# Patient Record
Sex: FEMALE | Race: OTHER | Employment: OTHER | URBAN - METROPOLITAN AREA
[De-identification: names, ages, dates, MRNs, and addresses within clinical notes are randomized per-mention and may not be internally consistent; named-entity substitution may affect disease eponyms.]

---

## 2019-11-04 ENCOUNTER — HOSPITAL ENCOUNTER (EMERGENCY)
Age: 54
Discharge: HOME | End: 2019-11-04
Attending: EMERGENCY MEDICINE
Payer: MEDICAID

## 2019-11-04 VITALS
TEMPERATURE: 98 F | HEIGHT: 67 IN | DIASTOLIC BLOOD PRESSURE: 71 MMHG | HEART RATE: 74 BPM | BODY MASS INDEX: 37.83 KG/M2 | SYSTOLIC BLOOD PRESSURE: 138 MMHG | WEIGHT: 241 LBS | RESPIRATION RATE: 16 BRPM | OXYGEN SATURATION: 98 %

## 2019-11-04 DIAGNOSIS — C54.1 ENDOMETRIAL CANCER (HCC): ICD-10-CM

## 2019-11-04 DIAGNOSIS — N93.9 VAGINAL BLEEDING: Primary | ICD-10-CM

## 2019-11-04 LAB
ALBUMIN SERPL-MCNC: 3.4 G/DL (ref 3.5–5)
ALBUMIN/GLOB SERPL: 0.9 {RATIO} (ref 1.2–3.5)
ALP SERPL-CCNC: 168 U/L (ref 50–130)
ALT SERPL-CCNC: 23 U/L (ref 12–65)
ANION GAP SERPL CALC-SCNC: 5 MMOL/L (ref 7–16)
APTT PPP: 27.9 SEC (ref 24.7–39.8)
AST SERPL-CCNC: 24 U/L (ref 15–37)
BASOPHILS # BLD: 0 K/UL (ref 0–0.2)
BASOPHILS NFR BLD: 0 % (ref 0–2)
BILIRUB SERPL-MCNC: 0.6 MG/DL (ref 0.2–1.1)
BUN SERPL-MCNC: 15 MG/DL (ref 6–23)
CALCIUM SERPL-MCNC: 8.8 MG/DL (ref 8.3–10.4)
CHLORIDE SERPL-SCNC: 108 MMOL/L (ref 98–107)
CO2 SERPL-SCNC: 28 MMOL/L (ref 21–32)
CREAT SERPL-MCNC: 0.84 MG/DL (ref 0.6–1)
DIFFERENTIAL METHOD BLD: ABNORMAL
EOSINOPHIL # BLD: 0.5 K/UL (ref 0–0.8)
EOSINOPHIL NFR BLD: 6 % (ref 0.5–7.8)
ERYTHROCYTE [DISTWIDTH] IN BLOOD BY AUTOMATED COUNT: 15.2 % (ref 11.9–14.6)
GLOBULIN SER CALC-MCNC: 3.7 G/DL (ref 2.3–3.5)
GLUCOSE SERPL-MCNC: 101 MG/DL (ref 65–100)
HCT VFR BLD AUTO: 29.3 % (ref 35.8–46.3)
HGB BLD-MCNC: 8.6 G/DL (ref 11.7–15.4)
IMM GRANULOCYTES # BLD AUTO: 0 K/UL (ref 0–0.5)
IMM GRANULOCYTES NFR BLD AUTO: 0 % (ref 0–5)
INR PPP: 1
LYMPHOCYTES # BLD: 2.5 K/UL (ref 0.5–4.6)
LYMPHOCYTES NFR BLD: 34 % (ref 13–44)
MAGNESIUM SERPL-MCNC: 2.2 MG/DL (ref 1.8–2.4)
MCH RBC QN AUTO: 22.6 PG (ref 26.1–32.9)
MCHC RBC AUTO-ENTMCNC: 29.4 G/DL (ref 31.4–35)
MCV RBC AUTO: 76.9 FL (ref 79.6–97.8)
MONOCYTES # BLD: 0.7 K/UL (ref 0.1–1.3)
MONOCYTES NFR BLD: 10 % (ref 4–12)
NEUTS SEG # BLD: 3.7 K/UL (ref 1.7–8.2)
NEUTS SEG NFR BLD: 50 % (ref 43–78)
NRBC # BLD: 0 K/UL (ref 0–0.2)
PLATELET # BLD AUTO: 230 K/UL (ref 150–450)
PMV BLD AUTO: 11 FL (ref 9.4–12.3)
POTASSIUM SERPL-SCNC: 4 MMOL/L (ref 3.5–5.1)
PROT SERPL-MCNC: 7.1 G/DL (ref 6.3–8.2)
PROTHROMBIN TIME: 13.8 SEC (ref 11.7–14.5)
RBC # BLD AUTO: 3.81 M/UL (ref 4.05–5.2)
SODIUM SERPL-SCNC: 141 MMOL/L (ref 136–145)
WBC # BLD AUTO: 7.4 K/UL (ref 4.3–11.1)

## 2019-11-04 PROCEDURE — 85025 COMPLETE CBC W/AUTO DIFF WBC: CPT

## 2019-11-04 PROCEDURE — 81003 URINALYSIS AUTO W/O SCOPE: CPT | Performed by: EMERGENCY MEDICINE

## 2019-11-04 PROCEDURE — 85730 THROMBOPLASTIN TIME PARTIAL: CPT

## 2019-11-04 PROCEDURE — 99284 EMERGENCY DEPT VISIT MOD MDM: CPT | Performed by: EMERGENCY MEDICINE

## 2019-11-04 PROCEDURE — 83735 ASSAY OF MAGNESIUM: CPT

## 2019-11-04 PROCEDURE — 85610 PROTHROMBIN TIME: CPT

## 2019-11-04 PROCEDURE — 80053 COMPREHEN METABOLIC PANEL: CPT

## 2019-11-04 RX ORDER — TRANEXAMIC ACID 650 1/1
1300 TABLET ORAL 3 TIMES DAILY
Qty: 30 TAB | Refills: 0 | Status: SHIPPED | OUTPATIENT
Start: 2019-11-04 | End: 2019-11-09

## 2019-11-04 RX ORDER — MEDROXYPROGESTERONE ACETATE 10 MG/1
20 TABLET ORAL DAILY
Qty: 10 TAB | Refills: 0 | Status: SHIPPED | OUTPATIENT
Start: 2019-11-04 | End: 2019-11-08 | Stop reason: SDUPTHER

## 2019-11-04 NOTE — ED NOTES
I have reviewed discharge instructions with the patient. The patient verbalized understanding. Patient left ED via Discharge Method: ambulatory to Home with family. Opportunity for questions and clarification provided. Patient given 2 scripts. To continue your aftercare when you leave the hospital, you may receive an automated call from our care team to check in on how you are doing. This is a free service and part of our promise to provide the best care and service to meet your aftercare needs.  If you have questions, or wish to unsubscribe from this service please call [phone]. Thank you for Choosing our [facility].

## 2019-11-04 NOTE — ED PROVIDER NOTES
Patient presents emerged from complaining of heavy vaginal bleeding lightheadedness and crampy pelvic pain. Patient was recently seen at a facility in another state, [place], but is moving to [place]. She is scheduled to see a gynecological oncologist, Dr. Rosie Greco, this coming Friday as her endometrial biopsy was positive for cancer. She also is noted to have fibroids. She has been bleeding for several weeks and was on Provera prior to having a D&C done last week. She is concerned because the bleeding has not slowed down. She has been taking aspirin and naproxen for chronic back pain she has Ultram for other pain management states she has not been taking that. The history is provided by the patient. Vaginal Bleeding   This is a new problem. The current episode started more than 1 week ago. The problem occurs constantly. The problem has not changed since onset. Nothing aggravates the symptoms. Nothing relieves the symptoms. She has tried nothing for the symptoms. The treatment provided no relief. No past medical history on file. No past surgical history on file. No family history on file.     Social History     Socioeconomic History    Marital status: SINGLE     Spouse name: Not on file    Number of children: Not on file    Years of education: Not on file    Highest education level: Not on file   Occupational History    Not on file   Social Needs    Financial resource strain: Not on file    Food insecurity:     Worry: Not on file     Inability: Not on file    Transportation needs:     Medical: Not on file     Non-medical: Not on file   Tobacco Use    Smoking status: Not on file   Substance and Sexual Activity    Alcohol use: Not on file    Drug use: Not on file    Sexual activity: Not on file   Lifestyle    Physical activity:     Days per week: Not on file     Minutes per session: Not on file    Stress: Not on file   Relationships    Social connections:     Talks on phone: Not on file     Gets together: Not on file     Attends Advent service: Not on file     Active member of club or organization: Not on file     Attends meetings of clubs or organizations: Not on file     Relationship status: Not on file    Intimate partner violence:     Fear of current or ex partner: Not on file     Emotionally abused: Not on file     Physically abused: Not on file     Forced sexual activity: Not on file   Other Topics Concern    Not on file   Social History Narrative    Not on file         ALLERGIES: Cardizem [diltiazem hcl]    Review of Systems   Genitourinary: Positive for vaginal bleeding. All other systems reviewed and are negative. Vitals:    11/04/19 1329   BP: (!) 169/106   Pulse: 83   Resp: 16   Temp: 98 °F (36.7 °C)   SpO2: 98%   Weight: 109.3 kg (241 lb)   Height: 5' 7\" (1.702 m)            Physical Exam   Constitutional: She is oriented to person, place, and time. She appears well-developed and well-nourished. HENT:   Head: Normocephalic and atraumatic. Eyes: Pupils are equal, round, and reactive to light. Conjunctivae and EOM are normal.   Neck: Normal range of motion. Cardiovascular: Normal rate and regular rhythm. Pulmonary/Chest: Effort normal and breath sounds normal.   Abdominal: Soft. Bowel sounds are normal.   Musculoskeletal: Normal range of motion. Neurological: She is alert and oriented to person, place, and time. Skin: Skin is warm and dry. Capillary refill takes less than 2 seconds. Psychiatric: She has a normal mood and affect. Her behavior is normal.   Nursing note and vitals reviewed.        MDM  Number of Diagnoses or Management Options     Amount and/or Complexity of Data Reviewed  Clinical lab tests: ordered and reviewed  Independent visualization of images, tracings, or specimens: yes    Risk of Complications, Morbidity, and/or Mortality  Presenting problems: moderate  Diagnostic procedures: moderate  Management options: moderate    Patient Progress  Patient progress: stable         Procedures

## 2019-11-04 NOTE — ED TRIAGE NOTES
Pt ambulatory to triage without complications. Pt states she had D&C last week for heavy bleeding. Pt also had US and states she has uterine fibroids. Pt has paper results with her today. Pt states they did biopsy and states there was some CA found. Pt states she is to see an oncologist. Pt states she has been bleeding heavily since the [facility]  and was told that the bleeding would decrease after a few days. Pt states she is fully saturating 2-3 pads per hour since 2100 last night.  Pt also reports urinary frequency

## 2019-11-06 ENCOUNTER — HOSPITAL ENCOUNTER (OUTPATIENT)
Dept: LAB | Age: 54
Discharge: HOME | End: 2019-11-06
Payer: MEDICAID

## 2019-11-06 DIAGNOSIS — N93.9 VAGINAL BLEEDING: ICD-10-CM

## 2019-11-06 LAB
HCT VFR BLD AUTO: 29.5 % (ref 35.8–46.3)
HGB BLD-MCNC: 8.9 G/DL (ref 11.7–15.4)

## 2019-11-06 PROCEDURE — 85018 HEMOGLOBIN: CPT

## 2019-11-06 PROCEDURE — 36415 COLL VENOUS BLD VENIPUNCTURE: CPT

## 2019-11-08 ENCOUNTER — HOSPITAL ENCOUNTER (OUTPATIENT)
Dept: LAB | Age: 54
Discharge: HOME | End: 2019-11-08
Payer: MEDICAID

## 2019-11-08 DIAGNOSIS — N93.9 VAGINAL BLEEDING: ICD-10-CM

## 2019-11-08 PROBLEM — I10 ESSENTIAL HYPERTENSION: Status: ACTIVE | Noted: 2019-11-08

## 2019-11-08 PROBLEM — E66.01 OBESITY, MORBID (HCC): Status: ACTIVE | Noted: 2019-11-08

## 2019-11-08 PROBLEM — C54.1 ENDOMETRIAL CANCER (HCC): Status: ACTIVE | Noted: 2019-11-08

## 2019-11-08 PROBLEM — E05.90 HYPERTHYROIDISM: Status: ACTIVE | Noted: 2019-11-08

## 2019-11-08 PROBLEM — I48.20 CHRONIC ATRIAL FIBRILLATION (HCC): Status: ACTIVE | Noted: 2019-11-08

## 2019-11-08 LAB
HCT VFR BLD AUTO: 28.9 % (ref 35.8–46.3)
HGB BLD-MCNC: 8.7 G/DL (ref 11.7–15.4)

## 2019-11-08 PROCEDURE — 85018 HEMOGLOBIN: CPT

## 2019-11-08 PROCEDURE — 36415 COLL VENOUS BLD VENIPUNCTURE: CPT

## 2019-11-12 PROBLEM — K80.20 SYMPTOMATIC CHOLELITHIASIS: Status: ACTIVE | Noted: 2019-11-12

## 2019-11-22 ENCOUNTER — HOSPITAL ENCOUNTER (OUTPATIENT)
Dept: SURGERY | Age: 54
Discharge: HOME | End: 2019-11-22
Payer: COMMERCIAL

## 2019-11-22 VITALS
HEIGHT: 66 IN | TEMPERATURE: 98.1 F | OXYGEN SATURATION: 97 % | SYSTOLIC BLOOD PRESSURE: 160 MMHG | WEIGHT: 247 LBS | HEART RATE: 77 BPM | BODY MASS INDEX: 39.7 KG/M2 | DIASTOLIC BLOOD PRESSURE: 104 MMHG | RESPIRATION RATE: 16 BRPM

## 2019-11-22 LAB
CREAT SERPL-MCNC: 0.76 MG/DL (ref 0.6–1)
HGB BLD-MCNC: 9.9 G/DL (ref 11.7–15.4)
POTASSIUM SERPL-SCNC: 4.2 MMOL/L (ref 3.5–5.1)

## 2019-11-22 PROCEDURE — 82565 ASSAY OF CREATININE: CPT

## 2019-11-22 PROCEDURE — 85018 HEMOGLOBIN: CPT

## 2019-11-22 PROCEDURE — 84132 ASSAY OF SERUM POTASSIUM: CPT

## 2019-11-22 RX ORDER — BENZONATATE 100 MG/1
100 CAPSULE ORAL AS NEEDED
COMMUNITY
End: 2022-02-28 | Stop reason: ALTCHOICE

## 2019-11-22 RX ORDER — SODIUM CHLORIDE 0.9 % (FLUSH) 0.9 %
5-40 SYRINGE (ML) INJECTION EVERY 8 HOURS
Status: CANCELLED | OUTPATIENT
Start: 2019-11-22

## 2019-11-22 RX ORDER — ALBUTEROL SULFATE 90 UG/1
AEROSOL, METERED RESPIRATORY (INHALATION)
COMMUNITY
End: 2022-04-15 | Stop reason: SDUPTHER

## 2019-11-22 RX ORDER — SODIUM CHLORIDE 0.9 % (FLUSH) 0.9 %
5-40 SYRINGE (ML) INJECTION AS NEEDED
Status: CANCELLED | OUTPATIENT
Start: 2019-11-22

## 2019-11-22 NOTE — PERIOP NOTES
Attempted to contact Dr. Alexander Matos office to inform that pt will be continuing aspirin 81 mg-no answer.

## 2019-11-22 NOTE — PERIOP NOTES
Informed Dr. Wong Serum of pt has been instructed to hold aspirin 81 mg and cardiology has given clearance to hold. ststed wants pt to continue aspirin 81 mg . Pt instructed and verbalized understanding. Also informed him of pt has had an issue with difficult intubation.  Ok to proceed

## 2019-11-22 NOTE — PERIOP NOTES
Patient verified name and . Patient provided medical/health information and PTA medications to the best of their ability. TYPE  CASE:2  Order for consent not found in EHR at time of PAT   Labs per surgeon:none. Results: none  Labs per anesthesia protocol: hgb,potassium,creatinine,type and scree-dos. Results -  EKG  :      Patient provided with and instructed on education handouts including Guide to Surgery, blood transfusions, pain management, and hand hygiene for the family and community, and [facility] brochure.     hibiclens and instructions given per hospital policy. Instructed patient to continue previous medications as prescribed prior to surgery unless otherwise directed and to take the following medications the day of surgery according to anesthesia guidelines : medroprogesterone,methimazole,albuterol as needed,aspirin 81 mg . Instructed patient to hold  the following medications: vit d, naprosyn. Original medication prescription bottles none visualized during patient appointment. Patient teach back successful and patient demonstrates knowledge of instruction.

## 2019-11-24 ENCOUNTER — ANESTHESIA EVENT (OUTPATIENT)
Dept: SURGERY | Age: 54
End: 2019-11-24
Payer: MEDICAID

## 2019-11-26 ENCOUNTER — HOSPITAL ENCOUNTER (OUTPATIENT)
Age: 54
Discharge: HOME | End: 2019-11-27
Attending: OBSTETRICS & GYNECOLOGY | Admitting: OBSTETRICS & GYNECOLOGY
Payer: MEDICAID

## 2019-11-26 ENCOUNTER — ANESTHESIA (OUTPATIENT)
Dept: SURGERY | Age: 54
End: 2019-11-26
Payer: MEDICAID

## 2019-11-26 DIAGNOSIS — C54.1 ENDOMETRIAL CANCER (HCC): Primary | ICD-10-CM

## 2019-11-26 LAB
ABO + RH BLD: NORMAL
BLOOD GROUP ANTIBODIES SERPL: NORMAL
HCG UR QL: NEGATIVE
HCT VFR BLD AUTO: 37.5 % (ref 35.8–46.3)
HGB BLD-MCNC: 10.7 G/DL (ref 11.7–15.4)
SPECIMEN EXP DATE BLD: NORMAL

## 2019-11-26 PROCEDURE — 77030037088 HC TUBE ENDOTRACH ORAL NSL COVD-A: Performed by: ANESTHESIOLOGY

## 2019-11-26 PROCEDURE — 77030003029 HC SUT VCRL J&J -B: Performed by: OBSTETRICS & GYNECOLOGY

## 2019-11-26 PROCEDURE — 77030003028 HC SUT VCRL J&J -A: Performed by: OBSTETRICS & GYNECOLOGY

## 2019-11-26 PROCEDURE — 74011250636 HC RX REV CODE- 250/636: Performed by: ANESTHESIOLOGY

## 2019-11-26 PROCEDURE — 77030031139 HC SUT VCRL2 J&J -A: Performed by: OBSTETRICS & GYNECOLOGY

## 2019-11-26 PROCEDURE — 74011000250 HC RX REV CODE- 250: Performed by: NURSE ANESTHETIST, CERTIFIED REGISTERED

## 2019-11-26 PROCEDURE — 88331 PATH CONSLTJ SURG 1 BLK 1SPC: CPT

## 2019-11-26 PROCEDURE — 81025 URINE PREGNANCY TEST: CPT

## 2019-11-26 PROCEDURE — 77030027138 HC INCENT SPIROMETER -A

## 2019-11-26 PROCEDURE — 94640 AIRWAY INHALATION TREATMENT: CPT

## 2019-11-26 PROCEDURE — 77030018875 HC APPL CLP LIG4 J&J -B: Performed by: OBSTETRICS & GYNECOLOGY

## 2019-11-26 PROCEDURE — 87521 HEPATITIS C PROBE&RVRS TRNSC: CPT

## 2019-11-26 PROCEDURE — 77030010545: Performed by: OBSTETRICS & GYNECOLOGY

## 2019-11-26 PROCEDURE — 77030027744 HC PWDR HEMSTAT ARISTA ABSRB 5GM BARD -D: Performed by: OBSTETRICS & GYNECOLOGY

## 2019-11-26 PROCEDURE — 77030009852 HC PCH RTVR ENDOSC COVD -B: Performed by: OBSTETRICS & GYNECOLOGY

## 2019-11-26 PROCEDURE — 74011000250 HC RX REV CODE- 250: Performed by: OBSTETRICS & GYNECOLOGY

## 2019-11-26 PROCEDURE — 76010000879 HC OR TIME 3.5 TO 4HR INTENSV - TIER 2: Performed by: OBSTETRICS & GYNECOLOGY

## 2019-11-26 PROCEDURE — 77030037892: Performed by: OBSTETRICS & GYNECOLOGY

## 2019-11-26 PROCEDURE — 77030027743 HC APPL F/HEMSTAT BARD -B: Performed by: OBSTETRICS & GYNECOLOGY

## 2019-11-26 PROCEDURE — 77030020703 HC SEAL CANN DISP INTU -B: Performed by: OBSTETRICS & GYNECOLOGY

## 2019-11-26 PROCEDURE — 77030039425 HC BLD LARYNG TRULITE DISP TELE -A: Performed by: ANESTHESIOLOGY

## 2019-11-26 PROCEDURE — 77010033678 HC OXYGEN DAILY

## 2019-11-26 PROCEDURE — 77030022704 HC SUT VLOC COVD -B: Performed by: OBSTETRICS & GYNECOLOGY

## 2019-11-26 PROCEDURE — 77030040361 HC SLV COMPR DVT MDII -B: Performed by: OBSTETRICS & GYNECOLOGY

## 2019-11-26 PROCEDURE — 88305 TISSUE EXAM BY PATHOLOGIST: CPT

## 2019-11-26 PROCEDURE — 77030021678 HC GLIDESCP STAT DISP VERT -B: Performed by: ANESTHESIOLOGY

## 2019-11-26 PROCEDURE — 77030000038 HC TIP SCIS LAPSCP SURI -B: Performed by: OBSTETRICS & GYNECOLOGY

## 2019-11-26 PROCEDURE — 77030016151 HC PROTCTR LNS DFOG COVD -B: Performed by: OBSTETRICS & GYNECOLOGY

## 2019-11-26 PROCEDURE — 77030003575 HC NDL INSUF ENDOPTH J&J -B: Performed by: OBSTETRICS & GYNECOLOGY

## 2019-11-26 PROCEDURE — 88304 TISSUE EXAM BY PATHOLOGIST: CPT

## 2019-11-26 PROCEDURE — 85018 HEMOGLOBIN: CPT

## 2019-11-26 PROCEDURE — 77030039266 HC ADH SKN EXOFIN S2SG -A: Performed by: OBSTETRICS & GYNECOLOGY

## 2019-11-26 PROCEDURE — 74011250636 HC RX REV CODE- 250/636: Performed by: NURSE ANESTHETIST, CERTIFIED REGISTERED

## 2019-11-26 PROCEDURE — 77030021158 HC TRCR BLN GELPRT AMR -B: Performed by: OBSTETRICS & GYNECOLOGY

## 2019-11-26 PROCEDURE — 86900 BLOOD TYPING SEROLOGIC ABO: CPT

## 2019-11-26 PROCEDURE — 87340 HEPATITIS B SURFACE AG IA: CPT

## 2019-11-26 PROCEDURE — 74011250637 HC RX REV CODE- 250/637: Performed by: OBSTETRICS & GYNECOLOGY

## 2019-11-26 PROCEDURE — 74011250636 HC RX REV CODE- 250/636: Performed by: OBSTETRICS & GYNECOLOGY

## 2019-11-26 PROCEDURE — 77030012770 HC TRCR OPT FX AMR -B: Performed by: OBSTETRICS & GYNECOLOGY

## 2019-11-26 PROCEDURE — 88307 TISSUE EXAM BY PATHOLOGIST: CPT

## 2019-11-26 PROCEDURE — 74011250637 HC RX REV CODE- 250/637: Performed by: ANESTHESIOLOGY

## 2019-11-26 PROCEDURE — 77030008756 HC TU IRR SUC STRY -B: Performed by: OBSTETRICS & GYNECOLOGY

## 2019-11-26 PROCEDURE — 88112 CYTOPATH CELL ENHANCE TECH: CPT

## 2019-11-26 PROCEDURE — 76060000038 HC ANESTHESIA 3.5 TO 4 HR: Performed by: OBSTETRICS & GYNECOLOGY

## 2019-11-26 PROCEDURE — 77030019908 HC STETH ESOPH SIMS -A: Performed by: NURSE ANESTHETIST, CERTIFIED REGISTERED

## 2019-11-26 PROCEDURE — 94760 N-INVAS EAR/PLS OXIMETRY 1: CPT

## 2019-11-26 PROCEDURE — 76210000006 HC OR PH I REC 0.5 TO 1 HR: Performed by: OBSTETRICS & GYNECOLOGY

## 2019-11-26 PROCEDURE — 74011250636 HC RX REV CODE- 250/636: Performed by: SURGERY

## 2019-11-26 PROCEDURE — 77030035277 HC OBTRTR BLDELSS DISP INTU -B: Performed by: OBSTETRICS & GYNECOLOGY

## 2019-11-26 PROCEDURE — 77030005518 HC CATH URETH FOL 2W BARD -B: Performed by: OBSTETRICS & GYNECOLOGY

## 2019-11-26 PROCEDURE — 77030018846 HC SOL IRR STRL H20 ICUM -A: Performed by: OBSTETRICS & GYNECOLOGY

## 2019-11-26 PROCEDURE — 77030018836 HC SOL IRR NACL ICUM -A: Performed by: OBSTETRICS & GYNECOLOGY

## 2019-11-26 PROCEDURE — 77030008522 HC TBNG INSUF LAPRO STRY -B: Performed by: OBSTETRICS & GYNECOLOGY

## 2019-11-26 PROCEDURE — 77030018778 HC MANIP UTER VCAR CNMD -B: Performed by: OBSTETRICS & GYNECOLOGY

## 2019-11-26 RX ORDER — ONDANSETRON 4 MG/1
4 TABLET, ORALLY DISINTEGRATING ORAL
Qty: 5 TAB | Refills: 2 | Status: SHIPPED | OUTPATIENT
Start: 2019-11-26 | End: 2019-12-12 | Stop reason: ALTCHOICE

## 2019-11-26 RX ORDER — ALBUTEROL SULFATE 0.83 MG/ML
2.5 SOLUTION RESPIRATORY (INHALATION)
Status: DISCONTINUED | OUTPATIENT
Start: 2019-11-26 | End: 2019-11-27 | Stop reason: HOSPADM

## 2019-11-26 RX ORDER — NALOXONE HYDROCHLORIDE 0.4 MG/ML
0.4 INJECTION, SOLUTION INTRAMUSCULAR; INTRAVENOUS; SUBCUTANEOUS AS NEEDED
Status: DISCONTINUED | OUTPATIENT
Start: 2019-11-26 | End: 2019-11-27 | Stop reason: HOSPADM

## 2019-11-26 RX ORDER — NEOSTIGMINE METHYLSULFATE 1 MG/ML
INJECTION, SOLUTION INTRAVENOUS AS NEEDED
Status: DISCONTINUED | OUTPATIENT
Start: 2019-11-26 | End: 2019-11-26 | Stop reason: HOSPADM

## 2019-11-26 RX ORDER — FLUMAZENIL 0.1 MG/ML
0.2 INJECTION INTRAVENOUS AS NEEDED
Status: DISCONTINUED | OUTPATIENT
Start: 2019-11-26 | End: 2019-11-26 | Stop reason: HOSPADM

## 2019-11-26 RX ORDER — IBUPROFEN 200 MG
200 TABLET ORAL
Status: DISCONTINUED | OUTPATIENT
Start: 2019-11-26 | End: 2019-11-27 | Stop reason: HOSPADM

## 2019-11-26 RX ORDER — SODIUM CHLORIDE 0.9 % (FLUSH) 0.9 %
5-40 SYRINGE (ML) INJECTION EVERY 8 HOURS
Status: DISCONTINUED | OUTPATIENT
Start: 2019-11-26 | End: 2019-11-27 | Stop reason: HOSPADM

## 2019-11-26 RX ORDER — OXYCODONE HYDROCHLORIDE 5 MG/1
5 TABLET ORAL
Status: DISCONTINUED | OUTPATIENT
Start: 2019-11-26 | End: 2019-11-27 | Stop reason: HOSPADM

## 2019-11-26 RX ORDER — FENTANYL CITRATE 50 UG/ML
INJECTION, SOLUTION INTRAMUSCULAR; INTRAVENOUS AS NEEDED
Status: DISCONTINUED | OUTPATIENT
Start: 2019-11-26 | End: 2019-11-26 | Stop reason: HOSPADM

## 2019-11-26 RX ORDER — SODIUM CHLORIDE 0.9 % (FLUSH) 0.9 %
5-40 SYRINGE (ML) INJECTION AS NEEDED
Status: DISCONTINUED | OUTPATIENT
Start: 2019-11-26 | End: 2019-11-26 | Stop reason: HOSPADM

## 2019-11-26 RX ORDER — OXYCODONE HYDROCHLORIDE 5 MG/1
5 TABLET ORAL
Status: DISCONTINUED | OUTPATIENT
Start: 2019-11-26 | End: 2019-11-26 | Stop reason: HOSPADM

## 2019-11-26 RX ORDER — ONDANSETRON 2 MG/ML
4 INJECTION INTRAMUSCULAR; INTRAVENOUS
Status: DISCONTINUED | OUTPATIENT
Start: 2019-11-26 | End: 2019-11-27 | Stop reason: HOSPADM

## 2019-11-26 RX ORDER — PANTOPRAZOLE SODIUM 40 MG/1
40 TABLET, DELAYED RELEASE ORAL
Status: DISCONTINUED | OUTPATIENT
Start: 2019-11-27 | End: 2019-11-27 | Stop reason: HOSPADM

## 2019-11-26 RX ORDER — SODIUM CHLORIDE 0.9 % (FLUSH) 0.9 %
5-40 SYRINGE (ML) INJECTION AS NEEDED
Status: DISCONTINUED | OUTPATIENT
Start: 2019-11-26 | End: 2019-11-27 | Stop reason: HOSPADM

## 2019-11-26 RX ORDER — GLYCOPYRROLATE 0.2 MG/ML
INJECTION INTRAMUSCULAR; INTRAVENOUS AS NEEDED
Status: DISCONTINUED | OUTPATIENT
Start: 2019-11-26 | End: 2019-11-26 | Stop reason: HOSPADM

## 2019-11-26 RX ORDER — SODIUM CHLORIDE, SODIUM LACTATE, POTASSIUM CHLORIDE, CALCIUM CHLORIDE 600; 310; 30; 20 MG/100ML; MG/100ML; MG/100ML; MG/100ML
INJECTION, SOLUTION INTRAVENOUS
Status: DISCONTINUED | OUTPATIENT
Start: 2019-11-26 | End: 2019-11-26 | Stop reason: HOSPADM

## 2019-11-26 RX ORDER — AMOXICILLIN 250 MG
1 CAPSULE ORAL DAILY
Qty: 30 TAB | Refills: 1 | Status: SHIPPED | OUTPATIENT
Start: 2019-11-26 | End: 2020-03-05

## 2019-11-26 RX ORDER — SODIUM CHLORIDE 0.9 % (FLUSH) 0.9 %
5-40 SYRINGE (ML) INJECTION EVERY 8 HOURS
Status: DISCONTINUED | OUTPATIENT
Start: 2019-11-26 | End: 2019-11-26 | Stop reason: HOSPADM

## 2019-11-26 RX ORDER — SODIUM CHLORIDE, SODIUM LACTATE, POTASSIUM CHLORIDE, CALCIUM CHLORIDE 600; 310; 30; 20 MG/100ML; MG/100ML; MG/100ML; MG/100ML
75 INJECTION, SOLUTION INTRAVENOUS CONTINUOUS
Status: DISCONTINUED | OUTPATIENT
Start: 2019-11-26 | End: 2019-11-26 | Stop reason: HOSPADM

## 2019-11-26 RX ORDER — ONDANSETRON 4 MG/1
4 TABLET, ORALLY DISINTEGRATING ORAL
Status: DISCONTINUED | OUTPATIENT
Start: 2019-11-26 | End: 2019-11-27 | Stop reason: HOSPADM

## 2019-11-26 RX ORDER — OXYCODONE HYDROCHLORIDE 5 MG/1
10 TABLET ORAL
Status: DISCONTINUED | OUTPATIENT
Start: 2019-11-26 | End: 2019-11-26 | Stop reason: HOSPADM

## 2019-11-26 RX ORDER — AMOXICILLIN 250 MG
2 CAPSULE ORAL DAILY
Status: DISCONTINUED | OUTPATIENT
Start: 2019-11-27 | End: 2019-11-27 | Stop reason: HOSPADM

## 2019-11-26 RX ORDER — CEFAZOLIN SODIUM/WATER 2 G/20 ML
2 SYRINGE (ML) INTRAVENOUS EVERY 8 HOURS
Status: COMPLETED | OUTPATIENT
Start: 2019-11-26 | End: 2019-11-27

## 2019-11-26 RX ORDER — ACETAMINOPHEN 10 MG/ML
1000 INJECTION, SOLUTION INTRAVENOUS ONCE
Status: COMPLETED | OUTPATIENT
Start: 2019-11-26 | End: 2019-11-26

## 2019-11-26 RX ORDER — BUPIVACAINE HYDROCHLORIDE 5 MG/ML
INJECTION, SOLUTION EPIDURAL; INTRACAUDAL AS NEEDED
Status: DISCONTINUED | OUTPATIENT
Start: 2019-11-26 | End: 2019-11-26 | Stop reason: HOSPADM

## 2019-11-26 RX ORDER — DIPHENHYDRAMINE HYDROCHLORIDE 50 MG/ML
12.5 INJECTION, SOLUTION INTRAMUSCULAR; INTRAVENOUS
Status: DISCONTINUED | OUTPATIENT
Start: 2019-11-26 | End: 2019-11-26 | Stop reason: HOSPADM

## 2019-11-26 RX ORDER — ESMOLOL HYDROCHLORIDE 10 MG/ML
INJECTION INTRAVENOUS AS NEEDED
Status: DISCONTINUED | OUTPATIENT
Start: 2019-11-26 | End: 2019-11-26 | Stop reason: HOSPADM

## 2019-11-26 RX ORDER — METHIMAZOLE 5 MG/1
10 TABLET ORAL 2 TIMES DAILY
Status: DISCONTINUED | OUTPATIENT
Start: 2019-11-26 | End: 2019-11-27 | Stop reason: HOSPADM

## 2019-11-26 RX ORDER — ROCURONIUM BROMIDE 10 MG/ML
INJECTION, SOLUTION INTRAVENOUS AS NEEDED
Status: DISCONTINUED | OUTPATIENT
Start: 2019-11-26 | End: 2019-11-26 | Stop reason: HOSPADM

## 2019-11-26 RX ORDER — CEFAZOLIN SODIUM/WATER 2 G/20 ML
2 SYRINGE (ML) INTRAVENOUS ONCE
Status: COMPLETED | OUTPATIENT
Start: 2019-11-26 | End: 2019-11-26

## 2019-11-26 RX ORDER — BENZONATATE 100 MG/1
100 CAPSULE ORAL
Status: DISCONTINUED | OUTPATIENT
Start: 2019-11-26 | End: 2019-11-27 | Stop reason: HOSPADM

## 2019-11-26 RX ORDER — PROCHLORPERAZINE MALEATE 5 MG
5 TABLET ORAL
Status: DISCONTINUED | OUTPATIENT
Start: 2019-11-26 | End: 2019-11-27 | Stop reason: HOSPADM

## 2019-11-26 RX ORDER — CEFAZOLIN SODIUM/WATER 2 G/20 ML
2 SYRINGE (ML) INTRAVENOUS EVERY 8 HOURS
Status: DISCONTINUED | OUTPATIENT
Start: 2019-11-26 | End: 2019-11-26

## 2019-11-26 RX ORDER — DEXAMETHASONE SODIUM PHOSPHATE 4 MG/ML
INJECTION, SOLUTION INTRA-ARTICULAR; INTRALESIONAL; INTRAMUSCULAR; INTRAVENOUS; SOFT TISSUE AS NEEDED
Status: DISCONTINUED | OUTPATIENT
Start: 2019-11-26 | End: 2019-11-26 | Stop reason: HOSPADM

## 2019-11-26 RX ORDER — NALOXONE HYDROCHLORIDE 0.4 MG/ML
0.1 INJECTION, SOLUTION INTRAMUSCULAR; INTRAVENOUS; SUBCUTANEOUS
Status: DISCONTINUED | OUTPATIENT
Start: 2019-11-26 | End: 2019-11-26 | Stop reason: HOSPADM

## 2019-11-26 RX ORDER — GABAPENTIN 300 MG/1
600 CAPSULE ORAL ONCE
Status: COMPLETED | OUTPATIENT
Start: 2019-11-26 | End: 2019-11-26

## 2019-11-26 RX ORDER — PROPOFOL 10 MG/ML
INJECTION, EMULSION INTRAVENOUS AS NEEDED
Status: DISCONTINUED | OUTPATIENT
Start: 2019-11-26 | End: 2019-11-26 | Stop reason: HOSPADM

## 2019-11-26 RX ORDER — OXYCODONE HYDROCHLORIDE 5 MG/1
5 TABLET ORAL
Qty: 25 TAB | Refills: 0 | Status: SHIPPED | OUTPATIENT
Start: 2019-11-26 | End: 2019-11-29

## 2019-11-26 RX ORDER — DIPHENHYDRAMINE HYDROCHLORIDE 50 MG/ML
12.5 INJECTION, SOLUTION INTRAMUSCULAR; INTRAVENOUS
Status: DISCONTINUED | OUTPATIENT
Start: 2019-11-26 | End: 2019-11-27 | Stop reason: HOSPADM

## 2019-11-26 RX ORDER — LIDOCAINE HYDROCHLORIDE 20 MG/ML
INJECTION, SOLUTION EPIDURAL; INFILTRATION; INTRACAUDAL; PERINEURAL AS NEEDED
Status: DISCONTINUED | OUTPATIENT
Start: 2019-11-26 | End: 2019-11-26 | Stop reason: HOSPADM

## 2019-11-26 RX ORDER — HYDROCHLOROTHIAZIDE 25 MG/1
25 TABLET ORAL DAILY
Status: DISCONTINUED | OUTPATIENT
Start: 2019-11-27 | End: 2019-11-27 | Stop reason: HOSPADM

## 2019-11-26 RX ORDER — LIDOCAINE HYDROCHLORIDE 10 MG/ML
0.1 INJECTION INFILTRATION; PERINEURAL AS NEEDED
Status: DISCONTINUED | OUTPATIENT
Start: 2019-11-26 | End: 2019-11-26 | Stop reason: HOSPADM

## 2019-11-26 RX ORDER — OXYCODONE HYDROCHLORIDE 5 MG/1
5 TABLET ORAL ONCE
Status: COMPLETED | OUTPATIENT
Start: 2019-11-26 | End: 2019-11-26

## 2019-11-26 RX ORDER — HEPARIN SODIUM 5000 [USP'U]/ML
5000 INJECTION, SOLUTION INTRAVENOUS; SUBCUTANEOUS ONCE
Status: COMPLETED | OUTPATIENT
Start: 2019-11-26 | End: 2019-11-26

## 2019-11-26 RX ORDER — HYDROMORPHONE HYDROCHLORIDE 2 MG/ML
0.5 INJECTION, SOLUTION INTRAMUSCULAR; INTRAVENOUS; SUBCUTANEOUS
Status: COMPLETED | OUTPATIENT
Start: 2019-11-26 | End: 2019-11-26

## 2019-11-26 RX ORDER — ONDANSETRON 2 MG/ML
INJECTION INTRAMUSCULAR; INTRAVENOUS AS NEEDED
Status: DISCONTINUED | OUTPATIENT
Start: 2019-11-26 | End: 2019-11-26 | Stop reason: HOSPADM

## 2019-11-26 RX ORDER — MIDAZOLAM HYDROCHLORIDE 1 MG/ML
2 INJECTION, SOLUTION INTRAMUSCULAR; INTRAVENOUS
Status: DISCONTINUED | OUTPATIENT
Start: 2019-11-26 | End: 2019-11-26 | Stop reason: HOSPADM

## 2019-11-26 RX ORDER — ZOLPIDEM TARTRATE 5 MG/1
5 TABLET ORAL
Status: DISCONTINUED | OUTPATIENT
Start: 2019-11-26 | End: 2019-11-27 | Stop reason: HOSPADM

## 2019-11-26 RX ADMIN — ROCURONIUM BROMIDE 30 MG: 10 INJECTION, SOLUTION INTRAVENOUS at 12:57

## 2019-11-26 RX ADMIN — HYDROMORPHONE HYDROCHLORIDE 0.5 MG: 2 INJECTION INTRAMUSCULAR; INTRAVENOUS; SUBCUTANEOUS at 15:30

## 2019-11-26 RX ADMIN — GLYCOPYRROLATE 0.2 MG: 0.2 INJECTION, SOLUTION INTRAMUSCULAR; INTRAVENOUS at 14:28

## 2019-11-26 RX ADMIN — FENTANYL CITRATE 50 MCG: 50 INJECTION INTRAMUSCULAR; INTRAVENOUS at 11:18

## 2019-11-26 RX ADMIN — PROPOFOL 20 MG: 10 INJECTION, EMULSION INTRAVENOUS at 14:07

## 2019-11-26 RX ADMIN — Medication 0.5 G: at 11:27

## 2019-11-26 RX ADMIN — OXYCODONE HYDROCHLORIDE 5 MG: 5 TABLET ORAL at 20:12

## 2019-11-26 RX ADMIN — FENTANYL CITRATE 50 MCG: 50 INJECTION INTRAMUSCULAR; INTRAVENOUS at 11:16

## 2019-11-26 RX ADMIN — PHENYLEPHRINE HYDROCHLORIDE 120 MCG: 10 INJECTION INTRAVENOUS at 14:16

## 2019-11-26 RX ADMIN — PHENYLEPHRINE HYDROCHLORIDE 120 MCG: 10 INJECTION INTRAVENOUS at 11:47

## 2019-11-26 RX ADMIN — METHIMAZOLE 10 MG: 5 TABLET ORAL at 18:00

## 2019-11-26 RX ADMIN — FENTANYL CITRATE 50 MCG: 50 INJECTION INTRAMUSCULAR; INTRAVENOUS at 12:03

## 2019-11-26 RX ADMIN — Medication 0.5 G: at 11:25

## 2019-11-26 RX ADMIN — Medication 2 G: at 20:13

## 2019-11-26 RX ADMIN — FENTANYL CITRATE 25 MCG: 50 INJECTION INTRAMUSCULAR; INTRAVENOUS at 14:48

## 2019-11-26 RX ADMIN — FENTANYL CITRATE 25 MCG: 50 INJECTION INTRAMUSCULAR; INTRAVENOUS at 14:53

## 2019-11-26 RX ADMIN — ROCURONIUM BROMIDE 10 MG: 10 INJECTION, SOLUTION INTRAVENOUS at 12:09

## 2019-11-26 RX ADMIN — ROCURONIUM BROMIDE 20 MG: 10 INJECTION, SOLUTION INTRAVENOUS at 11:43

## 2019-11-26 RX ADMIN — GABAPENTIN 600 MG: 300 CAPSULE ORAL at 10:04

## 2019-11-26 RX ADMIN — ESMOLOL HYDROCHLORIDE 10 MG: 10 INJECTION, SOLUTION INTRAVENOUS at 14:26

## 2019-11-26 RX ADMIN — FENTANYL CITRATE 25 MCG: 50 INJECTION INTRAMUSCULAR; INTRAVENOUS at 14:39

## 2019-11-26 RX ADMIN — LIDOCAINE HYDROCHLORIDE 100 MG: 20 INJECTION, SOLUTION EPIDURAL; INFILTRATION; INTRACAUDAL; PERINEURAL at 11:18

## 2019-11-26 RX ADMIN — ONDANSETRON 4 MG: 2 INJECTION INTRAMUSCULAR; INTRAVENOUS at 14:10

## 2019-11-26 RX ADMIN — PROPOFOL 100 MG: 10 INJECTION, EMULSION INTRAVENOUS at 14:22

## 2019-11-26 RX ADMIN — PROPOFOL 50 MG: 10 INJECTION, EMULSION INTRAVENOUS at 14:27

## 2019-11-26 RX ADMIN — GLYCOPYRROLATE 0.2 MG: 0.2 INJECTION, SOLUTION INTRAMUSCULAR; INTRAVENOUS at 14:30

## 2019-11-26 RX ADMIN — ESMOLOL HYDROCHLORIDE 30 MG: 10 INJECTION, SOLUTION INTRAVENOUS at 12:41

## 2019-11-26 RX ADMIN — ROCURONIUM BROMIDE 50 MG: 10 INJECTION, SOLUTION INTRAVENOUS at 11:18

## 2019-11-26 RX ADMIN — FENTANYL CITRATE 50 MCG: 50 INJECTION INTRAMUSCULAR; INTRAVENOUS at 13:33

## 2019-11-26 RX ADMIN — Medication 1.5 MG: at 14:30

## 2019-11-26 RX ADMIN — PROPOFOL 40 MG: 10 INJECTION, EMULSION INTRAVENOUS at 12:35

## 2019-11-26 RX ADMIN — ROCURONIUM BROMIDE 10 MG: 10 INJECTION, SOLUTION INTRAVENOUS at 12:29

## 2019-11-26 RX ADMIN — FENTANYL CITRATE 50 MCG: 50 INJECTION INTRAMUSCULAR; INTRAVENOUS at 11:28

## 2019-11-26 RX ADMIN — HYDROMORPHONE HYDROCHLORIDE 0.5 MG: 2 INJECTION INTRAMUSCULAR; INTRAVENOUS; SUBCUTANEOUS at 15:11

## 2019-11-26 RX ADMIN — SODIUM CHLORIDE, SODIUM LACTATE, POTASSIUM CHLORIDE, AND CALCIUM CHLORIDE 75 ML/HR: 600; 310; 30; 20 INJECTION, SOLUTION INTRAVENOUS at 10:26

## 2019-11-26 RX ADMIN — PROPOFOL 150 MG: 10 INJECTION, EMULSION INTRAVENOUS at 11:18

## 2019-11-26 RX ADMIN — GLYCOPYRROLATE 0.2 MG: 0.2 INJECTION, SOLUTION INTRAMUSCULAR; INTRAVENOUS at 14:32

## 2019-11-26 RX ADMIN — Medication 10 ML: at 18:00

## 2019-11-26 RX ADMIN — Medication 0.5 G: at 11:29

## 2019-11-26 RX ADMIN — HEPARIN SODIUM 5000 UNITS: 5000 INJECTION INTRAVENOUS; SUBCUTANEOUS at 10:04

## 2019-11-26 RX ADMIN — Medication 1 MG: at 14:31

## 2019-11-26 RX ADMIN — ACETAMINOPHEN 1000 MG: 10 INJECTION, SOLUTION INTRAVENOUS at 15:37

## 2019-11-26 RX ADMIN — PHENYLEPHRINE HYDROCHLORIDE 120 MCG: 10 INJECTION INTRAVENOUS at 11:40

## 2019-11-26 RX ADMIN — HYDROMORPHONE HYDROCHLORIDE 0.5 MG: 2 INJECTION INTRAMUSCULAR; INTRAVENOUS; SUBCUTANEOUS at 15:18

## 2019-11-26 RX ADMIN — SODIUM CHLORIDE, SODIUM LACTATE, POTASSIUM CHLORIDE, AND CALCIUM CHLORIDE: 600; 310; 30; 20 INJECTION, SOLUTION INTRAVENOUS at 08:44

## 2019-11-26 RX ADMIN — Medication 1 MG: at 14:32

## 2019-11-26 RX ADMIN — PHENYLEPHRINE HYDROCHLORIDE 120 MCG: 10 INJECTION INTRAVENOUS at 11:36

## 2019-11-26 RX ADMIN — FENTANYL CITRATE 50 MCG: 50 INJECTION INTRAMUSCULAR; INTRAVENOUS at 12:32

## 2019-11-26 RX ADMIN — Medication 1.5 MG: at 14:28

## 2019-11-26 RX ADMIN — OXYCODONE HYDROCHLORIDE 5 MG: 5 TABLET ORAL at 17:53

## 2019-11-26 RX ADMIN — GLYCOPYRROLATE 0.2 MG: 0.2 INJECTION, SOLUTION INTRAMUSCULAR; INTRAVENOUS at 14:31

## 2019-11-26 RX ADMIN — DEXAMETHASONE SODIUM PHOSPHATE 10 MG: 4 INJECTION, SOLUTION INTRAMUSCULAR; INTRAVENOUS at 11:30

## 2019-11-26 RX ADMIN — FENTANYL CITRATE 25 MCG: 50 INJECTION INTRAMUSCULAR; INTRAVENOUS at 14:37

## 2019-11-26 RX ADMIN — ESMOLOL HYDROCHLORIDE 20 MG: 10 INJECTION, SOLUTION INTRAVENOUS at 14:07

## 2019-11-26 RX ADMIN — ALBUTEROL SULFATE 2.5 MG: 2.5 SOLUTION RESPIRATORY (INHALATION) at 19:18

## 2019-11-26 RX ADMIN — Medication 0.5 G: at 11:23

## 2019-11-26 RX ADMIN — HYDROMORPHONE HYDROCHLORIDE 0.5 MG: 2 INJECTION INTRAMUSCULAR; INTRAVENOUS; SUBCUTANEOUS at 15:40

## 2019-11-26 NOTE — ANESTHESIA PREPROCEDURE EVALUATION
Relevant Problems   No relevant active problems       Anesthetic History     Increased risk of difficult airway (Pt told by nurse that multiple DL attempts were used during A fib ablation 2017)          Review of Systems / Medical History  Patient summary reviewed and pertinent labs reviewed    Pulmonary        Sleep apnea: No treatment    Asthma : well controlled       Neuro/Psych         Psychiatric history     Cardiovascular    Hypertension: well controlled        Dysrhythmias (s/p ablation (bASA only)) : atrial fibrillation      Exercise tolerance: >4 METS  Comments: 6/19 - normal stress test  6/18 Echo - normal LV fxn, no sig valve dz   GI/Hepatic/Renal     GERD: well controlled           Endo/Other      Hypothyroidism: well controlled  Morbid obesity     Other Findings            Physical Exam    Airway  Mallampati: II  TM Distance: > 6 cm  Neck ROM: normal range of motion   Mouth opening: Normal     Cardiovascular    Rhythm: regular  Rate: normal    Murmur: Grade 3     Dental  No notable dental hx       Pulmonary  Breath sounds clear to auscultation               Abdominal         Other Findings            Anesthetic Plan    ASA: 3  Anesthesia type: general            Anesthetic plan and risks discussed with: Patient      Glidescope available

## 2019-11-26 NOTE — PERIOP NOTES
TRANSFER - OUT REPORT:    Verbal report given to Salo Somers on [facility]  being transferred to [facility] for routine post - op       Report consisted of patients Situation, Background, Assessment and   Recommendations(SBAR). Information from the following report(s) OR Summary, Procedure Summary, Intake/Output, MAR and Cardiac Rhythm SR was reviewed with the receiving nurse. Lines:   Peripheral IV 11/26/19 Left Arm (Active)   Site Assessment Clean, dry, & intact 11/26/2019  3:34 PM   Phlebitis Assessment 0 11/26/2019  3:34 PM   Infiltration Assessment 0 11/26/2019  3:34 PM   Dressing Status Clean, dry, & intact 11/26/2019  3:34 PM   Dressing Type Transparent;Tape 11/26/2019  3:34 PM   Hub Color/Line Status Patent 11/26/2019  3:34 PM   Alcohol Cap Used No 11/26/2019  3:34 PM        Opportunity for questions and clarification was provided. Patient transported with:   O2 @ 2lnc liters    VTE prophylaxis orders have been written for [facility]. Patient and family given floor number and nurses name. Family updated re: pt status after security code verified.

## 2019-11-26 NOTE — ANESTHESIA POSTPROCEDURE EVALUATION
Procedure(s): HYSTERECTOMY ROBOTIC ASSISTED W/ BILATERAL SALPINGO-OOPHERECTOMY/ STAGING  CHOLECYSTECTOMY LAPAROSCOPIC/ TO GO FIRST. general    Anesthesia Post Evaluation      Multimodal analgesia: multimodal analgesia used between 6 hours prior to anesthesia start to PACU discharge  Patient location during evaluation: PACU  Patient participation: complete - patient participated  Level of consciousness: awake  Pain management: adequate  Airway patency: patent  Anesthetic complications: no  Cardiovascular status: acceptable and hemodynamically stable  Respiratory status: acceptable  Hydration status: acceptable  Comments: Acceptable for discharge from PACU. Post anesthesia nausea and vomiting:  none      Vitals Value Taken Time   /72 11/26/2019  3:44 PM   Temp 36.7 °C (98 °F) 11/26/2019  3:34 PM   Pulse 102 11/26/2019  3:46 PM   Resp 16 11/26/2019  3:34 PM   SpO2 97 % 11/26/2019  3:46 PM   Vitals shown include unvalidated device data.

## 2019-11-26 NOTE — PROGRESS NOTES
- Visited as requested, to offer spiritual support and prayer prior to surgery. Pt's  and son-in-law were present. Pt expressed her appreciation for 's visit. Follow-up as needed.     Leo Wood MDiv, [address] [place] [address]

## 2019-11-26 NOTE — PROGRESS NOTES
11/26/19 1710   Dual Skin Pressure Injury Assessment   Dual Skin Pressure Injury Assessment WDL   Second Care Provider (Based on Facility Policy) ONOFRE Ledezma   Skin Integumentary   Skin Integumentary (WDL) X   Skin Color Appropriate for ethnicity   Skin Condition/Temp Dry; Warm   Skin Integrity Incision (comment)  (abd lap sites w/ dermabond d/I)   Wound Prevention and Protection Methods   Orientation of Wound Prevention Posterior   Location of Wound Prevention Sacrum/Coccyx   Dressing Present  No   Wound Offloading (Prevention Methods) Repositioning

## 2019-11-27 VITALS
BODY MASS INDEX: 39.84 KG/M2 | TEMPERATURE: 99.2 F | OXYGEN SATURATION: 94 % | WEIGHT: 247.9 LBS | HEIGHT: 66 IN | DIASTOLIC BLOOD PRESSURE: 82 MMHG | SYSTOLIC BLOOD PRESSURE: 147 MMHG | HEART RATE: 89 BPM | RESPIRATION RATE: 19 BRPM

## 2019-11-27 PROBLEM — K74.69 OTHER CIRRHOSIS OF LIVER (HCC): Status: ACTIVE | Noted: 2019-11-27

## 2019-11-27 LAB
ANION GAP SERPL CALC-SCNC: 5 MMOL/L (ref 7–16)
BASOPHILS # BLD: 0 K/UL (ref 0–0.2)
BASOPHILS NFR BLD: 0 % (ref 0–2)
BUN SERPL-MCNC: 11 MG/DL (ref 6–23)
CALCIUM SERPL-MCNC: 9.1 MG/DL (ref 8.3–10.4)
CHLORIDE SERPL-SCNC: 109 MMOL/L (ref 98–107)
CO2 SERPL-SCNC: 26 MMOL/L (ref 21–32)
CREAT SERPL-MCNC: 0.71 MG/DL (ref 0.6–1)
DIFFERENTIAL METHOD BLD: ABNORMAL
EOSINOPHIL # BLD: 0 K/UL (ref 0–0.8)
EOSINOPHIL NFR BLD: 0 % (ref 0.5–7.8)
ERYTHROCYTE [DISTWIDTH] IN BLOOD BY AUTOMATED COUNT: 17.5 % (ref 11.9–14.6)
GLUCOSE SERPL-MCNC: 126 MG/DL (ref 65–100)
HBV SURFACE AG SERPL QL IA: NEGATIVE
HCT VFR BLD AUTO: 31.4 % (ref 35.8–46.3)
HCT VFR BLD AUTO: 32.7 % (ref 35.8–46.3)
HGB BLD-MCNC: 9.2 G/DL (ref 11.7–15.4)
HGB BLD-MCNC: 9.4 G/DL (ref 11.7–15.4)
IMM GRANULOCYTES # BLD AUTO: 0.1 K/UL (ref 0–0.5)
IMM GRANULOCYTES NFR BLD AUTO: 1 % (ref 0–5)
LYMPHOCYTES # BLD: 1.2 K/UL (ref 0.5–4.6)
LYMPHOCYTES NFR BLD: 11 % (ref 13–44)
MCH RBC QN AUTO: 22.1 PG (ref 26.1–32.9)
MCHC RBC AUTO-ENTMCNC: 29.3 G/DL (ref 31.4–35)
MCV RBC AUTO: 75.5 FL (ref 79.6–97.8)
MONOCYTES # BLD: 1.1 K/UL (ref 0.1–1.3)
MONOCYTES NFR BLD: 10 % (ref 4–12)
NEUTS SEG # BLD: 8.7 K/UL (ref 1.7–8.2)
NEUTS SEG NFR BLD: 79 % (ref 43–78)
NRBC # BLD: 0 K/UL (ref 0–0.2)
PLATELET # BLD AUTO: 278 K/UL (ref 150–450)
PMV BLD AUTO: 11.5 FL (ref 9.4–12.3)
POTASSIUM SERPL-SCNC: 4.6 MMOL/L (ref 3.5–5.1)
RBC # BLD AUTO: 4.16 M/UL (ref 4.05–5.2)
SODIUM SERPL-SCNC: 140 MMOL/L (ref 136–145)
WBC # BLD AUTO: 11 K/UL (ref 4.3–11.1)

## 2019-11-27 PROCEDURE — 77010033678 HC OXYGEN DAILY

## 2019-11-27 PROCEDURE — 74011250636 HC RX REV CODE- 250/636: Performed by: OBSTETRICS & GYNECOLOGY

## 2019-11-27 PROCEDURE — 85018 HEMOGLOBIN: CPT

## 2019-11-27 PROCEDURE — 85025 COMPLETE CBC W/AUTO DIFF WBC: CPT

## 2019-11-27 PROCEDURE — 94760 N-INVAS EAR/PLS OXIMETRY 1: CPT

## 2019-11-27 PROCEDURE — 94640 AIRWAY INHALATION TREATMENT: CPT

## 2019-11-27 PROCEDURE — 80048 BASIC METABOLIC PNL TOTAL CA: CPT

## 2019-11-27 PROCEDURE — 74011000250 HC RX REV CODE- 250: Performed by: OBSTETRICS & GYNECOLOGY

## 2019-11-27 PROCEDURE — 74011250637 HC RX REV CODE- 250/637: Performed by: OBSTETRICS & GYNECOLOGY

## 2019-11-27 PROCEDURE — 36415 COLL VENOUS BLD VENIPUNCTURE: CPT

## 2019-11-27 RX ADMIN — Medication 2 G: at 03:57

## 2019-11-27 RX ADMIN — METHIMAZOLE 10 MG: 5 TABLET ORAL at 07:33

## 2019-11-27 RX ADMIN — Medication 10 ML: at 11:42

## 2019-11-27 RX ADMIN — ALBUTEROL SULFATE 2.5 MG: 2.5 SOLUTION RESPIRATORY (INHALATION) at 07:52

## 2019-11-27 RX ADMIN — PHENOL 1 SPRAY: 1.5 LIQUID ORAL at 00:07

## 2019-11-27 RX ADMIN — ALBUTEROL SULFATE 2.5 MG: 2.5 SOLUTION RESPIRATORY (INHALATION) at 11:40

## 2019-11-27 RX ADMIN — OXYCODONE HYDROCHLORIDE 5 MG: 5 TABLET ORAL at 03:56

## 2019-11-27 RX ADMIN — OXYCODONE HYDROCHLORIDE 5 MG: 5 TABLET ORAL at 11:40

## 2019-11-27 RX ADMIN — IBUPROFEN 200 MG: 200 TABLET, FILM COATED ORAL at 06:30

## 2019-11-27 RX ADMIN — HYDROCHLOROTHIAZIDE 25 MG: 25 TABLET ORAL at 07:33

## 2019-11-27 RX ADMIN — PANTOPRAZOLE SODIUM 40 MG: 40 TABLET, DELAYED RELEASE ORAL at 06:23

## 2019-11-27 RX ADMIN — SENNOSIDES AND DOCUSATE SODIUM 2 TABLET: 8.6; 5 TABLET ORAL at 07:33

## 2019-11-27 RX ADMIN — OXYCODONE HYDROCHLORIDE 5 MG: 5 TABLET ORAL at 00:07

## 2019-11-27 NOTE — PROGRESS NOTES
TRANSFER - IN REPORT:    Verbal report received from Jose Reaves on Heidi Suazo  being received from PACU for routine post - op      Report consisted of patients Situation, Background, Assessment and   Recommendations(SBAR). Information from the following report(s) Kardex was reviewed with the receiving nurse. Opportunity for questions and clarification was provided. Assessment completed upon patients arrival to unit and care assumed.

## 2019-11-27 NOTE — OP NOTES
[facility] [address]  [place], [address] [facility]  [phone]    Guillermina Mukherjee    Admit date: 2019    MRN: [id number]     : 1965     Age: 53 y.o.          2019 12:40 PM    Cholecystectomy     Indications: Pt presented with acute and chronic cholecystitis and cholelithiasis. Risks, benefits, and alternatives to cholecystectomy were discussed with the patient. Appropriate consent was obtained. Pre-Op Diagnosis: Endometrial ca ([address] [C54.1]  Calculus of gallbladder without cholecystitis without obstruction [K80.20]    Post-Op Diagnosis:  Endometrial ca ([address] [C54.1]  Calculus of gallbladder without cholecystitis without obstruction [K80.20]      Surgeon: Olya Vazquez MD    Assistants: Surgeon(s):  MD Toni Sierra MD    Anesthesia:  General      Findings: large gallstones, extensive inflammation of gallbladder    Estimated Blood Loss:    20ml          Specimens: gallbladder and stones           Implants: * No implants in log *      Procedure Details     The patient was brought to the operating room and placed supine. After induction of a general anesthetic, the abdomen was prepped and draped in standard fashion. An incision was made in the umbilicus and a Rocio trocar was placed in the usual fashion after which  the abdomen was insufflated to 15mmHg sterile CO2. The other trocars were then placed under direct vision. These were positioned four fingerbreadths below the right costal margin in the anterior axillary line and mid clavicular line and just to the right of the falciform ligament in the epigatrium. The gallbladder was grasped at the dome and retracted laterally and to the right in order to gain the critical view. Dissection was begun with maryland dissector at the infundibulum of the gallbladder until the cystic duct was positively identified as a singular structure going directly into the gallbladder.   The cystic duct was then clipped twice inferiorly and superiorly and divided between with scissors. The cystic artery was then clipped and divided in the cystic triangle. The gallbladder was then taken off the liver bed with electrocautery hook. No gross spillage of bile was encountered during the dissection. The gallbladder was then removed from the superior port site within a bag. The gallbladder was sent to pathology for evaluation at this point. The gallbladder fossa was without evidence of bleeding, there was no bleeding from the cystic artery stump and there was no evidence of bile leak from the cystic duct stump. The three upper abdominal trocars were removed under the direct vision without bleeding from the trocar sites. The Rocio trocar was removed and the fascia of the umbilicus was closed with 0'0  Vicryl. The trocar sites were closed with the subcuticular Monocryl dermabond dressing applied. The patient was taken to the recovery room in good condition, having tolerated the procedure well. Instrument, sponge, and needle counts were correct prior to abdominal closure and at the conclusion of the case.      Signed: Isbaella Schroeder MD

## 2019-11-27 NOTE — PROGRESS NOTES
Katie Marie  Admission Date: 11/26/2019               Daily Progress Note: 11/27/2019    LAB  Recent Labs     11/27/19  0549 11/26/19  1521   WBC 11.0  --    HGB 9.2* 10.7*   HCT 31.4* 37.5     --      --    K 4.6  --    *  --    CO2 26  --    BUN 11  --    CREA 0.71  --    *  --          Visit Vitals  /85   Pulse 100   Temp 98.1 °F (36.7 °C)   Resp 18   Ht 5' 6\" (1.676 m)   Wt 247 lb 14.4 oz (112.4 kg)   SpO2 98%   BMI 40.01 kg/m²       Current Facility-Administered Medications   Medication Dose Route Frequency Provider Last Rate Last Dose    albuterol (PROVENTIL VENTOLIN) nebulizer solution 2.5 mg  2.5 mg Nebulization QID RT Hermelindo Pina MD   2.5 mg at 11/26/19 1918    benzonatate (TESSALON) capsule 100 mg  100 mg Oral TID PRN Hermelindo Pina MD        pantoprazole (PROTONIX) tablet 40 mg  40 mg Oral ACB Hermelindo Pina MD   40 mg at 11/27/19 9480    hydroCHLOROthiazide (HYDRODIURIL) tablet 25 mg  25 mg Oral DAILY Hermelindo Pina MD        methIMAzole (TAPAZOLE) tablet 10 mg  10 mg Oral BID Hermelindo Pina MD   10 mg at 11/26/19 1800    zolpidem (AMBIEN) tablet 5 mg  5 mg Oral QHS PRN Hermelindo Pina MD        sodium chloride (NS) flush 5-40 mL  5-40 mL IntraVENous Q8H Hermelindo Pina MD   10 mL at 11/26/19 1800    sodium chloride (NS) flush 5-40 mL  5-40 mL IntraVENous PRN Hermelindo Pina MD        ibuprofen (MOTRIN) tablet 200 mg  200 mg Oral Q6H PRN Hermelindo Pina MD   200 mg at 11/27/19 0630    oxyCODONE IR (ROXICODONE) tablet 5 mg  5 mg Oral Q3H PRN Hermelindo Pina MD   5 mg at 11/27/19 0356    naloxone (NARCAN) injection 0.4 mg  0.4 mg IntraVENous PRN Hermelindo Pina MD        ondansetron (ZOFRAN ODT) tablet 4 mg  4 mg Oral Q4H PRN Hermelindo Pina MD        diphenhydrAMINE (BENADRYL) injection 12.5 mg  12.5 mg IntraVENous Q4H PRN Hermelindo Pina MD        senna-docusate (PERICOLACE) 8.6-50 mg per tablet 2 Tab  2 Tab Oral DAILY Hermelindo Pina MD       [facility] prochlorperazine (COMPAZINE) tablet 5 mg  5 mg Oral Q6H PRN Torito Miguel MD        ondansetron [facility]) injection 4 mg  4 mg IntraVENous Q8H PRN Torito Miguel MD        phenol throat spray (CHLORASEPTIC) 1 Spray  1 Spray Oral PRN Torito Miguel MD   1 [place] at 11/27/19 0007       Allergies   Allergen Reactions    Cardizem [Diltiazem Hcl] Other (comments)     Blistering        NOTE  No issues overnight  aox3 nad  rrr  ctab  abd soft ntnd  incs cdi  pod1  Reviewed cirrhosis finding at [facility].  Dc tylenol, pt does not drink etoh, review with pcp appt dec.   dc later pending hct re check    Elier Davis MD  11/27/2019

## 2019-11-27 NOTE — PROGRESS NOTES
END OF SHIFT NOTE:    INTAKE/OUTPUT  11/26 0701 - 11/27 0700  In: 1400 [I.V.:1400]  Out: 2400 [Urine:2225]  Voiding: YES  Catheter: NO  Drain:              Flatus: Patient does not have flatus present. Stool:  0 occurrences. Characteristics:       Emesis: 0 occurrences. Characteristics:        VITAL SIGNS  Patient Vitals for the past 12 hrs:   Temp Pulse Resp BP SpO2   11/27/19 0325 98.1 °F (36.7 °C) 100 18 150/85 98 %   11/26/19 2301 98 °F (36.7 °C) (!) 103 18 146/89 97 %   11/26/19 1950 98.2 °F (36.8 °C) 95 16 148/71 98 %   11/26/19 1920     98 %       Pain Assessment  Pain Intensity 1: 7 (11/27/19 0630)  Pain Location 1: Abdomen  Pain Intervention(s) 1: Medication (see MAR)       Ambulating  Yes    Shift report given to oncoming nurse at the bedside.     Ezequiel Fowler RN

## 2019-11-27 NOTE — PROGRESS NOTES
Patient discharged with patient belongs, transported via wheel chair, and accompanied by family. No distress noted.

## 2019-11-27 NOTE — DISCHARGE INSTRUCTIONS
DISCHARGE SUMMARY from Nurse    PATIENT INSTRUCTIONS:    Report the following to your surgeon:  · Excessive pain, swelling, redness or odor of or around the surgical area  · Temperature over 100.5  · Nausea and vomiting lasting longer than 4 hours or if unable to take medications  · Any signs of decreased circulation or nerve impairment to extremity: change in color, persistent  numbness, tingling, coldness or increase pain  · Any questions    *  Please give a list of your current medications to your Primary Care Provider. *  Please update this list whenever your medications are discontinued, doses are      changed, or new medications (including over-the-counter products) are added. *  Please carry medication information at all times in case of emergency situations. These are general instructions for a healthy lifestyle:    No smoking/ No tobacco products/ Avoid exposure to second hand smoke  Surgeon General's Warning:  Quitting smoking now greatly reduces serious risk to your health. Obesity, smoking, and sedentary lifestyle greatly increases your risk for illness    A healthy diet, regular physical exercise & weight monitoring are important for maintaining a healthy lifestyle    You may be retaining fluid if you have a history of heart failure or if you experience any of the following symptoms:  Weight gain of 3 pounds or more overnight or 5 pounds in a week, increased swelling in our hands or feet or shortness of breath while lying flat in bed. Please call your doctor as soon as you notice any of these symptoms; do not wait until your next office visit. The discharge information has been reviewed with the patient. The patient verbalized understanding.   Discharge medications reviewed with the patient and appropriate educational materials and side effects teaching were provided. ___________________________________________________________________________________________________________________________________    Patient Education        Cholecystectomy: What to Expect at Home  Your Recovery  After your surgery, it is normal to feel weak and tired for several days after you return home. Your belly may be swollen. If you had laparoscopic surgery, you may also have pain in your shoulder for about 24 hours. You may have gas or need to burp a lot at first, and a few people get diarrhea. The diarrhea usually goes away in 2 to 4 weeks, but it may last longer. How quickly you recover depends on whether you had a laparoscopic or open surgery. · For a laparoscopic surgery, most people can go back to work or their normal routine in 1 to 2 weeks, but it may take longer, depending on the type of work you do. · For an open surgery, it will probably take 4 to 6 weeks before you get back to your normal routine. This care sheet gives you a general idea about how long it will take for you to recover. However, each person recovers at a different pace. Follow the steps below to get better as quickly as possible. How can you care for yourself at home? Activity    · Rest when you feel tired. Getting enough sleep will help you recover.     · Try to walk each day. Start out by walking a little more than you did the day before. Gradually increase the amount you walk. Walking boosts blood flow and helps prevent pneumonia and constipation.     · For about 2 to 4 weeks, avoid lifting anything that would make you strain. This may include a child, heavy grocery bags and milk containers, a heavy briefcase or backpack, cat litter or dog food bags, or a vacuum .     · Avoid strenuous activities, such as biking, jogging, weightlifting, and aerobic exercise, until your doctor says it is okay.     · You may shower 24 to 48 hours after surgery, if your doctor okays it. Pat the cut (incision) dry.  Do not take a bath for the first 2 weeks, or until your doctor tells you it is okay.     · You may drive when you are no longer taking pain medicine and can quickly move your foot from the gas pedal to the brake. You must also be able to sit comfortably for a long period of time, even if you do not plan to go far. You might get caught in traffic.     · For a laparoscopic surgery, most people can go back to work or their normal routine in 1 to 2 weeks, but it may take longer. For an open surgery, it will probably take 4 to 6 weeks before you get back to your normal routine.     · Your doctor will tell you when you can have sex again.    Diet    · Eat smaller meals more often instead of fewer larger meals. You can eat a normal diet, but avoid eating fatty foods for about 1 month. Fatty foods include hamburger, whole milk, cheese, and many snack foods. If your stomach is upset, try bland, low-fat foods like plain rice, broiled chicken, toast, and yogurt.     · Drink plenty of fluids (unless your doctor tells you not to).   · If you have diarrhea, try avoiding spicy foods, dairy products, fatty foods, and alcohol. You can also watch to see if specific foods cause it, and stop eating them. If the diarrhea continues for more than 2 weeks, talk to your doctor.     · You may notice that your bowel movements are not regular right after your surgery. This is common. Try to avoid constipation and straining with bowel movements. You may want to take a fiber supplement every day. If you have not had a bowel movement after a couple of days, ask your doctor about taking a mild laxative. Medicines    · Your doctor will tell you if and when you can restart your medicines. He or she will also give you instructions about taking any new medicines.     · If you take blood thinners, such as warfarin (Coumadin), clopidogrel (Plavix), or aspirin, be sure to talk to your doctor.  He or she will tell you if and when to start taking those medicines again. Make sure that you understand exactly what your doctor wants you to do.     · Take pain medicines exactly as directed. ? If the doctor gave you a prescription medicine for pain, take it as prescribed. ? If you are not taking a prescription pain medicine, take an over-the-counter medicine such as acetaminophen (Tylenol), ibuprofen (Advil, Motrin), or naproxen (Aleve). Read and follow all instructions on the label. ? Do not take two or more pain medicines at the same time unless the doctor told you to. Many pain medicines contain acetaminophen, which is Tylenol. Too much Tylenol can be harmful.     · If you think your pain medicine is making you sick to your stomach:  ? Take your medicine after meals (unless your doctor tells you not to). ? Ask your doctor for a different pain medicine.     · If your doctor prescribed antibiotics, take them as directed. Do not stop taking them just because you feel better. You need to take the full course of antibiotics. Incision care    · If you have strips of tape on the incision, or cut, leave the tape on for a week or until it falls off.     · After 24 to 48 hours, wash the area daily with warm, soapy water, and pat it dry.     · You may have staples to hold the cut together. Keep them dry until your doctor takes them out. This is usually in 7 to 10 days.     · Keep the area clean and dry. You may cover it with a gauze bandage if it weeps or rubs against clothing. Change the bandage every day.    Ice    · To reduce swelling and pain, put ice or a cold pack on your belly for 10 to 20 minutes at a time. Do this every 1 to 2 hours. Put a thin cloth between the ice and your skin. Follow-up care is a key part of your treatment and safety. Be sure to make and go to all appointments, and call your doctor if you are having problems. It's also a good idea to know your test results and keep a list of the medicines you take. When should you call for help?   Call [phone] anytime you think you may need emergency care. For example, call if:    · You passed out (lost consciousness).     · You are short of breath. [place] your doctor now or seek immediate medical care if:    · You are sick to your stomach and cannot drink fluids.     · You have pain that does not get better when you take your pain medicine.     · You cannot pass stools or gas.     · You have signs of infection, such as:  ? Increased pain, swelling, warmth, or redness. ? Red streaks leading from the incision. ? Pus draining from the incision. ? A fever.     · Bright red blood has soaked through the bandage over your incision.     · You have loose stitches, or your incision comes open.     · You have signs of a blood clot in your leg (called a deep vein thrombosis), such as:  ? Pain in your calf, back of knee, thigh, or groin. ? Redness and swelling in your leg or groin.    Watch closely for any changes in your health, and be sure to contact your doctor if you have any problems. Where can you learn more? Go to http://fina-gera.info/. Enter 389 25 [phone] in the search box to learn more about \"Cholecystectomy: What to Expect at Home. \"  Current as of: November 7, 2018  Content Version: 12.2  © 5122-4956 [employer]. Care instructions adapted under license by [employer] (which disclaims liability or warranty for this information). If you have questions about a medical condition or this instruction, always ask your healthcare professional. [place] [address] any warranty or liability for your use of this information.

## 2019-11-29 LAB — HCV RNA SERPL QL NAA+PROBE: NEGATIVE

## 2020-01-09 NOTE — PROGRESS NOTES
PC to pt to review results and disc need for genetics referral.  No answer message left to call back. Referral order placed.  Bo Connor, NP

## 2020-01-30 ENCOUNTER — HOSPITAL ENCOUNTER (OUTPATIENT)
Dept: LAB | Age: 55
Discharge: HOME | End: 2020-01-30
Payer: MEDICAID

## 2020-01-30 DIAGNOSIS — Z85.42 HISTORY OF UTERINE CANCER: ICD-10-CM

## 2020-01-30 LAB
Lab: NORMAL
REFERENCE LAB,REFLB: NORMAL
TEST DESCRIPTION:,ATST: NORMAL

## 2020-01-30 PROCEDURE — 36415 COLL VENOUS BLD VENIPUNCTURE: CPT

## 2020-03-05 PROBLEM — E05.00 GRAVES' OPHTHALMOPATHY: Status: ACTIVE | Noted: 2020-03-05

## 2020-03-05 PROBLEM — E05.00 GRAVES' DISEASE: Status: ACTIVE | Noted: 2019-11-08

## 2020-03-05 PROBLEM — E04.2 MULTINODULAR GOITER: Status: ACTIVE | Noted: 2020-03-05

## 2020-06-12 PROBLEM — G47.33 OSA (OBSTRUCTIVE SLEEP APNEA): Status: ACTIVE | Noted: 2020-06-12

## 2020-07-02 ENCOUNTER — HOSPITAL ENCOUNTER (OUTPATIENT)
Dept: MAMMOGRAPHY | Age: 55
Discharge: HOME | End: 2020-07-02
Attending: FAMILY MEDICINE
Payer: MEDICAID

## 2020-07-02 DIAGNOSIS — Z12.31 SCREENING MAMMOGRAM, ENCOUNTER FOR: ICD-10-CM

## 2020-07-02 PROCEDURE — 77067 SCR MAMMO BI INCL CAD: CPT

## 2020-07-17 NOTE — PROGRESS NOTES
Confirmed scheduled procedure with patient. Informed patient of time of arrival, entry location, and 1 visitor policy. Opportunity for questions provided. No concerns voiced at this time.

## 2020-07-20 ENCOUNTER — HOSPITAL ENCOUNTER (OUTPATIENT)
Age: 55
Setting detail: OUTPATIENT SURGERY
Discharge: HOME | End: 2020-07-20
Attending: SURGERY | Admitting: SURGERY
Payer: MEDICAID

## 2020-07-20 ENCOUNTER — ANESTHESIA (OUTPATIENT)
Dept: ENDOSCOPY | Age: 55
End: 2020-07-20
Payer: MEDICAID

## 2020-07-20 ENCOUNTER — ANESTHESIA EVENT (OUTPATIENT)
Dept: ENDOSCOPY | Age: 55
End: 2020-07-20
Payer: MEDICAID

## 2020-07-20 VITALS
BODY MASS INDEX: 41.14 KG/M2 | HEART RATE: 91 BPM | SYSTOLIC BLOOD PRESSURE: 163 MMHG | DIASTOLIC BLOOD PRESSURE: 87 MMHG | RESPIRATION RATE: 18 BRPM | OXYGEN SATURATION: 94 % | HEIGHT: 66 IN | WEIGHT: 256 LBS | TEMPERATURE: 97 F

## 2020-07-20 PROCEDURE — 74011250636 HC RX REV CODE- 250/636: Performed by: ANESTHESIOLOGY

## 2020-07-20 PROCEDURE — 76040000025: Performed by: SURGERY

## 2020-07-20 PROCEDURE — 74011250636 HC RX REV CODE- 250/636: Performed by: NURSE ANESTHETIST, CERTIFIED REGISTERED

## 2020-07-20 PROCEDURE — 76060000032 HC ANESTHESIA 0.5 TO 1 HR: Performed by: SURGERY

## 2020-07-20 PROCEDURE — 74011000250 HC RX REV CODE- 250: Performed by: NURSE ANESTHETIST, CERTIFIED REGISTERED

## 2020-07-20 RX ORDER — SODIUM CHLORIDE, SODIUM LACTATE, POTASSIUM CHLORIDE, CALCIUM CHLORIDE 600; 310; 30; 20 MG/100ML; MG/100ML; MG/100ML; MG/100ML
100 INJECTION, SOLUTION INTRAVENOUS CONTINUOUS
Status: DISCONTINUED | OUTPATIENT
Start: 2020-07-20 | End: 2020-07-20 | Stop reason: HOSPADM

## 2020-07-20 RX ORDER — PROPOFOL 10 MG/ML
INJECTION, EMULSION INTRAVENOUS AS NEEDED
Status: DISCONTINUED | OUTPATIENT
Start: 2020-07-20 | End: 2020-07-20 | Stop reason: HOSPADM

## 2020-07-20 RX ORDER — LIDOCAINE HYDROCHLORIDE 20 MG/ML
INJECTION, SOLUTION EPIDURAL; INFILTRATION; INTRACAUDAL; PERINEURAL AS NEEDED
Status: DISCONTINUED | OUTPATIENT
Start: 2020-07-20 | End: 2020-07-20 | Stop reason: HOSPADM

## 2020-07-20 RX ORDER — PROPOFOL 10 MG/ML
INJECTION, EMULSION INTRAVENOUS
Status: DISCONTINUED | OUTPATIENT
Start: 2020-07-20 | End: 2020-07-20 | Stop reason: HOSPADM

## 2020-07-20 RX ORDER — SODIUM CHLORIDE 0.9 % (FLUSH) 0.9 %
5-40 SYRINGE (ML) INJECTION AS NEEDED
Status: DISCONTINUED | OUTPATIENT
Start: 2020-07-20 | End: 2020-07-20 | Stop reason: HOSPADM

## 2020-07-20 RX ORDER — SODIUM CHLORIDE 0.9 % (FLUSH) 0.9 %
5-40 SYRINGE (ML) INJECTION EVERY 8 HOURS
Status: DISCONTINUED | OUTPATIENT
Start: 2020-07-20 | End: 2020-07-20 | Stop reason: HOSPADM

## 2020-07-20 RX ADMIN — SODIUM CHLORIDE, SODIUM LACTATE, POTASSIUM CHLORIDE, AND CALCIUM CHLORIDE 100 ML/HR: 600; 310; 30; 20 INJECTION, SOLUTION INTRAVENOUS at 10:30

## 2020-07-20 RX ADMIN — SODIUM CHLORIDE, SODIUM LACTATE, POTASSIUM CHLORIDE, AND CALCIUM CHLORIDE: 600; 310; 30; 20 INJECTION, SOLUTION INTRAVENOUS at 11:13

## 2020-07-20 RX ADMIN — PROPOFOL 50 MG: 10 INJECTION, EMULSION INTRAVENOUS at 11:17

## 2020-07-20 RX ADMIN — PROPOFOL 200 MCG/KG/MIN: 10 INJECTION, EMULSION INTRAVENOUS at 11:17

## 2020-07-20 RX ADMIN — LIDOCAINE HYDROCHLORIDE 50 MG: 20 INJECTION, SOLUTION EPIDURAL; INFILTRATION; INTRACAUDAL; PERINEURAL at 11:17

## 2020-07-20 NOTE — DISCHARGE INSTRUCTIONS
Gastrointestinal Colonoscopy/Flexible Sigmoidoscopy - Lower Exam Discharge Instructions  1. Call Dr. Sree Galvez at [phone] for any problems or questions. 2. Contact the doctors office for follow up appointment as directed  3. Medication may cause drowsiness for several hours, therefore:  · Do not drive or operate machinery for reminder of the day. · No alcohol today. · Do not make any important or legal decisions for 24 hours. · Do not sign any legal documents for 24 hours. 4. Ordinarily, you may resume regular diet and activity after exam unless otherwise specified by your physician. 5. Because of air put into your colon during exam, you may experience some abdominal distension, relieved by the passage of gas, for several hours. 6. Contact your physician if you have any of the following:  · Excessive amount of bleeding - large amount when having a bowel movement. Occasional specks of blood with bowel movement would not be unusual.  · Severe abdominal pain  · Fever or Chills    Any additional instructions:    1. Repeat colonoscopy in 10 years. Instructions given to Rossi Guerra and other family members.

## 2020-07-20 NOTE — H&P
Tom Fuchs    7/20/2020      Subjective:     Patient is a 53 y.o. female who was sent by their primary care physician for screening colonoscopy. Pt denies any symptoms of abdominal pain, change in bowel habits, blood per rectum, unexplained weight loss, or other symptoms that would be of concern for colon cancer. Patient Active Problem List    Diagnosis Date Noted    MITA (obstructive sleep apnea) 06/12/2020    Multinodular goiter 03/05/2020    Graves' ophthalmopathy 03/05/2020    Other cirrhosis of liver ([address] 11/27/2019    Symptomatic cholelithiasis 11/12/2019    Obesity, morbid ([address] 11/08/2019    Essential hypertension 11/08/2019    Endometrial cancer ([address] 11/08/2019    Graves' disease 11/08/2019    Chronic atrial fibrillation ([address] 11/08/2019     Past Medical History:   Diagnosis Date    Arrhythmia 2018    a-fib (2017) ablation-(12/2018)- resolved    Asthma     seasonal allergies- maint and rescue inhaler    Cancer ([address]     endometrial cancer- hysterectomy 11/26/20    Chronic pain     back pain - herniated disc    Cirrhosis ([address]     Claustrophobia     Colon polyps     Difficult intubation     in [place] 2019- no probleems listed with hysterectomy/lap chris 11/26/20 @ [address] [facility] GERD (gastroesophageal reflux disease)     H/O echocardiogram     Echo LVEF 55% mild mitral and tricuspid regurg    H/O: iron deficiency anemia     Heart murmur 06/2019    Echo LVEF 55% mild mitral and tricuspid regurg    Hypertension     managed with meds    Sleep apnea     not currently using cpap    Thyroid disease     Graves Disease      Past Surgical History:   Procedure Laterality Date    HX AFIB ABLATION  12/2017    HX DILATION AND CURETTAGE  10/2019    HX HYSTERECTOMY  11/26/2020    with lap chris    HX LAP CHOLECYSTECTOMY  11/26/2020    HX TUBAL LIGATION  1989      Prior to Admission Medications   Prescriptions Last Dose Informant Patient Reported? Taking?    ALPRAZolam (XANAX) 0.5 mg tablet Not Taking at Unknown time  No No   Si tablet po 1/2 hour before procedure. Patient taking differently: 1 tablet po 1/2 hour before procedure./ MRI   acetaminophen (Tylenol Extra Strength) 500 mg tablet Unknown at Unknown time  Yes No   Sig: Take 500 mg by mouth every six (6) hours as needed for Pain. albuterol (PROVENTIL HFA, VENTOLIN HFA, PROAIR HFA) 90 mcg/actuation inhaler 2020 at Unknown time  Yes Yes   Sig: Take  by inhalation every four (4) hours as needed for Wheezing. aspirin delayed-release 81 mg tablet 2020 at Unknown time  No Yes   Sig: Take 1 Tab by mouth daily. beclomethasone (QVAR) 80 mcg/actuation aero 2020 at Unknown time  No Yes   Sig: Take 1 Puff by inhalation two (2) times a day. benzonatate (TESSALON) 100 mg capsule Unknown at Unknown time  Yes No   Sig: Take 100 mg by mouth as needed for Cough. calcium/mag/vitamin D2/Zn/min (ANGEL-MAG ZINC II PO) 2020 at Unknown time  Yes Yes   Sig: Take 1 Tab by mouth daily. cpap machine kit 2020 at Unknown time  Yes Yes   Sig: Use as Instructed   cyclobenzaprine (FLEXERIL) 5 mg tablet 2020 at Unknown time  Yes Yes   Sig: Take 5-10 mg by mouth as needed. ergocalciferol (VITAMIN D2) 50,000 unit capsule 2020 at Unknown time  Yes Yes   Sig: Take 50,000 Units by mouth every seven (7) days.    esomeprazole (NexIUM) 20 mg capsule 2020 at Unknown time  Yes Yes   Sig: Take 1 Cap by mouth nightly. levothyroxine (SYNTHROID) 175 mcg tablet Not Taking at Unknown time  No No   Sig: Take 1 Tab by mouth Daily (before breakfast). Start after thyroid surgery   Patient taking differently: Take 175 mcg by mouth Daily (before breakfast). Start after thyroid surgery-- ~2020   methIMAzole (TAPAZOLE) 10 mg tablet 2020 at Unknown time  No Yes   Sig: Take 2 Tabs by mouth daily.    naproxen (NAPROSYN) 500 mg tablet 2020 at Unknown time  No Yes   Sig: Take 1 Tab by mouth two (2) times daily (with meals). Patient taking differently: Take 500 mg by mouth two (2) times daily (with meals). May continue per Dr Castro Cos- Pt requested   traZODone (DESYREL) 50 mg tablet 7/19/2020 at Unknown time  No Yes   Sig: Take 1 Tab by mouth nightly. valsartan-hydroCHLOROthiazide (DIOVAN-HCT) 320-25 mg per tablet 7/20/2020 at Unknown time  No Yes   Sig: Take 1 Tab by mouth daily. Facility-Administered Medications: None     Allergies   Allergen Reactions    Cardizem [Diltiazem Hcl] Other (comments)     Blistering       Social History     Tobacco Use    Smoking status: Never Smoker    Smokeless tobacco: Never Used   Substance Use Topics    Alcohol use: Yes     Alcohol/week: 1.0 standard drinks     Types: 1 Glasses of wine per week     Frequency: Monthly or less     Comment: rarely      Social History     Social History Narrative    Not on file     Family History   Problem Relation Age of Onset    Stroke Mother     Kidney Disease Mother         dialysis    Heart Disease Father     Diabetes Father     Liver Disease Father     Cancer Sister         cervical     Cancer Paternal Aunt         cervical cancer    Thyroid Disease Son         Hyperthyroidism    Thyroid Disease Daughter         Hypothyroidism        Current Facility-Administered Medications   Medication Dose Route Frequency    lactated Ringers infusion  100 mL/hr IntraVENous CONTINUOUS    lactated Ringers infusion  100 mL/hr IntraVENous CONTINUOUS    sodium chloride (NS) flush 5-40 mL  5-40 mL IntraVENous Q8H    sodium chloride (NS) flush 5-40 mL  5-40 mL IntraVENous PRN       Review of Systems  A comprehensive review of systems was negative except for that written in the HPI.     Objective:     Vitals:    07/20/20 1023   BP: 158/80   Pulse: 93   Resp: 18   Temp: 98.1 °F (36.7 °C)   SpO2: 97%   Weight: 256 lb (116.1 kg)   Height: 5' 6\" (1.676 m)     PHYSICAL EXAM     Gen- the patient is well developed and in no acute distress  HEENT- PERRL, EOMI, no scleral icterus       nose without alar flaring or epistaxis                  oral muscosa moist without cyanosis  Neck- no JVD or retractions  Lungs-clear  Heart- RRR without M,G,R  Abd- soft and non-tender; with positive bowel sounds. Ext- warm without cyanosis. There is no lower leg edema. Skin- no jaundice or rashes, no wounds   Neuro- alert and oriented x 3. No gross sensorimotor deficits are present. Plan:     Age appropriate screening colonoscopy. I have discussed the risks, benefits and alternatives of surgery. Pt understands and wishes to proceed.   Consent obtained           Candy Rodrigues MD

## 2020-07-20 NOTE — ANESTHESIA PREPROCEDURE EVALUATION
Relevant Problems   No relevant active problems       Anesthetic History     Increased risk of difficult airway (told that it took multiple attempts for afib ablation)          Review of Systems / Medical History      Pulmonary        Sleep apnea: CPAP    Asthma        Neuro/Psych              Cardiovascular    Hypertension        Dysrhythmias (s/p ablation, no longer on blood thinner medication) : atrial fibrillation      Exercise tolerance: >4 METS     GI/Hepatic/Renal           Liver disease    Comments: occ heartburn/reflux with dietary indiscretion Endo/Other      Hypothyroidism  Morbid obesity     Other Findings              Physical Exam    Airway  Mallampati: III  TM Distance: 4 - 6 cm  Neck ROM: normal range of motion   Mouth opening: Normal     Cardiovascular    Rhythm: regular  Rate: normal    Murmur: Grade 2     Dental  No notable dental hx       Pulmonary  Breath sounds clear to auscultation               Abdominal         Other Findings            Anesthetic Plan    ASA: 3  Anesthesia type: total IV anesthesia          Induction: Intravenous  Anesthetic plan and risks discussed with: Patient

## 2020-07-20 NOTE — ANESTHESIA POSTPROCEDURE EVALUATION
Procedure(s):  COLONOSCOPY/ BMI 43. total IV anesthesia    Anesthesia Post Evaluation        Patient location during evaluation: PACU  Patient participation: complete - patient participated  Level of consciousness: awake  Pain management: adequate  Airway patency: patent  Anesthetic complications: no  Cardiovascular status: acceptable  Respiratory status: acceptable  Hydration status: acceptable  Post anesthesia nausea and vomiting:  none      INITIAL Post-op Vital signs:   Vitals Value Taken Time   /76 7/20/2020 12:06 PM   Temp 36.1 °C (97 °F) 7/20/2020 11:47 AM   Pulse 93 7/20/2020 12:11 PM   Resp 18 7/20/2020 11:56 AM   SpO2 96 % 7/20/2020 12:11 PM   Vitals shown include unvalidated device data.

## 2020-07-20 NOTE — PROCEDURES
[address] [facility]  [phone]    Colonoscopy Procedure Note    Name: Jamar Andrews     Date: 7/20/2020  Med Record Number: [id number]   Age: 53 y.o. Sex: female   Procedure: Colonoscopy --screening  Pre-operative Diagnosis:  screening  Post-operative Diagnosis: sigmoid diverticulosis  Indications: screening for colon cancer  Anesthesia/Sedation: MAC IV MAC anesthesia Propofol  Procedure Details:    Informed consent was obtained for the procedure, including sedation. Risks of perforation, hemorrhage, adverse drug reaction and aspiration were discussed. The patient was placed in the left lateral decubitus position. Based on the pre-procedure assessment, including review of the patient's medical history, medications, allergies, and review of systems, she had been deemed to be an appropriate candidate for sedation by the Anesthesia Dept. The patient was monitored continuously with ECG tracing, pulse oximetry, blood pressure monitoring, and direct observations. A time out was performed. Once sedation was adequate, a rectal examination was performed. The scope was inserted into the rectum and advanced under direct vision to the cecum, which was identified by the ileocecal valve. The quality of the colonic preparation was fair. A careful inspection was made as the colonoscope was withdrawn, including a retroflexed view of the rectum; findings and interventions are described below. Appropriate photodocumentation was obtained. Findings: ANUS: Anal exam reveals no masses or hemorrhoids, sphincter tone is normal.   RECTUM: Rectal exam reveals no masses or hemorrhoids. SIGMOID COLON: The mucosa is normal with good vascular pattern and without ulcers,  and polyps. There were sections of scattered narrow diverticul  DESCENDING COLON: The mucosa is normal with good vascular pattern and without ulcers, diverticula, and polyps.    SPLENIC FLEXURE: The splenic flexure is normal.   TRANSVERSE COLON: The mucosa is normal with good vascular pattern and without ulcers, diverticula, and polyps. HEPATIC FLEXURE: The hepatic flexure is normal.   ASCENDING COLON: The mucosa is normal with good vascular pattern and without ulcers, diverticula, and polyps. CECUM: The appendiceal orifice appears normal. The ileocecal valve appears normal.   TERMINAL ILEUM: The terminal ileum was not entered. Specimens: none    Estimated Blood Loss:  0-minimum           Complications: None; patient tolerated the procedure well. Attending Attestation: I performed the procedure. Impression:  Otherwise normal colonoscopy to the cecum    Recommendations:-For colon cancer screening in this average-risk patient, colonoscopy may be repeated in 10 years.     Shayna Whatley MD

## 2020-07-29 ENCOUNTER — HOSPITAL ENCOUNTER (OUTPATIENT)
Dept: LAB | Age: 55
Discharge: HOME | End: 2020-07-29
Payer: MEDICAID

## 2020-07-29 PROCEDURE — 88142 CYTOPATH C/V THIN LAYER: CPT

## 2020-08-20 DIAGNOSIS — E04.2 MULTINODULAR THYROID: Primary | ICD-10-CM

## 2020-08-20 DIAGNOSIS — R13.13 PHARYNGEAL DYSPHAGIA: ICD-10-CM

## 2020-08-21 ENCOUNTER — HOSPITAL ENCOUNTER (OUTPATIENT)
Dept: SURGERY | Age: 55
Discharge: HOME | End: 2020-08-21

## 2020-08-24 VITALS — HEIGHT: 66 IN | BODY MASS INDEX: 38.25 KG/M2 | WEIGHT: 238 LBS

## 2020-08-24 NOTE — PERIOP NOTES
Patient verified name and . Order for consent was found in EHR and matches case posting; patient verifies procedure. Type 2 surgery, PAT assessment complete. Orders not received. Labs per surgeon: None  Labs per anesthesia protocol: Hgb, K+ and Cr. Patient instructed to come to [address] [place] [address] [address] Monday-Friday [id number] prior to DOS to have blood drawn and to receive packets of Hibiclens. No appointment necessary. Patient verbalized understanding    Latest EKG tracing 11/15/2019 and found in Chart Review, ECHO and Stress test reports (2019) found in Chart Review for anesthesia reference. LVEF 55% and patient has heart murmur and Paroxysmal Atrial Fib s/p ablation  Last cardiac office note with Dr. Asia Mcgraw at [facility] found in Chart Review for anesthesia reference. Patient answered medical/surgical history questions at their best of ability. All prior to admission medications documented in [facility]. Patient instructed to take the following medications the day of surgery according to anesthesia guidelines with a small sip of water:Qvar and Albuterol inhalers and bring to hospital, ASA 81mg, Methimazole. Hold all vitamins 7 days prior to surgery and NSAIDS 5 days prior to surgery. Prescription meds to hold:none    Patient instructed on the following:    > a negative Covid swab result  Found in Results Review from 2020  > 1 visitor allowed at this time. >Arrive at [facility], time of arrival to be called the day before by 1700  >NPO after midnight including gum, mints, and ice chips  >Responsible adult must drive patient to the hospital, stay during surgery, and patient will need supervision 24 hours after anesthesia  >Use hibiclens in shower the night before surgery and on the morning of surgery  >All piercings must be removed prior to arrival.    >Leave all valuables (money and jewelry) at home but bring insurance card and ID on       DOS.    >Do not wear make-up, nail polish, lotions, cologne, perfumes, powders, or oil on skin.

## 2020-08-25 ENCOUNTER — HOSPITAL ENCOUNTER (OUTPATIENT)
Dept: LAB | Age: 55
Discharge: HOME | End: 2020-08-25
Payer: MEDICAID

## 2020-08-25 LAB
CREAT SERPL-MCNC: 0.8 MG/DL (ref 0.6–1)
HGB BLD-MCNC: 12.5 G/DL (ref 11.7–15.4)
POTASSIUM SERPL-SCNC: 3.9 MMOL/L (ref 3.5–5.1)

## 2020-08-25 PROCEDURE — 84132 ASSAY OF SERUM POTASSIUM: CPT

## 2020-08-25 PROCEDURE — 85018 HEMOGLOBIN: CPT

## 2020-08-25 PROCEDURE — 36415 COLL VENOUS BLD VENIPUNCTURE: CPT

## 2020-08-25 PROCEDURE — 82565 ASSAY OF CREATININE: CPT

## 2020-08-26 NOTE — PERIOP NOTES
Labs done 8/25/20 WNL    Recent Results (from the past 24 hour(s))   CREATININE    Collection Time: 08/25/20  2:56 PM   Result Value Ref Range    Creatinine 0.80 0.6 - 1.0 MG/DL   HEMOGLOBIN    Collection Time: 08/25/20  2:56 PM   Result Value Ref Range    HGB 12.5 11.7 - 15.4 g/dL   POTASSIUM    Collection Time: 08/25/20  2:56 PM   Result Value Ref Range    Potassium 3.9 3.5 - 5.1 mmol/L

## 2020-08-28 ENCOUNTER — ANESTHESIA (OUTPATIENT)
Dept: SURGERY | Age: 55
End: 2020-08-28
Payer: MEDICAID

## 2020-08-28 ENCOUNTER — ANESTHESIA EVENT (OUTPATIENT)
Dept: SURGERY | Age: 55
End: 2020-08-28
Payer: MEDICAID

## 2020-08-28 ENCOUNTER — HOSPITAL ENCOUNTER (OUTPATIENT)
Age: 55
Setting detail: OBSERVATION
Discharge: HOME | End: 2020-08-29
Attending: OTOLARYNGOLOGY | Admitting: OTOLARYNGOLOGY
Payer: MEDICAID

## 2020-08-28 DIAGNOSIS — E04.2 MULTINODULAR THYROID: ICD-10-CM

## 2020-08-28 DIAGNOSIS — R13.13 PHARYNGEAL DYSPHAGIA: ICD-10-CM

## 2020-08-28 PROBLEM — E89.0 S/P TOTAL THYROIDECTOMY: Status: ACTIVE | Noted: 2020-08-28

## 2020-08-28 LAB
ABO + RH BLD: NORMAL
BLOOD GROUP ANTIBODIES SERPL: NORMAL
CALCIUM SERPL-MCNC: 8.8 MG/DL (ref 8.3–10.4)
CALCIUM SERPL-MCNC: 8.8 MG/DL (ref 8.3–10.4)
PTH-INTACT SERPL-MCNC: 37.6 PG/ML (ref 18.5–88)
SPECIMEN EXP DATE BLD: NORMAL

## 2020-08-28 PROCEDURE — 77030011267 HC ELECTRD BLD COVD -A: Performed by: OTOLARYNGOLOGY

## 2020-08-28 PROCEDURE — 77030014008 HC SPNG HEMSTAT J&J -C: Performed by: OTOLARYNGOLOGY

## 2020-08-28 PROCEDURE — 77010033678 HC OXYGEN DAILY

## 2020-08-28 PROCEDURE — 74011250636 HC RX REV CODE- 250/636: Performed by: OTOLARYNGOLOGY

## 2020-08-28 PROCEDURE — 74011000250 HC RX REV CODE- 250: Performed by: NURSE ANESTHETIST, CERTIFIED REGISTERED

## 2020-08-28 PROCEDURE — 36415 COLL VENOUS BLD VENIPUNCTURE: CPT

## 2020-08-28 PROCEDURE — 77030040361 HC SLV COMPR DVT MDII -B: Performed by: OTOLARYNGOLOGY

## 2020-08-28 PROCEDURE — 74011250636 HC RX REV CODE- 250/636: Performed by: ANESTHESIOLOGY

## 2020-08-28 PROCEDURE — 77030031753 HC SHR ENDO COAG HARM J&J -E: Performed by: OTOLARYNGOLOGY

## 2020-08-28 PROCEDURE — 86900 BLOOD TYPING SEROLOGIC ABO: CPT

## 2020-08-28 PROCEDURE — 77030002916 HC SUT ETHLN J&J -A: Performed by: OTOLARYNGOLOGY

## 2020-08-28 PROCEDURE — 77030021668 HC NEB PREFIL KT VYRM -A

## 2020-08-28 PROCEDURE — 77030040506 HC DRN WND MDII -A: Performed by: OTOLARYNGOLOGY

## 2020-08-28 PROCEDURE — 77030032988 HC TU ET NIM TRIVNTG EMG MEDT -D: Performed by: OTOLARYNGOLOGY

## 2020-08-28 PROCEDURE — 74011250637 HC RX REV CODE- 250/637: Performed by: NURSE ANESTHETIST, CERTIFIED REGISTERED

## 2020-08-28 PROCEDURE — 99218 HC RM OBSERVATION: CPT

## 2020-08-28 PROCEDURE — 77030019655 HC PRB STIM CRAN MEDT -B: Performed by: OTOLARYNGOLOGY

## 2020-08-28 PROCEDURE — 74011250637 HC RX REV CODE- 250/637: Performed by: STUDENT IN AN ORGANIZED HEALTH CARE EDUCATION/TRAINING PROGRAM

## 2020-08-28 PROCEDURE — 94762 N-INVAS EAR/PLS OXIMTRY CONT: CPT

## 2020-08-28 PROCEDURE — 77030002888 HC SUT CHRMC J&J -A: Performed by: OTOLARYNGOLOGY

## 2020-08-28 PROCEDURE — 76010000175 HC OR TIME 4 TO 4.5 HR INTENSV-TIER 1: Performed by: OTOLARYNGOLOGY

## 2020-08-28 PROCEDURE — 76060000040 HC ANESTHESIA 4.5 TO 5 HR: Performed by: OTOLARYNGOLOGY

## 2020-08-28 PROCEDURE — 77030010512 HC APPL CLP LIG J&J -C: Performed by: OTOLARYNGOLOGY

## 2020-08-28 PROCEDURE — 77030040361 HC SLV COMPR DVT MDII -B

## 2020-08-28 PROCEDURE — 82310 ASSAY OF CALCIUM: CPT

## 2020-08-28 PROCEDURE — 74011250637 HC RX REV CODE- 250/637: Performed by: OTOLARYNGOLOGY

## 2020-08-28 PROCEDURE — 74011250636 HC RX REV CODE- 250/636: Performed by: NURSE ANESTHETIST, CERTIFIED REGISTERED

## 2020-08-28 PROCEDURE — 74011000250 HC RX REV CODE- 250: Performed by: OTOLARYNGOLOGY

## 2020-08-28 PROCEDURE — 76210000006 HC OR PH I REC 0.5 TO 1 HR: Performed by: OTOLARYNGOLOGY

## 2020-08-28 PROCEDURE — 77030002996 HC SUT SLK J&J -A: Performed by: OTOLARYNGOLOGY

## 2020-08-28 PROCEDURE — 74011250637 HC RX REV CODE- 250/637: Performed by: ANESTHESIOLOGY

## 2020-08-28 PROCEDURE — 77030021678 HC GLIDESCP STAT DISP VERT -B: Performed by: ANESTHESIOLOGY

## 2020-08-28 PROCEDURE — 83970 ASSAY OF PARATHORMONE: CPT

## 2020-08-28 PROCEDURE — 77030040922 HC BLNKT HYPOTHRM STRY -A: Performed by: ANESTHESIOLOGY

## 2020-08-28 PROCEDURE — 88307 TISSUE EXAM BY PATHOLOGIST: CPT

## 2020-08-28 RX ORDER — OXYCODONE HYDROCHLORIDE 5 MG/1
5 TABLET ORAL
Status: DISCONTINUED | OUTPATIENT
Start: 2020-08-28 | End: 2020-08-28 | Stop reason: HOSPADM

## 2020-08-28 RX ORDER — CEPHALEXIN 250 MG/1
500 CAPSULE ORAL EVERY 6 HOURS
Status: DISCONTINUED | OUTPATIENT
Start: 2020-08-28 | End: 2020-08-29 | Stop reason: HOSPADM

## 2020-08-28 RX ORDER — TRAZODONE HYDROCHLORIDE 50 MG/1
50 TABLET ORAL
Status: DISCONTINUED | OUTPATIENT
Start: 2020-08-28 | End: 2020-08-29 | Stop reason: HOSPADM

## 2020-08-28 RX ORDER — CELECOXIB 200 MG/1
200 CAPSULE ORAL AS NEEDED
Status: CANCELLED | OUTPATIENT
Start: 2020-08-28

## 2020-08-28 RX ORDER — LIDOCAINE HYDROCHLORIDE 10 MG/ML
0.1 INJECTION INFILTRATION; PERINEURAL AS NEEDED
Status: DISCONTINUED | OUTPATIENT
Start: 2020-08-28 | End: 2020-08-28 | Stop reason: HOSPADM

## 2020-08-28 RX ORDER — SODIUM CHLORIDE, SODIUM LACTATE, POTASSIUM CHLORIDE, CALCIUM CHLORIDE 600; 310; 30; 20 MG/100ML; MG/100ML; MG/100ML; MG/100ML
100 INJECTION, SOLUTION INTRAVENOUS CONTINUOUS
Status: DISCONTINUED | OUTPATIENT
Start: 2020-08-28 | End: 2020-08-29 | Stop reason: HOSPADM

## 2020-08-28 RX ORDER — MUPIROCIN 20 MG/G
OINTMENT TOPICAL AS NEEDED
Status: DISCONTINUED | OUTPATIENT
Start: 2020-08-28 | End: 2020-08-28 | Stop reason: HOSPADM

## 2020-08-28 RX ORDER — NALOXONE HYDROCHLORIDE 0.4 MG/ML
0.4 INJECTION, SOLUTION INTRAMUSCULAR; INTRAVENOUS; SUBCUTANEOUS AS NEEDED
Status: DISCONTINUED | OUTPATIENT
Start: 2020-08-28 | End: 2020-08-29 | Stop reason: HOSPADM

## 2020-08-28 RX ORDER — MIDAZOLAM HYDROCHLORIDE 1 MG/ML
2 INJECTION, SOLUTION INTRAMUSCULAR; INTRAVENOUS ONCE
Status: COMPLETED | OUTPATIENT
Start: 2020-08-28 | End: 2020-08-28

## 2020-08-28 RX ORDER — HYDROMORPHONE HYDROCHLORIDE 2 MG/ML
0.5 INJECTION, SOLUTION INTRAMUSCULAR; INTRAVENOUS; SUBCUTANEOUS
Status: DISCONTINUED | OUTPATIENT
Start: 2020-08-28 | End: 2020-08-28 | Stop reason: HOSPADM

## 2020-08-28 RX ORDER — PROPOFOL 10 MG/ML
INJECTION, EMULSION INTRAVENOUS AS NEEDED
Status: DISCONTINUED | OUTPATIENT
Start: 2020-08-28 | End: 2020-08-28 | Stop reason: HOSPADM

## 2020-08-28 RX ORDER — ROCURONIUM BROMIDE 10 MG/ML
INJECTION, SOLUTION INTRAVENOUS AS NEEDED
Status: DISCONTINUED | OUTPATIENT
Start: 2020-08-28 | End: 2020-08-28 | Stop reason: HOSPADM

## 2020-08-28 RX ORDER — HYDROCODONE BITARTRATE AND ACETAMINOPHEN 5; 325 MG/1; MG/1
1 TABLET ORAL
Status: DISCONTINUED | OUTPATIENT
Start: 2020-08-28 | End: 2020-08-29 | Stop reason: HOSPADM

## 2020-08-28 RX ORDER — IPRATROPIUM BROMIDE AND ALBUTEROL SULFATE 2.5; .5 MG/3ML; MG/3ML
3 SOLUTION RESPIRATORY (INHALATION)
Status: DISCONTINUED | OUTPATIENT
Start: 2020-08-28 | End: 2020-08-29 | Stop reason: HOSPADM

## 2020-08-28 RX ORDER — MIDAZOLAM HYDROCHLORIDE 1 MG/ML
2 INJECTION, SOLUTION INTRAMUSCULAR; INTRAVENOUS
Status: DISCONTINUED | OUTPATIENT
Start: 2020-08-28 | End: 2020-08-28 | Stop reason: HOSPADM

## 2020-08-28 RX ORDER — HYDROMORPHONE HYDROCHLORIDE 2 MG/ML
INJECTION, SOLUTION INTRAMUSCULAR; INTRAVENOUS; SUBCUTANEOUS AS NEEDED
Status: DISCONTINUED | OUTPATIENT
Start: 2020-08-28 | End: 2020-08-28 | Stop reason: HOSPADM

## 2020-08-28 RX ORDER — LIDOCAINE HYDROCHLORIDE 20 MG/ML
INJECTION, SOLUTION EPIDURAL; INFILTRATION; INTRACAUDAL; PERINEURAL AS NEEDED
Status: DISCONTINUED | OUTPATIENT
Start: 2020-08-28 | End: 2020-08-28 | Stop reason: HOSPADM

## 2020-08-28 RX ORDER — ONDANSETRON 2 MG/ML
INJECTION INTRAMUSCULAR; INTRAVENOUS AS NEEDED
Status: DISCONTINUED | OUTPATIENT
Start: 2020-08-28 | End: 2020-08-28 | Stop reason: HOSPADM

## 2020-08-28 RX ORDER — SUCCINYLCHOLINE CHLORIDE 20 MG/ML
INJECTION INTRAMUSCULAR; INTRAVENOUS AS NEEDED
Status: DISCONTINUED | OUTPATIENT
Start: 2020-08-28 | End: 2020-08-28 | Stop reason: HOSPADM

## 2020-08-28 RX ORDER — ONDANSETRON 2 MG/ML
4 INJECTION INTRAMUSCULAR; INTRAVENOUS
Status: DISCONTINUED | OUTPATIENT
Start: 2020-08-28 | End: 2020-08-29 | Stop reason: HOSPADM

## 2020-08-28 RX ORDER — FENTANYL CITRATE 50 UG/ML
INJECTION, SOLUTION INTRAMUSCULAR; INTRAVENOUS AS NEEDED
Status: DISCONTINUED | OUTPATIENT
Start: 2020-08-28 | End: 2020-08-28 | Stop reason: HOSPADM

## 2020-08-28 RX ORDER — ALBUTEROL SULFATE 0.83 MG/ML
2.5 SOLUTION RESPIRATORY (INHALATION) AS NEEDED
Status: DISCONTINUED | OUTPATIENT
Start: 2020-08-28 | End: 2020-08-28 | Stop reason: HOSPADM

## 2020-08-28 RX ORDER — ACETAMINOPHEN 500 MG
1000 TABLET ORAL ONCE
Status: COMPLETED | OUTPATIENT
Start: 2020-08-28 | End: 2020-08-28

## 2020-08-28 RX ORDER — LIDOCAINE HYDROCHLORIDE AND EPINEPHRINE 10; 10 MG/ML; UG/ML
INJECTION, SOLUTION INFILTRATION; PERINEURAL AS NEEDED
Status: DISCONTINUED | OUTPATIENT
Start: 2020-08-28 | End: 2020-08-28 | Stop reason: HOSPADM

## 2020-08-28 RX ORDER — FENTANYL CITRATE 50 UG/ML
100 INJECTION, SOLUTION INTRAMUSCULAR; INTRAVENOUS ONCE
Status: DISCONTINUED | OUTPATIENT
Start: 2020-08-28 | End: 2020-08-28 | Stop reason: HOSPADM

## 2020-08-28 RX ORDER — FLUTICASONE PROPIONATE 110 UG/1
1 AEROSOL, METERED RESPIRATORY (INHALATION)
Status: DISCONTINUED | OUTPATIENT
Start: 2020-08-28 | End: 2020-08-29 | Stop reason: HOSPADM

## 2020-08-28 RX ORDER — EPHEDRINE SULFATE/0.9% NACL/PF 50 MG/5 ML
SYRINGE (ML) INTRAVENOUS AS NEEDED
Status: DISCONTINUED | OUTPATIENT
Start: 2020-08-28 | End: 2020-08-28 | Stop reason: HOSPADM

## 2020-08-28 RX ORDER — MORPHINE SULFATE 2 MG/ML
2 INJECTION, SOLUTION INTRAMUSCULAR; INTRAVENOUS
Status: DISCONTINUED | OUTPATIENT
Start: 2020-08-28 | End: 2020-08-29 | Stop reason: HOSPADM

## 2020-08-28 RX ORDER — SODIUM CHLORIDE, SODIUM LACTATE, POTASSIUM CHLORIDE, CALCIUM CHLORIDE 600; 310; 30; 20 MG/100ML; MG/100ML; MG/100ML; MG/100ML
50 INJECTION, SOLUTION INTRAVENOUS CONTINUOUS
Status: DISCONTINUED | OUTPATIENT
Start: 2020-08-28 | End: 2020-08-28 | Stop reason: HOSPADM

## 2020-08-28 RX ORDER — SODIUM CHLORIDE, SODIUM LACTATE, POTASSIUM CHLORIDE, CALCIUM CHLORIDE 600; 310; 30; 20 MG/100ML; MG/100ML; MG/100ML; MG/100ML
INJECTION, SOLUTION INTRAVENOUS
Status: DISCONTINUED | OUTPATIENT
Start: 2020-08-28 | End: 2020-08-28 | Stop reason: HOSPADM

## 2020-08-28 RX ORDER — SODIUM CHLORIDE, SODIUM LACTATE, POTASSIUM CHLORIDE, CALCIUM CHLORIDE 600; 310; 30; 20 MG/100ML; MG/100ML; MG/100ML; MG/100ML
75 INJECTION, SOLUTION INTRAVENOUS CONTINUOUS
Status: DISCONTINUED | OUTPATIENT
Start: 2020-08-28 | End: 2020-08-28 | Stop reason: HOSPADM

## 2020-08-28 RX ADMIN — SODIUM CHLORIDE, SODIUM LACTATE, POTASSIUM CHLORIDE, AND CALCIUM CHLORIDE 100 ML/HR: 600; 310; 30; 20 INJECTION, SOLUTION INTRAVENOUS at 15:39

## 2020-08-28 RX ADMIN — MINERAL OIL AND WHITE PETROLATUM 1 EACH: 150; 830 OINTMENT OPHTHALMIC at 09:11

## 2020-08-28 RX ADMIN — HYDROMORPHONE HYDROCHLORIDE 0.2 MG: 2 INJECTION INTRAMUSCULAR; INTRAVENOUS; SUBCUTANEOUS at 11:44

## 2020-08-28 RX ADMIN — SODIUM CHLORIDE, SODIUM LACTATE, POTASSIUM CHLORIDE, AND CALCIUM CHLORIDE: 600; 310; 30; 20 INJECTION, SOLUTION INTRAVENOUS at 13:05

## 2020-08-28 RX ADMIN — TRAZODONE HYDROCHLORIDE 50 MG: 50 TABLET ORAL at 22:59

## 2020-08-28 RX ADMIN — ONDANSETRON 4 MG: 2 INJECTION INTRAMUSCULAR; INTRAVENOUS at 13:14

## 2020-08-28 RX ADMIN — CEPHALEXIN 500 MG: 250 CAPSULE ORAL at 23:02

## 2020-08-28 RX ADMIN — FENTANYL CITRATE 100 MCG: 50 INJECTION INTRAMUSCULAR; INTRAVENOUS at 09:09

## 2020-08-28 RX ADMIN — PHENYLEPHRINE HYDROCHLORIDE 100 MCG: 10 INJECTION INTRAVENOUS at 12:45

## 2020-08-28 RX ADMIN — Medication 5 MG: at 12:35

## 2020-08-28 RX ADMIN — HYDROCODONE BITARTRATE AND ACETAMINOPHEN 1 TABLET: 5; 325 TABLET ORAL at 19:51

## 2020-08-28 RX ADMIN — Medication 5 MG: at 12:36

## 2020-08-28 RX ADMIN — HYDROMORPHONE HYDROCHLORIDE 0.4 MG: 2 INJECTION INTRAMUSCULAR; INTRAVENOUS; SUBCUTANEOUS at 09:36

## 2020-08-28 RX ADMIN — HYDROMORPHONE HYDROCHLORIDE 0.5 MG: 2 INJECTION INTRAMUSCULAR; INTRAVENOUS; SUBCUTANEOUS at 13:55

## 2020-08-28 RX ADMIN — SODIUM CHLORIDE, SODIUM LACTATE, POTASSIUM CHLORIDE, AND CALCIUM CHLORIDE: 600; 310; 30; 20 INJECTION, SOLUTION INTRAVENOUS at 09:13

## 2020-08-28 RX ADMIN — SUCCINYLCHOLINE CHLORIDE 140 MG: 20 INJECTION, SOLUTION INTRAMUSCULAR; INTRAVENOUS at 09:11

## 2020-08-28 RX ADMIN — MORPHINE SULFATE 2 MG: 2 INJECTION, SOLUTION INTRAMUSCULAR; INTRAVENOUS at 22:59

## 2020-08-28 RX ADMIN — PROPOFOL 150 MG: 10 INJECTION, EMULSION INTRAVENOUS at 09:10

## 2020-08-28 RX ADMIN — PHENYLEPHRINE HYDROCHLORIDE 100 MCG: 10 INJECTION INTRAVENOUS at 13:08

## 2020-08-28 RX ADMIN — CEPHALEXIN 500 MG: 250 CAPSULE ORAL at 17:53

## 2020-08-28 RX ADMIN — BENZOCAINE AND MENTHOL 1 LOZENGE: 15; 2.6 LOZENGE ORAL at 23:29

## 2020-08-28 RX ADMIN — HYDROMORPHONE HYDROCHLORIDE 0.5 MG: 2 INJECTION INTRAMUSCULAR; INTRAVENOUS; SUBCUTANEOUS at 13:48

## 2020-08-28 RX ADMIN — Medication 1 AMPULE: at 22:58

## 2020-08-28 RX ADMIN — PHENYLEPHRINE HYDROCHLORIDE 100 MCG: 10 INJECTION INTRAVENOUS at 12:41

## 2020-08-28 RX ADMIN — ACETAMINOPHEN 1000 MG: 500 TABLET, FILM COATED ORAL at 07:59

## 2020-08-28 RX ADMIN — MIDAZOLAM 2 MG: 1 INJECTION INTRAMUSCULAR; INTRAVENOUS at 08:34

## 2020-08-28 RX ADMIN — SODIUM CHLORIDE, SODIUM LACTATE, POTASSIUM CHLORIDE, AND CALCIUM CHLORIDE 75 ML/HR: 600; 310; 30; 20 INJECTION, SOLUTION INTRAVENOUS at 08:00

## 2020-08-28 RX ADMIN — HYDROCODONE BITARTRATE AND ACETAMINOPHEN 1 TABLET: 5; 325 TABLET ORAL at 15:40

## 2020-08-28 RX ADMIN — HYDROMORPHONE HYDROCHLORIDE 0.4 MG: 2 INJECTION INTRAMUSCULAR; INTRAVENOUS; SUBCUTANEOUS at 09:52

## 2020-08-28 RX ADMIN — LIDOCAINE HYDROCHLORIDE 60 MG: 20 INJECTION, SOLUTION EPIDURAL; INFILTRATION; INTRACAUDAL; PERINEURAL at 09:10

## 2020-08-28 RX ADMIN — ROCURONIUM BROMIDE 5 MG: 10 INJECTION, SOLUTION INTRAVENOUS at 09:10

## 2020-08-28 RX ADMIN — PHENYLEPHRINE HYDROCHLORIDE 100 MCG: 10 INJECTION INTRAVENOUS at 12:58

## 2020-08-28 NOTE — PROGRESS NOTES
's pre-procedure visit and prayer with patient as requested.     Melina Marie MDiv, BS  Board Certified

## 2020-08-28 NOTE — ANESTHESIA POSTPROCEDURE EVALUATION
Procedure(s):  TOTAL THYROIDECTOMY/NIMS.     general    Anesthesia Post Evaluation      Multimodal analgesia: multimodal analgesia used between 6 hours prior to anesthesia start to PACU discharge  Patient location during evaluation: PACU  Patient participation: complete - patient participated  Level of consciousness: responsive to verbal stimuli  Pain management: adequate  Airway patency: patent  Anesthetic complications: no  Cardiovascular status: acceptable  Respiratory status: acceptable, spontaneous ventilation and nonlabored ventilation  Hydration status: acceptable  Post anesthesia nausea and vomiting:  none      INITIAL Post-op Vital signs:   Vitals Value Taken Time   /55 8/28/2020  2:30 PM   Temp 37.2 °C (99 °F) 8/28/2020  1:40 PM   Pulse 100 8/28/2020  2:30 PM   Resp 16 8/28/2020  2:30 PM   SpO2 97 % 8/28/2020  2:30 PM

## 2020-08-28 NOTE — PROGRESS NOTES
Pt up to restroom with assistance. Was dizzy at first but did well. Voided without difficulty. Pt  is at bedside and helping. Pt voice quality is better.

## 2020-08-28 NOTE — PROGRESS NOTES
TRANSFER - IN REPORT:    Verbal report received from [facility] on Goldy Christy  being received from PACU for routine progression of care      Report consisted of patients Situation, Background, Assessment and   Recommendations(SBAR). Information from the following report(s) SBAR was reviewed with the receiving nurse. Opportunity for questions and clarification was provided. Assessment completed upon patients arrival to unit and care assumed.

## 2020-08-28 NOTE — PERIOP NOTES
TRANSFER - OUT REPORT:    Verbal report given to Grzegorz Padron RN on Maryanne Zapata  being transferred to [facility] for routine post - op       Report consisted of patients Situation, Background, Assessment and   Recommendations(SBAR). Information from the following report(s) SBAR, OR Summary, Procedure Summary, Intake/Output and MAR was reviewed with the receiving nurse. Lines:   Peripheral IV 08/28/20 Right Hand (Active)   Site Assessment Clean, dry, & intact 08/28/20 1441   Phlebitis Assessment 0 08/28/20 1441   Infiltration Assessment 0 08/28/20 1441   Dressing Status Clean, dry, & intact 08/28/20 1441   Dressing Type Tape;Transparent 08/28/20 1441   Hub Color/Line Status Pink; Infusing;Flushed;Patent 08/28/20 1441       Peripheral IV 08/28/20 Left Wrist (Active)   Site Assessment Clean, dry, & intact 08/28/20 1441   Phlebitis Assessment 0 08/28/20 1441   Infiltration Assessment 0 08/28/20 1441   Dressing Status Clean, dry, & intact 08/28/20 1441   Dressing Type Tape;Transparent 08/28/20 1441   Hub Color/Line Status Pink; Infusing;Flushed;Patent 08/28/20 1441        Opportunity for questions and clarification was provided. Patient transported with:   O2 @ 2 liters    VTE prophylaxis orders have been written for Maryanne Zapata. Patient and family given floor number and nurses name. Family updated re: pt status after security code verified.

## 2020-08-28 NOTE — ANESTHESIA PREPROCEDURE EVALUATION
Relevant Problems   No relevant active problems       Anesthetic History     Increased risk of difficult airway (Glidescope used successfully after failed DL 2019)          Review of Systems / Medical History  Patient summary reviewed, nursing notes reviewed and pertinent labs reviewed    Pulmonary        Sleep apnea: CPAP    Asthma        Neuro/Psych              Cardiovascular    Hypertension        Dysrhythmias (s/p ablation, no longer on blood thinner medication) : atrial fibrillation      Exercise tolerance: >4 METS     GI/Hepatic/Renal           Liver disease    Comments: occ heartburn/reflux with dietary indiscretion Endo/Other      Hypothyroidism  Morbid obesity     Other Findings              Physical Exam    Airway  Mallampati: III  TM Distance: 4 - 6 cm  Neck ROM: normal range of motion   Mouth opening: Normal     Cardiovascular    Rhythm: regular  Rate: normal    Murmur: Grade 2     Dental  No notable dental hx       Pulmonary  Breath sounds clear to auscultation               Abdominal         Other Findings            Anesthetic Plan    ASA: 3  Anesthesia type: general          Induction: Intravenous  Anesthetic plan and risks discussed with: Patient      Plan Glidescope

## 2020-08-28 NOTE — PROGRESS NOTES
Pt co of pain to throat and neck area. Incision is visible above drain. Sutures intact edges well approximated. FRANCISCO drain is charges. Pt able to swallow ice and water and pain med.

## 2020-08-28 NOTE — PROGRESS NOTES
Received pt to room 311. Alert and oriented x4 but drowsy. Opens eye for noise or movement. Voice is barely a whisper. Oriented to room and bed controls.

## 2020-08-29 VITALS
DIASTOLIC BLOOD PRESSURE: 75 MMHG | BODY MASS INDEX: 38.7 KG/M2 | WEIGHT: 239.8 LBS | RESPIRATION RATE: 19 BRPM | TEMPERATURE: 98.3 F | SYSTOLIC BLOOD PRESSURE: 157 MMHG | HEART RATE: 103 BPM | OXYGEN SATURATION: 91 %

## 2020-08-29 LAB
CALCIUM SERPL-MCNC: 8.8 MG/DL (ref 8.3–10.4)
CALCIUM SERPL-MCNC: 8.9 MG/DL (ref 8.3–10.4)
CALCIUM SERPL-MCNC: 8.9 MG/DL (ref 8.3–10.4)
CALCIUM SERPL-MCNC: 9.4 MG/DL (ref 8.3–10.4)

## 2020-08-29 PROCEDURE — 74011250636 HC RX REV CODE- 250/636: Performed by: OTOLARYNGOLOGY

## 2020-08-29 PROCEDURE — 74011250637 HC RX REV CODE- 250/637: Performed by: OTOLARYNGOLOGY

## 2020-08-29 PROCEDURE — 82310 ASSAY OF CALCIUM: CPT

## 2020-08-29 PROCEDURE — 36415 COLL VENOUS BLD VENIPUNCTURE: CPT

## 2020-08-29 PROCEDURE — 99218 HC RM OBSERVATION: CPT

## 2020-08-29 RX ADMIN — LEVOTHYROXINE SODIUM 175 MCG: 0.12 TABLET ORAL at 07:20

## 2020-08-29 RX ADMIN — CEPHALEXIN 500 MG: 250 CAPSULE ORAL at 06:19

## 2020-08-29 RX ADMIN — HYDROCODONE BITARTRATE AND ACETAMINOPHEN 1 TABLET: 5; 325 TABLET ORAL at 15:09

## 2020-08-29 RX ADMIN — Medication 1 AMPULE: at 08:48

## 2020-08-29 RX ADMIN — HYDROCODONE BITARTRATE AND ACETAMINOPHEN 1 TABLET: 5; 325 TABLET ORAL at 01:53

## 2020-08-29 RX ADMIN — HYDROCODONE BITARTRATE AND ACETAMINOPHEN 1 TABLET: 5; 325 TABLET ORAL at 11:15

## 2020-08-29 RX ADMIN — MORPHINE SULFATE 2 MG: 2 INJECTION, SOLUTION INTRAMUSCULAR; INTRAVENOUS at 08:39

## 2020-08-29 RX ADMIN — MORPHINE SULFATE 2 MG: 2 INJECTION, SOLUTION INTRAMUSCULAR; INTRAVENOUS at 03:42

## 2020-08-29 RX ADMIN — CEPHALEXIN 500 MG: 250 CAPSULE ORAL at 11:15

## 2020-08-29 RX ADMIN — HYDROCODONE BITARTRATE AND ACETAMINOPHEN 1 TABLET: 5; 325 TABLET ORAL at 06:19

## 2020-08-29 NOTE — PROGRESS NOTES
Problem: Falls - Risk of  Goal: *Absence of Falls  Description: Document Gary Laughlin Fall Risk and appropriate interventions in the flowsheet.   8/29/2020 0433 by Natalia Rios RN  Outcome: Progressing Towards Goal  Note: Fall Risk Interventions:  Medication Interventions: Patient to call before getting OOB, Teach patient to arise slowly     8/29/2020 0431 by Natalia Rios RN  Outcome: Progressing Towards Goal  Note: Fall Risk Interventions:     Medication Interventions: Patient to call before getting OOB, Teach patient to arise slowly       Problem: Pain  Goal: *Control of Pain  Outcome: Progressing Towards Goal  Note:   Assess pain management - barriers (eg: Past pain   experiences)  Identify pain expectations (eg: Patient's pain goal; somatic experiences; behavioral changes;   affect)  Identify pain medication concerns (eg: Cultural considerations; addiction   concerns)

## 2020-08-29 NOTE — DISCHARGE INSTRUCTIONS
Patient Education   Keep dressing clean and dry  Keep record of FRANCISCO drain amount      Thyroidectomy: What to Expect at Home  Your Recovery     Thyroidectomy is the removal of the thyroid gland, which is shaped like a butterfly and lies across the windpipe (trachea). The gland makes hormones that control how your body makes and uses energy (metabolism). A doctor removes the gland when a tumor is present. The doctor may also remove the gland if you have an enlarged thyroid that is causing symptoms that bother you. Most tumors that grow in the thyroid gland are benign, meaning they are not cancer. You may leave the hospital with stitches in the cut (incision) the doctor made. Your doctor will tell you if you need to come back to have these removed. You may still have a tube called a drain in your neck. Your doctor will take this out a few days after your surgery. You may have some trouble chewing and swallowing after you go home. Your voice probably will be hoarse, and you may have trouble talking. For most people, these problems get better within 3 to 4 months, but it can take as long as a year. In some cases, this surgery causes permanent problems with chewing, speaking, or swallowing. This care sheet gives you a general idea about how long it will take for you to recover. But each person recovers at a different pace. Follow the steps below to get better as quickly as possible. How can you care for yourself at home? Activity  · Rest when you feel tired. Getting enough sleep will help you recover. When you lie down, put two or three pillows under your head to keep it raised. · Try to walk each day. Start by walking a little more than you did the day before. Bit by bit, increase the amount you walk. Walking boosts blood flow and helps prevent pneumonia and constipation. · Avoid strenuous physical activity and lifting heavy objects for 3 weeks after surgery or until your doctor says it is okay.   · Do not over-extend your neck backwards for 2 weeks after surgery. · Ask your doctor when you can drive again. · You may take a shower, unless you still have a drain near your incision. Pat the incision dry. If you have a drain, follow your doctor's instructions to care for it. Diet  · If it is painful to swallow, start out with cold drinks, flavored ice pops, and ice cream. Next, try soft foods like pudding, yogurt, canned or cooked fruit, scrambled eggs, and mashed potatoes. Avoid eating hard or scratchy foods like chips or raw vegetables. Avoid orange or tomato juice and other acidic foods that can sting the throat. · If you cough right after drinking, try drinking thicker liquids, such as a smoothie. · You may notice that your bowel movements are not regular right after your surgery. This is common. Try to avoid constipation and straining with bowel movements. You may want to take a fiber supplement every day. If you have not had a bowel movement after a couple of days, ask your doctor about taking a mild laxative. Medicines  · Your doctor will tell you if and when you can restart your medicines. He or she will also give you instructions about taking any new medicines. · If you take aspirin or some other blood thinner, ask your doctor if and when to start taking it again. Make sure that you understand exactly what your doctor wants you to do. · Be safe with medicines. Take pain medicines exactly as directed. ? If the doctor gave you a prescription medicine for pain, take it as prescribed. ? If you are not taking a prescription pain medicine, ask your doctor if you can take an over-the-counter medicine. · If you think your pain medicine is making you sick to your stomach:  ? Take your medicine after meals (unless your doctor has told you not to). ? Ask your doctor for a different pain medicine. · Your doctor may prescribe calcium to prevent problems after surgery from low calcium.  Not having enough calcium can cause symptoms such as tingling around your mouth or in your hands and feet. · Your doctor may have prescribed antibiotics. Take them as directed. Do not stop taking them just because you feel better. You need to take the full course of antibiotics. Incision care  · If your doctor told you how to care for your incision, follow your doctor's instructions. If you did not get instructions, follow this general advice:  ? After the first 24 to 48 hours, wash around the wound with clean water 2 times a day. Don't use hydrogen peroxide or alcohol, which can slow healing. · You may have a drain near your incision. Your doctor will tell you how to take care of it. Follow-up care is a key part of your treatment and safety. Be sure to make and go to all appointments, and call your doctor if you are having problems. It's also a good idea to know your test results and keep a list of the medicines you take. When should you call for help? [id number] anytime you think you may need emergency care. For example, call if:  · You passed out (lost consciousness). · You have sudden chest pain and shortness of breath, or you cough up blood. · You have severe trouble breathing. Call your doctor now or seek immediate medical care if:  · You have loose stitches, or your incision comes open. · Bleeding from your incision soaks through your bandages. · You have signs of infection, such as:  ? Increased pain, swelling, warmth, or redness. ? Red streaks leading from the incision. ? Pus draining from the incision. ? A fever. · You have a tingling feeling around your mouth. · You have cramping or tingling in your hands and feet. Watch closely for changes in your health, and be sure to contact your doctor if:  · You have trouble talking. · You are sick to your stomach or cannot keep fluids down. · You do not have a bowel movement after taking a laxative. Where can you learn more?   Go to http://fina-gera.info/  Enter D009 in the search box to learn more about \"Thyroidectomy: What to Expect at Home. \"  Current as of: July 29, 2019               Content Version: 12.5  © 6685-3597 [employer], [employer]. Care instructions adapted under license by [employer] (which disclaims liability or warranty for this information). If you have questions about a medical condition or this instruction, always ask your healthcare professional. [place] [address] any warranty or liability for your use of this information.

## 2020-08-29 NOTE — PROGRESS NOTES
Spoke with Dr. Vinicio Khan, Physician on call. Patient has requested Cepocal lozenges for sore throat. MD approved this order, verbal feedback received.

## 2020-08-29 NOTE — OP NOTES
[id number] [address]  OPERATIVE REPORT    Name:  Lance Adair  MR#:  [id number]  :  1965  ACCOUNT #:  [de-identified]  DATE OF SERVICE:  2020    PREOPERATIVE DIAGNOSES:  1.  Multinodular goiter. 2.  Dysphagia. POSTOPERATIVE DIAGNOSES:  1.  Multinodular goiter. 2.  Dysphagia. PROCEDURE PERFORMED:  Total thyroidectomy with nerve integrity monitoring. SURGEON:  Kirk Cooper DO    ASSISTANT:  None. ANESTHESIA:  General.    COMPLICATIONS:  None. SPECIMENS REMOVED:  1. Left thyroid. 2.  Right thyroid. IMPLANTS:  None. ESTIMATED BLOOD LOSS:  1200 mL. HISTORY:  This is a 41-year-old female who came to see me from Dr. Nancy Barrientos with Endocrinology. She started getting heart palpitations about 5 years ago. She was worked up and found to have hyperthyroidism and was diagnosed with Graves' disease. She has been on varying doses of methimazole. She was initially in [place] when all this was found and she started seeing Dr. Jed Gastelum when she moved to [place]. When she comes off her methimazole, she has a recurrence of her hyperthyroid symptoms. So, we discussed with her more definitive therapy and suggested that she consider a total thyroidectomy and she was in agreement with that plan, so she came to see me in the office. She does get a feeling like food pauses in her throat when she swallows and she has to concentrate to get the food down. There is no hoarseness. There is choking on food. She does feel like it gets stuck to the point where she has to cough it out. It does not feel like it is going into her trachea though. There is no bleeding from the throat. She has a family history of thyroid issues but not thyroid cancer. So, physical exam revealed an extremely enlarged thyroid gland bilaterally. So based on the history and physical exam, it was my recommendation that she undergo a total thyroidectomy.   The procedure risks and benefits were discussed with her in the office. All questions were answered and she was agreeable to the surgery. SURGERY DETAILS:  The patient was identified in the preoperative waiting area, taken back to the operating room where she underwent general anesthesia. Nerve integrity monitoring endotracheal tube was used for the intubation. She did have wires placed in the anterior chest wall for the nerve integrity monitoring probe and the ground wire. She was prepped and draped in sterile fashion with the neck extended. I was able to draw the prominent landmarks on the neck. She had a crease in the neck that sat about the level of the hyoid bone, but she did not have another one until low in the neck near the clavicle. So, after drawing out the sternal notch, anterior border of the sternocleidomastoid muscles, the thyroid masses themselves, the cricoid cartilage, I did draw a line paralleling the more superior line approximately 2 cm below that line and this was directly overlying the thyroid gland. I did draw between the sternocleidomastoid muscles. Overall, this was approximately 5 cm long. I then injected less than 1 mL of 1% lidocaine with epinephrine into that pre-drawn line. This was allowed to sit for a minute. A knife was used to make an incision through the skin down through the subcutaneous fat, identified the platysma, blunt dissection and sized the platysma. Superior and inferior subplatysmal flaps were then raised, used stay sutures to hold the four corners open allowing exposure of the strap muscles and I could see the anterior jugular veins. The anterior jugular veins and strap muscles were used to split the midline, taken down to the level of the thyroid gland. Once I was able to identify the thyroid gland, I did start on the left side. Blunt dissection was used to go around the gland.   Immediately upon lifting up the strap muscles and the fascia off the gland, there was a lot of hypervascularity to the gland and there was a lot of oozing. We were able to control this as we went along going laterally around the gland that there was a much easier dissection towards the inferior portion of the gland. So, I did take this to the inferior gland. I was able to locate the inferior parathyroid gland, the inferior vessels. They were ligated close to the gland and I was able to retract the parathyroid gland inferiorly and laterally. The gland itself was very large. It was wrapped around so that the posterior portion of the gland was almost posterior to the esophagus. It went 2 cm substernal, it was 3 to 4 cm lateral to the sternocleidomastoid muscle on the left. So, again using blunt dissection, I was able to get around the gland. There was a lot of scar tissue, a lot of hypervascularity. Once I had the inferior pole out, I did locate the middle thyroid vein, which was ligated using the LigaSure and then went to the superior pole. Superior pole went approximately 3 to 4 cm superior to the level of the cricoid cartilage and very lateral in the neck. I was able to ligate the superior vessels using the LigaSure and then I was able to pull the superior pole back down into the surgical field. Again, with the gland being so large, it was hard to even rotate it anteriorly to even get to the more tracheoesophageal groove. So, I did end up splitting the gland at the midline overlying the trachea and then I went laterally around the trachea towards the left and near the posterior aspect of the cricoid cartilage, I was able to identify the recurrent laryngeal nerve and from there, I traced it back following it into the inferior neck. By doing that, I was able to remove the rest of the gland. The gland was taken out as the left thyroid gland.   Once the gland was removed, I was able to locate the superior left parathyroid gland, and both the superior and inferior parathyroid glands appeared to be healthy and also appeared to be healthy at the end of the case. Stimulation of the recurrent laryngeal nerve at its most distal point inferiorly also showed good stimulation of the left recurrent laryngeal nerve. Attention was then paid to the right in the same fashion. I was able to take this at the midline staying against the trachea and I was able to go lateral around the trachea until I ran into a lot of scar tissue. As it was found on the left, the right side also showed a lot of severe hypervascularity. I found a lot of same things that I found on the left side. I was able to go in the superficial portion going laterally around the gland. I was able to create a nice plane, took this inferiorly. I was able to locate the recurrent laryngeal nerve inferiorly. I was able to find the parathyroid gland which was also  from the gland along with its blood supply and retracted laterally. There was a lot of scar tissue that was attaching the thyroid gland to the trachea on the medial aspect and this was right where the recurrent laryngeal nerve was going. I did go towards the superior pole. I did also go posteriorly around the gland as far as I could. The posterior thyroid gland went posterior behind the esophagus between the esophagus and the cervical spine, and was even into the left side of the neck. So, again slow blunt dissection was taken to try to get this back towards the right. Going more into the superior pole, the superior pole tracked so far superior that it was up near the tongue base and more laterally out towards the angle of the mandible. Once I was able to release all the adhesions from the gland and control the oozing from the hypervascularity, I was eventually able to pull the superior portion of the gland into the main area of the neck. The right thyroid gland was much larger than the left and that needed to be taken out in pieces.   I was able to remove the inferior portion first once I knew where the recurrent laryngeal nerve was located. Once I was able to trace the nerve with blunt dissection up to its insertion posterior to the cricoid cartilage, then I was able to remove the midportion of the thyroid gland and at that point, I was able to retract the superior portion of the gland up from near the angle of the mandible, back down into the lower neck to completely remove that. The gland was completely removed on both sides. I was able to locate what appeared to be a superior and inferior parathyroid gland on the right side also which also appeared to be healthy at the end of the case. I did stimulate the nerve with a nerve integrity monitoring probe and I got a good response on the right side. Irrigation was done on both sides. I was able to make a small stab incision approximately 1 cm below the surgical incision and inserted a size 10 round FRANCISCO drain. This was sewn in place using a 3-0 silk. A 4-0 chromic was used to reapproximate the platysma, 5-0 nylon was used to reapproximate the skin. Antibiotic ointment was placed on the incision and then the patient was awakened and taken to the postop recovery room in stable condition. This case was much more difficult than the average thyroid gland and time on the case was approximately 4 hours.       DO JENNY [email or web address]  D:  08/28/2020 13:43  T:  08/29/2020 1:12  JOB #:  [id number]

## 2020-08-29 NOTE — PROGRESS NOTES
Shift assessment complete. Bed low to ground, Bed rails up x 3. Pt resting in bed. Anterior neck sutures well approximated  with no bleeding. FRANCISCO drain with sanguinous drainage emptied and recharged. No needs at this time.

## 2020-08-29 NOTE — PROGRESS NOTES
08/28/20 2052   Oxygen Therapy   O2 Sat (%) 94 %   O2 Device Room air     C/s placed on patient. Alarms set per protocol. Cpap set up at bedside and ready for patient use.

## 2020-08-29 NOTE — PROGRESS NOTES
Shift assessment complete. Patient is alert and oriented, in bed watching TV. Respirations are even and unlabored. Bowel sounds are present. Gauze and tape dressing on neck. Incision is clean, dry and intact. FRANCISCO drain charged. Lactated ringers infusing at 100 ml/hr. No needs expressed at this time. Bed low and locked, call light within reach.

## 2020-08-31 ENCOUNTER — PATIENT OUTREACH (OUTPATIENT)
Dept: CASE MANAGEMENT | Age: 55
End: 2020-08-31

## 2020-08-31 NOTE — PROGRESS NOTES
Patient contacted regarding recent discharge and COVID-19 risk. Discussed COVID-19 related testing which was available at this time. Test results were negative. Patient informed of results, if available? yes    Care Transition Nurse/ Ambulatory Care Manager/ LPN Care Coordinator contacted the patient by telephone to perform post discharge assessment. Verified name and  with patient as identifiers. Patient has following risk factors of: immunocompromised and Essential HTN. CTN/ACM/LPN reviewed discharge instructions, medical action plan and red flags related to discharge diagnosis. Reviewed and educated them on any new and changed medications related to discharge diagnosis. Advised obtaining a 90-day supply of all daily and as-needed medications. Advance Care Planning:   Does patient have an Advance Directive: decision makers updated     Education provided regarding infection prevention, and signs and symptoms of COVID-19 and when to seek medical attention with patient who verbalized understanding. Discussed exposure protocols and quarantine from [address] you at higher risk for severe illness  and given an opportunity for questions and concerns. The patient agrees to contact the COVID-19 hotline [phone] or PCP office for questions related to their healthcare. CTN/ACM/LPN provided contact information for future reference. From CDC: Are you at higher risk for severe illness?  Wash your hands often.  Avoid close contact (6 feet, which is about two arm lengths) with people who are sick.  Put distance between yourself and other people if COVID-19 is spreading in your community.  Clean and disinfect frequently touched surfaces.  Avoid all cruise travel and non-essential air travel.  Call your healthcare professional if you have concerns about COVID-19 and your underlying condition or if you are sick.     For more information on steps you can take to protect yourself, see CDC's How to Protect Yourself      Patient/family/caregiver given information for [employer] Loop and agrees to enroll no  Patient's preferred e-mail:  n/a  Patient's preferred phone number: n/a  Based on Loop alert triggers, patient will be contacted by nurse care manager for worsening symptoms. Plan for follow-up call in 7-14 days based on severity of symptoms and risk factors.

## 2020-09-09 ENCOUNTER — PATIENT OUTREACH (OUTPATIENT)
Dept: CASE MANAGEMENT | Age: 55
End: 2020-09-09

## 2020-09-14 ENCOUNTER — PATIENT OUTREACH (OUTPATIENT)
Dept: CASE MANAGEMENT | Age: 55
End: 2020-09-14

## 2020-10-12 ENCOUNTER — HOSPITAL ENCOUNTER (OUTPATIENT)
Dept: SLEEP MEDICINE | Age: 55
Discharge: HOME | End: 2020-10-12
Payer: MEDICAID

## 2020-10-12 PROCEDURE — 95810 POLYSOM 6/> YRS 4/> PARAM: CPT

## 2020-10-21 PROBLEM — E04.2 MULTINODULAR GOITER: Status: RESOLVED | Noted: 2020-03-05 | Resolved: 2020-10-21

## 2020-10-21 PROBLEM — E89.0 POSTSURGICAL HYPOTHYROIDISM: Status: ACTIVE | Noted: 2019-11-08

## 2020-12-02 ENCOUNTER — HOSPITAL ENCOUNTER (OUTPATIENT)
Dept: LAB | Age: 55
Discharge: HOME | End: 2020-12-02
Payer: MEDICAID

## 2020-12-02 PROCEDURE — 88142 CYTOPATH C/V THIN LAYER: CPT

## 2020-12-02 PROCEDURE — 87624 HPV HI-RISK TYP POOLED RSLT: CPT

## 2021-06-02 ENCOUNTER — HOSPITAL ENCOUNTER (OUTPATIENT)
Dept: LAB | Age: 56
Discharge: HOME | End: 2021-06-02
Payer: MEDICAID

## 2021-06-02 DIAGNOSIS — C54.1 ENDOMETRIAL CANCER (HCC): ICD-10-CM

## 2021-06-02 PROCEDURE — 88142 CYTOPATH C/V THIN LAYER: CPT

## 2021-06-10 ENCOUNTER — TRANSCRIBE ORDER (OUTPATIENT)
Dept: SCHEDULING | Age: 56
End: 2021-06-10

## 2021-06-10 DIAGNOSIS — Z12.31 SCREENING MAMMOGRAM FOR HIGH-RISK PATIENT: Primary | ICD-10-CM

## 2021-07-08 ENCOUNTER — HOSPITAL ENCOUNTER (OUTPATIENT)
Dept: MAMMOGRAPHY | Age: 56
Discharge: HOME | End: 2021-07-08
Attending: FAMILY MEDICINE
Payer: MEDICAID

## 2021-07-08 DIAGNOSIS — Z12.31 SCREENING MAMMOGRAM FOR HIGH-RISK PATIENT: ICD-10-CM

## 2021-07-08 PROCEDURE — 77067 SCR MAMMO BI INCL CAD: CPT

## 2021-12-15 ENCOUNTER — HOSPITAL ENCOUNTER (OUTPATIENT)
Dept: LAB | Age: 56
Discharge: HOME | End: 2021-12-15
Payer: MEDICAID

## 2021-12-15 DIAGNOSIS — C54.1 ENDOMETRIAL CANCER (HCC): ICD-10-CM

## 2021-12-15 PROCEDURE — 88142 CYTOPATH C/V THIN LAYER: CPT

## 2022-03-18 PROBLEM — E89.0 POSTSURGICAL HYPOTHYROIDISM: Status: ACTIVE | Noted: 2019-11-08

## 2022-03-18 PROBLEM — E05.00 GRAVES' OPHTHALMOPATHY: Status: ACTIVE | Noted: 2020-03-05

## 2022-03-19 PROBLEM — E89.0 S/P TOTAL THYROIDECTOMY: Status: ACTIVE | Noted: 2020-08-28

## 2022-03-19 PROBLEM — Z90.89 S/P TOTAL THYROIDECTOMY: Status: ACTIVE | Noted: 2020-08-28

## 2022-03-19 PROBLEM — K80.20 SYMPTOMATIC CHOLELITHIASIS: Status: ACTIVE | Noted: 2019-11-12

## 2022-03-19 PROBLEM — C54.1 ENDOMETRIAL CANCER (HCC): Status: ACTIVE | Noted: 2019-11-08

## 2022-03-19 PROBLEM — K74.69 OTHER CIRRHOSIS OF LIVER (HCC): Status: ACTIVE | Noted: 2019-11-27

## 2022-03-19 PROBLEM — I48.20 CHRONIC ATRIAL FIBRILLATION (HCC): Status: ACTIVE | Noted: 2019-11-08

## 2022-03-19 PROBLEM — E66.01 OBESITY, MORBID (HCC): Status: ACTIVE | Noted: 2019-11-08

## 2022-03-19 PROBLEM — I10 ESSENTIAL HYPERTENSION: Status: ACTIVE | Noted: 2019-11-08

## 2022-03-19 PROBLEM — Z98.890 S/P TOTAL THYROIDECTOMY: Status: ACTIVE | Noted: 2020-08-28

## 2022-03-19 PROBLEM — G47.33 OSA (OBSTRUCTIVE SLEEP APNEA): Status: ACTIVE | Noted: 2020-06-12

## 2022-05-27 DIAGNOSIS — E89.0 POSTSURGICAL HYPOTHYROIDISM: Primary | ICD-10-CM

## 2022-06-14 DIAGNOSIS — C54.1 ENDOMETRIAL CANCER (HCC): Primary | ICD-10-CM

## 2022-06-15 ENCOUNTER — HOSPITAL ENCOUNTER (OUTPATIENT)
Dept: LAB | Age: 57
Discharge: HOME | End: 2022-06-18
Payer: MEDICAID

## 2022-06-15 ENCOUNTER — OFFICE VISIT (OUTPATIENT)
Dept: ONCOLOGY | Age: 57
End: 2022-06-15
Payer: MEDICAID

## 2022-06-15 VITALS
TEMPERATURE: 98.1 F | BODY MASS INDEX: 45.8 KG/M2 | WEIGHT: 285 LBS | HEIGHT: 66 IN | SYSTOLIC BLOOD PRESSURE: 149 MMHG | OXYGEN SATURATION: 94 % | DIASTOLIC BLOOD PRESSURE: 88 MMHG | HEART RATE: 84 BPM | RESPIRATION RATE: 21 BRPM

## 2022-06-15 DIAGNOSIS — E89.0 S/P TOTAL THYROIDECTOMY: ICD-10-CM

## 2022-06-15 DIAGNOSIS — K74.69 OTHER CIRRHOSIS OF LIVER (HCC): ICD-10-CM

## 2022-06-15 DIAGNOSIS — G47.33 OSA (OBSTRUCTIVE SLEEP APNEA): ICD-10-CM

## 2022-06-15 DIAGNOSIS — C54.1 ENDOMETRIAL CANCER (HCC): Primary | ICD-10-CM

## 2022-06-15 PROCEDURE — 88142 CYTOPATH C/V THIN LAYER: CPT

## 2022-06-15 PROCEDURE — 99213 OFFICE O/P EST LOW 20 MIN: CPT | Performed by: OBSTETRICS & GYNECOLOGY

## 2022-06-15 ASSESSMENT — PATIENT HEALTH QUESTIONNAIRE - PHQ9
SUM OF ALL RESPONSES TO PHQ QUESTIONS 1-9: 0
1. LITTLE INTEREST OR PLEASURE IN DOING THINGS: 0
2. FEELING DOWN, DEPRESSED OR HOPELESS: 0
SUM OF ALL RESPONSES TO PHQ9 QUESTIONS 1 & 2: 0

## 2022-06-15 NOTE — PROGRESS NOTES
Cc   endoemtrial cancer, stage 1a gr 2    Surgery jan 2019       Hx gallbladder dx-lap choly with dr Davey Rodrigues      Cirrhotic appearing liver at surgery    Pt reports multiple family members with cirrhosis    Hep b, hep c neg., nov 2019, no etoh use per pt report      msi pos. Negative genetics per pt/   Obesity   Hx afib, s/p ablation(now 81 asa)      HPI:   Ref MD:      Mrs. Layton Wong is a 62 y/o who is here today for a scheduled visit. She  went through menopause 2014  and that she started having PMB 2017. She had Pelvic US and D&C 10/2019 which showed an endometrial carcinoma, FIGO grade II.        3/2019 she went to ER in [place] for abd pain, abd US showed gallstones and CT A/P showed cholelithiasis and enlarged uterus. Sh went to ER for heavy bleeding. Her H&H was 8.6/29.3 on 11/4/2019. She was started on provera. Presented to office for further tx at that time      Last pap 2017 - she was told she had small fiborid   No h/o of abdnormal    A-fib - ablation 12/2017 1/2019 colonoscopy - normal per pt   mammo - current - normal per pt. Review of Systems    Constitutional: Negative. HENT: Negative. Eyes: Negative. Respiratory: Negative. Cardiovascular: Negative. Gastrointestinal: Positive for blood in stool. Genitourinary: Negative. Musculoskeletal: Negative. Skin: Negative. Neurological: Negative. Endo/Heme/Allergies: Negative. Psychiatric/Behavioral: Negative. All other systems reviewed and are negative.              Allergies        Allergen  Reactions           Cardizem [Diltiazem Hcl]  Other (comments)             Blistering          Past Medical History:        Diagnosis  Date           Arrhythmia  2018          a-fib (2017) ablation-(12/2018)- resolved           Asthma            seasonal allergies- maint and rescue inhaler           Cancer [facility])            endometrial cancer- hysterectomy 11/26/20           Chronic status:  Never Smoker       Smokeless tobacco:  Never Used       Substance and Sexual Activity           Alcohol use: Yes              Alcohol/week:  1.0 standard drink        Types:  1 Glasses of wine per week            Comment: rarely           Drug use:  Not Currently       Sexual activity:  Not on file        Other Topics  Concern          Not on file       Social History Narrative          Not on file         Social Determinants of Health         Financial Resource Strain:           Difficulty of Paying Living Expenses: Not on file       Food Insecurity:           Worried About Running Out of Food in the Last Year: Not on file       Koby of Food in the Last Year: Not on file       Transportation Needs:           Lack of Transportation (Medical): Not on file       Lack of Transportation (Non-Medical):  Not on file       Physical Activity:           Days of Exercise per Week: Not on file       Minutes of Exercise per Session: Not on file       Stress:           Feeling of Stress : Not on file       Social Connections:           Frequency of Communication with Friends and Family: Not on file       Frequency of Social Gatherings with Friends and Family: Not on file       Attends Mandaen Services: Not on file       Active Member of [address] [place] [address] or Organizations: Not on file       Attends Club or Organization Meetings: Not on file       Marital Status: Not on file       Intimate Partner Violence:           Fear of Current or Ex-Partner: Not on file       Emotionally Abused: Not on file       Physically Abused: Not on file       Sexually Abused: Not on file       Housing Stability:           Unable to Pay for Housing in the Last Year: Not on file       Number of [place] in the Last Year: Not on file          Unstable Housing in the Last Year: Not on file          Current Outpatient Medications          Medication  Sig  Dispense  Refill            clobetasoL (TEMOVATE) 0.05 % external mg. Do not combine with Ativan (Patient not taking: Reported on 12/15/2021)  30 Tablet  5          OBJECTIVE:   Visit Vitals  Vss, afeb     Performance Status:  0      Physical Exam   Vitals and nursing note reviewed. Exam conducted with a chaperone present. Constitutional:        Appearance: Normal appearance. She is obese. HENT:       Head: Normocephalic and atraumatic. Nose: No congestion. Cardiovascular :       Rate and Rhythm: Normal rate and regular rhythm. Heart sounds: Normal heart sounds. Pulmonary:       Effort: Pulmonary effort is normal. No respiratory distress. Breath sounds: Normal breath sounds. Abdominal:      General: Abdomen is flat. There is no distension. Palpations: Abdomen is soft. There is no mass. Genitourinary :      General: Normal vulva. Vagina: No vaginal discharge. Comments: Gerald, no gyn source bleeding  Musculoskeletal:          General: Normal range of motion. Cervical back: Normal range of motion and neck supple. Skin:      General: Skin is warm and dry. Neurological :       General: No focal deficit present. Mental Status: She is alert and oriented to person, place, and time. Psychiatric:         Mood and Affect: Mood normal.         Behavior: Behavior normal.         Thought Content: Thought content normal.         Judgment: Judgment normal.             Labs:   No results found for this or any previous visit (from the past 24 hour(s)). Pathology:        Vaginal, Thin Prep Pap Test:     Satisfactory for evaluation.    NEGATIVE FOR INTRAEPITHELIAL LESION OR MALIGNANCY            apm/6/4/2021     DIAGNOSIS    A: \"LEFT SENTINEL NODE\":    LYMPH NODE NEGATIVE FOR METASTATIC TUMOR    B: \"GALLBLADDER\":    CHOLELITHIASIS WITH HEMORRHAGIC CHRONIC CHOLECYSTITIS    C: \"RIGHT SENTINEL NODE\":    LYMPH NODE NEGATIVE FOR METASTATIC TUMOR    D: \"UTERUS, BILATERAL TUBES AND OVARIES\":    ENDOMETRIOID ADENOCARCINOMA, FIGO GRADE II INFILTRATING TO A DEPTH OF APPROXIMATELY 0.6 CM OF A TOTAL MYOMETRIAL THICKNESS OF 1.5 CM (INNER ONE HALF). MARGINS OF RESECTION ARE NEGATIVE FOR MALIGNANT  TUMOR. LYMPHOVASCULAR INVASION IS NOT IDENTIFIED.    jtl/11/29/2019    Electronically signed out on 12/6/2019 07:28 by LITA Braun M.D.        Radiology:         ASSESSMENT:            ICD-10-CM  ICD-9-CM            1.  Endometrial cancer ([facility] [address]   C54.1  182.0            2.  Morbid obesity ([facility] [address]   E66.01  278.01          Specialty Problems                  Oncology Probl*       Endometrial cancer [facility])                               PLAN:   mammo and colonoscopy with pcp      Gerald   Fu pap   Fu 6 months or prn   S/sx recurrence reveiwed.-pt asked to call/get exam if bleeding                  Bethany Pierre MD   [facility]   [address] [place] [address]   Office : [phone], Option 5   Fax : [phone]

## 2022-06-16 ENCOUNTER — OFFICE VISIT (OUTPATIENT)
Dept: FAMILY MEDICINE CLINIC | Facility: CLINIC | Age: 57
End: 2022-06-16
Payer: MEDICAID

## 2022-06-16 VITALS
DIASTOLIC BLOOD PRESSURE: 82 MMHG | WEIGHT: 285 LBS | OXYGEN SATURATION: 95 % | SYSTOLIC BLOOD PRESSURE: 144 MMHG | BODY MASS INDEX: 46 KG/M2 | TEMPERATURE: 97.9 F | HEART RATE: 81 BPM

## 2022-06-16 DIAGNOSIS — M54.41 RIGHT-SIDED LOW BACK PAIN WITH RIGHT-SIDED SCIATICA, UNSPECIFIED CHRONICITY: Primary | ICD-10-CM

## 2022-06-16 DIAGNOSIS — L30.9 DERMATITIS: ICD-10-CM

## 2022-06-16 PROCEDURE — 96372 THER/PROPH/DIAG INJ SC/IM: CPT | Performed by: NURSE PRACTITIONER

## 2022-06-16 PROCEDURE — 99213 OFFICE O/P EST LOW 20 MIN: CPT | Performed by: NURSE PRACTITIONER

## 2022-06-16 RX ORDER — NYSTATIN 100000 U/G
CREAM TOPICAL
Qty: 30 G | Refills: 1 | Status: SHIPPED | OUTPATIENT
Start: 2022-06-16

## 2022-06-16 RX ORDER — TIZANIDINE 4 MG/1
4 TABLET ORAL NIGHTLY PRN
Qty: 10 TABLET | Refills: 0 | Status: SHIPPED | OUTPATIENT
Start: 2022-06-16 | End: 2022-09-20 | Stop reason: SDUPTHER

## 2022-06-16 RX ORDER — NYSTATIN 100000 [USP'U]/G
POWDER TOPICAL
Qty: 60 G | Refills: 1 | Status: SHIPPED | OUTPATIENT
Start: 2022-06-16 | End: 2022-10-04 | Stop reason: ALTCHOICE

## 2022-06-16 RX ORDER — TRAMADOL HYDROCHLORIDE 50 MG/1
50 TABLET ORAL EVERY 6 HOURS PRN
Qty: 20 TABLET | Refills: 0 | Status: SHIPPED | OUTPATIENT
Start: 2022-06-16 | End: 2022-06-21

## 2022-06-16 RX ORDER — METHYLPREDNISOLONE SODIUM SUCCINATE 40 MG/ML
40 INJECTION, POWDER, LYOPHILIZED, FOR SOLUTION INTRAMUSCULAR; INTRAVENOUS DAILY
Status: SHIPPED | OUTPATIENT
Start: 2022-06-16

## 2022-06-16 RX ORDER — METHYLPREDNISOLONE SODIUM SUCCINATE 40 MG/ML
40 INJECTION, POWDER, LYOPHILIZED, FOR SOLUTION INTRAMUSCULAR; INTRAVENOUS ONCE
Status: COMPLETED | OUTPATIENT
Start: 2022-06-16 | End: 2022-06-16

## 2022-06-16 RX ADMIN — METHYLPREDNISOLONE SODIUM SUCCINATE 40 MG: 40 INJECTION, POWDER, LYOPHILIZED, FOR SOLUTION INTRAMUSCULAR; INTRAVENOUS at 14:46

## 2022-06-16 ASSESSMENT — ENCOUNTER SYMPTOMS
NAUSEA: 0
VOMITING: 0
SINUS PAIN: 0
CONSTIPATION: 0
SORE THROAT: 0
EYE PAIN: 0
EYE REDNESS: 0
COUGH: 0
BACK PAIN: 1
SHORTNESS OF BREATH: 0
DIARRHEA: 0
BLOOD IN STOOL: 0

## 2022-06-16 NOTE — PROGRESS NOTES
Tarun Carmona (:  1965) is a 61 y.o. female,Established patient, here for evaluation of the following chief complaint(s):  Back Pain (getting back ) and Rash (under abdomen )         ASSESSMENT/PLAN:  1. Right-sided low back pain with right-sided sciatica, unspecified chronicity  -     traMADol (ULTRAM) 50 MG tablet; Take 1 tablet by mouth every 6 hours as needed for Pain for up to 5 days. Intended supply: 5 days. Take lowest dose possible to manage pain, Disp-20 tablet, R-0Normal  -     tiZANidine (ZANAFLEX) 4 MG tablet; Take 1 tablet by mouth nightly as needed (back pain), Disp-10 tablet, R-0Normal  -     methylPREDNISolone sodium (SOLU-MEDROL) injection 40 mg; 40 mg, IntraMUSCular, DAILY, First dose on u 22 at 1500, Until Discontinued  2. Dermatitis  -     nystatin (MYCOSTATIN) 824702 UNIT/GM cream; Apply topically 2 times daily. , Disp-30 g, R-1, Normal  -     nystatin (MYCOSTATIN) 333376 UNIT/GM powder; Apply 3 times daily. , Disp-60 g, R-1, Normal  -     methylPREDNISolone sodium (SOLU-MEDROL) injection 40 mg; 40 mg, IntraMUSCular, DAILY, First dose on u 22 at 1500, Until Discontinued    Solumedrol IM given in the office. Discussed medications, side effects and risks versus benefits in detail. Increase fluids and rest.  Tramadol refill provided per patient report. Scripts report checked. Healthy diet, exercise and weight loss discussed. Return if symptoms worsen or fail to improve, for keep already scheduled appt in July. Subjective   SUBJECTIVE/OBJECTIVE:  HPI  Low back pain. Current episode began after pt drove back to [facility] from [place]. Was also taking care of her elderly mother. Also c/o arm, back and knee pain. Right-sided sciatica. Aggravated by sitting or standing. Relieved by the recliner. Only sleeping 3 hrs per night. Rash to lower abdomen. Three year duration. Worsening last year. Tried lotion and gold bold without relief.       Review of Systems Constitutional: Negative for chills and fever. HENT: Negative for congestion, ear pain, sinus pain and sore throat. Eyes: Negative for pain and redness. Respiratory: Negative for cough and shortness of breath. Cardiovascular: Negative for chest pain and palpitations. Gastrointestinal: Negative for blood in stool, constipation, diarrhea, nausea and vomiting. Endocrine: Negative for polydipsia. Genitourinary: Negative for dysuria, frequency and urgency. Musculoskeletal: Positive for back pain. Negative for arthralgias and myalgias. Skin: Negative for rash. Allergic/Immunologic: Negative for environmental allergies. Neurological: Negative for dizziness and headaches. Psychiatric/Behavioral: Negative for hallucinations and suicidal ideas. Objective   Physical Exam  Vitals and nursing note reviewed. Constitutional:       General: She is not in acute distress. Appearance: Normal appearance. HENT:      Head: Normocephalic. Right Ear: External ear normal.      Left Ear: External ear normal.      Nose: Nose normal.      Mouth/Throat:      Lips: Pink. Mouth: Mucous membranes are moist.   Eyes:      Conjunctiva/sclera: Conjunctivae normal.      Pupils: Pupils are equal, round, and reactive to light. Cardiovascular:      Rate and Rhythm: Normal rate and regular rhythm. Pulmonary:      Effort: Pulmonary effort is normal.      Breath sounds: Normal breath sounds. No wheezing, rhonchi or rales. Musculoskeletal:         General: Normal range of motion. Cervical back: Normal range of motion. Lumbar back: Tenderness (bilat paraspinal) present. Right lower leg: No edema. Left lower leg: No edema. Lymphadenopathy:      Cervical: No cervical adenopathy. Skin:     General: Skin is warm and dry. Capillary Refill: Capillary refill takes less than 2 seconds. Findings: Rash present. Comments: Erythematous patches beneath panus.  No streaking or drainage noted.      Neurological:      General: No focal deficit present. Mental Status: She is alert and oriented to person, place, and time. Mental status is at baseline. On this date 6/16/2022 I have spent 20-29 minutes reviewing previous notes, test results and face to face with the patient discussing the diagnosis and importance of compliance with the treatment plan as well as documenting on the day of the visit. An electronic signature was used to authenticate this note.     --Arabella Horner, TOMAS - CNP

## 2022-06-20 DIAGNOSIS — Z00.00 ROUTINE GENERAL MEDICAL EXAMINATION AT A HEALTH CARE FACILITY: Primary | ICD-10-CM

## 2022-06-22 ENCOUNTER — TELEPHONE (OUTPATIENT)
Dept: FAMILY MEDICINE CLINIC | Facility: CLINIC | Age: 57
End: 2022-06-22

## 2022-06-22 DIAGNOSIS — F41.9 ANXIETY: Primary | ICD-10-CM

## 2022-06-22 DIAGNOSIS — F41.9 ANXIETY: ICD-10-CM

## 2022-06-22 RX ORDER — LORAZEPAM 1 MG/1
1 TABLET ORAL 2 TIMES DAILY PRN
Qty: 60 TABLET | Refills: 5 | Status: SHIPPED | OUTPATIENT
Start: 2022-06-22 | End: 2022-07-22

## 2022-06-22 NOTE — TELEPHONE ENCOUNTER
Needs ASAP leaving town due to mother being in critical condition     Refill: Lorazepam  To: Yahir @ [facility]

## 2022-06-26 RX ORDER — LORAZEPAM 1 MG/1
1 TABLET ORAL 2 TIMES DAILY PRN
Qty: 60 TABLET | Refills: 5 | OUTPATIENT
Start: 2022-06-26 | End: 2022-07-26

## 2022-07-01 ENCOUNTER — TELEPHONE (OUTPATIENT)
Dept: CARDIOLOGY CLINIC | Age: 57
End: 2022-07-01

## 2022-07-01 NOTE — TELEPHONE ENCOUNTER
(FYI) Patients  called stating she has been hospitalized in [place]. He states she was admitted with CP, SOB. Positive stress test, elevated white blood cell count, anemic. Scheduled for colonoscopy for 7/2. A heart cath is scheduled for 7/3. Patients'  states he wanted Dr Ruthie Potter to know and call if possible or needed.

## 2022-07-04 NOTE — TELEPHONE ENCOUNTER
Please let the patient know I hope things go well and to have her follow up when she is back in town. Thanks.

## 2022-07-12 ENCOUNTER — NURSE ONLY (OUTPATIENT)
Dept: FAMILY MEDICINE CLINIC | Facility: CLINIC | Age: 57
End: 2022-07-12

## 2022-07-12 ENCOUNTER — TELEPHONE (OUTPATIENT)
Dept: FAMILY MEDICINE CLINIC | Facility: CLINIC | Age: 57
End: 2022-07-12

## 2022-07-12 DIAGNOSIS — Z00.00 ANNUAL PHYSICAL EXAM: ICD-10-CM

## 2022-07-12 DIAGNOSIS — Z00.00 PREVENTATIVE HEALTH CARE: ICD-10-CM

## 2022-07-12 DIAGNOSIS — Z00.00 ANNUAL PHYSICAL EXAM: Primary | ICD-10-CM

## 2022-07-12 LAB
APPEARANCE UR: ABNORMAL
BACTERIA URNS QL MICRO: NEGATIVE /HPF
BILIRUB UR QL: NEGATIVE
CASTS URNS QL MICRO: ABNORMAL /LPF
COLOR UR: ABNORMAL
EPI CELLS #/AREA URNS HPF: ABNORMAL /HPF
GLUCOSE UR STRIP.AUTO-MCNC: NEGATIVE MG/DL
HGB UR QL STRIP: NEGATIVE
KETONES UR QL STRIP.AUTO: NEGATIVE MG/DL
LEUKOCYTE ESTERASE UR QL STRIP.AUTO: NEGATIVE
MUCOUS THREADS URNS QL MICRO: 0 /LPF
NITRITE UR QL STRIP.AUTO: NEGATIVE
PH UR STRIP: 7.5 [PH] (ref 5–9)
PROT UR STRIP-MCNC: NEGATIVE MG/DL
RBC #/AREA URNS HPF: ABNORMAL /HPF
SP GR UR REFRACTOMETRY: 1.02 (ref 1–1.02)
URINE CULTURE IF INDICATED: ABNORMAL
UROBILINOGEN UR QL STRIP.AUTO: 1 EU/DL (ref 0.2–1)
WBC URNS QL MICRO: ABNORMAL /HPF

## 2022-07-12 NOTE — TELEPHONE ENCOUNTER
Oxycodone 4 times daily (prescribed by hospital). Naproxen was upsetting her stomach. [facility] in [facility]. Please call patient with questions.

## 2022-07-13 LAB
ALBUMIN SERPL-MCNC: 3.7 G/DL (ref 3.5–5)
ALBUMIN/GLOB SERPL: 0.9 {RATIO} (ref 1.2–3.5)
ALP SERPL-CCNC: 136 U/L (ref 50–136)
ALT SERPL-CCNC: 36 U/L (ref 12–65)
ANION GAP SERPL CALC-SCNC: 4 MMOL/L (ref 7–16)
AST SERPL-CCNC: 34 U/L (ref 15–37)
BASOPHILS # BLD: 0 K/UL (ref 0–0.2)
BASOPHILS NFR BLD: 1 % (ref 0–2)
BILIRUB SERPL-MCNC: 0.6 MG/DL (ref 0.2–1.1)
BUN SERPL-MCNC: 16 MG/DL (ref 6–23)
CALCIUM SERPL-MCNC: 9.4 MG/DL (ref 8.3–10.4)
CHLORIDE SERPL-SCNC: 107 MMOL/L (ref 98–107)
CHOLEST SERPL-MCNC: 153 MG/DL
CO2 SERPL-SCNC: 30 MMOL/L (ref 21–32)
CREAT SERPL-MCNC: 0.8 MG/DL (ref 0.6–1)
DIFFERENTIAL METHOD BLD: ABNORMAL
EOSINOPHIL # BLD: 0.5 K/UL (ref 0–0.8)
EOSINOPHIL NFR BLD: 8 % (ref 0.5–7.8)
ERYTHROCYTE [DISTWIDTH] IN BLOOD BY AUTOMATED COUNT: 33.8 % (ref 11.9–14.6)
GLOBULIN SER CALC-MCNC: 4.3 G/DL (ref 2.3–3.5)
GLUCOSE SERPL-MCNC: 89 MG/DL (ref 65–100)
HCT VFR BLD AUTO: 43.9 % (ref 35.8–46.3)
HDLC SERPL-MCNC: 52 MG/DL (ref 40–60)
HDLC SERPL: 2.9 {RATIO}
HGB BLD-MCNC: 11.6 G/DL (ref 11.7–15.4)
IMM GRANULOCYTES # BLD AUTO: 0 K/UL (ref 0–0.5)
IMM GRANULOCYTES NFR BLD AUTO: 0 % (ref 0–5)
LDLC SERPL CALC-MCNC: 80.6 MG/DL
LYMPHOCYTES # BLD: 1.3 K/UL (ref 0.5–4.6)
LYMPHOCYTES NFR BLD: 21 % (ref 13–44)
MCH RBC QN AUTO: 19.4 PG (ref 26.1–32.9)
MCHC RBC AUTO-ENTMCNC: 26.4 G/DL (ref 31.4–35)
MCV RBC AUTO: 73.5 FL (ref 79.6–97.8)
MONOCYTES # BLD: 0.5 K/UL (ref 0.1–1.3)
MONOCYTES NFR BLD: 9 % (ref 4–12)
NEUTS SEG # BLD: 3.8 K/UL (ref 1.7–8.2)
NEUTS SEG NFR BLD: 62 % (ref 43–78)
NRBC # BLD: 0 K/UL (ref 0–0.2)
PLATELET # BLD AUTO: 173 K/UL (ref 150–450)
PMV BLD AUTO: ABNORMAL FL (ref 9.4–12.3)
POTASSIUM SERPL-SCNC: 4 MMOL/L (ref 3.5–5.1)
PROT SERPL-MCNC: 8 G/DL (ref 6.3–8.2)
RBC # BLD AUTO: 5.97 M/UL (ref 4.05–5.2)
SODIUM SERPL-SCNC: 141 MMOL/L (ref 136–145)
TRIGL SERPL-MCNC: 102 MG/DL (ref 35–150)
TSH, 3RD GENERATION: 0.55 UIU/ML (ref 0.36–3.74)
VLDLC SERPL CALC-MCNC: 20.4 MG/DL (ref 6–23)
WBC # BLD AUTO: 6.2 K/UL (ref 4.3–11.1)

## 2022-07-15 ENCOUNTER — OFFICE VISIT (OUTPATIENT)
Dept: FAMILY MEDICINE CLINIC | Facility: CLINIC | Age: 57
End: 2022-07-15
Payer: MEDICAID

## 2022-07-15 VITALS
DIASTOLIC BLOOD PRESSURE: 76 MMHG | HEART RATE: 68 BPM | TEMPERATURE: 97.3 F | BODY MASS INDEX: 44.2 KG/M2 | WEIGHT: 275 LBS | OXYGEN SATURATION: 97 % | HEIGHT: 66 IN | SYSTOLIC BLOOD PRESSURE: 138 MMHG

## 2022-07-15 DIAGNOSIS — I48.20 CHRONIC ATRIAL FIBRILLATION (HCC): ICD-10-CM

## 2022-07-15 DIAGNOSIS — K74.69 OTHER CIRRHOSIS OF LIVER (HCC): ICD-10-CM

## 2022-07-15 DIAGNOSIS — M54.16 LUMBAR RADICULOPATHY: ICD-10-CM

## 2022-07-15 DIAGNOSIS — D50.9 IRON DEFICIENCY ANEMIA, UNSPECIFIED IRON DEFICIENCY ANEMIA TYPE: ICD-10-CM

## 2022-07-15 DIAGNOSIS — E89.0 POSTSURGICAL HYPOTHYROIDISM: ICD-10-CM

## 2022-07-15 DIAGNOSIS — I10 ESSENTIAL HYPERTENSION: ICD-10-CM

## 2022-07-15 DIAGNOSIS — J40 BRONCHITIS: ICD-10-CM

## 2022-07-15 DIAGNOSIS — Z00.00 PREVENTATIVE HEALTH CARE: Primary | ICD-10-CM

## 2022-07-15 DIAGNOSIS — C54.1 ENDOMETRIAL CANCER (HCC): ICD-10-CM

## 2022-07-15 DIAGNOSIS — F33.9 RECURRENT DEPRESSION (HCC): ICD-10-CM

## 2022-07-15 DIAGNOSIS — E89.0 S/P TOTAL THYROIDECTOMY: ICD-10-CM

## 2022-07-15 PROCEDURE — 99396 PREV VISIT EST AGE 40-64: CPT | Performed by: FAMILY MEDICINE

## 2022-07-15 RX ORDER — TRAMADOL HYDROCHLORIDE 50 MG/1
50 TABLET ORAL EVERY 8 HOURS PRN
Qty: 60 TABLET | Refills: 2 | Status: SHIPPED | OUTPATIENT
Start: 2022-07-15 | End: 2022-08-14

## 2022-07-15 ASSESSMENT — PATIENT HEALTH QUESTIONNAIRE - PHQ9
10. IF YOU CHECKED OFF ANY PROBLEMS, HOW DIFFICULT HAVE THESE PROBLEMS MADE IT FOR YOU TO DO YOUR WORK, TAKE CARE OF THINGS AT HOME, OR GET ALONG WITH OTHER PEOPLE: 0
SUM OF ALL RESPONSES TO PHQ QUESTIONS 1-9: 7
4. FEELING TIRED OR HAVING LITTLE ENERGY: 3
6. FEELING BAD ABOUT YOURSELF - OR THAT YOU ARE A FAILURE OR HAVE LET YOURSELF OR YOUR FAMILY DOWN: 0
3. TROUBLE FALLING OR STAYING ASLEEP: 3
9. THOUGHTS THAT YOU WOULD BE BETTER OFF DEAD, OR OF HURTING YOURSELF: 0
SUM OF ALL RESPONSES TO PHQ QUESTIONS 1-9: 7
2. FEELING DOWN, DEPRESSED OR HOPELESS: 1
5. POOR APPETITE OR OVEREATING: 0
SUM OF ALL RESPONSES TO PHQ QUESTIONS 1-9: 7
7. TROUBLE CONCENTRATING ON THINGS, SUCH AS READING THE NEWSPAPER OR WATCHING TELEVISION: 0
8. MOVING OR SPEAKING SO SLOWLY THAT OTHER PEOPLE COULD HAVE NOTICED. OR THE OPPOSITE, BEING SO FIGETY OR RESTLESS THAT YOU HAVE BEEN MOVING AROUND A LOT MORE THAN USUAL: 0
SUM OF ALL RESPONSES TO PHQ QUESTIONS 1-9: 7

## 2022-07-15 NOTE — PROGRESS NOTES
Jose Smith is a 61 y.o. female who presents with   Chief Complaint   Patient presents with    Annual Exam    Cough    Pharyngitis       History of Present Illness    Pt was in [place] last mo with anemia, hospitalized and transfused x 1 and 8 Fe infusions. Taken off Advil. Low back pain into R leg. Cough without sputum, deep, worsening since hospitalization. Was not put on antibiotic. ST. No fever. Covid neg x 3. Hgb now 11.6 now. Lipids look good. Had EGD and colonoscopy which were unrevealing for cause of anemia. Mood doing well on Zoloft and wellbutrin. Ativan occa. Here for recheck of thyroid. TSH is normal. No temperature intolerance. No palpitations. No skin or hair changes. No increased fatigue. No jitteriness. Mood good. LFTs were elevated in past (history of cirrhosis), but normal on recent labs. Recent KARLEE for endometrial cancer. Did not require additional treatment. Hx of afib s/p ablation. Review of Systems  Review of Systems   Constitutional:  Positive for fatigue. Negative for appetite change and fever. HENT:  Positive for sinus pain and sore throat. Negative for congestion and ear pain. Eyes:  Negative for discharge. Respiratory:  Positive for cough. Negative for chest tightness and shortness of breath. Cardiovascular:  Negative for chest pain, palpitations and leg swelling. Gastrointestinal:  Negative for abdominal pain, constipation and diarrhea. Genitourinary:  Negative for dysuria. Musculoskeletal:  Positive for back pain (with radiculopathy). Negative for joint swelling. Skin:  Negative for rash. Neurological:  Negative for headaches. Hematological:  Negative for adenopathy. Psychiatric/Behavioral:  Positive for dysphoric mood (doing well with meds). The patient is not nervous/anxious.        Medications  Current Outpatient Medications   Medication Sig Dispense Refill    traMADol (ULTRAM) 50 MG tablet Take 1 tablet by mouth every 8 hours as needed for Pain for up to 30 days. Intended supply: 3 days. Take lowest dose possible to manage pain 60 tablet 2    LORazepam (ATIVAN) 1 MG tablet Take 1 tablet by mouth 2 times daily as needed for Anxiety for up to 30 days. 60 tablet 5    tiZANidine (ZANAFLEX) 4 MG tablet Take 1 tablet by mouth nightly as needed (back pain) 10 tablet 0    nystatin (MYCOSTATIN) 207293 UNIT/GM cream Apply topically 2 times daily. 30 g 1    nystatin (MYCOSTATIN) 573072 UNIT/GM powder Apply 3 times daily. 60 g 1    albuterol sulfate  (90 Base) MCG/ACT inhaler Inhale 2 puffs into the lungs every 4 hours as needed      amLODIPine (NORVASC) 5 MG tablet Take 5 mg by mouth daily      aspirin 81 MG EC tablet Take 81 mg by mouth daily      buPROPion (WELLBUTRIN XL) 150 MG extended release tablet Take 150 mg by mouth      clobetasol (TEMOVATE) 0.05 % external solution Apply bid      esomeprazole (NEXIUM) 20 MG delayed release capsule Take one cap daily.       levothyroxine (SYNTHROID) 150 MCG tablet 1 tablet once a day except for 1/2 tablet on Sundays      sertraline (ZOLOFT) 50 MG tablet Take 50 mg by mouth daily      traZODone (DESYREL) 150 MG tablet Take 150 mg by mouth      azithromycin (ZITHROMAX) 250 MG tablet Take 1 tablet by mouth See Admin Instructions for 5 days 500mg on day 1 followed by 250mg on days 2 - 5 6 tablet 0    naproxen (NAPROSYN) 500 MG tablet Take 500 mg by mouth 2 times daily (with meals) (Patient not taking: Reported on 7/15/2022)      valsartan-hydroCHLOROthiazide (DIOVAN-HCT) 320-25 MG per tablet Take 1 tablet by mouth daily (Patient not taking: Reported on 7/15/2022)       Current Facility-Administered Medications   Medication Dose Route Frequency Provider Last Rate Last Admin    methylPREDNISolone sodium (SOLU-MEDROL) injection 40 mg  40 mg IntraMUSCular Daily TOMAS Greene - CNP            Past Medical History  Past Medical History:   Diagnosis Date    Arrhythmia 2018    a-fib (2017) ablation-(12/2018)- resolved Asthma     seasonal allergies- maint and rescue inhaler    Cancer ([facility]     endometrial cancer- hysterectomy 11/26/20    Chronic pain     back pain - herniated disc    Cirrhosis ([facility] [address]     Claustrophobia     Colon polyps     Difficult intubation     in [place] 2019- glidescope needed with hysterectomy/lap deysi 11/26/19 @ [address]    GERD (gastroesophageal reflux disease)     H/O echocardiogram     Echo LVEF 55% mild mitral and tricuspid regurg    H/O: iron deficiency anemia     Heart murmur 06/2019    Echo LVEF 55% mild mitral and tricuspid regurg    Hypertension     managed with meds    Sleep apnea     not currently using cpap    Thyroid disease     Graves Disease       Surgical History  Past Surgical History:   Procedure Laterality Date    ABLATION OF DYSRHYTHMIC FOCUS  12/2017    CHOLECYSTECTOMY, LAPAROSCOPIC  11/26/2020    COLONOSCOPY N/A 7/20/2020    COLONOSCOPY/ BMI 43 performed by Jose Luis Heaton MD at [place] [address]  10/2019    HYSTERECTOMY (CERVIX STATUS UNKNOWN)  11/26/2020    with lap deysi    THYROIDECTOMY  08/28/2020    TUBAL LIGATION  1989          Family History  Family History   Problem Relation Age of Onset    Cancer Paternal Aunt         cervical cancer    Thyroid Disease Son         Hyperthyroidism    Cancer Sister         cervical     Thyroid Disease Daughter         Hypothyroidism    Liver Disease Father     Diabetes Father     Heart Disease Father     Kidney Disease Mother         dialysis    Stroke Mother        Social History  Social History     Socioeconomic History    Marital status:      Spouse name: Not on file    Number of children: Not on file    Years of education: Not on file    Highest education level: Not on file   Occupational History    Not on file   Tobacco Use    Smoking status: Never    Smokeless tobacco: Never   Substance and Sexual Activity    Alcohol use:  Yes     Alcohol/week: 1.0 standard drink    Drug use: Not Currently Sexual activity: Not on file   Other Topics Concern    Not on file   Social History Narrative    Not on file     Social Determinants of Health     Financial Resource Strain: Not on file   Food Insecurity: Not on file   Transportation Needs: Not on file   Physical Activity: Not on file   Stress: Not on file   Social Connections: Not on file   Intimate Partner Violence: Not on file   Housing Stability: Not on file       Social History     Tobacco Use   Smoking Status Never   Smokeless Tobacco Never       Allergies  Allergies   Allergen Reactions    Diltiazem Hcl Other (See Comments)     Blistering        Vital Signs  Body mass index is 44.39 kg/m². Vitals:    07/15/22 1439   BP: 138/76   Pulse: 68   Temp: 97.3 °F (36.3 °C)   TempSrc: Temporal   SpO2: 97%   Weight: 275 lb (124.7 kg)   Height: 5' 6\" (1.676 m)       Physical Exam  Physical Exam  Vitals reviewed. Constitutional:       Appearance: Normal appearance. HENT:      Head: Normocephalic. Right Ear: Tympanic membrane normal.      Left Ear: Tympanic membrane normal.      Nose: Congestion present. Right Sinus: Maxillary sinus tenderness present. Left Sinus: Maxillary sinus tenderness present. Mouth/Throat:      Pharynx: No oropharyngeal exudate or posterior oropharyngeal erythema. Eyes:      Extraocular Movements: Extraocular movements intact. Conjunctiva/sclera: Conjunctivae normal.      Pupils: Pupils are equal, round, and reactive to light. Neck:      Vascular: No carotid bruit. Cardiovascular:      Rate and Rhythm: Normal rate and regular rhythm. Pulses: Normal pulses. Heart sounds: No murmur heard. Pulmonary:      Effort: Pulmonary effort is normal.      Breath sounds: Normal breath sounds. No wheezing or rhonchi. Comments: Deep cough  Chest:   Breasts:     Breasts are symmetrical.      Right: No swelling, bleeding, inverted nipple, mass, nipple discharge, skin change or tenderness.       Left: No swelling, bleeding, inverted nipple, mass, nipple discharge, skin change or tenderness. Abdominal:      General: Bowel sounds are normal. There is no distension. Palpations: Abdomen is soft. There is no mass. Tenderness: There is no abdominal tenderness. Hernia: No hernia is present. Musculoskeletal:         General: Tenderness (low back) present. Cervical back: Normal range of motion. Right lower leg: No edema. Left lower leg: No edema. Lymphadenopathy:      Cervical: No cervical adenopathy. Skin:     General: Skin is warm and dry. Findings: No rash. Neurological:      General: No focal deficit present. Mental Status: She is alert and oriented to person, place, and time. Psychiatric:         Mood and Affect: Mood normal.         Thought Content: Thought content normal.        Assessment and Plan  August Layton was seen today for annual exam, cough and pharyngitis. Diagnoses and all orders for this visit:    Preventative health care    Essential hypertension    Iron deficiency anemia, unspecified iron deficiency anemia type  -     Ana Cristina Tom MD, Hematology & Oncology, [place]    Lumbar radiculopathy  [facility] - Physical Therapy, [facility]  -     traMADol (ULTRAM) 50 MG tablet; Take 1 tablet by mouth every 8 hours as needed for Pain for up to 30 days. Intended supply: 3 days.  Take lowest dose possible to manage pain    Endometrial cancer ([address]    Bronchitis    Other cirrhosis of liver ([address]  -     [facility] - [facility]    S/P total thyroidectomy    Chronic atrial fibrillation ([address]    Postsurgical hypothyroidism    Recurrent depression ([address]    [place] for Bronchitis- call if not improved  Tramadol prn- minimize; avoid NSAIDs  F/up with GI (has not heard from prior referral) and hematology re anemia  Ag given  Diet/exercise  Recheck in 6 mo or sooner if not improved    On this date I have spent 45 minutes reviewing previous notes, test results and face to face with the patient discussing the diagnosis and importance of compliance with the treatment plan as well as documenting on the day of the visit.

## 2022-07-16 DIAGNOSIS — J21.9 ACUTE BRONCHIOLITIS DUE TO UNSPECIFIED ORGANISM: Primary | ICD-10-CM

## 2022-07-16 RX ORDER — AZITHROMYCIN 250 MG/1
250 TABLET, FILM COATED ORAL SEE ADMIN INSTRUCTIONS
Qty: 6 TABLET | Refills: 0 | Status: SHIPPED | OUTPATIENT
Start: 2022-07-16 | End: 2022-07-21

## 2022-07-16 ASSESSMENT — ENCOUNTER SYMPTOMS
COUGH: 1
SINUS PAIN: 1
ABDOMINAL PAIN: 0
EYE DISCHARGE: 0
BACK PAIN: 1
CHEST TIGHTNESS: 0
CONSTIPATION: 0
DIARRHEA: 0
SHORTNESS OF BREATH: 0
SORE THROAT: 1

## 2022-07-26 ENCOUNTER — HOSPITAL ENCOUNTER (OUTPATIENT)
Dept: PHYSICAL THERAPY | Age: 57
Setting detail: RECURRING SERIES
Discharge: HOME | End: 2022-07-29
Payer: MEDICAID

## 2022-07-26 PROCEDURE — 97161 PT EVAL LOW COMPLEX 20 MIN: CPT

## 2022-07-26 PROCEDURE — 97110 THERAPEUTIC EXERCISES: CPT

## 2022-07-26 NOTE — PROGRESS NOTES
Christiano Cortez  : 1965  Primary: [address] Medicaid  Secondary:  [address] @ [address]  [address] [place] [address]  Phone: [phone]  Fax: [phone] No data recorded  No data recorded    PT Visit Info:  No data recorded    OUTPATIENT PHYSICAL THERAPY:OP NOTE TYPE: Treatment Note 2022       Episode  }Appt Desk             Treatment Diagnosis:  Low back pain (M54.5)  Radiculopathy, Lumbar Region (M54.16)  Muscle Weakness, Generalized (M62.81)  Abnormal posture (R29.3)  Medical/Referring Diagnosis:  Radiculopathy, lumbar region [M54.16]  Referring Physician:  Keena Arrington MD MD Orders:  PT Eval and Treat  Date of Onset:  No data recorded   Allergies:   Diltiazem hcl  Restrictions/Precautions:  No data recordedNo data recorded   Interventions Planned (Treatment may consist of any combination of the following):    No data recorded   Subjective Comments: See eval     Initial:}     7/10Post Session:        7/10  Medications Last Reviewed:  2022  Updated Objective Findings:  See evaluation note from today  Treatment   THERAPEUTIC EXERCISE: (10 minutes):  Exercises per grid below to improve mobility and strength. Required moderate visual, verbal, manual and tactile cues to promote proper body alignment, promote proper body posture and promote proper body mechanics. Progressed resistance, range, repetitions and complexity of movement as indicated. Date:  2022   Activity/Exercise Parameters    Pt education HEP, POC, Anatomy   Pelvic tilts 1x10  Hold 3s   SLR flexion 1x10 B   Lower trunk rotation   1x10 B  Hold 3s   [employer] Portal   Access Code: [email or web address]  URL: [email or web address]  Date: 2022  Prepared by: Kavita Hartman    Exercises  Supine Lower Trunk Rotation - 2 x daily - 7 x weekly - 10 reps - 2 sets - 5s hold  Supine Posterior Pelvic Tilt - 2 x daily - 7 x weekly - 10 reps - 2 sets - 10s hold  Supine Active Straight Leg Raise - 2 x daily - 7 x weekly - 10 reps - 2 sets - 3s hold    MANUAL THERAPY: (0 minutes): Joint mobilization, Soft tissue mobilization and Manipulation was utilized and necessary because of the patient's restricted joint motion, painful spasm, loss of articular motion and restricted motion of soft tissue. Commonly used abbreviations that may be included in this note:  STM- Soft tissue mobilization, R>L or L>R- right greater than left or vice versa, HEP - Home exercise program, CPA/UPA - Central or unilateral posterior-anterior mobilization, SLS- single leg stance, SKTC - Single leg to chest, SNAGS/NAGS- (sustained) Natural apophyseal glides, TKE- Terminal knee extension, ER- External rotation, IR- Internal rotation, B - Bilateral, sec- seconds, Lb- pounds, min - minutes, HA- Headache, OP- Over pressure, tband- theraband, fwd/bwd- Forward/Backward, TA- Transversus Abdominus, dbl- Double      TREATMENT/SESSION SUMMARY:     Response to Treatment:  Difficulty with SLR due to weakness. Good stretch reported during LTR. Pain and pulling noted at the beginning of each exercise but dissipated as movement progressed. Communication/Consultation:  None today  Equipment provided today:  None  Recommendations/Intent for next treatment session: Next visit will focus on progressing strength and ROM.      Total Treatment Billable Duration:  10 minutes  Time In: 9190  Time Out: One Ariadna Colvin, PT       Charge Capture  }Post Session Pain  PT Visit [facility] Portal  MD Guidelines  Scanned Media  Benefits  MyChart    Future Appointments   Date Time Provider [facility]   8/25/2022  1:30 PM MD CARLOS [facility]   10/19/2022  5:10 AM Mackenzie  [address]   10/19/2022  1:45 PM MD CARLOS Up-[facility]   2/28/2023  3:00 PM MD TENZIN Junior [facility]

## 2022-07-28 ENCOUNTER — HOSPITAL ENCOUNTER (OUTPATIENT)
Dept: PHYSICAL THERAPY | Age: 57
Setting detail: RECURRING SERIES
End: 2022-07-28
Payer: MEDICAID

## 2022-07-28 NOTE — PROGRESS NOTES
Justo Resendez  : 1965  Primary: [address] Medicaid  Secondary:  [address] @ [address]  [address] [place] [address]  Phone: [phone]  Fax: [phone]       2022      Patient called to cancel for today's appointment with no reason provided and will plan to follow up at next scheduled visit.        Marbella Hawkins, PT

## 2022-08-19 DIAGNOSIS — M54.41 RIGHT-SIDED LOW BACK PAIN WITH RIGHT-SIDED SCIATICA, UNSPECIFIED CHRONICITY: ICD-10-CM

## 2022-08-19 RX ORDER — TIZANIDINE 4 MG/1
TABLET ORAL
Qty: 10 TABLET | Refills: 0 | OUTPATIENT
Start: 2022-08-19

## 2022-08-23 ENCOUNTER — TELEPHONE (OUTPATIENT)
Dept: ONCOLOGY | Age: 57
End: 2022-08-23

## 2022-08-23 NOTE — TELEPHONE ENCOUNTER
Patient needs to reschedule new patient appt for 8/25 with Dr Briant Galeazzi. She appologizes and states her mother passed away and she is out of town.    The 2nd week of September is a good week for her

## 2022-09-01 ENCOUNTER — APPOINTMENT (OUTPATIENT)
Dept: PHYSICAL THERAPY | Age: 57
End: 2022-09-01
Payer: MEDICAID

## 2022-09-06 ENCOUNTER — HOSPITAL ENCOUNTER (OUTPATIENT)
Dept: PHYSICAL THERAPY | Age: 57
Setting detail: RECURRING SERIES
Discharge: HOME | End: 2022-09-09
Payer: MEDICAID

## 2022-09-06 PROCEDURE — 97140 MANUAL THERAPY 1/> REGIONS: CPT

## 2022-09-06 PROCEDURE — 97110 THERAPEUTIC EXERCISES: CPT

## 2022-09-06 NOTE — PROGRESS NOTES
Horacio Fan  : 1965  Primary: [address] Medicaid  Secondary:  [address] @ [address]  [address] [place] [address]  Phone: [phone]  Fax: [phone] Plan Frequency: 2x a week for 90 days  Plan of Care/Certification Expiration Date: 10/30/22      PT Visit Info:  No data recorded    OUTPATIENT PHYSICAL THERAPY:OP NOTE TYPE: Treatment Note 2022       Episode  }Appt Desk             Treatment Diagnosis:  Low back pain (M54.5)  Radiculopathy, Lumbar Region (M54.16)  Muscle Weakness, Generalized (M62.81)  Abnormal posture (R29.3)  Medical/Referring Diagnosis:  Radiculopathy, lumbar region [M54.16]  Referring Physician:  Angelito Miner MD MD Orders:  PT Eval and Treat  Date of Onset:  Onset Date: 06     Allergies:   Diltiazem hcl  Restrictions/Precautions:  No data recordedNo data recorded   Interventions Planned (Treatment may consist of any combination of the following):    Current Treatment Recommendations: Strengthening; ROM; Balance training; Functional mobility training; ADL/Self-care training; Endurance training; Gait training; Stair training; Neuromuscular re-education; Manual Therapy - Soft Tissue Mobilization; Manual Therapy - Joint Manipulation; Pain management; Home exercise program; Safety education & training; Patient/Caregiver education & training; Modalities; Equipment evaluation, education, & procurement; Positioning; Integrated dry needling; Aquatics     Subjective Comments: See eval     Initial:}     7/10Post Session:        7/10  Medications Last Reviewed:  2022  Updated Objective Findings:  See evaluation note from today  Treatment   THERAPEUTIC EXERCISE: (15 minutes):  Exercises per grid below to improve mobility and strength. Required moderate visual, verbal, manual and tactile cues to promote proper body alignment, promote proper body posture and promote proper body mechanics.   Progressed resistance, range, repetitions and complexity of movement as indicated. Date:  9/6/2022   Activity/Exercise Parameters   Nustep 5min  Level 1   Bridging 2x10   SLR flexion 2x10 B   Lower trunk rotation   1x10 B  Hold 3s   [employer] Portal   Access Code: [email or web address]  URL: [email or web address]  Date: 07/26/2022  Prepared by: Zen Ramon    Exercises  Supine Lower Trunk Rotation - 2 x daily - 7 x weekly - 10 reps - 2 sets - 5s hold  Supine Posterior Pelvic Tilt - 2 x daily - 7 x weekly - 10 reps - 2 sets - 10s hold  Supine Active Straight Leg Raise - 2 x daily - 7 x weekly - 10 reps - 2 sets - 3s hold    MANUAL THERAPY: (38 minutes): Joint mobilization, Soft tissue mobilization and Manipulation was utilized and necessary because of the patient's restricted joint motion, painful spasm, loss of articular motion and restricted motion of soft tissue. STM with thera roll to B QL, glute med, glute max, piriformis, TFL  Gentle PA lumbar segments, B SI joint, and sacrum       Commonly used abbreviations that may be included in this note:  STM- Soft tissue mobilization, R>L or L>R- right greater than left or vice versa, HEP - Home exercise program, CPA/UPA - Central or unilateral posterior-anterior mobilization, SLS- single leg stance, SKTC - Single leg to chest, SNAGS/NAGS- (sustained) Natural apophyseal glides, TKE- Terminal knee extension, ER- External rotation, IR- Internal rotation, B - Bilateral, sec- seconds, Lb- pounds, min - minutes, HA- Headache, OP- Over pressure, tband- theraband, fwd/bwd- Forward/Backward, TA- Transversus Abdominus, dbl- Double      TREATMENT/SESSION SUMMARY:     Response to Treatment:  Increased tone to Rt glute med today with subsequent tenderness tenderness. Good mobility throughout SI joint. Muscle weakness evident during SLR flexion and reports of burning in associated musculature. Continue working on decreasing tone and increasing strength.    Communication/Consultation:  None today  Equipment provided today:  None  Recommendations/Intent for next treatment session: Next visit will focus on progressing strength and ROM.      Total Treatment Billable Duration: 53 minutes  Time In: 1430  Time Out: [place] 172  }Post Session Pain  PT Visit Info  [address] Portal  MD Guidelines  Scanned Media  Benefits  MyChart    Future Appointments   Date Time Provider Alonso Morales   9/8/2022  7:00 PM Lady Granados, PT [facility]   9/13/2022  2:30 PM Lady Granados, PT [facility]   9/14/2022 10:30 AM Tom Darnell MD [facility]   9/15/2022  2:30 PM Lady Granados, PT [facility]   10/19/2022  5:10 AM Mackenzie [address] [facility]   10/19/2022  1:45 PM Valentine Montilla MD [facility]   2/28/2023  3:00 PM Susana Klein MD END [facility]

## 2022-09-08 ENCOUNTER — HOSPITAL ENCOUNTER (OUTPATIENT)
Dept: PHYSICAL THERAPY | Age: 57
Setting detail: RECURRING SERIES
End: 2022-09-08
Payer: MEDICAID

## 2022-09-08 NOTE — PROGRESS NOTES
Mitra Chavez  : 1965  Primary: [address] Medicaid  Secondary:  [address] @ [address]  [address] [place] [address]  Phone: [phone]  Fax: [phone]       2022      Patient cancelled on previous date for today's appointment and will plan to follow up at next scheduled visit.        Natalie Evans, PT

## 2022-09-09 ENCOUNTER — HOSPITAL ENCOUNTER (OUTPATIENT)
Dept: PHYSICAL THERAPY | Age: 57
Setting detail: RECURRING SERIES
Discharge: HOME | End: 2022-09-12
Payer: MEDICAID

## 2022-09-09 PROCEDURE — 97110 THERAPEUTIC EXERCISES: CPT

## 2022-09-09 PROCEDURE — 97140 MANUAL THERAPY 1/> REGIONS: CPT

## 2022-09-09 NOTE — PROGRESS NOTES
Katherine Tavarez  : 1965  Primary: [address] Medicaid  Secondary:  [address] @ [address]  [address] [place] [address]  Phone: [phone]  Fax: [phone] Plan Frequency: 2x a week for 90 days  Plan of Care/Certification Expiration Date: 10/30/22      PT Visit Info:  No data recorded    OUTPATIENT PHYSICAL THERAPY:OP NOTE TYPE: Treatment Note 2022       Episode  }Appt Desk             Treatment Diagnosis:  Low back pain (M54.5)  Radiculopathy, Lumbar Region (M54.16)  Muscle Weakness, Generalized (M62.81)  Abnormal posture (R29.3)  Medical/Referring Diagnosis:  Radiculopathy, lumbar region [M54.16]  Referring Physician:  Flash Perry MD MD Orders:  PT Eval and Treat  Date of Onset:  Onset Date: 06     Allergies:   Diltiazem hcl  Restrictions/Precautions:  No data recordedNo data recorded   Interventions Planned (Treatment may consist of any combination of the following):    Current Treatment Recommendations: Strengthening; ROM; Balance training; Functional mobility training; ADL/Self-care training; Endurance training; Gait training; Stair training; Neuromuscular re-education; Manual Therapy - Soft Tissue Mobilization; Manual Therapy - Joint Manipulation; Pain management; Home exercise program; Safety education & training; Patient/Caregiver education & training; Modalities; Equipment evaluation, education, & procurement; Positioning; Integrated dry needling; Aquatics     Subjective Comments: Pt reports she felt very good after last session and has since been walking a lot. Initial:}     4/10Post Session:        4/10  Medications Last Reviewed:  2022  Updated Objective Findings:   None Today  Treatment   THERAPEUTIC EXERCISE: (30 minutes):  Exercises per grid below to improve mobility and strength.   Required moderate visual, verbal, manual and tactile cues to promote proper body alignment, promote proper body posture and promote proper body mechanics. Progressed resistance, range, repetitions and complexity of movement as indicated. Date:  9/9/2022   Activity/Exercise Parameters   Nustep 8min  Level 1   LTR 2x10   Hip adduction --   Bridging 3x10   Sit to stand 3x10   SLR flexion --   Cat cow 2x10   [employer] Portal  Access Code: [email or web address]  URL: [email or web address]  Date: 09/09/2022  Prepared by: Dorothea Eubanks    Exercises  Supine Lower Trunk Rotation - 2 x daily - 7 x weekly - 10 reps - 2 sets - 5s hold  Supine Active Straight Leg Raise - 2 x daily - 7 x weekly - 10 reps - 2 sets - 3s hold  Supine Bridge - 2 x daily - 7 x weekly - 10 reps - 2 sets - 5s hold  Sit to Stand with Arms Crossed - 2 x daily - 7 x weekly - 2 sets - 10 reps  Cat Cow - 2 x daily - 7 x weekly - 2 sets - 10 reps      MANUAL THERAPY: (23 minutes): Joint mobilization, Soft tissue mobilization and Manipulation was utilized and necessary because of the patient's restricted joint motion, painful spasm, loss of articular motion and restricted motion of soft tissue. STM with thera roll to B QL, glute med, glute max, piriformis, TFL  Gentle PA lumbar segments, B SI joint, and sacrum       Commonly used abbreviations that may be included in this note:  STM- Soft tissue mobilization, R>L or L>R- right greater than left or vice versa, HEP - Home exercise program, CPA/UPA - Central or unilateral posterior-anterior mobilization, SLS- single leg stance, SKTC - Single leg to chest, SNAGS/NAGS- (sustained) Natural apophyseal glides, TKE- Terminal knee extension, ER- External rotation, IR- Internal rotation, B - Bilateral, sec- seconds, Lb- pounds, min - minutes, HA- Headache, OP- Over pressure, tband- theraband, fwd/bwd- Forward/Backward, TA- Transversus Abdominus, dbl- Double      TREATMENT/SESSION SUMMARY:     Response to Treatment: Pt with decreased tone and tenderness during manual therapy. Able tolerate increased pressure today. Improved mobility as well. Decreased strength. Communication/Consultation:  None today  Equipment provided today:  None  Recommendations/Intent for next treatment session: Next visit will focus on progressing strength and ROM.      Total Treatment Billable Duration: 53 minutes  Time In: 1330  Time Out: [place] 5454  }Post Session Pain  PT Visit [address] Portal  MD Guidelines  Scanned Media  Benefits  MyChart    Future Appointments   Date Time Provider Alonso Carmen   9/13/2022  2:30 PM Soy Bauer, PT [facility]   9/14/2022 10:30 AM Sonido Rushing MD [facility]   9/15/2022  2:30 PM Soy Bauer, PT OFF [facility]   9/23/2022  2:30 PM Soy Bauer, PT OFF [facility]   10/19/2022  5:10 AM Mackenzie [address] [place] [address]   10/19/2022  1:45 PM [facility]iles [facility]   2/28/2023  3:00 PM Shelton Martinez MD [facility] AMB

## 2022-09-12 NOTE — PROGRESS NOTES
New Patient Abstract    Reason for Referral: Iron deficiency anemia, unspecified iron deficiency anemia type    Referring Provider:  Adriana Baez MD    Primary Care Provider: Adriana Baez MD    Family History of Cancer/Hematologic Disorders: Mother with stroke; Sister with cervical cancer; Paternal aunt with cervical cancer    Presenting Symptoms: fatigue and abnormal CBC    Narrative with recent with Results/Procedures/Biopsies and Dates completed:   Ms. Austen Sanford is a 50-year-old  female who reports to have never used tobacco products. She reports use of alcohol however denies use of drug substances. Her medical history reports as arrhythmia, asthma, endometrial cancer, chronic pain, cirrhosis, claustrophobia, colon polyps, GERD, JOSE E, heart murmur, HTN, sleep apnea, and Graves disease. Her surgical history reports as hysterectomy, cardiac ablation, cholecystectomy, colonoscopy, D&C of uterus, thyroidectomy and tubal ligation. In July 2022 she presented to her PCP for hospital follow up. She reported being in [place] in June 2022. She reports being hospitalized for anemia. She stated she received 1 unit of PRBCs and 8 IV iron infusions. She reported having an EGD and colonoscopy that were unrevealing for her anemia. She reports having a hysterectomy due to endometrial cancer and is currently under surveillance by Dr Álvaro Vidal. Her records of her [place] admission were not available for review. Her 7/12/22 STF CBC reported an increased RBC of 5.97 and RDW of 33 with a decreased hemoglobin of 11.6, MCV of 73, MCH of 19 and MCHC of 26. Her historical STF CBCs have reported a decreased hemoglobin since November 2019 when then it reported at 8.6 however in 2020 and 2021 her hemoglobin reported normal range. Her July 2022 chemistry panel reported a normal BUN of 16 and creatinine of 0.80.  Her July 2022 liver profile reported WNL however in April 2022 her ALT and AST were elevated and due to history of cirrhosis, a referral was placed to GI. It is unclear if she has seen GI for her elevated LFTs. It is unclear if she has tried an oral iron supplement. A referral was placed to hematology to further evaluate and treat her anemia.      Labs            Notes from Referring Provider: n/a    Other Pertinent Information: n/a    Presented at Tumor Board: n/a

## 2022-09-13 ENCOUNTER — HOSPITAL ENCOUNTER (OUTPATIENT)
Dept: PHYSICAL THERAPY | Age: 57
Setting detail: RECURRING SERIES
End: 2022-09-13
Payer: MEDICAID

## 2022-09-15 ENCOUNTER — HOSPITAL ENCOUNTER (OUTPATIENT)
Dept: PHYSICAL THERAPY | Age: 57
Setting detail: RECURRING SERIES
Discharge: HOME | End: 2022-09-18
Payer: MEDICAID

## 2022-09-15 PROCEDURE — 97110 THERAPEUTIC EXERCISES: CPT

## 2022-09-15 PROCEDURE — 97140 MANUAL THERAPY 1/> REGIONS: CPT

## 2022-09-15 NOTE — PROGRESS NOTES
Lenard Klein  : 1965  Primary: [address] Medicaid  Secondary:  [address] @ [facility]  [address] [address]  Phone: [phone]  Fax: [phone] Plan Frequency: 2x a week for 90 days  Plan of Care/Certification Expiration Date: 10/30/22      PT Visit Info:  No data recorded    OUTPATIENT PHYSICAL THERAPY:OP NOTE TYPE: Treatment Note 9/15/2022       Episode  }Appt Desk             Treatment Diagnosis:  Low back pain (M54.5)  Radiculopathy, Lumbar Region (M54.16)  Muscle Weakness, Generalized (M62.81)  Abnormal posture (R29.3)  Medical/Referring Diagnosis:  Radiculopathy, lumbar region [M54.16]  Referring Physician:  Lionel Thrasher MD MD Orders:  PT Eval and Treat  Date of Onset:  Onset Date: 06     Allergies:   Diltiazem hcl  Restrictions/Precautions:  No data recordedNo data recorded   Interventions Planned (Treatment may consist of any combination of the following):    Current Treatment Recommendations: Strengthening; ROM; Balance training; Functional mobility training; ADL/Self-care training; Endurance training; Gait training; Stair training; Neuromuscular re-education; Manual Therapy - Soft Tissue Mobilization; Manual Therapy - Joint Manipulation; Pain management; Home exercise program; Safety education & training; Patient/Caregiver education & training; Modalities; Equipment evaluation, education, & procurement; Positioning; Integrated dry needling; Aquatics     Subjective Comments: Pt reports she was feeling good and she over did it yesterday. Notes she was doing a lot of pushing/pulling, lifting and is sore in her Rt lower back and Lt ankle. Initial:}     4/10Post Session:        4/10  Medications Last Reviewed:  9/15/2022  Updated Objective Findings:   None Today  Treatment   THERAPEUTIC EXERCISE: (38 minutes):  Exercises per grid below to improve mobility and strength.   Required moderate visual, verbal, manual and tactile cues to promote proper body alignment, promote proper body posture and promote proper body mechanics. Progressed resistance, range, repetitions and complexity of movement as indicated. Date:  9/15/2022   Activity/Exercise Parameters   Nustep 8min  Level 1   LTR 2x10   Hip adduction 1x10  Hold 10s   Bridging 3x10   Sit to stand 3x10   SLR flexion 3x10 B   SLR abduction 3x10 B   Walking marching 4x60ft   Cat cow --   [employer] Portal  Access Code: [email or web address]  URL: [email or web address]  Date: 09/09/2022  Prepared by: Zheng Cisse  Exercises  Supine Lower Trunk Rotation - 2 x daily - 7 x weekly - 10 reps - 2 sets - 5s hold  Supine Active Straight Leg Raise - 2 x daily - 7 x weekly - 10 reps - 2 sets - 3s hold  Supine Bridge - 2 x daily - 7 x weekly - 10 reps - 2 sets - 5s hold  Sit to Stand with Arms Crossed - 2 x daily - 7 x weekly - 2 sets - 10 reps  Cat Cow - 2 x daily - 7 x weekly - 2 sets - 10 reps    MANUAL THERAPY: (15 minutes): Joint mobilization, Soft tissue mobilization and Manipulation was utilized and necessary because of the patient's restricted joint motion, painful spasm, loss of articular motion and restricted motion of soft tissue.    STM with thera roll to B QL, glute med, glute max, piriformis, TFL  Gentle PA lumbar segments, B SI joint, and sacrum       Commonly used abbreviations that may be included in this note:  STM- Soft tissue mobilization, R>L or L>R- right greater than left or vice versa, HEP - Home exercise program, CPA/UPA - Central or unilateral posterior-anterior mobilization, SLS- single leg stance, SKTC - Single leg to chest, SNAGS/NAGS- (sustained) Natural apophyseal glides, TKE- Terminal knee extension, ER- External rotation, IR- Internal rotation, B - Bilateral, sec- seconds, Lb- pounds, min - minutes, HA- Headache, OP- Over pressure, tband- theraband, fwd/bwd- Forward/Backward, TA- Transversus Abdominus, dbl- Double      TREATMENT/SESSION SUMMARY:     Response to Treatment: Pt with good tolerance for new exercises. Fatigue during sidelying hip abduction due to weakness. Decreased balance during walking marching. Communication/Consultation:  None today  Equipment provided today:  None  Recommendations/Intent for next treatment session: Next visit will focus on progressing strength and ROM.       Total Treatment Billable Duration: 53 minutes  Time In: 1430  Time Out: Soledad Schulz 172  }Post Session Pain  PT Visit Info  [facility] Portal  MD Guidelines  Scanned Media  Benefits  MyChart    Future Appointments   Date Time Provider Alonso Suttoni   9/15/2022  2:30 PM Kj Lua, PT [facility]   9/23/2022  2:30 PM Kj Lua, PT [facility]   10/19/2022  5:10 AM [facility]REACH [facility]   10/19/2022  1:45 PM Tamar Melendez MD [facility]   2/28/2023  3:00 PM Yeimy Billy MD [facility] AMB

## 2022-09-16 NOTE — PROGRESS NOTES
Mauricio Larose  : 1965  Primary: [address] Medicaid  Secondary:  [place] @ [address] [address]  Phone: [phone]  Fax: [phone]       2022      Patient  visit canceled due to provider being out.      Freddie Cancer, PT

## 2022-09-18 DIAGNOSIS — M54.41 RIGHT-SIDED LOW BACK PAIN WITH RIGHT-SIDED SCIATICA, UNSPECIFIED CHRONICITY: ICD-10-CM

## 2022-09-18 RX ORDER — NAPROXEN 500 MG/1
TABLET ORAL
Qty: 60 TABLET | Refills: 3 | Status: SHIPPED | OUTPATIENT
Start: 2022-09-18 | End: 2022-11-02

## 2022-09-19 DIAGNOSIS — M54.41 RIGHT-SIDED LOW BACK PAIN WITH RIGHT-SIDED SCIATICA, UNSPECIFIED CHRONICITY: ICD-10-CM

## 2022-09-19 RX ORDER — TIZANIDINE 4 MG/1
TABLET ORAL
Qty: 10 TABLET | Refills: 0 | OUTPATIENT
Start: 2022-09-19

## 2022-09-20 RX ORDER — TIZANIDINE 4 MG/1
4 TABLET ORAL NIGHTLY PRN
Qty: 10 TABLET | Refills: 0 | Status: SHIPPED | OUTPATIENT
Start: 2022-09-20 | End: 2022-11-03

## 2022-09-21 ENCOUNTER — TELEPHONE (OUTPATIENT)
Dept: ONCOLOGY | Age: 57
End: 2022-09-21

## 2022-09-23 ENCOUNTER — HOSPITAL ENCOUNTER (OUTPATIENT)
Dept: PHYSICAL THERAPY | Age: 57
Setting detail: RECURRING SERIES
Discharge: HOME | End: 2022-09-26
Payer: MEDICAID

## 2022-09-23 PROCEDURE — 97140 MANUAL THERAPY 1/> REGIONS: CPT

## 2022-09-23 PROCEDURE — 97110 THERAPEUTIC EXERCISES: CPT

## 2022-09-23 NOTE — PROGRESS NOTES
Leif Ellis  : 1965  Primary: [address] Medicaid  Secondary:  [address] @ [address]  [address] [place] [address]  Phone: [phone]  Fax: [phone] Plan Frequency: 2x a week for 90 days  Plan of Care/Certification Expiration Date: 10/30/22      PT Visit Info:  No data recorded    OUTPATIENT PHYSICAL THERAPY:OP NOTE TYPE: Treatment Note 2022       Episode  }Appt Desk             Treatment Diagnosis:  Low back pain (M54.5)  Radiculopathy, Lumbar Region (M54.16)  Muscle Weakness, Generalized (M62.81)  Abnormal posture (R29.3)  Medical/Referring Diagnosis:  Radiculopathy, lumbar region [M54.16]  Referring Physician:  Brie Traylor MD MD Orders:  PT Eval and Treat  Date of Onset:  Onset Date: 06     Allergies:   Diltiazem hcl  Restrictions/Precautions:  No data recordedNo data recorded   Interventions Planned (Treatment may consist of any combination of the following):    Current Treatment Recommendations: Strengthening; ROM; Balance training; Functional mobility training; ADL/Self-care training; Endurance training; Gait training; Stair training; Neuromuscular re-education; Manual Therapy - Soft Tissue Mobilization; Manual Therapy - Joint Manipulation; Pain management; Home exercise program; Safety education & training; Patient/Caregiver education & training; Modalities; Equipment evaluation, education, & procurement; Positioning; Integrated dry needling; Aquatics     Subjective Comments: Pt reports she does her exercises in the morning in order to get out of bed. Initial:}     4/10Post Session:        4/10  Medications Last Reviewed:  2022  Updated Objective Findings:   None Today  Treatment   THERAPEUTIC EXERCISE: (38 minutes):  Exercises per grid below to improve mobility and strength.   Required moderate visual, verbal, manual and tactile cues to promote proper body alignment, promote proper body posture and promote proper body mechanics. Progressed resistance, range, repetitions and complexity of movement as indicated. Date:  9/23/2022   Activity/Exercise Parameters   Nustep 6 min  Level 1   LTR    Hip adduction    Bridging 3x10   Sit to stand    SLR flexion 3x10 B   SLR abduction 3x10 B   Walking marching    Cat cow --   Supine clamshell Green x 20 reps   Supine march with TA 20 reps   Standing lumbar decompression at bar 3x30 sec hold       [facility] Portal  Access Code: [email or web address]  URL: [email or web address]  Date: 09/09/2022  Prepared by: Zhegn Cisse  Exercises  Supine Lower Trunk Rotation - 2 x daily - 7 x weekly - 10 reps - 2 sets - 5s hold  Supine Active Straight Leg Raise - 2 x daily - 7 x weekly - 10 reps - 2 sets - 3s hold  Supine Bridge - 2 x daily - 7 x weekly - 10 reps - 2 sets - 5s hold  Sit to Stand with Arms Crossed - 2 x daily - 7 x weekly - 2 sets - 10 reps  Cat Cow - 2 x daily - 7 x weekly - 2 sets - 10 reps    MANUAL THERAPY: (15 minutes): Joint mobilization, Soft tissue mobilization and Manipulation was utilized and necessary because of the patient's restricted joint motion, painful spasm, loss of articular motion and restricted motion of soft tissue.    STM with thera roll to B QL, glute med, glute max, piriformis, TFL  Gentle PA lumbar segments, B SI joint, and sacrum       Commonly used abbreviations that may be included in this note:  STM- Soft tissue mobilization, R>L or L>R- right greater than left or vice versa, HEP - Home exercise program, CPA/UPA - Central or unilateral posterior-anterior mobilization, SLS- single leg stance, SKTC - Single leg to chest, SNAGS/NAGS- (sustained) Natural apophyseal glides, TKE- Terminal knee extension, ER- External rotation, IR- Internal rotation, B - Bilateral, sec- seconds, Lb- pounds, min - minutes, HA- Headache, OP- Over pressure, tband- theraband, fwd/bwd- Forward/Backward, TA- Transversus Abdominus, dbl- Double      TREATMENT/SESSION SUMMARY: Response to Treatment: Pt responded well to standing lumbar decompression at bar. Communication/Consultation:  None today  Equipment provided today:  None  Recommendations/Intent for next treatment session: Next visit will focus on progressing strength and ROM.       Total Treatment Billable Duration: 53 minutes  Time In: 1232  Time Out: [address], PT       Charge Capture  }Post Session Pain  PT Visit Info  [employer] Portal  MD Guidelines  Scanned Media  Benefits  MyChart    Future Appointments   Date Time Provider Alonso Morales   10/4/2022 11:00 AM Donell Atkinson MD [facility] AMB   10/5/2022  5:50 AM [facility] OUTREACH INSURANCE [facility]   10/5/2022  1:15 PM Jorge Patel MD [facility] AMB   10/24/2022 10:30 AM Jenise Uribe MD [facility] AMB   2/28/2023  3:00 PM Consuelo Henley MD [facility] AMB

## 2022-09-29 ENCOUNTER — HOSPITAL ENCOUNTER (OUTPATIENT)
Dept: PHYSICAL THERAPY | Age: 57
Setting detail: RECURRING SERIES
Discharge: HOME | End: 2022-10-02
Payer: MEDICAID

## 2022-09-29 PROCEDURE — 97110 THERAPEUTIC EXERCISES: CPT

## 2022-09-29 PROCEDURE — 97140 MANUAL THERAPY 1/> REGIONS: CPT

## 2022-09-29 NOTE — PROGRESS NOTES
Clark Staples MD   Bariatric & Advanced Laparoscopic Surgery & Endoscopy  [address] [place] [address]  Cristian Kaur                                                         Office [phone] Fax [phone]        Date of visit: 10/4/2022      History and Physical    Patient: Mitra Chavez MRN: [id number]     YOB: 1965  Age: 61 y.o. Sex: female              PCP: Elizabeth Olsen MD   Date:  10/4/2022      Mitra Chavez  who presents to the [facility] for consideration of bariatric surgery. This is her initial consultation preparing her for our multi-disciplinary surgical preparatory regimen. She presents with a height of 5' 6\" (1.676 m) and weight of 281 lb (127.5 kg), giving her a Body mass index is 45.35 kg/m². She has an ideal body weight of 155 lbs, and excess body weight of 126 lbs. BARIATRIC PROCEDURE OF INTEREST: laparoscopic gastric bypass    30 day Bariatric Surgical Risk Percentage: 5.54, 11.61%    MEDICAL HISTORY:  Morbid Obesity  Depression  Anxiety  Hypothyroidism  Anemia  A-fib  Heart ablation in 2018  Hx of uterine cancer in 2019 - no current treatment - s/p hysterectomy  Hx of blood transfusion July 2022 - had EGD/colonoscopy in [facility] and no findings    Comorbidity Yes or No   Hypertension- how many medications = 1 Yes   Hyperlipidemia No   Diabetes Mellitus No   Coronary Artery Disease No   Gastroesophageal Reflux  Treatment Med = Nexium Yes   Obstructive Sleep Apnea No   Cancer Yes, uterine cancer in 2019   Asthma Yes   Osteoarthritis Yes   Joint Pain Yes     Admits to GERD symptoms for 10+ years including heartburn, which occurs 4x per week when eating spicy foods. She completed an EGD in July at a hospital in [facility] with concern for bleeding due to Naprosyn intake, but no source was found. She was given iron and blood transfusions at this time.      Other Yes or No   Venous Stasis No   Dialysis No   Long term use of steroid or immunocompromised conditions No   On Anticoagulants No       DENTAL/CHEWING ABILITY:  No dental issues with chewing. PRIOR WEIGHT LOSS ATTEMPTS:  Reports 4-10 previous weight loss attempts including medication (Meridia), Weight Watchers and low calorie diet and exercise. EXERCISE ASSESSMENT:  Reports walking daily and PT twice per week. Reviewed NIH guidelines. PSYCHOSOCIAL:  She notes she is  and states main support person is her , Jason Gilman. She is disabled due to anxiety and spinal injuries. Her goal in pursuing surgical weight loss is to improve health. She denies any active psychological problems and reports appropriate treatment of depression/anxiety. She states she is independent in her care, cannot drive a car, and can ambulate without assistance. HISTORY:  Past Medical History:   Diagnosis Date    Arrhythmia 2018    a-fib (2017) ablation-(12/2018)- resolved    Asthma     seasonal allergies- maint and rescue inhaler    Cancer ([facility] [address]     endometrial cancer- hysterectomy 11/26/20    Chronic pain     back pain - herniated disc    Cirrhosis ([facility]     Claustrophobia     Colon polyps     Difficult intubation     in [place] 2019- glidescope needed with hysterectomy/lap deysi 11/26/19 @ [facility]    GERD (gastroesophageal reflux disease)     H/O echocardiogram     Echo LVEF 55% mild mitral and tricuspid regurg    H/O: iron deficiency anemia     Heart murmur 06/2019    Echo LVEF 55% mild mitral and tricuspid regurg    Hypertension     managed with meds    Sleep apnea     not currently using cpap    Thyroid disease     Graves Disease       She denies personal or family history of problems with anesthesia, bleeding, or clotting. She denies history of DVT or pulmonary embolism. She denies dysphagia.       Past Surgical History:   Procedure Laterality Date    ABLATION OF DYSRHYTHMIC FOCUS  12/2017    CHOLECYSTECTOMY, LAPAROSCOPIC  11/26/2020    COLONOSCOPY N/A 7/20/2020 COLONOSCOPY/ BMI 43 performed by Jolanta Ponce MD at [facility]  10/2019    HYSTERECTOMY (CERVIX STATUS UNKNOWN)  11/26/2020    with lap deysi    THYROIDECTOMY  08/28/2020    TUBAL LIGATION  1989     Social History     Tobacco Use    Smoking status: Never    Smokeless tobacco: Never   Substance Use Topics    Alcohol use: Yes     Alcohol/week: 1.0 standard drink    Drug use: Not Currently     Family History   Problem Relation Age of Onset    Cancer Paternal Aunt         cervical cancer    Thyroid Disease Son         Hyperthyroidism    Cancer Sister         cervical     Thyroid Disease Daughter         Hypothyroidism    Liver Disease Father     Diabetes Father     Heart Disease Father     Kidney Disease Mother         dialysis    Stroke Mother         MEDICATIONS:  Current Outpatient Medications   Medication Sig Dispense Refill    tiZANidine (ZANAFLEX) 4 MG tablet Take 1 tablet by mouth nightly as needed (back pain) 10 tablet 0    naproxen (NAPROSYN) 500 MG tablet TAKE ONE TABLET BY MOUTH TWICE A DAY WITH A MEAL 60 tablet 3    nystatin (MYCOSTATIN) 566165 UNIT/GM cream Apply topically 2 times daily. 30 g 1    nystatin (MYCOSTATIN) 606690 UNIT/GM powder Apply 3 times daily. 60 g 1    albuterol sulfate  (90 Base) MCG/ACT inhaler Inhale 2 puffs into the lungs every 4 hours as needed      amLODIPine (NORVASC) 5 MG tablet Take 5 mg by mouth daily      aspirin 81 MG EC tablet Take 81 mg by mouth daily      buPROPion (WELLBUTRIN XL) 150 MG extended release tablet Take 150 mg by mouth      clobetasol (TEMOVATE) 0.05 % external solution Apply bid      esomeprazole (NEXIUM) 20 MG delayed release capsule Take one cap daily.       levothyroxine (SYNTHROID) 150 MCG tablet 1 tablet once a day except for 1/2 tablet on Sundays      sertraline (ZOLOFT) 50 MG tablet Take 50 mg by mouth daily      traZODone (DESYREL) 150 MG tablet Take 150 mg by mouth      valsartan-hydroCHLOROthiazide (DIOVAN-HCT) 320-25 MG per tablet Take 1 tablet by mouth daily (Patient not taking: Reported on 7/15/2022)       Current Facility-Administered Medications   Medication Dose Route Frequency Provider Last Rate Last Admin    methylPREDNISolone sodium (SOLU-MEDROL) injection 40 mg  40 mg IntraMUSCular Daily TOMAS Isabel - CNP           ALLERGIES:  Allergies   Allergen Reactions    Diltiazem Hcl Other (See Comments)     Blistering        ROS: The patient has no difficulty with chest pain or shortness of breath. No fever or chills. Comprehensive 13 point review of systems was otherwise unremarkable except as noted above. Physical Exam:     /82   Pulse 78   Ht 5' 6\" (1.676 m)   Wt 281 lb (127.5 kg)   BMI 45.35 kg/m²     General:  Well-developed, well-nourished, no distress. Psych:  Cooperative, good insight and judgement. Neuro:  Alert, oriented to person, place and time. HEENT:  Normocephalic, atraumatic. Sclera clear. Lungs:  Unlabored breathing. Symmetrical chest expansion. Chest wall:  No tenderness or deformity. Heart:  Regular rate and rhythm. No JVD. Abdomen:  Soft, non-tender, non-distended. No guarding or rebound. No palpable masses. Extremities:  Extremities normal, atraumatic, no cyanosis or edema. Skin:  Skin color, texture, turgor normal. No rashes. ASSESSMENT:  Morbid obesity with a Body mass index is 45.35 kg/m². beginning multi-disciplinary surgical prep program.    PLAN:  We have discussed the patient's participation and reviewed the steps in our preparatory program. She has had a long history of failed diet and exercise programs and has good understanding about the risks, benefits, and commitment related to bariatric surgery. She has attended our comprehensive educational seminar. She is interested in proceeding with our program seeking the laparoscopic gastric bypass. Diagnosis Orders   1.  Morbid obesity (HCC)  Ferritin    Folate    Hemoglobin A1C    Iron Kub    Vitamin B12    Vitamin D 25 Hydroxy    US ABDOMEN COMPLETE          1. Discussed in detail the Laparoscopic Nisha-en-Y Gastric Bypass and the Laparoscopic Sleeve Gastrectomy including the benefits, risks, and complications of each operation. The patient has a clear understanding of all operations. Also, discussed perioperative care and in-hospital and home care after each operation. The patient verbalized and demonstrated a clear understanding of what to expect. 2.  Patient is an excellent candidate with a clear medical necessity for bariatric surgery. 3.  Encouraged patient to participate in our Bariatric Support Group. 4.  Advised that weight loss surgery is only one part of a lifelong weight management program, and that sustainable weight loss will only come with major diet and behavioral changes. Advised that weight loss surgery requires a commitment to self-management and long-term follow up. 5.  Nutrition Recommendations:   Diet Rx - Low-moderate calorie intake (~1,785-2,040 kcal/day, based on 14-16kcal/kg ABW). Work on eating at least 3x/d with lean protein focus. 2 week pre-operative diet with caloric restriction of ~1200kcal/d.     6.  Exercise Recommendations: Exercise routine, incorporating both cardio and strength training, building up to 5x/wk for at least 30 minutes. Physical therapy evaluation for guidance on exercise routine. 7.  The assessment and plan of care were reviewed in detail with the patient and her questions were answered. She will complete the following steps:  1. Complete bariatric labs after dietitian evaluation. 2.  Participation in our behavior modification program, reduced calorie diet program, and participation in our exercise program recommendations supervised by our office. 3.  Complete our required psychological evaluation. 4.  Reminded of policy of no weight gain during prep period. 5.  Upper GI ordered  6. Physical Therapy Evaluation  7. Cardiology clearance  8. Oncology clearance  9. EGD  10. Abd US to rule out sirrhosis      She will return after the above are complete for assessment of her progress and schedule surgery. Time: I spent 60 minutes preparing to see patient (including chart review and preparation), obtaining and/or reviewing additional medical history, performing a physical exam and evaluation, documenting clinical information in the electronic health record, independently interpreting results, communicating results to patient, family or caregiver, and/or coordinating care.         Signed: Debora De Anda MD  Bariatric & Minimally Invasive Surgery  10/4/2022

## 2022-09-29 NOTE — PROGRESS NOTES
Romulo Macedo  : 1965  Primary: [address] Medicaid  Secondary:  [address] @ [address]  [address] [place] [address]  Phone: [phone]  Fax: [phone] Plan Frequency: 2x a week for 90 days  Plan of Care/Certification Expiration Date: 10/30/22      PT Visit Info:  No data recorded    OUTPATIENT PHYSICAL THERAPY:OP NOTE TYPE: Treatment Note 2022       Episode  }Appt Desk             Treatment Diagnosis:  Low back pain (M54.5)  Radiculopathy, Lumbar Region (M54.16)  Muscle Weakness, Generalized (M62.81)  Abnormal posture (R29.3)  Medical/Referring Diagnosis:  Radiculopathy, lumbar region [M54.16]  Referring Physician:  Elli Weldon MD MD Orders:  PT Eval and Treat  Date of Onset:  Onset Date: 06     Allergies:   Diltiazem hcl  Restrictions/Precautions:  No data recordedNo data recorded   Interventions Planned (Treatment may consist of any combination of the following):    Current Treatment Recommendations: Strengthening; ROM; Balance training; Functional mobility training; ADL/Self-care training; Endurance training; Gait training; Stair training; Neuromuscular re-education; Manual Therapy - Soft Tissue Mobilization; Manual Therapy - Joint Manipulation; Pain management; Home exercise program; Safety education & training; Patient/Caregiver education & training; Modalities; Equipment evaluation, education, & procurement; Positioning; Integrated dry needling; Aquatics     Subjective Comments: Pt reports she fell down 3 steps on Tuesday and is very sore in her hips and lower back. Initial:}     4/10Post Session:        4/10  Medications Last Reviewed:  2022  Updated Objective Findings:   None Today  Treatment   THERAPEUTIC EXERCISE: (30 minutes):  Exercises per grid below to improve mobility and strength.   Required moderate visual, verbal, manual and tactile cues to promote proper body alignment, promote proper body posture and promote proper body mechanics. Progressed resistance, range, repetitions and complexity of movement as indicated. Date:  9/29/2022   Activity/Exercise Parameters   Nustep 6 min  Level 1   LTR 2x10   Hip adduction    Bridging 3x10   Sit to stand    SLR flexion 3x10 B   SLR abduction 3x10 B   Walking marching    Cat cow --   Supine clamshell Green x 20 reps   Supine march with TA 20 reps   Standing lumbar decompression at bar 3x30 sec hold       [employer] Portal  Access Code: [email or web address]  URL: [email or web address]  Date: 09/09/2022  Prepared by: Stan Driver  Exercises  Supine Lower Trunk Rotation - 2 x daily - 7 x weekly - 10 reps - 2 sets - 5s hold  Supine Active Straight Leg Raise - 2 x daily - 7 x weekly - 10 reps - 2 sets - 3s hold  Supine Bridge - 2 x daily - 7 x weekly - 10 reps - 2 sets - 5s hold  Sit to Stand with Arms Crossed - 2 x daily - 7 x weekly - 2 sets - 10 reps  Cat Cow - 2 x daily - 7 x weekly - 2 sets - 10 reps    MANUAL THERAPY: (23 minutes): Joint mobilization, Soft tissue mobilization and Manipulation was utilized and necessary because of the patient's restricted joint motion, painful spasm, loss of articular motion and restricted motion of soft tissue.    STM with thera roll to B QL, glute med, glute max, piriformis, TFL  Gentle PA lumbar segments, B SI joint, and sacrum       Commonly used abbreviations that may be included in this note:  STM- Soft tissue mobilization, R>L or L>R- right greater than left or vice versa, HEP - Home exercise program, CPA/UPA - Central or unilateral posterior-anterior mobilization, SLS- single leg stance, SKTC - Single leg to chest, SNAGS/NAGS- (sustained) Natural apophyseal glides, TKE- Terminal knee extension, ER- External rotation, IR- Internal rotation, B - Bilateral, sec- seconds, Lb- pounds, min - minutes, HA- Headache, OP- Over pressure, tband- theraband, fwd/bwd- Forward/Backward, TA- Transversus Abdominus, dbl- Double      TREATMENT/SESSION SUMMARY:     Response to Treatment: Pt more tender than usual at Rt glute and IT band region from fall. Able to perform strengthening with little to no difficulty at this time. Communication/Consultation:  None today  Equipment provided today:  None  Recommendations/Intent for next treatment session: Next visit will focus on progressing strength and ROM.       Total Treatment Billable Duration: 53 minutes  Time In: 1430  Time Out: [address]  }Post Session Pain  PT Visit Info  [facility] [place] Portal  MD Guidelines  Scanned Media  Benefits  MyChart    Future Appointments   Date Time Provider Alonso Morales   9/30/2022  2:30 PM Macho Mehta, [facility]   10/4/2022 11:00 AM Kenji Herrera MD [facility]   10/5/2022  5:50 AM [facility]   10/5/2022  1:15 PM Mery Alfaro MD [facility]   10/24/2022 10:30 AM Gamal Doe MD [facility]   2/28/2023  3:00 PM Larry Olea MD [facility]

## 2022-09-30 ENCOUNTER — HOSPITAL ENCOUNTER (OUTPATIENT)
Dept: PHYSICAL THERAPY | Age: 57
Setting detail: RECURRING SERIES
End: 2022-09-30
Payer: MEDICAID

## 2022-10-04 ENCOUNTER — OFFICE VISIT (OUTPATIENT)
Dept: SURGERY | Age: 57
End: 2022-10-04
Payer: MEDICAID

## 2022-10-04 VITALS
BODY MASS INDEX: 45.16 KG/M2 | SYSTOLIC BLOOD PRESSURE: 128 MMHG | WEIGHT: 281 LBS | DIASTOLIC BLOOD PRESSURE: 82 MMHG | HEART RATE: 78 BPM | HEIGHT: 66 IN

## 2022-10-04 DIAGNOSIS — E66.01 OBESITY, CLASS III, BMI 40-49.9 (MORBID OBESITY) (HCC): ICD-10-CM

## 2022-10-04 DIAGNOSIS — K74.69 OTHER CIRRHOSIS OF LIVER (HCC): ICD-10-CM

## 2022-10-04 DIAGNOSIS — Z01.812 ENCOUNTER FOR PRE-OPERATIVE LABORATORY TESTING: ICD-10-CM

## 2022-10-04 DIAGNOSIS — Z87.19 HX OF GASTROESOPHAGEAL REFLUX (GERD): ICD-10-CM

## 2022-10-04 DIAGNOSIS — I10 PRIMARY HYPERTENSION: ICD-10-CM

## 2022-10-04 DIAGNOSIS — C54.1 ENDOMETRIAL CANCER (HCC): Primary | ICD-10-CM

## 2022-10-04 DIAGNOSIS — E66.01 MORBID OBESITY (HCC): Primary | ICD-10-CM

## 2022-10-04 DIAGNOSIS — I48.20 CHRONIC ATRIAL FIBRILLATION (HCC): ICD-10-CM

## 2022-10-04 DIAGNOSIS — E66.01 MORBID OBESITY (HCC): ICD-10-CM

## 2022-10-04 LAB
25(OH)D3 SERPL-MCNC: 30.4 NG/ML (ref 30–100)
FERRITIN SERPL-MCNC: 104 NG/ML (ref 8–388)
FOLATE SERPL-MCNC: 20.9 NG/ML (ref 3.1–17.5)
IRON SERPL-MCNC: 109 UG/DL (ref 35–150)
TSH, 3RD GENERATION: 1.91 UIU/ML (ref 0.36–3.74)
VIT B12 SERPL-MCNC: 2182 PG/ML (ref 193–986)

## 2022-10-04 PROCEDURE — 99205 OFFICE O/P NEW HI 60 MIN: CPT | Performed by: SURGERY

## 2022-10-04 PROCEDURE — APPSS45 APP SPLIT SHARED TIME 31-45 MINUTES: Performed by: PHYSICIAN ASSISTANT

## 2022-10-04 NOTE — PROGRESS NOTES
Date: 10/5/2022        Patient Name: Fredi Bence     YOB: 1965          Chief Complaint     Chief Complaint   Patient presents with    Follow-up       endoemtrial cancer, stage 1a gr 2    Surgery jan 2019     msi pos. Negative genetics per pt/          History of Present Illness   Fredi Bence is a 61 y.o. who presents today for scheduled visit    she  went through menopause 2014  and that she started having PMB 2017. She had Pelvic US and D&C 10/2019 which showed an endometrial carcinoma, FIGO grade II.        3/2019 she went to ER in [place] for abd pain, abd US showed gallstones and CT A/P showed cholelithiasis and enlarged uterus. Sh went to ER for heavy bleeding. Her H&H was 8.6/29.3 on 11/4/2019. She was started on provera. Presented to office for further tx at that time      Last pap 2017 - she was told she had small fiborid   No h/o of abdnormal    A-fib - ablation 12/2017 1/2019 colonoscopy - normal per pt   mammo - current - normal per pt.      Cirrhosis noted at surgery    Past Medical History     Past Medical History:   Diagnosis Date    Arrhythmia 2018    a-fib (2017) ablation-(12/2018)- resolved    Asthma     seasonal allergies- maint and rescue inhaler    Cancer ([address]     endometrial cancer- hysterectomy 11/26/20    Chronic pain     back pain - herniated disc    Cirrhosis ([address]     Claustrophobia     Colon polyps     Difficult intubation     in [place] 2019- glidescope needed with hysterectomy/lap deysi 11/26/19 @ [facility]    GERD (gastroesophageal reflux disease)     H/O echocardiogram     Echo LVEF 55% mild mitral and tricuspid regurg    H/O: iron deficiency anemia     Heart murmur 06/2019    Echo LVEF 55% mild mitral and tricuspid regurg    Hypertension     managed with meds    Sleep apnea     not currently using cpap    Thyroid disease     Graves Disease        Past Surgical History     Past Surgical History:   Procedure Laterality Date ABLATION OF DYSRHYTHMIC FOCUS  12/2017    CHOLECYSTECTOMY, LAPAROSCOPIC  11/26/2020    COLONOSCOPY N/A 7/20/2020    COLONOSCOPY/ BMI 43 performed by Riki Nair MD at [facility]  10/2019    HYSTERECTOMY (CERVIX STATUS UNKNOWN)  11/26/2020    with lap deysi    THYROIDECTOMY  08/28/2020    TUBAL LIGATION  1989        Medications      Current Outpatient Medications:     LORazepam (ATIVAN) 1 MG tablet, Take 1 mg by mouth 2 times daily. , Disp: , Rfl:     ferrous sulfate (IRON 325) 325 (65 Fe) MG tablet, Take 325 mg by mouth daily (with breakfast), Disp: , Rfl:     vitamin B-12 (CYANOCOBALAMIN) 500 MCG tablet, Take 500 mcg by mouth daily, Disp: , Rfl:     tiZANidine (ZANAFLEX) 4 MG tablet, Take 1 tablet by mouth nightly as needed (back pain), Disp: 10 tablet, Rfl: 0    nystatin (MYCOSTATIN) 892866 UNIT/GM cream, Apply topically 2 times daily. , Disp: 30 g, Rfl: 1    albuterol sulfate  (90 Base) MCG/ACT inhaler, Inhale 2 puffs into the lungs every 4 hours as needed, Disp: , Rfl:     amLODIPine (NORVASC) 5 MG tablet, Take 5 mg by mouth daily, Disp: , Rfl:     buPROPion (WELLBUTRIN XL) 150 MG extended release tablet, Take 150 mg by mouth, Disp: , Rfl:     clobetasol (TEMOVATE) 0.05 % external solution, Apply bid, Disp: , Rfl:     esomeprazole (NEXIUM) 20 MG delayed release capsule, Take one cap daily. , Disp: , Rfl:     levothyroxine (SYNTHROID) 150 MCG tablet, 1 tablet once a day except for 1/2 tablet on Sundays, Disp: , Rfl:     sertraline (ZOLOFT) 50 MG tablet, Take 50 mg by mouth daily, Disp: , Rfl:     traZODone (DESYREL) 150 MG tablet, Take 150 mg by mouth, Disp: , Rfl:     naproxen (NAPROSYN) 500 MG tablet, TAKE ONE TABLET BY MOUTH TWICE A DAY WITH A MEAL (Patient not taking: Reported on 10/5/2022), Disp: 60 tablet, Rfl: 3    valsartan-hydroCHLOROthiazide (DIOVAN-HCT) 320-25 MG per tablet, Take 1 tablet by mouth daily (Patient not taking: Reported on 7/15/2022), Disp: , Rfl:      Allergies   Diltiazem hcl    Social History     Social History       Tobacco History       Smoking Status  Never      Smokeless Tobacco Use  Never              Alcohol History       Alcohol Use Status  Yes Amount  1.0 standard drink of alcohol/wk              Drug Use       Drug Use Status  Not Currently              Sexual Activity       Sexually Active  Not Asked                    Family History     Family History   Problem Relation Age of Onset    Cancer Paternal Aunt         cervical cancer    Thyroid Disease Son         Hyperthyroidism    Cancer Sister         cervical     Thyroid Disease Daughter         Hypothyroidism    Liver Disease Father     Diabetes Father     Heart Disease Father     Kidney Disease Mother         dialysis    Stroke Mother        Review of Systems   Review of Systems   Constitutional: Negative. HENT: Negative. Eyes: Negative. Respiratory: Negative. Cardiovascular: Negative. Gastrointestinal: Negative. Endocrine: Negative. Genitourinary: Negative. Musculoskeletal: Negative. Skin:  Positive for rash. Allergic/Immunologic: Negative. Neurological: Negative. Hematological: Negative. Psychiatric/Behavioral: Negative. All other systems reviewed and are negative. Physical Exam     ECOG PERFORMANCE STATUS - 1- Restricted in physically strenuous activity but ambulatory and able to carry out work of a light or sedentary nature such as light house work, office work. Blood pressure (!) 171/107, pulse 76, temperature 98.1 °F (36.7 °C), temperature source Oral, resp. rate 14, height 5' 6\" (1.676 m), weight 282 lb 9.6 oz (128.2 kg), SpO2 98 %. Physical Exam  Vitals and nursing note reviewed. Exam conducted with a chaperone present. Constitutional:       Appearance: Normal appearance. She is obese. HENT:      Head: Normocephalic and atraumatic. Cardiovascular:      Rate and Rhythm: Normal rate and regular rhythm.       Heart sounds: Normal heart sounds. Pulmonary:      Effort: Pulmonary effort is normal. No respiratory distress. Breath sounds: Normal breath sounds. Abdominal:      General: Abdomen is flat. Genitourinary:     General: Normal vulva. Vagina: No vaginal discharge. Neurological:      General: No focal deficit present. Mental Status: She is alert and oriented to person, place, and time. Psychiatric:         Mood and Affect: Mood normal.         Behavior: Behavior normal.         Thought Content: Thought content normal.         Judgment: Judgment normal.       Labs        Recent Results (from the past 48 hour(s))   Vitamin D 25 Hydroxy    Collection Time: 10/04/22 11:52 AM   Result Value Ref Range    Vit D, 25-Hydroxy 30.4 30.0 - 100.0 ng/mL   Vitamin B12    Collection Time: 10/04/22 11:52 AM   Result Value Ref Range    Vitamin B-12 2182 (H) 193 - 986 pg/mL   TSH    Collection Time: 10/04/22 11:52 AM   Result Value Ref Range    TSH, 3RD GENERATION 1.910 0.358 - 3.740 uIU/mL   Iron    Collection Time: 10/04/22 11:52 AM   Result Value Ref Range    Iron 109 35 - 150 ug/dL   Hemoglobin A1C    Collection Time: 10/04/22 11:52 AM   Result Value Ref Range    Hemoglobin A1C 5.7 (H) 4.8 - 5.6 %    eAG 117 mg/dL   Folate    Collection Time: 10/04/22 11:52 AM   Result Value Ref Range    Folate 20.9 (H) 3.1 - 17.5 ng/mL   Ferritin    Collection Time: 10/04/22 11:52 AM   Result Value Ref Range    Ferritin 104 8 - 388 NG/ML        No results found for:      Pathology    Vaginal, Thin Prep Pap Test:     Satisfactory for evaluation.    NEGATIVE FOR INTRAEPITHELIAL LESION OR MALIGNANCY   Microorganisms consistent with Candida sp. are present.          ajr/12/17/2021         DIAGNOSIS    A: \"LEFT SENTINEL NODE\":    LYMPH NODE NEGATIVE FOR METASTATIC TUMOR    B: \"GALLBLADDER\":    CHOLELITHIASIS WITH HEMORRHAGIC CHRONIC CHOLECYSTITIS    C: \"RIGHT SENTINEL NODE\":    LYMPH NODE NEGATIVE FOR METASTATIC TUMOR    D: \"UTERUS, BILATERAL TUBES AND OVARIES\":    ENDOMETRIOID ADENOCARCINOMA, FIGO GRADE II INFILTRATING TO A DEPTH OF APPROXIMATELY 0.6 CM OF A TOTAL MYOMETRIAL THICKNESS OF 1.5 CM (INNER ONE HALF). MARGINS OF RESECTION ARE NEGATIVE FOR MALIGNANT  TUMOR. LYMPHOVASCULAR INVASION IS NOT IDENTIFIED.    jtl/11/29/2019    Electronically signed out on 12/6/2019 07:28 by LITA Owens M.D. Imaging/Diagnostics Last 24 Hours   No results found. Radiology:    CT Result (most recent):  CT ABDOMEN PELVIS W IV CONTRAST      PET Results (most recent):  No results found for this or any previous visit from the past 365 days. Mammo Results (most recent):  No results found for this or any previous visit from the past 365 days. US Result (most recent):  US ABDOMEN COMPLETE          Assessment       Diagnosis Orders   1.  Endometrial cancer [facility])          Specialty Problems          Oncology Problems    Endometrial cancer ([facility]           Plan   1.alexsander on exam, pap pending  S/sx recurrence reviewed  Pt reports planned gastric bypass, no gyn oncology contraindications pending pap    Fu six months or prn  Cont with pcp              Dylon Wick MD  [facility]  [phone] [id number]  Office : [phone]  Fax : [phone]

## 2022-10-05 ENCOUNTER — OFFICE VISIT (OUTPATIENT)
Dept: ONCOLOGY | Age: 57
End: 2022-10-05
Payer: MEDICAID

## 2022-10-05 ENCOUNTER — HOSPITAL ENCOUNTER (OUTPATIENT)
Dept: LAB | Age: 57
Discharge: HOME | End: 2022-10-08
Payer: MEDICAID

## 2022-10-05 VITALS
RESPIRATION RATE: 14 BRPM | DIASTOLIC BLOOD PRESSURE: 107 MMHG | OXYGEN SATURATION: 98 % | SYSTOLIC BLOOD PRESSURE: 171 MMHG | HEART RATE: 76 BPM | BODY MASS INDEX: 45.42 KG/M2 | HEIGHT: 66 IN | WEIGHT: 282.6 LBS | TEMPERATURE: 98.1 F

## 2022-10-05 DIAGNOSIS — C54.1 ENDOMETRIAL CANCER (HCC): Primary | ICD-10-CM

## 2022-10-05 LAB
EST. AVERAGE GLUCOSE BLD GHB EST-MCNC: 117 MG/DL
HBA1C MFR BLD: 5.7 % (ref 4.8–5.6)

## 2022-10-05 PROCEDURE — 88142 CYTOPATH C/V THIN LAYER: CPT

## 2022-10-05 PROCEDURE — 99213 OFFICE O/P EST LOW 20 MIN: CPT | Performed by: OBSTETRICS & GYNECOLOGY

## 2022-10-05 RX ORDER — CHOLECALCIFEROL (VITAMIN D3) 125 MCG
500 CAPSULE ORAL DAILY
COMMUNITY

## 2022-10-05 RX ORDER — LORAZEPAM 1 MG/1
1 TABLET ORAL 2 TIMES DAILY
COMMUNITY
End: 2022-10-27 | Stop reason: SDUPTHER

## 2022-10-05 RX ORDER — FERROUS SULFATE 325(65) MG
325 TABLET ORAL
COMMUNITY

## 2022-10-05 ASSESSMENT — ENCOUNTER SYMPTOMS
RESPIRATORY NEGATIVE: 1
EYES NEGATIVE: 1
GASTROINTESTINAL NEGATIVE: 1
ALLERGIC/IMMUNOLOGIC NEGATIVE: 1

## 2022-10-05 ASSESSMENT — PATIENT HEALTH QUESTIONNAIRE - PHQ9
1. LITTLE INTEREST OR PLEASURE IN DOING THINGS: 1
SUM OF ALL RESPONSES TO PHQ QUESTIONS 1-9: 2
2. FEELING DOWN, DEPRESSED OR HOPELESS: 1
SUM OF ALL RESPONSES TO PHQ QUESTIONS 1-9: 2
SUM OF ALL RESPONSES TO PHQ9 QUESTIONS 1 & 2: 2

## 2022-10-06 ENCOUNTER — HOSPITAL ENCOUNTER (EMERGENCY)
Dept: ULTRASOUND IMAGING | Age: 57
Discharge: HOME | End: 2022-10-09
Payer: MEDICAID

## 2022-10-06 ENCOUNTER — HOSPITAL ENCOUNTER (EMERGENCY)
Dept: GENERAL RADIOLOGY | Age: 57
Discharge: HOME | End: 2022-10-09
Payer: MEDICAID

## 2022-10-06 ENCOUNTER — HOSPITAL ENCOUNTER (OUTPATIENT)
Dept: PHYSICAL THERAPY | Age: 57
Setting detail: RECURRING SERIES
Discharge: HOME | End: 2022-10-09
Payer: MEDICAID

## 2022-10-06 ENCOUNTER — NURSE TRIAGE (OUTPATIENT)
Dept: OTHER | Facility: CLINIC | Age: 57
End: 2022-10-06

## 2022-10-06 ENCOUNTER — HOSPITAL ENCOUNTER (EMERGENCY)
Age: 57
Discharge: HOME | End: 2022-10-06
Payer: MEDICAID

## 2022-10-06 VITALS
HEART RATE: 70 BPM | TEMPERATURE: 98.2 F | SYSTOLIC BLOOD PRESSURE: 162 MMHG | RESPIRATION RATE: 16 BRPM | DIASTOLIC BLOOD PRESSURE: 89 MMHG | WEIGHT: 280 LBS | OXYGEN SATURATION: 99 % | BODY MASS INDEX: 45 KG/M2 | HEIGHT: 66 IN

## 2022-10-06 DIAGNOSIS — M17.11 PRIMARY OSTEOARTHRITIS OF RIGHT KNEE: ICD-10-CM

## 2022-10-06 DIAGNOSIS — L03.116 CELLULITIS OF LEFT LOWER EXTREMITY: Primary | ICD-10-CM

## 2022-10-06 LAB
ALBUMIN SERPL-MCNC: 3.2 G/DL (ref 3.5–5)
ALBUMIN/GLOB SERPL: 0.6 {RATIO} (ref 1.2–3.5)
ALP SERPL-CCNC: 165 U/L (ref 50–130)
ALT SERPL-CCNC: 41 U/L (ref 12–65)
ANION GAP SERPL CALC-SCNC: 5 MMOL/L (ref 4–13)
AST SERPL-CCNC: 39 U/L (ref 15–37)
BASOPHILS # BLD: 0 K/UL (ref 0–0.2)
BASOPHILS NFR BLD: 0 % (ref 0–2)
BILIRUB SERPL-MCNC: 0.5 MG/DL (ref 0.2–1.1)
BUN SERPL-MCNC: 11 MG/DL (ref 6–23)
CALCIUM SERPL-MCNC: 9.5 MG/DL (ref 8.3–10.4)
CHLORIDE SERPL-SCNC: 104 MMOL/L (ref 101–110)
CO2 SERPL-SCNC: 29 MMOL/L (ref 21–32)
CREAT SERPL-MCNC: 0.7 MG/DL (ref 0.6–1)
DIFFERENTIAL METHOD BLD: ABNORMAL
EOSINOPHIL # BLD: 0.3 K/UL (ref 0–0.8)
EOSINOPHIL NFR BLD: 4 % (ref 0.5–7.8)
ERYTHROCYTE [DISTWIDTH] IN BLOOD BY AUTOMATED COUNT: 15.9 % (ref 11.9–14.6)
GLOBULIN SER CALC-MCNC: 5 G/DL (ref 2.3–3.5)
GLUCOSE SERPL-MCNC: 112 MG/DL (ref 65–100)
HCT VFR BLD AUTO: 47.4 % (ref 35.8–46.3)
HGB BLD-MCNC: 15.1 G/DL (ref 11.7–15.4)
IMM GRANULOCYTES # BLD AUTO: 0 K/UL (ref 0–0.5)
IMM GRANULOCYTES NFR BLD AUTO: 0 % (ref 0–5)
LACTATE SERPL-SCNC: 0.7 MMOL/L (ref 0.4–2)
LYMPHOCYTES # BLD: 1.9 K/UL (ref 0.5–4.6)
LYMPHOCYTES NFR BLD: 28 % (ref 13–44)
MCH RBC QN AUTO: 25.9 PG (ref 26.1–32.9)
MCHC RBC AUTO-ENTMCNC: 31.9 G/DL (ref 31.4–35)
MCV RBC AUTO: 81.2 FL (ref 79.6–97.8)
MONOCYTES # BLD: 0.8 K/UL (ref 0.1–1.3)
MONOCYTES NFR BLD: 12 % (ref 4–12)
NEUTS SEG # BLD: 3.8 K/UL (ref 1.7–8.2)
NEUTS SEG NFR BLD: 56 % (ref 43–78)
NRBC # BLD: 0 K/UL (ref 0–0.2)
PLATELET # BLD AUTO: 188 K/UL (ref 150–450)
PMV BLD AUTO: 9.9 FL (ref 9.4–12.3)
POTASSIUM SERPL-SCNC: 3.5 MMOL/L (ref 3.5–5.1)
PROT SERPL-MCNC: 8.2 G/DL (ref 6.3–8.2)
RBC # BLD AUTO: 5.84 M/UL (ref 4.05–5.2)
SODIUM SERPL-SCNC: 138 MMOL/L (ref 136–145)
WBC # BLD AUTO: 6.8 K/UL (ref 4.3–11.1)

## 2022-10-06 PROCEDURE — 85025 COMPLETE CBC W/AUTO DIFF WBC: CPT

## 2022-10-06 PROCEDURE — 93971 EXTREMITY STUDY: CPT

## 2022-10-06 PROCEDURE — 99284 EMERGENCY DEPT VISIT MOD MDM: CPT

## 2022-10-06 PROCEDURE — 96365 THER/PROPH/DIAG IV INF INIT: CPT

## 2022-10-06 PROCEDURE — 80053 COMPREHEN METABOLIC PANEL: CPT

## 2022-10-06 PROCEDURE — 73562 X-RAY EXAM OF KNEE 3: CPT

## 2022-10-06 PROCEDURE — 97161 PT EVAL LOW COMPLEX 20 MIN: CPT

## 2022-10-06 PROCEDURE — 2500000003 HC RX 250 WO HCPCS: Performed by: NURSE PRACTITIONER

## 2022-10-06 PROCEDURE — 83605 ASSAY OF LACTIC ACID: CPT

## 2022-10-06 RX ORDER — CLINDAMYCIN HYDROCHLORIDE 300 MG/1
300 CAPSULE ORAL 4 TIMES DAILY
Qty: 40 CAPSULE | Refills: 0 | Status: SHIPPED | OUTPATIENT
Start: 2022-10-06 | End: 2022-10-16

## 2022-10-06 RX ORDER — CEPHALEXIN 500 MG/1
500 CAPSULE ORAL 4 TIMES DAILY
COMMUNITY
End: 2022-11-03

## 2022-10-06 RX ORDER — CLINDAMYCIN PHOSPHATE 600 MG/50ML
600 INJECTION INTRAVENOUS
Status: COMPLETED | OUTPATIENT
Start: 2022-10-06 | End: 2022-10-06

## 2022-10-06 RX ADMIN — CLINDAMYCIN PHOSPHATE 600 MG: 600 INJECTION, SOLUTION INTRAVENOUS at 14:16

## 2022-10-06 ASSESSMENT — PAIN SCALES - GENERAL
PAINLEVEL_OUTOF10: 7
PAINLEVEL_OUTOF10: 6
PAINLEVEL_OUTOF10: 7
PAINLEVEL_OUTOF10: 4

## 2022-10-06 ASSESSMENT — PAIN DESCRIPTION - LOCATION
LOCATION: LEG
LOCATION: LEG

## 2022-10-06 ASSESSMENT — PAIN DESCRIPTION - DESCRIPTORS
DESCRIPTORS: BURNING
DESCRIPTORS: BURNING

## 2022-10-06 ASSESSMENT — PAIN DESCRIPTION - ONSET: ONSET: ON-GOING

## 2022-10-06 ASSESSMENT — PAIN DESCRIPTION - ORIENTATION
ORIENTATION: LEFT
ORIENTATION: LEFT

## 2022-10-06 ASSESSMENT — PAIN - FUNCTIONAL ASSESSMENT
PAIN_FUNCTIONAL_ASSESSMENT: 0-10
PAIN_FUNCTIONAL_ASSESSMENT: ACTIVITIES ARE NOT PREVENTED

## 2022-10-06 ASSESSMENT — PAIN DESCRIPTION - PAIN TYPE: TYPE: ACUTE PAIN

## 2022-10-06 ASSESSMENT — PAIN DESCRIPTION - FREQUENCY: FREQUENCY: CONTINUOUS

## 2022-10-06 ASSESSMENT — ENCOUNTER SYMPTOMS: SHORTNESS OF BREATH: 0

## 2022-10-06 NOTE — THERAPY EVALUATION
Chris Garzon  : 1965  Primary: [address] [place] [address] Medicaid  Secondary:  [address] [address]  Phone: [phone]  Fax: [phone] Plan Frequency: 1/week x 1 week    Plan of Care/Certification Expiration Date: 10/06/22      PT Visit Info: Total # of Visits to Date: 1      Visit Count:  1    OUTPATIENT PHYSICAL THERAPY:OP NOTE TYPE: Initial Assessment 10/6/2022               Episode  Appt Desk         Treatment Diagnosis:  Generalized Muscle Weakness (M62.81)  Medical/Referring Diagnosis:  Morbid obesity ([facility] [E66.01]  Obesity, Class III, BMI 40-49.9 (morbid obesity) ([facility] [E66.01]  Hx of gastroesophageal reflux (GERD) [Z87.19]  Primary hypertension [I10]  Chronic atrial fibrillation (HCC) [I48.20]  Other cirrhosis of liver ([facility] [K74.69]  Encounter for pre-operative laboratory testing [Z01.812]  Referring Physician:  Karri Curtis MD MD Orders:  PT Eval and Treat   Return MD Appt:  tbd  Date of Onset:  Onset Date: 06     Allergies:  Diltiazem hcl  Restrictions/Precautions:    Restrictions/Precautions: None        Medications Last Reviewed:  10/6/2022  P  SUBJECTIVE   History of Injury/Illness (Reason for Referral):  Pt is preparing for bariatric surgery as she is having problems with dressing and daily activities. She has back pain and leg pain that she is seeing a physical therapist for at [facility]. Pt also has cellulitis in her L ankle and lower leg. She was seen for Urgent Care 2 days ago and is on antibiotics but she states it is getting worse.      Patient Stated Goal(s):  \"be able to walk and dress without difficulty\"  Initial:   7   /10 Post Session:      /10  Past Medical History/Comorbidities:   Ms. Artis Drew  has a past medical history of Arrhythmia, Asthma, Cancer ([facility] Chronic pain, Cirrhosis ([facility] Claustrophobia, Colon polyps, Difficult intubation, GERD (gastroesophageal reflux disease), H/O echocardiogram, H/O: iron deficiency anemia, Heart murmur, Hypertension, Sleep apnea, and Thyroid disease. Ms. Rollen Bloch  has a past surgical history that includes Thyroidectomy (08/28/2020); Colonoscopy (N/A, 7/20/2020); Cholecystectomy, laparoscopic (11/26/2020); Hysterectomy (11/26/2020); Dilation and curettage of uterus (10/2019); ablation of dysrhythmic focus (12/2017); and Tubal ligation (1989). Social History/Living Environment:   Type of Home: Condo  Home Layout: One level  Home Access: Stairs to enter with rails     Prior Level of Function/Work/Activity:   Prior level of function: Independent         Learning:   Does the patient/guardian have any barriers to learning?: No barriers; Reading; Language  Will there be a co-learner?: No  What is the preferred language of the patient/guardian?: English  Is an  required?: No  How does the patient/guardian prefer to learn new concepts?: Listening; Reading; Demonstration     Fall Risk Scale: Clinton Total Score: 0  Alonzo Fall Risk: Low (0-24)           OBJECTIVE   ROM and strength grossly WNL  ASSESSMENT   Initial Assessment:  Independent with HEP for bariatric surgery. Discharge today. Problem List: (Impacting functional limitations): Body Structures, Functions, Activity Limitations Requiring Skilled Therapeutic Intervention: Decreased strength     Therapy Prognosis:   Therapy Prognosis: Good     Assessment Complexity:      PLAN   Effective Dates: 10/6/22 TO Plan of Care/Certification Expiration Date: 10/06/22     Frequency/Duration: Plan Frequency: 1/week x 1 week     Interventions Planned (Treatment may consist of any combination of the following):    Current Treatment Recommendations: Home exercise program     Goals: (Goals have been discussed and agreed upon with patient.)  Short-Term Functional Goals: Time Frame: 1 visit  Independent with HEP MET today. Discharge           Outcome Measure:    Tool Used: Lower Extremity Functional Scale (LEFS)  Score:  Initial: /80 Most Recent: X/80 (Date: -- )   Interpretation of Score: 20 questions each scored on a 5 point scale with 0 representing \"extreme difficulty or unable to perform\" and 4 representing \"no difficulty\". The lower the score, the greater the functional disability. 80/80 represents no disability. Minimal detectable change is 9 points. Medical Necessity:   > Skilled intervention continues to be required due to knowledge deficit exercises post op bariatric surgery. Reason For Services/Other Comments: Total Duration:       Regarding Luciana Rodriguez's therapy, I certify that the treatment plan above will be carried out by a therapist or under their direction.   Thank you for this referral,  Robert Yo, PT     Referring Physician Signature: Xin Brantley* _______________________________ Date _____________        Post Session Pain  Charge Capture  PT Visit Info MD Guidelines  Mariel

## 2022-10-06 NOTE — TELEPHONE ENCOUNTER
Received call from [place]  at [facility] with CAPNIA. Subjective: Caller states \" She got a Shingles shot last Friday. She got redness in the left leg on Monday. We called Tuesday and they offered Virtual and they really can't see much, she felt a hot sensation. I took her to an Urgent Care and they gave her an antibiotic. Yesterday she was crying, she was miserable, another rash started. Her rash was hot, it looked like a cold sore. \"     +Limited Triage - Caller not with Pt- Patient did come on the call at the end of triage and agreed to ER     Current Symptoms:   +Left leg is now swollen   +She is getting a physical at time of triage per  - \"for therapy\" - [address]   + she can barely walk on the leg   +getting red around the back of the leg   +\"it is a lot bigger than the other leg\"   +she sent a picture over to Dr. Marissa Schmitt- she does not know if the picture went through   -no c/o shortness of breath or chest pain   +\"she can't put her foot on the floor - it hurts\"     Onset: 4 days ago; worsening    Associated Symptoms: NA    Pain Severity: pain that goes down the leg per spouse     Temperature: Tuesday - \"she had fever 101.1- yesterday she checked but she didn't say anything\"      What has been tried: tylenol, benadryl     LMP: NA Pregnant: NA    Recommended disposition: Go to ED Now    Care advice provided, patient verbalizes understanding; denies any other questions or concerns; instructed to call back for any new or worsening symptoms. Patient/caller agrees to proceed to [facility]  Emergency Department    Attention Provider: Thank you for allowing me to participate in the care of your patient. The patient was connected to triage in response to information provided to the ECC/PSC. Please do not respond through this encounter as the response is not directed to a shared pool.       Reason for Disposition   [1] Can't walk or can barely walk AND [2] new-onset    Protocols used: Leg Swelling and Edema-ADULT-AH

## 2022-10-06 NOTE — ED NOTES
I have reviewed discharge instructions with the patient and spouse. The patient and spouse verbalized understanding. Patient left ED via Discharge Method: ambulatory to Home with family    Opportunity for questions and clarification provided. Patient given 1 E scripts. To continue your aftercare when you leave the hospital, you may receive an automated call from our care team to check in on how you are doing. This is a free service and part of our promise to provide the best care and service to meet your aftercare needs.  If you have questions, or wish to unsubscribe from this service please call [phone]. Thank you for Choosing our [address] [place] [address] Emergency Department.           Sheri Bravo RN  10/06/22 0269

## 2022-10-06 NOTE — ED PROVIDER NOTES
Emergency Department Provider Note                   PCP:                Rafy Martinez MD               Age: 61 y.o. Sex: female       ICD-10-CM    1. Cellulitis of left lower extremity  L03.116       2. Primary osteoarthritis of right knee  M17.11           DISPOSITION Discharge - Pending Orders Complete 10/06/2022 02:27:35 PM        MDM  Number of Diagnoses or Management Options  Cellulitis of left lower extremity: new, needed workup  Primary osteoarthritis of right knee: new, needed workup  Diagnosis management comments: 79-year-old female presents emergency department today with complaint of worsening cellulitis to the left lower leg despite being on Keflex for 2 days. Patient is not toxic or acutely ill-appearing on exam today. She is afebrile in the emergency department. Work-up today is reassuring. We will switch her to clindamycin and have her return in 48 hours for recheck. I have discussed the results of all labs, procedures, radiographs, and/or treatments with the patient and available family members. Treatment plan is agreed upon by the patient and the patient is ready for discharge. Questions about treatment in the ED and differential diagnosis of presenting condition were answered. Patient was given verbal discharge instructions including, but not limited to, importance of returning to the emergency department for any concern of worsening or continued symptoms. Instructions were given to follow-up with a primary care provider or specialist within 1 to 2 days. Adverse effects of medications, if prescribed, were discussed and the patient was advised to refrain from significant physical activity until followed up by a primary care physician and to not drive or operate heavy machinery after taking any sedating substances.          Amount and/or Complexity of Data Reviewed  Clinical lab tests: ordered and reviewed  Tests in the radiology section of CPT®: ordered and reviewed  Tests in [Non-Contributory] : Non-contributory the medicine section of CPT®: reviewed and ordered  Review and summarize past medical records: yes  Discuss the patient with other providers: yes (Dr. Regi Sierra)  Independent visualization of images, tracings, or specimens: yes    Risk of Complications, Morbidity, and/or Mortality  Presenting problems: moderate  Diagnostic procedures: moderate  Management options: moderate    Patient Progress  Patient progress: improved             Orders Placed This Encounter   Procedures    XR KNEE RIGHT (3 VIEWS)    CBC with Auto Differential    Comprehensive Metabolic Panel    Lactic Acid    Vascular duplex lower extremity venous left        Medications   clindamycin (CLEOCIN) 600 mg in dextrose 5 % 50 mL IVPB (600 mg IntraVENous New Bag 10/6/22 1416)       New Prescriptions    CLINDAMYCIN (CLEOCIN) 300 MG CAPSULE    Take 1 capsule by mouth 4 times daily for 10 days        Romulo Macedo is a 61 y.o. female who presents to the Emergency Department with chief complaint of    Chief Complaint   Patient presents with    Skin Problem      76-year-old female who presents emergency department today with complaint of left lower leg redness, swelling, and pain. Patient states that symptoms began this weekend. She was seen at urgent care on Tuesday morning and was prescribed Keflex for cellulitis. She reports symptoms have progressively worsened. She reports compliance with the Keflex. She reports she has had fever of 101 at home. She reports feeling generally unwell since symptom onset. She denies any nausea, vomiting, diarrhea, chest pain, abdominal pain, shortness of breath. The history is provided by the patient. Review of Systems   Respiratory:  Negative for shortness of breath. Cardiovascular:  Positive for leg swelling. Negative for chest pain. Skin:  Positive for wound. All other systems reviewed and are negative.     Past Medical History:   Diagnosis Date    Arrhythmia 2018    a-fib (2017) ablation-(12/2018)- [Vaginal] : Vaginal resolved    Asthma     seasonal allergies- maint and rescue inhaler    Cancer ([facility] [address]     endometrial cancer- hysterectomy 11/26/20    Chronic pain     back pain - herniated disc    Cirrhosis ([facility] [address]     Claustrophobia     Colon polyps     Difficult intubation     in [place] 2019- glidescope needed with hysterectomy/lap deysi 11/26/19 @ [address]    GERD (gastroesophageal reflux disease)     H/O echocardiogram     Echo LVEF 55% mild mitral and tricuspid regurg    H/O: iron deficiency anemia     Heart murmur 06/2019    Echo LVEF 55% mild mitral and tricuspid regurg    Hypertension     managed with meds    Sleep apnea     not currently using cpap    Thyroid disease     Graves Disease        Past Surgical History:   Procedure Laterality Date    ABLATION OF DYSRHYTHMIC FOCUS  12/2017    CHOLECYSTECTOMY, LAPAROSCOPIC  11/26/2020    COLONOSCOPY N/A 7/20/2020    COLONOSCOPY/ BMI 43 performed by Ariella Rao MD at [address] [place] [address]  10/2019    HYSTERECTOMY (CERVIX STATUS UNKNOWN)  11/26/2020    with lap deysi    THYROIDECTOMY  08/28/2020    TUBAL LIGATION  1989        Family History   Problem Relation Age of Onset    Cancer Paternal Aunt         cervical cancer    Thyroid Disease Son         Hyperthyroidism    Cancer Sister         cervical     Thyroid Disease Daughter         Hypothyroidism    Liver Disease Father     Diabetes Father     Heart Disease Father     Kidney Disease Mother         dialysis    Stroke Mother         Social History     Socioeconomic History    Marital status:      Spouse name: None    Number of children: None    Years of education: None    Highest education level: None   Tobacco Use    Smoking status: Never    Smokeless tobacco: Never   Vaping Use    Vaping Use: Never used   Substance and Sexual Activity    Alcohol use:  Yes     Alcohol/week: 1.0 standard drink    Drug use: Not Currently         Diltiazem hcl     Previous Medications    ALBUTEROL SULFATE  (90 BASE) MCG/ACT INHALER    Inhale 2 puffs into the lungs every 4 hours as needed    AMLODIPINE (NORVASC) 5 MG TABLET    Take 5 mg by mouth daily    BUPROPION (WELLBUTRIN XL) 150 MG EXTENDED RELEASE TABLET    Take 150 mg by mouth    CEPHALEXIN (KEFLEX) 500 MG CAPSULE    Take 500 mg by mouth 4 times daily    CLOBETASOL (TEMOVATE) 0.05 % EXTERNAL SOLUTION    Apply bid    ESOMEPRAZOLE (NEXIUM) 20 MG DELAYED RELEASE CAPSULE    Take one cap daily. FERROUS SULFATE (IRON 325) 325 (65 FE) MG TABLET    Take 325 mg by mouth daily (with breakfast)    LEVOTHYROXINE (SYNTHROID) 150 MCG TABLET    1 tablet once a day except for 1/2 tablet on Sundays    LORAZEPAM (ATIVAN) 1 MG TABLET    Take 1 mg by mouth 2 times daily. NAPROXEN (NAPROSYN) 500 MG TABLET    TAKE ONE TABLET BY MOUTH TWICE A DAY WITH A MEAL    NYSTATIN (MYCOSTATIN) 004152 UNIT/GM CREAM    Apply topically 2 times daily. SERTRALINE (ZOLOFT) 50 MG TABLET    Take 50 mg by mouth daily    TIZANIDINE (ZANAFLEX) 4 MG TABLET    Take 1 tablet by mouth nightly as needed (back pain)    TRAZODONE (DESYREL) 150 MG TABLET    Take 150 mg by mouth    VALSARTAN-HYDROCHLOROTHIAZIDE (DIOVAN-HCT) 320-25 MG PER TABLET    Take 1 tablet by mouth daily    VITAMIN B-12 (CYANOCOBALAMIN) 500 MCG TABLET    Take 500 mcg by mouth daily        Vitals signs and nursing note reviewed. Patient Vitals for the past 4 hrs:   Temp Pulse Resp BP SpO2   10/06/22 1137 98.2 °F (36.8 °C) 70 16 (!) 160/98 96 %          Physical Exam  Vitals and nursing note reviewed. Constitutional:       General: She is not in acute distress. Appearance: Normal appearance. She is well-developed. She is not ill-appearing, toxic-appearing or diaphoretic. HENT:      Head: Normocephalic and atraumatic. Mouth/Throat:      Mouth: Mucous membranes are moist.   Eyes:      General: No scleral icterus. Extraocular Movements: Extraocular movements intact.    Cardiovascular:      Rate and Rhythm: Normal [Normal?] : normal delivery rate.      Pulses:           Dorsalis pedis pulses are 2+ on the right side and 2+ on the left side. Posterior tibial pulses are 2+ on the right side and 2+ on the left side. Heart sounds: Normal heart sounds. Pulmonary:      Effort: Pulmonary effort is normal. No respiratory distress. Breath sounds: Normal breath sounds. Abdominal:      General: Abdomen is flat. Bowel sounds are normal. There is no distension. Palpations: Abdomen is soft. Tenderness: There is no abdominal tenderness. Musculoskeletal:      Right knee: Normal. No swelling, deformity or effusion. Normal range of motion. Left lower leg: Swelling and tenderness present. Left ankle: Normal.      Left foot: Normal.      Comments: Swelling, erythema, and tenderness to the left lower leg. No open areas of skin noted. Patient reports generalized right knee pain. No swelling, erythema, deformity, or effusion noted. Patient exhibits full range of motion of the knee. Skin:     General: Skin is warm and dry. Capillary Refill: Capillary refill takes less than 2 seconds. Neurological:      General: No focal deficit present. Mental Status: She is alert and oriented to person, place, and time. Psychiatric:         Mood and Affect: Mood normal.         Behavior: Behavior normal.    Media Information  Document Information    Dermatology Image:  Dermatology Photo   Left lower leg   10/06/2022 14:03   Attached To: Hospital Encounter on 10/6/22     Source [address] [facility], [facility] Emergency Dept       Procedures    Results for orders placed or performed during the hospital encounter of 10/06/22   XR KNEE RIGHT (3 VIEWS)    Narrative    EXAMINATION: XR KNEE RIGHT (3 VIEWS) 10/6/2022 1:35 PM     COMPARISON: None available. INDICATION: right knee pain    TECHNIQUE: 3 views of the right knee were obtained    FINDINGS:    No evidence of fracture or malalignment.  Tricompartmental marginal osteophytes  with joint space narrowing in the medial and patellofemoral compartments. No  joint effusion. Osteopenia. Normal soft tissues. Impression    1. No acute osseous abnormality. 2. Tricompartment osteoarthrosis, moderate in the medial and patellofemoral  compartments.              CBC with Auto Differential   Result Value Ref Range    WBC 6.8 4.3 - 11.1 K/uL    RBC 5.84 (H) 4.05 - 5.2 M/uL    Hemoglobin 15.1 11.7 - 15.4 g/dL    Hematocrit 47.4 (H) 35.8 - 46.3 %    MCV 81.2 79.6 - 97.8 FL    MCH 25.9 (L) 26.1 - 32.9 PG    MCHC 31.9 31.4 - 35.0 g/dL    RDW 15.9 (H) 11.9 - 14.6 %    Platelets 786 930 - 580 K/uL    MPV 9.9 9.4 - 12.3 FL    nRBC 0.00 0.0 - 0.2 K/uL    Differential Type AUTOMATED      Seg Neutrophils 56 43 - 78 %    Lymphocytes 28 13 - 44 %    Monocytes 12 4.0 - 12.0 %    Eosinophils % 4 0.5 - 7.8 %    Basophils 0 0.0 - 2.0 %    Immature Granulocytes 0 0.0 - 5.0 %    Segs Absolute 3.8 1.7 - 8.2 K/UL    Absolute Lymph # 1.9 0.5 - 4.6 K/UL    Absolute Mono # 0.8 0.1 - 1.3 K/UL    Absolute Eos # 0.3 0.0 - 0.8 K/UL    Basophils Absolute 0.0 0.0 - 0.2 K/UL    Absolute Immature Granulocyte 0.0 0.0 - 0.5 K/UL   Comprehensive Metabolic Panel   Result Value Ref Range    Sodium 138 136 - 145 mmol/L    Potassium 3.5 3.5 - 5.1 mmol/L    Chloride 104 101 - 110 mmol/L    CO2 29 21 - 32 mmol/L    Anion Gap 5 4 - 13 mmol/L    Glucose 112 (H) 65 - 100 mg/dL    BUN 11 6 - 23 MG/DL    Creatinine 0.70 0.6 - 1.0 MG/DL    Est, Glom Filt Rate >60 >60 ml/min/1.73m2    Calcium 9.5 8.3 - 10.4 MG/DL    Total Bilirubin 0.5 0.2 - 1.1 MG/DL    ALT 41 12 - 65 U/L    AST 39 (H) 15 - 37 U/L    Alk Phosphatase 165 (H) 50 - 130 U/L    Total Protein 8.2 6.3 - 8.2 g/dL    Albumin 3.2 (L) 3.5 - 5.0 g/dL    Globulin 5.0 (H) 2.3 - 3.5 g/dL    Albumin/Globulin Ratio 0.6 (L) 1.2 - 3.5     Lactic Acid   Result Value Ref Range    Lactic Acid, Plasma 0.7 0.4 - 2.0 MMOL/L   Vascular duplex lower extremity venous left Narrative    Exam: Left lower extremity venous ultrasound  Indication:  Pain and swelling. Comparison: None. Doppler ultrasound of the left lower extremity was performed. FINDINGS:  There is normal flow in the common femoral, superficial femoral,  profunda femoral, and popliteal veins. Normal flow in the greater saphenous  vein. Normal compression and augmentation is demonstrated. The proximal calf  veins are also patent. Impression    1. No evidence of deep venous thrombosis in the left lower extremity. XR KNEE RIGHT (3 VIEWS)   Final Result      1. No acute osseous abnormality. 2. Tricompartment osteoarthrosis, moderate in the medial and patellofemoral   compartments. Vascular duplex lower extremity venous left   Final Result   1. No evidence of deep venous thrombosis in the left lower extremity. Voice dictation software was used during the making of this note. This software is not perfect and grammatical and other typographical errors may be present. This note has not been completely proofread for errors.        Luis Angel Chamorro, APRN - [address]  10/06/22 [id number]

## 2022-10-06 NOTE — ED TRIAGE NOTES
Patient  seen on Monday at urgent care for left lower leg redness and pain, prescribed Keflex. Patient reports redness and pain increasing despite antibiotics.

## 2022-10-06 NOTE — DISCHARGE INSTRUCTIONS
Discontinue Keflex. Begin taking clindamycin. As we discussed, you need to have a recheck in 48 hours to ensure improvement of symptoms. Return to the emergency department for any new, worsening, or concerning symptoms. Follow-up with orthopedics regarding the arthritis in your knee.

## 2022-10-08 ENCOUNTER — HOSPITAL ENCOUNTER (EMERGENCY)
Dept: GENERAL RADIOLOGY | Age: 57
Discharge: HOME | End: 2022-10-11
Payer: MEDICAID

## 2022-10-08 ENCOUNTER — HOSPITAL ENCOUNTER (EMERGENCY)
Age: 57
Discharge: HOME | End: 2022-10-08
Payer: MEDICAID

## 2022-10-08 VITALS
SYSTOLIC BLOOD PRESSURE: 153 MMHG | HEART RATE: 70 BPM | TEMPERATURE: 98.2 F | WEIGHT: 280 LBS | BODY MASS INDEX: 45 KG/M2 | OXYGEN SATURATION: 95 % | DIASTOLIC BLOOD PRESSURE: 84 MMHG | HEIGHT: 66 IN | RESPIRATION RATE: 16 BRPM

## 2022-10-08 DIAGNOSIS — L03.116 CELLULITIS OF LEFT LOWER EXTREMITY: Primary | ICD-10-CM

## 2022-10-08 PROCEDURE — 99283 EMERGENCY DEPT VISIT LOW MDM: CPT

## 2022-10-08 PROCEDURE — 73590 X-RAY EXAM OF LOWER LEG: CPT

## 2022-10-08 ASSESSMENT — PAIN - FUNCTIONAL ASSESSMENT: PAIN_FUNCTIONAL_ASSESSMENT: 0-10

## 2022-10-08 ASSESSMENT — PAIN DESCRIPTION - LOCATION: LOCATION: LEG

## 2022-10-08 ASSESSMENT — PAIN SCALES - GENERAL: PAINLEVEL_OUTOF10: 6

## 2022-10-08 ASSESSMENT — PAIN DESCRIPTION - ORIENTATION: ORIENTATION: LOWER;LEFT

## 2022-10-08 NOTE — ED NOTES
Patient recently here for cellulitis and was told to return today for recheck. Patient advises improvement to area after starting antibiotics.       Tosha Wheeler RN  10/08/22 [id number]

## 2022-10-08 NOTE — ED PROVIDER NOTES
Emergency Department Provider Note                   PCP:                Manuela Marroquin MD               Age: 61 y.o. Sex: female       ICD-10-CM    1. Cellulitis of left lower extremity  L03. [address] To Discharge 10/08/2022 01:15:53 PM        MDM  Number of Diagnoses or Management Options  Cellulitis of left lower extremity  Diagnosis management comments: In summary this is a patient presenting with improving cellulitis. Based on my evaluation I feel the patient is at low risk for alternative diagnoses such as lymphangitis, abscess, sepsis, necrotizing infection. The reasoning behind my medical decision making process is that the patient is grossly well-appearing on exam and in no acute distress. Vitals are stable without fever, tachycardia, tachypnea, hypoxia, hypotension. Patient does not meet SIRS or qSOFA criteria for sepsis at this time. X ray did not reveal soft tissue gas. No signs of pain out of proportion, necrosis, or subcutaneous emphysema. There is no fluctuance or purulence to suggest abscess. There is no lymphangitic streaking. No skin sloughing or mucous membrane involvement to suggest SJS. Compared to the picture from 2 days ago, rash does seem to be improving. The plan for this patient is outpatient management. The patient will continue current antibiotics. I have advised her to follow-up in 2 days for reassessment. I specifically counseled the patient warning signs that they return immediately for including but not limited to dyspnea, intractable fever, worsening symptoms. The patient has verbalized understanding and is in agreement with the treatment plan. All concerns addressed.          Amount and/or Complexity of Data Reviewed  Tests in the radiology section of CPT®: reviewed and ordered  Independent visualization of images, tracings, or specimens: yes    Risk of Complications, Morbidity, and/or Mortality  Presenting problems: low  Diagnostic procedures: low  Management options: low    Patient Progress  Patient progress: stable             Orders Placed This Encounter   Procedures    XR TIBIA FIBULA LEFT (2 VIEWS)        Medications - No data to display    Discharge Medication List as of 10/8/2022  1:31 PM           Saadia Babb is a 61 y.o. female who presents to the Emergency Department with chief complaint of    Chief Complaint   Patient presents with    Wound Check      69-year-old female patient presents today for follow-up of cellulitis on the lower left leg. She was initially seen in an urgent care few days ago and placed on Keflex. It was worsening so she came here 2 days ago and was switched to clindamycin. She states she feels like it is improving but was told to come back for recheck. She states there is still some rash and warmth that it all overall seems to be getting better. She denies any more fevers. Denies lymphangitic streaking, purulence, drainage. Denies any new rashes. Patient is primary historian and quality is reliable. The history is provided by the patient. No  was used.        Review of Systems    Past Medical History:   Diagnosis Date    Arrhythmia 2018    a-fib (2017) ablation-(12/2018)- resolved    Asthma     seasonal allergies- maint and rescue inhaler    Cancer ([address]     endometrial cancer- hysterectomy 11/26/20    Chronic pain     back pain - herniated disc    Cirrhosis ([address]     Claustrophobia     Colon polyps     Difficult intubation     in [place] 2019- glidescope needed with hysterectomy/lap deysi 11/26/19 @ [facility]    GERD (gastroesophageal reflux disease)     H/O echocardiogram     Echo LVEF 55% mild mitral and tricuspid regurg    H/O: iron deficiency anemia     Heart murmur 06/2019    Echo LVEF 55% mild mitral and tricuspid regurg    Hypertension     managed with meds    Sleep apnea     not currently using cpap    Thyroid disease     Graves Disease        Past Surgical History: Procedure Laterality Date    ABLATION OF DYSRHYTHMIC FOCUS  12/2017    CHOLECYSTECTOMY, LAPAROSCOPIC  11/26/2020    COLONOSCOPY N/A 7/20/2020    COLONOSCOPY/ BMI 43 performed by Lazarus Freer, MD at [facility]  10/2019    HYSTERECTOMY (CERVIX STATUS UNKNOWN)  11/26/2020    with lap deysi    THYROIDECTOMY  08/28/2020    TUBAL LIGATION  1989        Family History   Problem Relation Age of Onset    Cancer Paternal Aunt         cervical cancer    Thyroid Disease Son         Hyperthyroidism    Cancer Sister         cervical     Thyroid Disease Daughter         Hypothyroidism    Liver Disease Father     Diabetes Father     Heart Disease Father     Kidney Disease Mother         dialysis    Stroke Mother         Social History     Socioeconomic History    Marital status:      Spouse name: None    Number of children: None    Years of education: None    Highest education level: None   Tobacco Use    Smoking status: Never    Smokeless tobacco: Never   Vaping Use    Vaping Use: Never used   Substance and Sexual Activity    Alcohol use: Yes     Alcohol/week: 1.0 standard drink    Drug use: Not Currently         Diltiazem hcl     Discharge Medication List as of 10/8/2022  1:31 PM        CONTINUE these medications which have NOT CHANGED    Details   cephALEXin (KEFLEX) 500 MG capsule Take 500 mg by mouth 4 times dailyHistorical Med      clindamycin (CLEOCIN) 300 MG capsule Take 1 capsule by mouth 4 times daily for 10 days, Disp-40 capsule, R-0Normal      LORazepam (ATIVAN) 1 MG tablet Take 1 mg by mouth 2 times daily. Historical Med      ferrous sulfate (IRON 325) 325 (65 Fe) MG tablet Take 325 mg by mouth daily (with breakfast)Historical Med      vitamin B-12 (CYANOCOBALAMIN) 500 MCG tablet Take 500 mcg by mouth dailyHistorical Med      tiZANidine (ZANAFLEX) 4 MG tablet Take 1 tablet by mouth nightly as needed (back pain), Disp-10 tablet, R-0Normal      naproxen (NAPROSYN) 500 MG tablet TAKE ONE TABLET BY MOUTH TWICE A DAY WITH A MEAL, Disp-60 tablet, R-3Normal      nystatin (MYCOSTATIN) 368496 UNIT/GM cream Apply topically 2 times daily. , Disp-30 g, R-1, Normal      albuterol sulfate  (90 Base) MCG/ACT inhaler Inhale 2 puffs into the lungs every 4 hours as neededHistorical Med      amLODIPine (NORVASC) 5 MG tablet Take 5 mg by mouth dailyHistorical Med      buPROPion (WELLBUTRIN XL) 150 MG extended release tablet Take 150 mg by mouthHistorical Med      clobetasol (TEMOVATE) 0.05 % external solution Apply bid, Historical Med      esomeprazole (NEXIUM) 20 MG delayed release capsule Take one cap daily. Historical Med      levothyroxine (SYNTHROID) 150 MCG tablet 1 tablet once a day except for 1/2 tablet on SundaysHistorical Med      sertraline (ZOLOFT) 50 MG tablet Take 50 mg by mouth dailyHistorical Med      traZODone (DESYREL) 150 MG tablet Take 150 mg by mouthHistorical Med      valsartan-hydroCHLOROthiazide (DIOVAN-HCT) 320-25 MG per tablet Take 1 tablet by mouth dailyHistorical Med              Vitals signs and nursing note reviewed. Patient Vitals for the past 4 hrs:   Temp Pulse Resp BP SpO2   10/08/22 1213 98.2 °F (36.8 °C) 70 16 (!) 153/84 95 %          Physical Exam  Vitals and nursing note reviewed. Constitutional:       General: She is not in acute distress. Appearance: Normal appearance. She is obese. She is not ill-appearing, toxic-appearing or diaphoretic. Comments: Pleasant and cooperative. No acute distress. HENT:      Head: Normocephalic and atraumatic. Right Ear: Tympanic membrane, ear canal and external ear normal.      Left Ear: Tympanic membrane, ear canal and external ear normal.      Nose: Nose normal.      Mouth/Throat:      Mouth: Mucous membranes are moist.   Eyes:      General: No scleral icterus. Right eye: No discharge. Left eye: No discharge.       Conjunctiva/sclera: Conjunctivae normal.   Cardiovascular:      Rate and Rhythm: Normal rate and regular rhythm. Pulses: Normal pulses. Heart sounds: Normal heart sounds. Pulmonary:      Effort: Pulmonary effort is normal. No respiratory distress. Breath sounds: Normal breath sounds. No stridor. No wheezing, rhonchi or rales. Abdominal:      General: Abdomen is flat. Bowel sounds are normal.      Palpations: Abdomen is soft. Tenderness: There is no abdominal tenderness. There is no guarding or rebound. Musculoskeletal:         General: Normal range of motion. Cervical back: Normal range of motion and neck supple. No rigidity or tenderness. Right lower leg: No edema. Left lower leg: No edema. Lymphadenopathy:      Cervical: No cervical adenopathy. Skin:     General: Skin is warm and dry. Capillary Refill: Capillary refill takes less than 2 seconds. Coloration: Skin is not jaundiced. Findings: Erythema, lesion and rash present. Comments: Skin lesion as shown in photo to left anterior tibia. .  Erythematous. Warm to touch. Tender to palpation. Appears to be improving since last visit based on compared to photos. No weeping or drainage. No other lesions noted. Neurological:      General: No focal deficit present. Mental Status: She is alert and oriented to person, place, and time. Mental status is at baseline. Psychiatric:         Mood and Affect: Mood normal.         Behavior: Behavior normal.         Thought Content: Thought content normal.         Judgment: Judgment normal.          Procedures    Results for orders placed or performed during the hospital encounter of 10/08/22   XR TIBIA FIBULA LEFT (2 VIEWS)    Narrative    Left Tib/fib    INDICATION:Leg pain and swelling, cellulitis    AP and lateral views of the left tibia and fibula were obtained. FINDINGS: There is no evidence of fracture or other acute bony abnormality in  the left lower leg. The tibia and fibula are intact.        Impression    Negative left lower leg        XR TIBIA FIBULA LEFT (2 VIEWS)   Final Result   Negative left lower leg                          Voice dictation software was used during the making of this note. This software is not perfect and grammatical and other typographical errors may be present. This note has not been completely proofread for errors.      Mickey Kwan [facility]  10/08/22 1825

## 2022-10-08 NOTE — DISCHARGE INSTRUCTIONS
Your x-ray was normal.  Continue taking the clindamycin. Even if it completely goes away, take the clindamycin for the full course.   Please follow-up in 2 days so we may recheck you

## 2022-10-10 LAB
COTININE SERPL-MCNC: <1 NG/ML
NICOTINE SERPL-MCNC: <1 NG/ML

## 2022-10-11 NOTE — PROGRESS NOTES
Wilbert Hall  : 1965  Primary: [address] Medicaid  Secondary:  [address] @ [facility]  [address] [place] [address]  Phone: [phone]  Fax: [phone]       10/11/2022      Called and spoke to patient regarding appointments. She has recently had an episode of Cellulitis with her Lt LE. Notes it is slowly getting better however still has swelling and numbness/tingling.  Advised to call the office when symptoms subside and is ready to resume PT.       Bernabe Lewis, PT

## 2022-10-18 DIAGNOSIS — M54.41 RIGHT-SIDED LOW BACK PAIN WITH RIGHT-SIDED SCIATICA, UNSPECIFIED CHRONICITY: ICD-10-CM

## 2022-10-18 RX ORDER — TIZANIDINE 4 MG/1
TABLET ORAL
Qty: 10 TABLET | Refills: 0 | OUTPATIENT
Start: 2022-10-18

## 2022-10-24 ENCOUNTER — OFFICE VISIT (OUTPATIENT)
Dept: ONCOLOGY | Age: 57
End: 2022-10-24
Payer: MEDICAID

## 2022-10-24 ENCOUNTER — HOSPITAL ENCOUNTER (OUTPATIENT)
Dept: LAB | Age: 57
Discharge: HOME | End: 2022-10-27
Payer: MEDICAID

## 2022-10-24 VITALS
WEIGHT: 287.8 LBS | OXYGEN SATURATION: 96 % | HEART RATE: 67 BPM | HEIGHT: 66 IN | SYSTOLIC BLOOD PRESSURE: 152 MMHG | DIASTOLIC BLOOD PRESSURE: 91 MMHG | RESPIRATION RATE: 16 BRPM | TEMPERATURE: 98.7 F | BODY MASS INDEX: 46.25 KG/M2

## 2022-10-24 DIAGNOSIS — D64.9 ANEMIA, UNSPECIFIED TYPE: ICD-10-CM

## 2022-10-24 DIAGNOSIS — D64.9 ANEMIA, UNSPECIFIED TYPE: Primary | ICD-10-CM

## 2022-10-24 LAB
ALBUMIN SERPL-MCNC: 3.4 G/DL (ref 3.5–5)
ALBUMIN/GLOB SERPL: 0.7 {RATIO} (ref 0.4–1.6)
ALP SERPL-CCNC: 159 U/L (ref 50–136)
ALT SERPL-CCNC: 58 U/L (ref 12–65)
ANION GAP SERPL CALC-SCNC: 6 MMOL/L (ref 2–11)
AST SERPL-CCNC: 60 U/L (ref 15–37)
BASOPHILS # BLD: 0 K/UL (ref 0–0.2)
BASOPHILS NFR BLD: 1 % (ref 0–2)
BILIRUB SERPL-MCNC: 0.6 MG/DL (ref 0.2–1.1)
BUN SERPL-MCNC: 12 MG/DL (ref 6–23)
CALCIUM SERPL-MCNC: 9.6 MG/DL (ref 8.3–10.4)
CHLORIDE SERPL-SCNC: 101 MMOL/L (ref 101–110)
CO2 SERPL-SCNC: 31 MMOL/L (ref 21–32)
CREAT SERPL-MCNC: 0.8 MG/DL (ref 0.6–1)
DIFFERENTIAL METHOD BLD: ABNORMAL
EOSINOPHIL # BLD: 0.4 K/UL (ref 0–0.8)
EOSINOPHIL NFR BLD: 6 % (ref 0.5–7.8)
ERYTHROCYTE [DISTWIDTH] IN BLOOD BY AUTOMATED COUNT: 15.5 % (ref 11.9–14.6)
ERYTHROCYTE [SEDIMENTATION RATE] IN BLOOD: 31 MM/HR (ref 0–30)
FERRITIN SERPL-MCNC: 116 NG/ML (ref 8–388)
FOLATE SERPL-MCNC: 22 NG/ML (ref 3.1–17.5)
GLOBULIN SER CALC-MCNC: 5.2 G/DL (ref 2.8–4.5)
GLUCOSE SERPL-MCNC: 102 MG/DL (ref 65–100)
HCT VFR BLD AUTO: 49.7 % (ref 35.8–46.3)
HGB BLD-MCNC: 15.5 G/DL (ref 11.7–15.4)
HGB RETIC QN AUTO: 33 PG (ref 29–35)
IMM GRANULOCYTES # BLD AUTO: 0 K/UL (ref 0–0.5)
IMM GRANULOCYTES NFR BLD AUTO: 0 % (ref 0–5)
IMM RETICS NFR: 8.4 % (ref 3–15.9)
IRON SATN MFR SERPL: 29 %
IRON SERPL-MCNC: 95 UG/DL (ref 35–150)
LYMPHOCYTES # BLD: 2 K/UL (ref 0.5–4.6)
LYMPHOCYTES NFR BLD: 31 % (ref 13–44)
MCH RBC QN AUTO: 25.9 PG (ref 26.1–32.9)
MCHC RBC AUTO-ENTMCNC: 31.2 G/DL (ref 31.4–35)
MCV RBC AUTO: 83 FL (ref 82–102)
MONOCYTES # BLD: 0.6 K/UL (ref 0.1–1.3)
MONOCYTES NFR BLD: 9 % (ref 4–12)
NEUTS SEG # BLD: 3.3 K/UL (ref 1.7–8.2)
NEUTS SEG NFR BLD: 53 % (ref 43–78)
NRBC # BLD: 0 K/UL (ref 0–0.2)
PLATELET # BLD AUTO: 188 K/UL (ref 150–450)
PMV BLD AUTO: 10.2 FL (ref 9.4–12.3)
POTASSIUM SERPL-SCNC: 4.2 MMOL/L (ref 3.5–5.1)
PROT SERPL-MCNC: 8.6 G/DL (ref 6.3–8.2)
RBC # BLD AUTO: 5.99 M/UL (ref 4.05–5.2)
RETICS # AUTO: 0.1 M/UL (ref 0.03–0.1)
RETICS/RBC NFR AUTO: 1.7 % (ref 0.3–2)
SODIUM SERPL-SCNC: 138 MMOL/L (ref 133–143)
TIBC SERPL-MCNC: 326 UG/DL (ref 250–450)
VIT B12 SERPL-MCNC: 2209 PG/ML (ref 193–986)
WBC # BLD AUTO: 6.3 K/UL (ref 4.3–11.1)

## 2022-10-24 PROCEDURE — 83921 ORGANIC ACID SINGLE QUANT: CPT

## 2022-10-24 PROCEDURE — 83090 ASSAY OF HOMOCYSTEINE: CPT

## 2022-10-24 PROCEDURE — 36415 COLL VENOUS BLD VENIPUNCTURE: CPT

## 2022-10-24 PROCEDURE — 99215 OFFICE O/P EST HI 40 MIN: CPT | Performed by: INTERNAL MEDICINE

## 2022-10-24 PROCEDURE — 85025 COMPLETE CBC W/AUTO DIFF WBC: CPT

## 2022-10-24 PROCEDURE — 83540 ASSAY OF IRON: CPT

## 2022-10-24 PROCEDURE — 82607 VITAMIN B-12: CPT

## 2022-10-24 PROCEDURE — 80053 COMPREHEN METABOLIC PANEL: CPT

## 2022-10-24 PROCEDURE — 82728 ASSAY OF FERRITIN: CPT

## 2022-10-24 PROCEDURE — 82746 ASSAY OF FOLIC ACID SERUM: CPT

## 2022-10-24 PROCEDURE — 85046 RETICYTE/HGB CONCENTRATE: CPT

## 2022-10-24 PROCEDURE — 3074F SYST BP LT 130 MM HG: CPT | Performed by: INTERNAL MEDICINE

## 2022-10-24 PROCEDURE — 85652 RBC SED RATE AUTOMATED: CPT

## 2022-10-24 PROCEDURE — 3078F DIAST BP <80 MM HG: CPT | Performed by: INTERNAL MEDICINE

## 2022-10-24 RX ORDER — TRAMADOL HYDROCHLORIDE 50 MG/1
TABLET ORAL
COMMUNITY
Start: 2022-09-25 | End: 2022-10-27 | Stop reason: SDUPTHER

## 2022-10-24 ASSESSMENT — PATIENT HEALTH QUESTIONNAIRE - PHQ9
SUM OF ALL RESPONSES TO PHQ QUESTIONS 1-9: 0
SUM OF ALL RESPONSES TO PHQ QUESTIONS 1-9: 0
1. LITTLE INTEREST OR PLEASURE IN DOING THINGS: 0
SUM OF ALL RESPONSES TO PHQ QUESTIONS 1-9: 0
SUM OF ALL RESPONSES TO PHQ9 QUESTIONS 1 & 2: 0
2. FEELING DOWN, DEPRESSED OR HOPELESS: 0
SUM OF ALL RESPONSES TO PHQ QUESTIONS 1-9: 0

## 2022-10-24 NOTE — PATIENT INSTRUCTIONS
Patient Instructions from Today's Visit    Reason for Visit:  New patient appointment    Plan: You will have lab work today    If your lab work shows that you are iron deficient, we will set you up for two IV iron infusions, one week apart. Most people become iron deficient from blood loss, either from the GI tract or vaginally. We want you to see a GI doctor and a gynecologist so you can get these areas checked out to make sure you are not bleeding. Follow Up: Follow up in 2 months    Recent Lab Results:      Treatment Summary has been discussed and given to patient: n/a        -------------------------------------------------------------------------------------------------------------------  Please call our office at [phone] if you have any  of the following symptoms:   Fever of 100.5 or greater  Chills  Shortness of breath  Swelling or pain in one leg    After office hours an answering service is available and will contact a provider for emergencies or if you are experiencing any of the above symptoms. Patient did express an interest in My Chart. My Chart log in information explained on the after visit summary printout at the [facility] [employer] desk.     Trav Hernandez RN

## 2022-10-24 NOTE — PROGRESS NOTES
Outpatient Consult Note    Data Source: Patient, [facility] record. 10/25/2022  6:41 PM      Mauricio Larose [id number]    61 y.o. Patient Encounter: [facility] Note    Reason for consult:  Anemia    HPI:  59-year-old  female, on disability, history of cirrhosis (mentioned in 2019 at time of cholecystectomy), iron deficiency anemia requiring PRBC and IV iron infusions in past, Afib s/p cardiac ablation (2018, not felt to need a/c), asthma, chronic lower back pain (on regular NSAID use), GERD, hypertension, sleep apnea, Graves' disease, endometrial cancer status post hysterectomy and now follows with Dr. Felicitas Castañeda for surveillance, cholecystectomy, D&C uterus, thyroidectomy, now referred to us by primary care for Dr. Jeronimo Rangel for iron deficiency anemia. She reported history of being hospitalized in 6/22 in [place] for anemia, requiring 2 units PRBC transfusion as well as multiple IV iron infusions (~ 6). She reports having EGD/colonoscopy which clear evidence of bleeding. She has also been referred to GI given reported history of cirrhosis. She denies any bleeding per rectum. Has noticed dark stool but attributes this to PO iron supplementation with some occasional constipation requiring OTC medications.      Past Medical History:   Diagnosis Date    Arrhythmia 2018    a-fib (2017) ablation-(12/2018)- resolved    Asthma     seasonal allergies- maint and rescue inhaler    Cancer ([address]     endometrial cancer- hysterectomy 11/26/20    Chronic pain     back pain - herniated disc    Cirrhosis ([address]     Claustrophobia     Colon polyps     Difficult intubation     in [place] 2019- glidescope needed with hysterectomy/lap deysi 11/26/19 @ [facility]    GERD (gastroesophageal reflux disease)     H/O echocardiogram     Echo LVEF 55% mild mitral and tricuspid regurg    H/O: iron deficiency anemia     Heart murmur 06/2019    Echo LVEF 55% mild mitral and tricuspid regurg    Hypertension managed with meds    Sleep apnea     not currently using cpap    Thyroid disease     Graves Disease         Past Surgical History:   Procedure Laterality Date    ABLATION OF DYSRHYTHMIC FOCUS  12/2017    CHOLECYSTECTOMY, LAPAROSCOPIC  11/26/2020    COLONOSCOPY N/A 7/20/2020    COLONOSCOPY/ BMI 43 performed by Vianney Camarillo MD at [facility]  10/2019    HYSTERECTOMY (CERVIX STATUS UNKNOWN)  11/26/2020    with lap deysi    THYROIDECTOMY  08/28/2020    TUBAL LIGATION  1989         Current Outpatient Medications   Medication Sig Dispense Refill    traMADol (ULTRAM) 50 MG tablet       LORazepam (ATIVAN) 1 MG tablet Take 1 mg by mouth 2 times daily. ferrous sulfate (IRON 325) 325 (65 Fe) MG tablet Take 325 mg by mouth daily (with breakfast)      vitamin B-12 (CYANOCOBALAMIN) 500 MCG tablet Take 500 mcg by mouth daily      tiZANidine (ZANAFLEX) 4 MG tablet Take 1 tablet by mouth nightly as needed (back pain) 10 tablet 0    nystatin (MYCOSTATIN) 312025 UNIT/GM cream Apply topically 2 times daily. 30 g 1    albuterol sulfate  (90 Base) MCG/ACT inhaler Inhale 2 puffs into the lungs every 4 hours as needed      amLODIPine (NORVASC) 5 MG tablet Take 5 mg by mouth daily      buPROPion (WELLBUTRIN XL) 150 MG extended release tablet Take 150 mg by mouth      clobetasol (TEMOVATE) 0.05 % external solution Apply bid      esomeprazole (NEXIUM) 20 MG delayed release capsule Take one cap daily.       levothyroxine (SYNTHROID) 150 MCG tablet 1 tablet once a day except for 1/2 tablet on Sundays      sertraline (ZOLOFT) 50 MG tablet Take 50 mg by mouth daily      traZODone (DESYREL) 150 MG tablet Take 150 mg by mouth      cephALEXin (KEFLEX) 500 MG capsule Take 500 mg by mouth 4 times daily (Patient not taking: Reported on 10/24/2022)      naproxen (NAPROSYN) 500 MG tablet TAKE ONE TABLET BY MOUTH TWICE A DAY WITH A MEAL (Patient not taking: Reported on 10/5/2022) 60 tablet 3 valsartan-hydroCHLOROthiazide (DIOVAN-HCT) 320-25 MG per tablet Take 1 tablet by mouth daily (Patient not taking: Reported on 7/15/2022)       Current Facility-Administered Medications   Medication Dose Route Frequency Provider Last Rate Last Admin    methylPREDNISolone sodium (SOLU-MEDROL) injection 40 mg  40 mg IntraMUSCular Daily TOMAS Isabel - CNP             Social History     Socioeconomic History    Marital status:      Spouse name: None    Number of children: None    Years of education: None    Highest education level: None   Tobacco Use    Smoking status: Never    Smokeless tobacco: Never   Vaping Use    Vaping Use: Never used   Substance and Sexual Activity    Alcohol use: Yes     Alcohol/week: 1.0 standard drink    Drug use: Not Currently         Family History   Problem Relation Age of Onset    Cancer Paternal Aunt         cervical cancer    Thyroid Disease Son         Hyperthyroidism    Cancer Sister         cervical     Thyroid Disease Daughter         Hypothyroidism    Liver Disease Father     Diabetes Father     Heart Disease Father     Kidney Disease Mother         dialysis    Stroke Mother          REVIEW OF SYSTEMS:  As mentioned above, all other systems were reviewed in full and are negative. Allergies   Allergen Reactions    Diltiazem Hcl Other (See Comments)     Blistering            PHYSICAL EXAMINATION:  General Appearance: Healthy appearing patient in no acute distress  Vitals were reviewed. BP (!) 152/91   Pulse 67   Temp 98.7 °F (37.1 °C)   Resp 16   Ht 5' 6\" (1.676 m)   Wt 287 lb 12.8 oz (130.5 kg)   SpO2 96%   BMI 46.45 kg/m²   HEENT: No oral or pharyngeal masses, ulceration or thrush noted, no sinus tenderness. Neck is supple with no thyromegaly or JVD noted. Lymph Nodes: No lymphadenopathy noted in the occipital, pre and post auricular, cervical, supra and infraclavicular, axillary, epitrochlear, inguinal, and popliteal region.   Breasts: No palpable masses, nipple discharge or skin retraction  Lungs/Thorax: Clear to auscultation, no accessory muscles of respiration being used. Heart: Regular rate and rhythm, normal S1, S2, no appreciable murmurs, rubs, gallops  Abdomen: Soft, nontender, bowel sounds present, no appreciable hepatosplenomegaly, no palpable masses  Extremeties: Good pulses bilaterally, no peripheral edema. Skin: Normal skin tone with no rash, petechiae, ecchymosis noted. Musculoskeletal: No pain on palpation over bony prominence, no edema, no evidence of gout, no joint or bony deformity  Neurologic: Grossly intact    LABS/IMAGING:    Lab Results   Component Value Date/Time    WBC 6.3 10/24/2022 12:44 PM    HGB 15.5 10/24/2022 12:44 PM    HCT 49.7 10/24/2022 12:44 PM     10/24/2022 12:44 PM    MCV 83.0 10/24/2022 12:44 PM       Lab Results   Component Value Date/Time     10/24/2022 12:44 PM    K 4.2 10/24/2022 12:44 PM     10/24/2022 12:44 PM    CO2 31 10/24/2022 12:44 PM    BUN 12 10/24/2022 12:44 PM    GFRAA >60 07/12/2022 11:03 AM    GLOB 5.2 10/24/2022 12:44 PM    ALT 58 10/24/2022 12:44 PM           Above results reviewed with patient     ASSESSMENT:  26-year-old  female, on disability, history of cirrhosis (mentioned in 2019 at time of cholecystectomy), iron deficiency anemia requiring PRBC and IV iron infusions in past, Afib s/p cardiac ablation (2018, not felt to need a/c), asthma, chronic lower back pain (on regular NSAID use), GERD, hypertension, sleep apnea, Graves' disease, endometrial cancer status post hysterectomy and now follows with Dr. Elder Cuello for surveillance, cholecystectomy, D&C uterus, thyroidectomy, now referred to us by primary care for Dr. Duke Payne for iron deficiency anemia. She reported history of being hospitalized in 6/22 in [place] for anemia, requiring 2 units PRBC transfusion as well as multiple IV iron infusions (~ 6). She reports having EGD/colonoscopy which clear evidence of bleeding. She has also been referred to GI given reported history of cirrhosis. She denies any bleeding per rectum. Has noticed dark stool but attributes this to PO iron supplementation with some occasional constipation requiring OTC medications. We will w/u and manage as below:    PLAN:  - Routine labs, iron panel  - ESR  - B12, folate, MMA, HC  - IV iron x2 if needed. Continue following with GI as well as GYN Dr. Rosemary Simpson. RTC in 6 to 8 weeks with above, repeat labs. Thank you Dr Almanza Him for allowing us to paticipate in care of this pleasant patient. Please call w/ any questions      Total time 60 min 50% in direct consultation about the patient's diagnosis and management. All questions answered.   Sheila Rios MD  [address] [place] [address] Hematology Oncology  [address] [place] [address]  Office : [phone]  Fax : [phone]

## 2022-10-25 LAB — HCYS SERPL-SCNC: 7.6 UMOL/L (ref 0–14.5)

## 2022-10-27 ENCOUNTER — TELEMEDICINE (OUTPATIENT)
Dept: FAMILY MEDICINE CLINIC | Facility: CLINIC | Age: 57
End: 2022-10-27
Payer: MEDICAID

## 2022-10-27 DIAGNOSIS — F41.9 ANXIETY: ICD-10-CM

## 2022-10-27 DIAGNOSIS — L40.9 SCALP PSORIASIS: ICD-10-CM

## 2022-10-27 DIAGNOSIS — M25.561 CHRONIC PAIN OF BOTH KNEES: ICD-10-CM

## 2022-10-27 DIAGNOSIS — G89.29 CHRONIC BILATERAL LOW BACK PAIN WITHOUT SCIATICA: ICD-10-CM

## 2022-10-27 DIAGNOSIS — D50.9 IRON DEFICIENCY ANEMIA, UNSPECIFIED IRON DEFICIENCY ANEMIA TYPE: Primary | ICD-10-CM

## 2022-10-27 DIAGNOSIS — M25.562 CHRONIC PAIN OF BOTH KNEES: ICD-10-CM

## 2022-10-27 DIAGNOSIS — F33.9 RECURRENT DEPRESSION (HCC): ICD-10-CM

## 2022-10-27 DIAGNOSIS — J45.40 MODERATE PERSISTENT ASTHMA WITHOUT COMPLICATION: ICD-10-CM

## 2022-10-27 DIAGNOSIS — R74.8 ELEVATED LIVER ENZYMES: ICD-10-CM

## 2022-10-27 DIAGNOSIS — G89.29 CHRONIC PAIN OF BOTH KNEES: ICD-10-CM

## 2022-10-27 DIAGNOSIS — C54.1 ENDOMETRIAL CANCER (HCC): ICD-10-CM

## 2022-10-27 DIAGNOSIS — M54.50 CHRONIC BILATERAL LOW BACK PAIN WITHOUT SCIATICA: ICD-10-CM

## 2022-10-27 DIAGNOSIS — I10 ESSENTIAL HYPERTENSION: ICD-10-CM

## 2022-10-27 PROCEDURE — 99214 OFFICE O/P EST MOD 30 MIN: CPT | Performed by: FAMILY MEDICINE

## 2022-10-27 RX ORDER — FLUTICASONE PROPIONATE AND SALMETEROL 250; 50 UG/1; UG/1
1 POWDER RESPIRATORY (INHALATION) EVERY 12 HOURS
Qty: 60 EACH | Refills: 12 | Status: SHIPPED | OUTPATIENT
Start: 2022-10-27 | End: 2022-11-03

## 2022-10-27 RX ORDER — BUPROPION HYDROCHLORIDE 150 MG/1
150 TABLET ORAL EVERY MORNING
Qty: 90 TABLET | Refills: 3 | Status: SHIPPED | OUTPATIENT
Start: 2022-10-27

## 2022-10-27 RX ORDER — CLOBETASOL PROPIONATE 0.46 MG/ML
SOLUTION TOPICAL
Qty: 50 ML | Refills: 3 | Status: SHIPPED | OUTPATIENT
Start: 2022-10-27

## 2022-10-27 RX ORDER — LORAZEPAM 1 MG/1
1 TABLET ORAL 2 TIMES DAILY
Qty: 60 TABLET | Refills: 5 | Status: SHIPPED | OUTPATIENT
Start: 2022-10-27 | End: 2023-04-25

## 2022-10-27 RX ORDER — ALBUTEROL SULFATE 90 UG/1
2 AEROSOL, METERED RESPIRATORY (INHALATION) EVERY 4 HOURS PRN
Qty: 18 G | Refills: 5 | Status: SHIPPED | OUTPATIENT
Start: 2022-10-27

## 2022-10-27 RX ORDER — TRAZODONE HYDROCHLORIDE 150 MG/1
150 TABLET ORAL NIGHTLY
Qty: 90 TABLET | Refills: 3 | Status: SHIPPED | OUTPATIENT
Start: 2022-10-27

## 2022-10-27 RX ORDER — VALSARTAN AND HYDROCHLOROTHIAZIDE 320; 25 MG/1; MG/1
1 TABLET, FILM COATED ORAL DAILY
Qty: 90 TABLET | Refills: 3 | Status: SHIPPED | OUTPATIENT
Start: 2022-10-27

## 2022-10-27 RX ORDER — TRAMADOL HYDROCHLORIDE 50 MG/1
50 TABLET ORAL 2 TIMES DAILY
Qty: 180 TABLET | Refills: 3 | Status: SHIPPED | OUTPATIENT
Start: 2022-10-27 | End: 2023-04-25

## 2022-10-27 ASSESSMENT — ENCOUNTER SYMPTOMS
EYE DISCHARGE: 0
DIARRHEA: 0
CONSTIPATION: 0
WHEEZING: 1
CHEST TIGHTNESS: 0
COUGH: 0
SHORTNESS OF BREATH: 1
ABDOMINAL PAIN: 0
SINUS PAIN: 0

## 2022-10-27 NOTE — PROGRESS NOTES
10/27/2022    TELEHEALTH EVALUATION -- Audio/Visual (During  public health emergency)    HPI:    Bryan Chau (:  1965) has requested an audio/video evaluation for the following concern(s):  Asthma has been worsening. Using rescue inhaler about 2 x per day and sometimes at night. Pt lost mom in July and dad 1 year ago. Mood low. Saw bariatric surgery. Knee pain bilat. Seeing bariatric surgeon. Hx of possible cirrhosis at time of cholecystectomy 2019. FH cirrhosis in non-alcoholics. No absd pain or jaundice. Last colonoscopy and EGD in July. KARLEE for endometrial cancer . Recent ER visits for Cellulitis LLE - resolved. No pain or redness. Last AST 60, Alk Phos 159, ALT 58. Hgb 15.5 on recent check. Seeing hematology for JOSE E. Review of Systems   Constitutional:  Negative for appetite change, fatigue and fever. HENT:  Negative for congestion, ear pain and sinus pain. Eyes:  Negative for discharge. Respiratory:  Positive for shortness of breath (occas) and wheezing. Negative for cough and chest tightness. Cardiovascular:  Negative for chest pain, palpitations and leg swelling. Gastrointestinal:  Negative for abdominal pain, constipation and diarrhea. Genitourinary:  Negative for dysuria. Musculoskeletal:  Positive for arthralgias (knee pain bilat). Negative for joint swelling. Skin:  Negative for rash. Neurological:  Negative for headaches. Hematological:  Negative for adenopathy. Psychiatric/Behavioral:  Negative for dysphoric mood. The patient is not nervous/anxious. Prior to Visit Medications    Medication Sig Taking?  Authorizing Provider   albuterol sulfate HFA (PROVENTIL;VENTOLIN;PROAIR) 108 (90 Base) MCG/ACT inhaler Inhale 2 puffs into the lungs every 4 hours as needed for Wheezing Yes Cayden Dye MD   clobetasol (TEMOVATE) 0.05 % external solution Apply bid Yes Cayden Dye MD   sertraline (ZOLOFT) 50 MG tablet Take 1 tablet by mouth daily Yes Carles Burkitt, MD   buPROPion (WELLBUTRIN XL) 150 MG extended release tablet Take 1 tablet by mouth every morning Yes Carles Burkitt, MD   traZODone (DESYREL) 150 MG tablet Take 1 tablet by mouth nightly Yes Carles Burkitt, MD   valsartan-hydroCHLOROthiazide (DIOVAN-HCT) 320-25 MG per tablet Take 1 tablet by mouth daily Yes Carles Burkitt, MD   LORazepam (ATIVAN) 1 MG tablet Take 1 tablet by mouth 2 times daily for 180 days. Yes Carles Burkitt, MD   traMADol (ULTRAM) 50 MG tablet Take 1 tablet by mouth in the morning and at bedtime for 180 days. Yes Carles Burkitt, MD   fluticasone-salmeterol (ADVAIR) 250-50 MCG/ACT AEPB diskus inhaler Inhale 1 puff into the lungs in the morning and 1 puff in the evening. Yes Carles Burkitt, MD   cephALEXin (KEFLEX) 500 MG capsule Take 500 mg by mouth 4 times daily  Patient not taking: Reported on 10/24/2022  Historical Provider, MD   ferrous sulfate (IRON 325) 325 (65 Fe) MG tablet Take 325 mg by mouth daily (with breakfast)  Historical Provider, MD   vitamin B-12 (CYANOCOBALAMIN) 500 MCG tablet Take 500 mcg by mouth daily  Historical Provider, MD   tiZANidine (ZANAFLEX) 4 MG tablet Take 1 tablet by mouth nightly as needed (back pain)  Carles Burkitt, MD   naproxen (NAPROSYN) 500 MG tablet TAKE ONE TABLET BY MOUTH TWICE A DAY WITH A MEAL  Patient not taking: Reported on 10/5/2022  Carles Burkitt, MD   nystatin (MYCOSTATIN) 959184 UNIT/GM cream Apply topically 2 times daily. TOMAS Isabel - CNP   amLODIPine (NORVASC) 5 MG tablet Take 5 mg by mouth daily  Ar Automatic Reconciliation   esomeprazole (NEXIUM) 20 MG delayed release capsule Take one cap daily.   Ar Automatic Reconciliation   levothyroxine (SYNTHROID) 150 MCG tablet 1 tablet once a day except for 1/2 tablet on Sundays  Ar Automatic Reconciliation       Social History     Tobacco Use    Smoking status: Never    Smokeless tobacco: Never   Vaping Use    Vaping Use: Never used Substance Use Topics    Alcohol use: Yes     Alcohol/week: 1.0 standard drink    Drug use: Not Currently        Allergies   Allergen Reactions    Diltiazem Hcl Other (See Comments)     Blistering    ,   Past Medical History:   Diagnosis Date    Arrhythmia 2018    a-fib (2017) ablation-(12/2018)- resolved    Asthma     seasonal allergies- maint and rescue inhaler    Cancer ([address]     endometrial cancer- hysterectomy 11/26/20    Chronic pain     back pain - herniated disc    Cirrhosis ([address]     Claustrophobia     Colon polyps     Difficult intubation     in [place] 2019- glidescope needed with hysterectomy/lap deysi 11/26/[facility] @ [facility]    GERD (gastroesophageal reflux disease)     H/O echocardiogram     Echo LVEF 55% mild mitral and tricuspid regurg    H/O: iron deficiency anemia     Heart murmur 06/2019    Echo LVEF 55% mild mitral and tricuspid regurg    Hypertension     managed with meds    Sleep apnea     not currently using cpap    Thyroid disease     Graves Disease   ,   Past Surgical History:   Procedure Laterality Date    ABLATION OF DYSRHYTHMIC FOCUS  12/2017    CHOLECYSTECTOMY, LAPAROSCOPIC  11/26/2020    COLONOSCOPY N/A 7/20/2020    COLONOSCOPY/ BMI 43 performed by Rajesh Mckeon MD at [address] [place] [address]  10/2019    HYSTERECTOMY (CERVIX STATUS UNKNOWN)  11/26/2020    with lap deysi    THYROIDECTOMY  08/28/2020    TUBAL LIGATION  1989   ,   Social History     Tobacco Use    Smoking status: Never    Smokeless tobacco: Never   Vaping Use    Vaping Use: Never used   Substance Use Topics    Alcohol use:  Yes     Alcohol/week: 1.0 standard drink    Drug use: Not Currently   ,   Family History   Problem Relation Age of Onset    Cancer Paternal Aunt         cervical cancer    Thyroid Disease Son         Hyperthyroidism    Cancer Sister         cervical     Thyroid Disease Daughter         Hypothyroidism    Liver Disease Father     Diabetes Father     Heart Disease Father Kidney Disease Mother         dialysis    Stroke Mother        PHYSICAL EXAMINATION:  [ INSTRUCTIONS:  \"[x]\" Indicates a positive item  \"[]\" Indicates a negative item  -- DELETE ALL ITEMS NOT EXAMINED]  Vital Signs: (As obtained by patient/caregiver or practitioner observation)    Blood pressure-  Heart rate-    Respiratory rate-    Temperature-  Pulse oximetry-     Constitutional: [x] Appears well-developed and well-nourished [] No apparent distress      [] Abnormal-   Mental status  [x] Alert and awake  [x] Oriented to person/place/time []Able to follow commands      Eyes:  EOM    [x]  Normal  [] Abnormal-  Sclera  [x]  Normal  [] Abnormal -         Discharge []  None visible  [] Abnormal -    HENT:   [x] Normocephalic, atraumatic. [] Abnormal   [] Mouth/Throat: Mucous membranes are moist.     External Ears [x] Normal  [] Abnormal-     Neck: [x] No visualized mass     Pulmonary/Chest: [x] Respiratory effort normal.  [x] No visualized signs of difficulty breathing or respiratory distress        [] Abnormal-      Musculoskeletal:   [] Normal gait with no signs of ataxia         [] Normal range of motion of neck        [] Abnormal-       Neurological:        [x] No Facial Asymmetry (Cranial nerve 7 motor function) (limited exam to video visit)          [] No gaze palsy        [] Abnormal-         Skin:        [x] No significant exanthematous lesions or discoloration noted on facial skin         [] Abnormal-            Psychiatric:       [x] Normal Affect [] No Hallucinations        [] Abnormal-     Other pertinent observable physical exam findings-     ASSESSMENT/PLAN:  1. Iron deficiency anemia, unspecified iron deficiency anemia type    - [facility]    2. Endometrial cancer [address]      3. Essential hypertension    - valsartan-hydroCHLOROthiazide (DIOVAN-HCT) 320-25 MG per tablet; Take 1 tablet by mouth daily  Dispense: 90 tablet; Refill: 3    4.  Recurrent depression (HCC)    - sertraline (ZOLOFT) 50 MG tablet; Take 1 tablet by mouth daily  Dispense: 90 tablet; Refill: 3  - traZODone (DESYREL) 150 MG tablet; Take 1 tablet by mouth nightly  Dispense: 90 tablet; Refill: 3    5. Elevated liver enzymes    - [facility]    6Chronic pain of both knees    - traMADol (ULTRAM) 50 MG tablet; Take 1 tablet by mouth in the morning and at bedtime for 180 days. Dispense: 180 tablet; Refill: 3  - [facility] - [facility]  - [facility] [address] [address]    7. Chronic bilateral low back pain without sciatica    - traMADol (ULTRAM) 50 MG tablet; Take 1 tablet by mouth in the morning and at bedtime for 180 days. Dispense: 180 tablet; Refill: 3  - [facility] - [facility]    8. Anxiety    - LORazepam (ATIVAN) 1 MG tablet; Take 1 tablet by mouth 2 times daily for 180 days. Dispense: 60 tablet; Refill: 5    9. Scalp psoriasis    - clobetasol (TEMOVATE) 0.05 % external solution; Apply bid  Dispense: 50 mL; Refill: 3    10. Moderate persistent asthma without complication  Add Advair  Call if not improved  - albuterol sulfate HFA (PROVENTIL;VENTOLIN;PROAIR) 108 (90 Base) MCG/ACT inhaler; Inhale 2 puffs into the lungs every 4 hours as needed for Wheezing  Dispense: 18 g; Refill: 5  - fluticasone-salmeterol (ADVAIR) 250-50 MCG/ACT AEPB diskus inhaler; Inhale 1 puff into the lungs in the morning and 1 puff in the evening. Dispense: 60 each; Refill: 12    No follow-ups on file. Lady Andre, was evaluated through a synchronous (real-time) audio-video encounter. The patient (or guardian if applicable) is aware that this is a billable service, which includes applicable co-pays. This Virtual Visit was conducted with patient's (and/or legal guardian's) consent.  The visit was conducted pursuant to the emergency declaration under the [facility] waiver authority and the [employer] General Act. Patient identification was verified, and a caregiver was present when appropriate. The patient was located at Home: [address] [facility] [address]. Provider was located at [facility] (Appt Dept): [address]. Total time spent on this encounter:  30min    --Jesus Horvath MD on 10/27/2022 at 5:14 PM    An electronic signature was used to authenticate this note.

## 2022-10-31 NOTE — PROGRESS NOTES
Mony Boateng PA-C  Bariatric & Advanced Laparoscopic Surgery & Endoscopy  [place] [address], [address] [facility], [place]  Phone [phone]   Fax [phone]    Date of visit: 11/3/2022      Primary/Requesting provider: Cedric Medley MD         Name: Wilbert Hall      MRN: [id number]       : 1965       Age: 61 y.o. Sex: female        PCP: Cedric Medley MD     CC:  Bariatric monthly dietary follow up    Surgeon: Dr. Serena Charles    Procedure: laparoscopic gastric bypass    Surgical Consult Date: 10/04/2022    The patient is a 61 y.o. female presenting with a height of 5' 6\" (1.676 m) and weight of 288 lb (130.6 kg), giving her a Body mass index is 46.48 kg/m². She has an ideal body weight of 155 lbs, and excess body weight of 133 lbs. She started our program with a weight of 281 lbs, gained 7lbs. She is in the process of completing all aspects of our prep program and to be deemed an acceptable candidate for bariatric surgery. Patient has a long term history of obesity with multiple failed attempts at weight reduction. Patient denies any changes in health history or physical health since last visit. Patient has been adherent to her diet and exercise regimen. Patient feels that she is well informed regarding her bariatric surgery choice and would like to proceed with a laparoscopic naa-en-y gastric bypass for weight reduction, improvement of her medical conditions, and improved quality of life. Patient verbalized understanding of the risks associated with bariatric surgery. Patient also verbalized understanding that bariatric surgery is a tool and that weight reduction is dependent on behavioral changes in regards to what she eats and how much.     PSYCHOLOGICAL EVALUATION:  Scheduled, 2022  DIETITIAN EVALUATION:  In progress with Behzad Gregory RD, DOMONIQUE  BARIATRIC LABS:  Completed, 10/04/2022 (vitamin B12 2182, A1C 5.7)  CARDIAC CLEARANCE: Scheduled, 11/14/2022  ONCOLOGY CLEARANCE:  Completed, 10/05/2022  UPPER GI:  Not completed  EGD:  Not completed, will send results  ABDOMINAL US:  Not completed  PHYSICAL THERAPY EVALUATION FOR MOBILITY OPTIMIZATION:  Completed, 10/06/2022    MEDICAL HISTORY:  Morbid Obesity  Depression  Anxiety  Hypothyroidism  Anemia  A-fib  Heart ablation in 2018  Hx of uterine cancer in 2019 - no current treatment - s/p hysterectomy  Hx of blood transfusion July 2022 - had EGD/colonoscopy in [place] and no findings     Comorbidity Yes or No   Hypertension- how many medications = 1 Yes   Hyperlipidemia No   Diabetes Mellitus No   Coronary Artery Disease No   Gastroesophageal Reflux  Treatment Med = Nexium Yes   Obstructive Sleep Apnea No   Cancer Yes, uterine cancer in 2019   Asthma Yes   Osteoarthritis Yes   Joint Pain Yes      Admits to GERD symptoms for 10+ years including heartburn, which occurs 4x per week when eating spicy foods. She completed an EGD in July at a hospital in [place] with concern for bleeding due to Naprosyn intake, but no source was found. She was given iron and blood transfusions at this time.       Other Yes or No   Venous Stasis No   Dialysis No   Long term use of steroid or immunocompromised conditions No   On Anticoagulants No      PMH:  Past Medical History:   Diagnosis Date    Arrhythmia 2018    a-fib (2017) ablation-(12/2018)- resolved    Arthritis Oct. 20, 2022    Knees and left heel    Asthma     seasonal allergies- maint and rescue inhaler    Cancer ([address]     endometrial cancer- hysterectomy 11/26/20    Chronic pain     back pain - herniated disc    Cirrhosis ([address]     Claustrophobia     Colon polyps     Difficult intubation     in [place] 2019- glidescope needed with hysterectomy/lap deysi 11/26/19 @ [facility]    GERD (gastroesophageal reflux disease)     H/O echocardiogram     Echo LVEF 55% mild mitral and tricuspid regurg    H/O: iron deficiency anemia     Heart murmur 06/2019    Echo LVEF 55% mild mitral and tricuspid regurg    Hypertension     managed with meds    Osteoarthritis Oct.6, 2022    ER x-ray showed arthritis    Sleep apnea     not currently using cpap    Thyroid disease     Graves Disease       PSH:  Past Surgical History:   Procedure Laterality Date    ABDOMEN SURGERY  Nov. 2019    Hysterectomy and Gallbladder    ABLATION OF DYSRHYTHMIC FOCUS  12/2017    CARDIAC SURGERY  Dec. 2018    Ablation for a-fib    CHOLECYSTECTOMY, LAPAROSCOPIC  11/26/2020    COLONOSCOPY N/A 7/20/2020    COLONOSCOPY/ BMI 43 performed by Kacie Thornton MD at [facility]  10/2019    HAND SURGERY  Dec.1989    A small cyst    HYSTERECTOMY (CERVIX STATUS UNKNOWN)  11/26/2020    with lap deysi    THYROIDECTOMY  08/28/2020    TUBAL LIGATION  1989       MEDS:  Current Outpatient Medications   Medication Sig Dispense Refill    diclofenac (VOLTAREN) 75 MG EC tablet Take 1 tablet by mouth 2 times daily 60 tablet 0    albuterol sulfate HFA (PROVENTIL;VENTOLIN;PROAIR) 108 (90 Base) MCG/ACT inhaler Inhale 2 puffs into the lungs every 4 hours as needed for Wheezing 18 g 5    clobetasol (TEMOVATE) 0.05 % external solution Apply bid 50 mL 3    sertraline (ZOLOFT) 50 MG tablet Take 1 tablet by mouth daily 90 tablet 3    buPROPion (WELLBUTRIN XL) 150 MG extended release tablet Take 1 tablet by mouth every morning 90 tablet 3    traZODone (DESYREL) 150 MG tablet Take 1 tablet by mouth nightly 90 tablet 3    valsartan-hydroCHLOROthiazide (DIOVAN-HCT) 320-25 MG per tablet Take 1 tablet by mouth daily 90 tablet 3    LORazepam (ATIVAN) 1 MG tablet Take 1 tablet by mouth 2 times daily for 180 days. 60 tablet 5    traMADol (ULTRAM) 50 MG tablet Take 1 tablet by mouth in the morning and at bedtime for 180 days. 180 tablet 3    fluticasone-salmeterol (ADVAIR) 250-50 MCG/ACT AEPB diskus inhaler Inhale 1 puff into the lungs in the morning and 1 puff in the evening.  60 each 12    cephALEXin (KEFLEX) 500 MG capsule Take 500 mg by mouth 4 times daily (Patient not taking: Reported on 10/24/2022)      ferrous sulfate (IRON 325) 325 (65 Fe) MG tablet Take 325 mg by mouth daily (with breakfast)      vitamin B-12 (CYANOCOBALAMIN) 500 MCG tablet Take 500 mcg by mouth daily      tiZANidine (ZANAFLEX) 4 MG tablet Take 1 tablet by mouth nightly as needed (back pain) 10 tablet 0    nystatin (MYCOSTATIN) 839492 UNIT/GM cream Apply topically 2 times daily. 30 g 1    amLODIPine (NORVASC) 5 MG tablet Take 5 mg by mouth daily      esomeprazole (NEXIUM) 20 MG delayed release capsule Take one cap daily. levothyroxine (SYNTHROID) 150 MCG tablet 1 tablet once a day except for 1/2 tablet on Sundays       Current Facility-Administered Medications   Medication Dose Route Frequency Provider Last Rate Last Admin    methylPREDNISolone sodium (SOLU-MEDROL) injection 40 mg  40 mg IntraMUSCular Daily TOMAS Isabel - CNP           ALLERGIES:    Allergies   Allergen Reactions    Diltiazem Hcl Other (See Comments)     Blistering        SH:  Social History     Tobacco Use    Smoking status: Never    Smokeless tobacco: Never   Vaping Use    Vaping Use: Never used   Substance Use Topics    Alcohol use: Not Currently     Alcohol/week: 1.0 standard drink     Types: 1 Glasses of wine per week     Comment: During holidays    Drug use: Never       FH:  Family History   Problem Relation Age of Onset    Cancer Paternal Aunt         Cervical    Thyroid Disease Son         Hyperthyroidism    Cancer Sister         Cervical    Thyroid Disease Daughter         Hypothyroidism    Liver Disease Father     Diabetes Father     Heart Disease Father     Kidney Disease Mother         dialysis    Stroke Mother           Physical Exam:     BP (!) 177/92   Pulse 76   Ht 5' 6\" (1.676 m)   Wt 288 lb (130.6 kg)   BMI 46.48 kg/m²     General:  Well-developed, well-nourished, no distress. Psych:  Cooperative, good insight and judgement.   Neuro:  Alert, oriented to person, place and time. Lungs:  Unlabored breathing. Symmetrical chest expansion. Heart:  Regular rate and rhythm. Abdomen:  Soft, obese, non-tender, non-distended. No guarding or rebound. No palpable masses. Labs: All recent labs were reviewed. Lab Results   Component Value Date    LABA1C 5.7 (H) 10/04/2022     Lab Results   Component Value Date    TSH 1.350 02/25/2022      Lab Results   Component Value Date    VITD25 30.4 10/04/2022      Lab Results   Component Value Date    JDERFMDF51 2209 (H) 10/24/2022      Lab Results   Component Value Date    IRON 95 10/24/2022    TIBC 326 10/24/2022    FERRITIN 116 10/24/2022        Imaging: All images were independently reviewed by me. Diagnosis Orders   1. Morbid obesity ([facility]        2. Obesity, Class III, BMI 40-49.9 (morbid obesity) ([facility]        3. Hx of gastroesophageal reflux (GERD)        4. Primary hypertension        5. Chronic atrial fibrillation (HCC)        6. Other cirrhosis of liver ([facility]        7. Dietary counseling        8. Exercise counseling            Assessment/Plan:    Juan Menchaca is a 61 y.o. female is here for monthly follow-up visit prior to bariatric surgery with medical co-morbidities related to morbid obesity. Patient is a fair candidate for bariatric surgery and meets NIH criteria for weight loss surgery. In detail, discussed risks and benefits of laparoscopic naa-en-y gastric bypass. Reviewed information and expectations regarding the pre-operative period, the bariatric procedure, and postoperative period. Stressed with patient importance of understanding bariatric surgery is a tool and will not work if patient does not continue with healthy lifestyle changes including regular exercise and high protein, low calorie, low carb diet. Patient was seen by the dietitian today for continued evaluation and management regarding lifestyle and dietary changes. Reviewed assessment and plan in detail. Patient verbalized understanding. Questions answered. Recommendations: Continue to work on dietary changes over the next couple of weeks, follow up in one month. Complete remaining work up requirements including abdominal ultrasound, upper GI study, cardiology clearance and psychology appointments. Audra Mo PA-C  Bariatric Surgery  11/3/2022    Counseling time:counseling time more than 50% of visit: 45 minutes: I spent this time preparing to see patient (including chart review and preparation), obtaining and/or reviewing additional medical history, performing a physical exam and evaluation, documenting clinical information in the electronic health record, independently interpreting results, communicating results to patient, family or caregiver, and/or coordinating care.

## 2022-11-01 NOTE — PROGRESS NOTES
Marina Dorado, MS, RD, LD  Surgical Weight Loss Dietitian  [address], [address] [facility]  [place]  Phone [phone]   Fax [phone]    [facility] INITIAL ASSESSMENT    Nutrition Assessment:  Anthropometrics:    Ht: 56, wt: 288#, 186% IBW, 46.48 BMI  Pt has gained 7 lbs since last visit. Labs:   Vit D, 25-Hydroxy (ng/mL)   Date Value   10/04/2022 30.4     Folate (ng/mL)   Date Value   10/24/2022 22.0 (H)   10/04/2022 20.9 (H)     Iron (ug/dL)   Date Value   10/24/2022 95   10/04/2022 109     Ferritin (NG/ML)   Date Value   10/24/2022 116   10/04/2022 104     TSH (uIU/mL)   Date Value   02/25/2022 1.350   08/18/2021 0.927   06/14/2021 2.230   02/23/2021 0.313 (L)   10/22/2020 0.007 (L)      Macronutrient needs assessment   EER: 1217-0092 kcal/d (14-16 kcal/kg ABW)    MSJ x 1.3-500 = 1978 kcal/d (sedentary AF)   EPR: 59-89g pro (1.0-1.5 gm pro/kg IBW)   CHO: ~247 g CHO/d (50% EER, ~5-6 servings CHO/meal)   H2O: 1mL/kcal or per MD recommendations     Support:  Pt has told children, , sister - to be good support. Reminded pt about [employer] support group and online support group. Motivation:  High. Onset of obesity: Adulthood   Potential triggers to weight gain: post pregnancy wt gain, stress, emotional eating, depression   Hx of obesity in family members include twin sister, sister. History of Weight loss attempts, goal >10# weight loss:    Prior bariatric programs: none    Commercial programs: weightwatchers    Registered Dietitian visits: none    Medical Weight Loss programs, including medications: yes, could not recall name    Self-supervised diets and exercise: high protein, low-carb   Pt has attempted weight loss via modalities listed above - ~7 attempts made, all without any permanent weight loss. Pt goal in pursuing SWL is better health, improved mobility, improved self confidence, decreased risk for comorbidities.       Eating Habits:   Eating occasions/d: 3  Main cook at home: pt  Restaurant/Fast Food Intake: 1-2x/month  Food Allergies: none  Cultural Preferences: none  Typical Beverage Consumption: water, decaf coffee  Diet Recall:   Breakfast: corn meal mash, decaf coffee  Lunch: cauliflower pizza slice  Dinner: 8oz chicken, broccoli  Beverages: water, decaf coffee  Feeling before eating meals: Slightly hungry   Feeling after eating meals:  Comfortable    Lifestyle Assessment:    Hours of sleep/night: ~6-7   Current Occupation: disabled   Activity during day: light   Number of people living in house and influence: self only. Exercise Assessment:   PAR-Q: yes - limited mobility d/t injections in R knee  Medical:     Pain or injuries (present): R knee, R hip, lower back    Past surgeries related to mobility: none  Exercise equipment at home: weights   Gym Membership: none  Current Exercise Routine: stretches and leg lifts 7d/week for 25 minutes  Assessment Exercise Goal: walking, sretches, and weights for 60 minutes 5-7 d/week    Nutrition Diagnosis:  Morbid obesity R/T excessive energy intake and yoyo dieting as evidenced by BMI = 46.48 and 186 % IBW. Nutrition Intervention:   Diet Rx - Low-moderate calorie intake (~2000 kcal/day). Work on eating 3 meals/d and meal balance. Modify distribution, type or amount of food and nutrients within meals or at a specified time-    Discussed need for 3 meals per day and snacks based on body size. Explained macronutrients and emphasized mindful eating behaviors. Discussed meal priority and encouraged practice. Discussed effect of sugary, carbonated, caffeinated, alcoholic beverages on the body. Pt goal to increasing hydrating fluid intake. Educated on meal priority, counting protein, and nutrition supplements and encouraged practice. *Pt verbalizes understanding of information provided during this session. Nutrition Monitoring and Evaluation:   Monitor for understanding of diet and exercise rx.    Follow up for practicing mindful eating behaviors and meal priority.    Follow for increased activity  F/U one month      Jona Jimenez, MS, RD, LD

## 2022-11-02 ENCOUNTER — OFFICE VISIT (OUTPATIENT)
Dept: ORTHOPEDIC SURGERY | Age: 57
End: 2022-11-02
Payer: MEDICAID

## 2022-11-02 DIAGNOSIS — M17.11 PRIMARY OSTEOARTHRITIS OF RIGHT KNEE: Primary | ICD-10-CM

## 2022-11-02 LAB — METHYLMALONATE SERPL-SCNC: 149 NMOL/L (ref 0–378)

## 2022-11-02 PROCEDURE — 20610 DRAIN/INJ JOINT/BURSA W/O US: CPT | Performed by: SPECIALIST/TECHNOLOGIST

## 2022-11-02 PROCEDURE — 99204 OFFICE O/P NEW MOD 45 MIN: CPT | Performed by: SPECIALIST/TECHNOLOGIST

## 2022-11-02 RX ORDER — TRIAMCINOLONE ACETONIDE 40 MG/ML
40 INJECTION, SUSPENSION INTRA-ARTICULAR; INTRAMUSCULAR ONCE
Status: COMPLETED | OUTPATIENT
Start: 2022-11-02 | End: 2022-11-02

## 2022-11-02 RX ORDER — DICLOFENAC SODIUM 75 MG/1
75 TABLET, DELAYED RELEASE ORAL 2 TIMES DAILY
Qty: 60 TABLET | Refills: 0 | Status: SHIPPED | OUTPATIENT
Start: 2022-11-02

## 2022-11-02 RX ADMIN — TRIAMCINOLONE ACETONIDE 40 MG: 40 INJECTION, SUSPENSION INTRA-ARTICULAR; INTRAMUSCULAR at 13:56

## 2022-11-02 NOTE — PROGRESS NOTES
Name: Wilbert Hall  YOB: 1965  Gender: female  MRN: [id number]      CC: Knee Pain (bilat)       HPI: Wilbert Hall is a 61 y.o. female who presents with Knee Pain (bilat)  . Patient with complaints of bilateral knee pain right greater than left and she would like to focus on her right only today. She reports that she has been having pain since July with no mechanism of injury. She reports that she has constant pain and feelings of instability with intermittent swelling and the pain localizes posteriorly. She reports that she has limited range of motion and her pain is worse with prolonged standing and rotation. Of note she discussed that she is in the process of attempting to have weight loss surgery. ROS/Meds/PSH/PMH/FH/SH: I personally reviewed the patients standard intake form. Below are the pertinents    Tobacco:  reports that she has never smoked. She has never used smokeless tobacco.  Diabetes: none  Other: HTN, A. fib, cirrhosis, asthma, endometrial cancer    Physical Examination:  General: no acute distress  Lungs: breathing easily  CV: regular rhythm by pulse  Right Knee: Minimal effusion present. Tenderness to palpation of the lateral joint line and posterior lateral knee. Full active and passive range of motion with pain in the extreme of flexion. Negative ligamentous exam x4. Mild pain with Wallace's that localizes laterally. Positive bounce home test.      Imaging:   I reviewed 3 views of the knee from 10/6/2022 which shows no acute fracture, dislocation, advanced degenerative changes localized to the medial compartment with osteophyte formation. All imaging interpreted by myself GURVINDER Gleason independent of radiology review        Assessment:   1. Primary osteoarthritis of right knee         Plan: Think the majority of the patient's right knee pain is due to osteoarthritis however cannot exclusively rule out a lateral meniscus tear.   I discussed with the patient conservative treatment options to include corticosteroid injection, NSAIDs, formal physical therapy, viscosupplementation and advanced imaging. She is already scheduled to start formal physical therapy on 11/9/2022 as scheduled by her PCP which I think is reasonable to begin. I discussed with the patient oral NSAIDs and I prescribed her 75 mg diclofenac twice daily and explained to her not to take any other NSAID products and to take this medication with food. I think she would also get excellent benefit from a corticosteroid injection to help with her pain which should make physical therapy more effective. If she does not get excellent relief from corticosteroid injection and physical therapy then we will consider an MRI. The patient elected to proceed with an intraarticular knee injection today. After verbal informed consent was obtained after sterile prep the Right knee  was injected from a anterior lateral approach with 4 cc of 0.25% Marcaine and 1 cc of 40 mg Kenalog. The patient tolerated the procedure well was given postinjection flare precautions.           Bigg Sterling MS, APRN, FNP-BC  Orthopaedics and Sports Medicine

## 2022-11-03 ENCOUNTER — OFFICE VISIT (OUTPATIENT)
Dept: SURGERY | Age: 57
End: 2022-11-03
Payer: MEDICAID

## 2022-11-03 VITALS
WEIGHT: 288 LBS | BODY MASS INDEX: 46.28 KG/M2 | HEIGHT: 66 IN | DIASTOLIC BLOOD PRESSURE: 92 MMHG | SYSTOLIC BLOOD PRESSURE: 177 MMHG | HEART RATE: 76 BPM

## 2022-11-03 DIAGNOSIS — Z87.19 HX OF GASTROESOPHAGEAL REFLUX (GERD): ICD-10-CM

## 2022-11-03 DIAGNOSIS — I48.20 CHRONIC ATRIAL FIBRILLATION (HCC): ICD-10-CM

## 2022-11-03 DIAGNOSIS — I10 PRIMARY HYPERTENSION: ICD-10-CM

## 2022-11-03 DIAGNOSIS — Z71.82 EXERCISE COUNSELING: ICD-10-CM

## 2022-11-03 DIAGNOSIS — E66.01 OBESITY, CLASS III, BMI 40-49.9 (MORBID OBESITY) (HCC): ICD-10-CM

## 2022-11-03 DIAGNOSIS — E66.01 MORBID OBESITY (HCC): Primary | ICD-10-CM

## 2022-11-03 DIAGNOSIS — Z71.3 DIETARY COUNSELING: ICD-10-CM

## 2022-11-03 DIAGNOSIS — K74.69 OTHER CIRRHOSIS OF LIVER (HCC): ICD-10-CM

## 2022-11-03 PROBLEM — C54.1 ENDOMETRIAL CANCER (HCC): Status: ACTIVE | Noted: 2019-11-08

## 2022-11-03 PROBLEM — H02.826: Status: ACTIVE | Noted: 2020-05-12

## 2022-11-03 PROBLEM — H10.13 ALLERGIC CONJUNCTIVITIS OF BOTH EYES: Status: ACTIVE | Noted: 2020-05-19

## 2022-11-03 PROBLEM — H52.4 MYOPIA OF BOTH EYES WITH REGULAR ASTIGMATISM AND PRESBYOPIA: Status: ACTIVE | Noted: 2020-05-19

## 2022-11-03 PROBLEM — E89.0 POSTOPERATIVE HYPOTHYROIDISM: Status: ACTIVE | Noted: 2019-11-08

## 2022-11-03 PROBLEM — H52.223 MYOPIA OF BOTH EYES WITH REGULAR ASTIGMATISM AND PRESBYOPIA: Status: ACTIVE | Noted: 2020-05-19

## 2022-11-03 PROBLEM — H52.13 MYOPIA OF BOTH EYES WITH REGULAR ASTIGMATISM AND PRESBYOPIA: Status: ACTIVE | Noted: 2020-05-19

## 2022-11-03 PROBLEM — H25.13 AGE-RELATED NUCLEAR CATARACT OF BOTH EYES: Status: ACTIVE | Noted: 2020-05-19

## 2022-11-03 PROCEDURE — 99215 OFFICE O/P EST HI 40 MIN: CPT | Performed by: PHYSICIAN ASSISTANT

## 2022-11-03 PROCEDURE — 3074F SYST BP LT 130 MM HG: CPT | Performed by: PHYSICIAN ASSISTANT

## 2022-11-03 PROCEDURE — 3078F DIAST BP <80 MM HG: CPT | Performed by: PHYSICIAN ASSISTANT

## 2022-11-09 ENCOUNTER — HOSPITAL ENCOUNTER (OUTPATIENT)
Dept: PHYSICAL THERAPY | Age: 57
Setting detail: RECURRING SERIES
Discharge: HOME | End: 2022-11-12
Payer: MEDICAID

## 2022-11-09 PROCEDURE — 97164 PT RE-EVAL EST PLAN CARE: CPT

## 2022-11-09 PROCEDURE — 97110 THERAPEUTIC EXERCISES: CPT

## 2022-11-09 ASSESSMENT — PAIN SCALES - GENERAL: PAINLEVEL_OUTOF10: 7
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
## 2022-11-09 NOTE — PROGRESS NOTES
Wilbert Hall  : 1965  Primary: [address] Medicaid  Secondary:  [address] @ [address]  [address] [place] [address]  Phone: [phone]  Fax: [phone] Plan Frequency: 1-2x/week for 90 days  Plan of Care/Certification Expiration Date: 23      PT Visit Info: Total # of Visits to Date: 1      OUTPATIENT PHYSICAL THERAPY:OP NOTE TYPE: Treatment Note 2022       Episode  }Appt Desk             Treatment Diagnosis:  Low back pain (M54.5)  Radiculopathy, Lumbar Region (M54.16)  Muscle Weakness, Generalized (M62.81)  Abnormal posture (R29.3)  Medical/Referring Diagnosis:  Radiculopathy, lumbar region [M54.16]  Chronic pain of both knees [M25.561, M25.562, G89.29]  Chronic bilateral low back pain without sciatica [M54.50, G89.29]  Referring Physician:  Alix Aguilar MD MD Orders:  PT Eval and Treat  Date of Onset:  Onset Date: 06     Allergies:   Diltiazem hcl  Restrictions/Precautions:  Restrictions/Precautions: None  No data recorded   Interventions Planned (Treatment may consist of any combination of the following):    Current Treatment Recommendations: Strengthening; ROM; Balance training; Functional mobility training; Endurance training; Gait training; Stair training; Neuromuscular re-education; Manual Therapy - Soft Tissue Mobilization; Manual Therapy - Joint Manipulation; Pain management; Return to work related activity; Home exercise program; Safety education & training; Patient/Caregiver education & training; Modalities; Positioning; Integrated dry needling; Aquatics; Therapeutic activities     Subjective Comments: See re-eval for subjective   Initial:}    74/10Post Session:       64/10  Medications Last Reviewed:  2022  Updated Objective Findings:   See re-eval for update objective  Treatment   THERAPEUTIC EXERCISE: (30 minutes):  Exercises per grid below to improve mobility and strength.   Required moderate visual, verbal, manual and tactile cues to promote proper body alignment, promote proper body posture and promote proper body mechanics. Progressed resistance, range, repetitions and complexity of movement as indicated. Date:  11/9/2022   Activity/Exercise Parameters   Education; provides HEP, sleeping postures, and pain management  15 min    LTR 1x10   Hip adduction    Bridging --   Sit to stand    SLR flexion --   SLR abduction --   Walking marching    Cat cow --   Supine clamshell 1x10 RTB B   Supine march with TA --   Standing lumbar decompression at bar --   PPT 1x10   SLR w/ PPT 1x10        MANUAL THERAPY: (0 minutes): Joint mobilization, Soft tissue mobilization and Manipulation was utilized and necessary because of the patient's restricted joint motion, painful spasm, loss of articular motion and restricted motion of soft tissue. STM with thera roll to B QL, glute med, glute max, piriformis, TFL  Gentle PA lumbar segments, B SI joint, and sacrum       Commonly used abbreviations that may be included in this note:  STM- Soft tissue mobilization, R>L or L>R- right greater than left or vice versa, HEP - Home exercise program, CPA/UPA - Central or unilateral posterior-anterior mobilization, SLS- single leg stance, SKTC - Single leg to chest, SNAGS/NAGS- (sustained) Natural apophyseal glides, TKE- Terminal knee extension, ER- External rotation, IR- Internal rotation, B - Bilateral, sec- seconds, Lb- pounds, min - minutes, HA- Headache, OP- Over pressure, tband- theraband, fwd/bwd- Forward/Backward, TA- Transversus Abdominus, dbl- Double      TREATMENT/SESSION SUMMARY: Pt tolerated session well this date with no significant increases in pain when completing therapy exercises. Pt had marked TTP in RLE and B glute med and piriformis. Pt demonstrated significant weakness in B LE with more significance on the right.       Communication/Consultation:  None today  Equipment provided today:  None  Recommendations/Intent for next treatment session: Next visit will focus on progressing strength and ROM.       Total Treatment Billable Duration: 30 minutes (plus 1 re-evaluation)   Time In: 1300  Time Out: [address]       Charge Capture  }Post Session Pain  PT Visit [address] Portal  MD Guidelines  Scanned Media  Benefits  MyChart    Future Appointments   Date Time Provider Alonso Carmen   11/16/2022  1:00 PM Emeli Ballard,  UCDG [facility]   11/23/2022  3:00 PM FERNANDA Piña BSBHPST [facility]B   11/28/2022 10:00 AM SFE XR RF ROOM 2 SFERAD [facility]FE   11/30/2022  3:00 PM Cleatrice Dew, PT SFOFF [facility]   12/5/2022  2:00 PM Cleatrice Dew, PT SFOFF [facility]FO   12/7/2022  2:00 PM FERNANDA Piña BSBHPST [facility]B   12/8/2022  2:00 PM Cleatrice Dew, PT SFOFF [facility]   12/12/2022  1:00 PM Cleatrice Dew, PT SFOFF [facility]FO   12/12/2022  3:00 PM Braulio Smith, PA CSAE [facility]   12/14/2022  2:15 PM Christoph Davison, APRN - CNP POAI [facility]B   12/15/2022  3:00 PM Cleatrice Dew, PT SFO[facility]   12/21/2022  1:00 PM Cleatrice Dew, PT SFOFF [facility]FO   12/27/2022  3:00 PM [facility] OUTREACH INSURANCE [facility]   12/27/2022  3:30 PM Narcisa Butcher MD [facility]   12/28/2022  1:00 PM Cleatrice Dew, PT SFOFF [facility]   1/4/2023  1:00 PM Cleatrice Dew, PT SFOFF [facility]FO   2/22/2023  5:40 AM [facility] OUTREACH INSURANCE [facility]   2/22/2023  1:00 PM Brigitte Marshall MD [facility]   2/28/2023  3:00 PM Camila Richards MD [facility] AMB

## 2022-11-11 ENCOUNTER — CLINICAL DOCUMENTATION (OUTPATIENT)
Dept: BEHAVIORAL/MENTAL HEALTH CLINIC | Facility: CLINIC | Age: 57
End: 2022-11-11

## 2022-11-11 NOTE — PROGRESS NOTES
Ngo Milady  11/10/22  Duration: 60 mins  Pt attended the Bariatric Support Group virtually through Zoom. This group is held at 6 pm the second Thursday of each month. Psycho-education was provided. Coping skills were discussed. The pts were encouraged to share their own thoughts and feelings. This SW facilitated the group. Guided questions were asked to encourage discussion. Consent: Ngo [place], who was seen by synchronous (real-time) audio-video technology, and/or her healthcare decision maker, is aware that this patient-initiated, Telehealth encounter on 11/11/2022 is NOT a billable service.   Yes      This virtual visit was conducted: Zoom    Total Time: 60

## 2022-11-22 ENCOUNTER — HOSPITAL ENCOUNTER (OUTPATIENT)
Dept: GENERAL RADIOLOGY | Age: 57
Discharge: HOME | End: 2022-11-25
Payer: MEDICAID

## 2022-11-22 DIAGNOSIS — Z01.812 ENCOUNTER FOR PRE-OPERATIVE LABORATORY TESTING: ICD-10-CM

## 2022-11-22 PROCEDURE — A4641 RADIOPHARM DX AGENT NOC: HCPCS | Performed by: PHYSICIAN ASSISTANT

## 2022-11-22 PROCEDURE — 6360000004 HC RX CONTRAST MEDICATION: Performed by: PHYSICIAN ASSISTANT

## 2022-11-22 PROCEDURE — 6370000000 HC RX 637 (ALT 250 FOR IP): Performed by: PHYSICIAN ASSISTANT

## 2022-11-22 PROCEDURE — 74246 X-RAY XM UPR GI TRC 2CNTRST: CPT

## 2022-11-22 PROCEDURE — 2500000003 HC RX 250 WO HCPCS: Performed by: PHYSICIAN ASSISTANT

## 2022-11-22 RX ADMIN — ANTACID/ANTIFLATULENT 1 EACH: 380; 550; 10; 10 GRANULE, EFFERVESCENT ORAL at 10:23

## 2022-11-22 RX ADMIN — BARIUM SULFATE 355 ML: 0.6 SUSPENSION ORAL at 10:27

## 2022-11-22 RX ADMIN — BARIUM SULFATE 140 ML: 980 POWDER, FOR SUSPENSION ORAL at 10:23

## 2022-11-23 ENCOUNTER — OFFICE VISIT (OUTPATIENT)
Dept: BEHAVIORAL/MENTAL HEALTH CLINIC | Facility: CLINIC | Age: 57
End: 2022-11-23
Payer: MEDICAID

## 2022-11-23 ENCOUNTER — HOSPITAL ENCOUNTER (OUTPATIENT)
Dept: PHYSICAL THERAPY | Age: 57
Setting detail: RECURRING SERIES
End: 2022-11-23
Payer: MEDICAID

## 2022-11-23 DIAGNOSIS — E66.01 OBESITY, MORBID, BMI 40.0-49.9 (HCC): ICD-10-CM

## 2022-11-23 DIAGNOSIS — F33.0 MDD (MAJOR DEPRESSIVE DISORDER), RECURRENT EPISODE, MILD (HCC): Primary | ICD-10-CM

## 2022-11-23 DIAGNOSIS — F41.1 GAD (GENERALIZED ANXIETY DISORDER): ICD-10-CM

## 2022-11-23 PROCEDURE — 90791 PSYCH DIAGNOSTIC EVALUATION: CPT | Performed by: SOCIAL WORKER

## 2022-11-23 NOTE — PROGRESS NOTES
Lyn Ross  : 1965  Primary: [address] Medicaid  Secondary:  [address] @ [address]  [address] [place] [address]  Phone: [phone]  Fax: [phone]       2022      Patient canceled today's appointment and will plan to follow up at next scheduled visit.        Shalini Pressley, PT

## 2022-11-28 ASSESSMENT — ANXIETY QUESTIONNAIRES
5. BEING SO RESTLESS THAT IT IS HARD TO SIT STILL: 0
2. NOT BEING ABLE TO STOP OR CONTROL WORRYING: 1
GAD7 TOTAL SCORE: 8
7. FEELING AFRAID AS IF SOMETHING AWFUL MIGHT HAPPEN: 2
4. TROUBLE RELAXING: 1
1. FEELING NERVOUS, ANXIOUS, OR ON EDGE: 1
3. WORRYING TOO MUCH ABOUT DIFFERENT THINGS: 2
6. BECOMING EASILY ANNOYED OR IRRITABLE: 1

## 2022-11-28 ASSESSMENT — PATIENT HEALTH QUESTIONNAIRE - PHQ9
SUM OF ALL RESPONSES TO PHQ QUESTIONS 1-9: 6
7. TROUBLE CONCENTRATING ON THINGS, SUCH AS READING THE NEWSPAPER OR WATCHING TELEVISION: 1
9. THOUGHTS THAT YOU WOULD BE BETTER OFF DEAD, OR OF HURTING YOURSELF: 0
SUM OF ALL RESPONSES TO PHQ9 QUESTIONS 1 & 2: 1
SUM OF ALL RESPONSES TO PHQ QUESTIONS 1-9: 6
8. MOVING OR SPEAKING SO SLOWLY THAT OTHER PEOPLE COULD HAVE NOTICED. OR THE OPPOSITE, BEING SO FIGETY OR RESTLESS THAT YOU HAVE BEEN MOVING AROUND A LOT MORE THAN USUAL: 0
5. POOR APPETITE OR OVEREATING: 0
SUM OF ALL RESPONSES TO PHQ QUESTIONS 1-9: 6
3. TROUBLE FALLING OR STAYING ASLEEP: 1
10. IF YOU CHECKED OFF ANY PROBLEMS, HOW DIFFICULT HAVE THESE PROBLEMS MADE IT FOR YOU TO DO YOUR WORK, TAKE CARE OF THINGS AT HOME, OR GET ALONG WITH OTHER PEOPLE: 1
2. FEELING DOWN, DEPRESSED OR HOPELESS: 1
SUM OF ALL RESPONSES TO PHQ QUESTIONS 1-9: 6
4. FEELING TIRED OR HAVING LITTLE ENERGY: 1
1. LITTLE INTEREST OR PLEASURE IN DOING THINGS: 0
6. FEELING BAD ABOUT YOURSELF - OR THAT YOU ARE A FAILURE OR HAVE LET YOURSELF OR YOUR FAMILY DOWN: 2

## 2022-11-28 ASSESSMENT — LIFESTYLE VARIABLES
HOW MANY STANDARD DRINKS CONTAINING ALCOHOL DO YOU HAVE ON A TYPICAL DAY: 1 OR 2
HOW OFTEN DO YOU HAVE A DRINK CONTAINING ALCOHOL: MONTHLY OR LESS
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
## 2022-11-30 ENCOUNTER — HOSPITAL ENCOUNTER (OUTPATIENT)
Dept: PHYSICAL THERAPY | Age: 57
Setting detail: RECURRING SERIES
Discharge: HOME | End: 2022-12-03
Payer: MEDICAID

## 2022-11-30 PROCEDURE — 97140 MANUAL THERAPY 1/> REGIONS: CPT

## 2022-11-30 PROCEDURE — 97110 THERAPEUTIC EXERCISES: CPT

## 2022-11-30 NOTE — PROGRESS NOTES
Alicia Crowley  : 1965  Primary: [address] Medicaid  Secondary:  [address] @ [address]  [address] [place] [address]  Phone: [phone]  Fax: [phone] Plan Frequency: 1-2x/week for 90 days  Plan of Care/Certification Expiration Date: 23      PT Visit Info: Total # of Visits to Date: 1      OUTPATIENT PHYSICAL THERAPY:OP NOTE TYPE: Treatment Note 2022       Episode  }Appt Desk             Treatment Diagnosis:  Low back pain (M54.5)  Radiculopathy, Lumbar Region (M54.16)  Muscle Weakness, Generalized (M62.81)  Abnormal posture (R29.3)  Medical/Referring Diagnosis:  Radiculopathy, lumbar region [M54.16]  Chronic pain of both knees [M25.561, M25.562, G89.29]  Chronic bilateral low back pain without sciatica [M54.50, G89.29]  Referring Physician:  Renetta Campbell MD MD Orders:  PT Eval and Treat  Date of Onset:  Onset Date: 06     Allergies:   Diltiazem hcl  Restrictions/Precautions:  Restrictions/Precautions: None  No data recorded   Interventions Planned (Treatment may consist of any combination of the following):    Current Treatment Recommendations: Strengthening; ROM; Balance training; Functional mobility training; Endurance training; Gait training; Stair training; Neuromuscular re-education; Manual Therapy - Soft Tissue Mobilization; Manual Therapy - Joint Manipulation; Pain management; Return to work related activity; Home exercise program; Safety education & training; Patient/Caregiver education & training; Modalities; Positioning; Integrated dry needling; Aquatics; Therapeutic activities     Subjective Comments: Pt states that she is having a lot of pain in the middle part of her knee. She says that the cortisone shot is not helping the pain in her knee. She says that her back has not been hurting as bad as of late. Pt states that she has been compliant with HEP since last visit.    Initial:}     4/10Post Session: 4/10  Medications Last Reviewed:  11/30/2022  Updated Objective Findings:  None Today  Treatment   THERAPEUTIC EXERCISE: (23 minutes):  Exercises per grid below to improve mobility and strength. Required moderate visual, verbal, manual and tactile cues to promote proper body alignment, promote proper body posture and promote proper body mechanics. Progressed resistance, range, repetitions and complexity of movement as indicated. Date:  11/30/2022   Activity/Exercise Parameters   Education; provides HEP, sleeping postures, and pain management  --    LTR 10 reps   Hip adduction    Bridging 10 reps    Sit to stand    SLR flexion --   SLR abduction --   Walking marching    Cat cow --   Supine clamshell 1x10 RTB B   Supine march with TA --   Sit to stand 10 reps   PPT 1x10   SLR w/ PPT 1x10        MANUAL THERAPY: (30 minutes): Joint mobilization, Soft tissue mobilization and Manipulation was utilized and necessary because of the patient's restricted joint motion, painful spasm, loss of articular motion and restricted motion of soft tissue. STM to VMO, adductor complex, semitendinosus and semimembranosus  Grade II PF mobs    Commonly used abbreviations that may be included in this note:  STM- Soft tissue mobilization, R>L or L>R- right greater than left or vice versa, HEP - Home exercise program, CPA/UPA - Central or unilateral posterior-anterior mobilization, SLS- single leg stance, SKTC - Single leg to chest, SNAGS/NAGS- (sustained) Natural apophyseal glides, TKE- Terminal knee extension, ER- External rotation, IR- Internal rotation, B - Bilateral, sec- seconds, Lb- pounds, min - minutes, HA- Headache, OP- Over pressure, tband- theraband, fwd/bwd- Forward/Backward, TA- Transversus Abdominus, dbl- Double      TREATMENT/SESSION SUMMARY: Pt tolerated session well this date with no significant increases in pain when completing therapy exercises. Pt ambulated into clinic with significant stiff gait pattern on RLE. Pt had marked TTP in VMO, adductor complex, semitendinosus and semimembranosus. Tissue extensibility increased with STM and pt demonstrated decreased stiffness during ambulation following session. Pt continues to demonstrate significant weakness in B LE. Clinical decision making used to kinesiotape 1) starting at pes anserine following VMO at 50% tension 2) starting at pes anserine following adductor complex at 50% tension. Pt continues to progress as per POC. Communication/Consultation:  None today  Equipment provided today:  None  Recommendations/Intent for next treatment session: Next visit will focus on progressing strength and ROM.       Total Treatment Billable Duration: 53 minutes  Time In: 1500  Time Out: [address]       Charge Capture  }Post Session Pain  PT Visit Info  Gonzalo Otero MD Guidelines  Scanned Media  Benefits  TV Interactive Systemshart    Future Appointments   Date Time Provider Alonso Morales   12/5/2022  2:00 PM Reesa Prow, PT [facility]   12/7/2022  2:00 PM FERNANDA Roche BSBHPST [facility] AMB   12/7/2022  4:30 PM Gallito Vaca DO UCDG [facility] AMB   12/8/2022  2:00 PM Reesa Prow, PT [facility]   12/12/2022  1:00 PM Reesa Prow, PT [facility]   12/12/2022  3:00 PM HOWARD Mack [facility] AMB   12/14/2022  2:15 PM TOMAS Collins - CNP POAI [facility] AMB   12/15/2022  3:00 PM Reesa Prow, PT [facility]   12/21/2022  1:00 PM Reesa Prow, PT SFO[facility]   12/27/2022  3:00 PM [facility] OUTREACH INSURANCE [facility]   12/27/2022  3:30 PM Burton Devine MD [facility] AMB   12/28/2022  1:00 PM Reesa Prow, PT SFO[facility]   1/4/2023  1:00 PM Reesa Prow, PT SFO[facility]   2/22/2023  5:40 AM [facility] OUTREACH INSURANCE [facility]   2/22/2023  1:00 PM Shireen Vazquez MD [facility] AMB   2/28/2023  3:00 PM Kati Talley MD [facility] AMB

## 2022-12-02 RX ORDER — DICLOFENAC SODIUM 75 MG/1
75 TABLET, DELAYED RELEASE ORAL 2 TIMES DAILY
Qty: 60 TABLET | Refills: 0 | Status: SHIPPED | OUTPATIENT
Start: 2022-12-02

## 2022-12-05 ENCOUNTER — HOSPITAL ENCOUNTER (OUTPATIENT)
Dept: PHYSICAL THERAPY | Age: 57
Setting detail: RECURRING SERIES
Discharge: HOME | End: 2022-12-08
Payer: MEDICAID

## 2022-12-05 PROCEDURE — 97140 MANUAL THERAPY 1/> REGIONS: CPT

## 2022-12-05 PROCEDURE — 97110 THERAPEUTIC EXERCISES: CPT

## 2022-12-05 NOTE — PROGRESS NOTES
Mauricio Larose  : 1965  Primary: [address] Medicaid  Secondary:  [address] @ [address]  [address] [place] [address]  Phone: [phone]  Fax: [phone] Plan Frequency: 1-2x/week for 90 days  Plan of Care/Certification Expiration Date: 23      PT Visit Info:        OUTPATIENT PHYSICAL WNWFNHZ:AK NOTE TYPE: Treatment Note 2022       Episode  }Appt Desk             Treatment Diagnosis:  Low back pain (M54.5)  Radiculopathy, Lumbar Region (M54.16)  Muscle Weakness, Generalized (M62.81)  Abnormal posture (R29.3)  Medical/Referring Diagnosis:  Radiculopathy, lumbar region [M54.16]  Chronic pain of both knees [M25.561, M25.562, G89.29]  Chronic bilateral low back pain without sciatica [M54.50, G89.29]  Referring Physician:  Molly Falcon MD MD Orders:  PT Eval and Treat  Date of Onset:  Onset Date: 06     Allergies:   Diltiazem hcl  Restrictions/Precautions:  Restrictions/Precautions: None  No data recorded   Interventions Planned (Treatment may consist of any combination of the following):    Current Treatment Recommendations: Strengthening; ROM; Balance training; Functional mobility training; Endurance training; Gait training; Stair training; Neuromuscular re-education; Manual Therapy - Soft Tissue Mobilization; Manual Therapy - Joint Manipulation; Pain management; Return to work related activity; Home exercise program; Safety education & training; Patient/Caregiver education & training; Modalities; Positioning; Integrated dry needling; Aquatics; Therapeutic activities     Subjective Comments: Pt states that her knee felt a lot better 3-4 days after last session. She says that that she did some shopping on Friday that caused the knee some pain. She says that her back has not been hurting as bad as of late. Pt states that she has been compliant with HEP since last visit.    Initial:}     4/10Post Session:        4/10  Medications Last Reviewed:  12/5/2022  Updated Objective Findings:  None Today  Treatment   THERAPEUTIC EXERCISE: (23 minutes):  Exercises per grid below to improve mobility and strength. Required moderate visual, verbal, manual and tactile cues to promote proper body alignment, promote proper body posture and promote proper body mechanics. Progressed resistance, range, repetitions and complexity of movement as indicated. Date:  12/5/2022   Activity/Exercise Parameters   KT tape to medial knee 4 magan   LTR 15 reps   Hip adduction    Bridging w PPT 15 reps    Sit to stand    SLR flexion --   SLR abduction --   Walking marching    Cat cow --   Supine clamshell 1x10 RTB B   Supine march with TA --   Sit to stand 20 reps   PPT    SLR w/ PPT        MANUAL THERAPY: (30 minutes): Joint mobilization, Soft tissue mobilization and Manipulation was utilized and necessary because of the patient's restricted joint motion, painful spasm, loss of articular motion and restricted motion of soft tissue. STM to VMO, adductor complex, semitendinosus and semimembranosus      Commonly used abbreviations that may be included in this note:  STM- Soft tissue mobilization, R>L or L>R- right greater than left or vice versa, HEP - Home exercise program, CPA/UPA - Central or unilateral posterior-anterior mobilization, SLS- single leg stance, SKTC - Single leg to chest, SNAGS/NAGS- (sustained) Natural apophyseal glides, TKE- Terminal knee extension, ER- External rotation, IR- Internal rotation, B - Bilateral, sec- seconds, Lb- pounds, min - minutes, HA- Headache, OP- Over pressure, tband- theraband, fwd/bwd- Forward/Backward, TA- Transversus Abdominus, dbl- Double      TREATMENT/SESSION SUMMARY: Pt tolerated session well this date with no significant increases in pain when completing therapy exercises. Pt ambulated into clinic with significant stiff gait pattern on RLE. Pt had marked TTP in VMO, adductor complex, semitendinosus and semimembranosus. Tissue extensibility increased with STM and pt demonstrated decreased stiffness during ambulation following session. KT tape reapplied as this was helpful for her last visit     Communication/Consultation:  None today  Equipment provided today:  None  Recommendations/Intent for next treatment session: Next visit will focus on progressing strength and ROM.       Total Treatment Billable Duration: 53 minutes  Time In: 1400  Time Out: [address]       Charge Capture  }Post Session Pain  PT Visit Info  [address] Portal  MD Guidelines  Scanned Media  Benefits  MyChart    Future Appointments   Date Time Provider Alonso Morales   12/7/2022  2:00 PM Joni Ahumada, LISW BSBHPST [facility] AMB   12/7/2022  4:30 PM Debra Mitchell DO [facility] AMB   12/8/2022  2:00 PM Socorro Lindsay, PT SFOFF [facility]   12/12/2022  1:00 PM Socorro Lindsay, PT SFO[facility]   12/12/2022  3:00 PM HOWARD Baker CSAE [facility] AMB   12/14/2022  2:15 PM Jan Gottlieb APRN - CNP POAI [facility] AMB   12/15/2022  3:00 PM Socorro Irizarryman, PT S[facility]   12/21/2022  1:00 PM Socorro Irizarryman, PT S[facility]   12/27/2022  3:00 PM [facility] OUTREACH INSURANCE [facility]   12/27/2022  3:30 PM Yamile Sadler MD [facility] AMB   12/28/2022  1:00 PM Socorro Lindsay, PT S[facility]   1/4/2023  1:00 PM Socorro Lindsay, PT SFOFF [facility]   2/22/2023  5:40 AM [facility] OUTREACH INSURANCE [facility]   2/22/2023  1:00 PM Justin Benítez MD [facility] G[facility] AMB   2/28/2023  3:00 PM Vanessa Barnard MD [facility] G[facility] AMB

## 2022-12-07 ENCOUNTER — OFFICE VISIT (OUTPATIENT)
Dept: BEHAVIORAL/MENTAL HEALTH CLINIC | Facility: CLINIC | Age: 57
End: 2022-12-07

## 2022-12-07 ENCOUNTER — OFFICE VISIT (OUTPATIENT)
Dept: CARDIOLOGY CLINIC | Age: 57
End: 2022-12-07
Payer: MEDICAID

## 2022-12-07 VITALS
HEIGHT: 67 IN | HEART RATE: 72 BPM | DIASTOLIC BLOOD PRESSURE: 88 MMHG | BODY MASS INDEX: 44.57 KG/M2 | SYSTOLIC BLOOD PRESSURE: 138 MMHG | WEIGHT: 284 LBS

## 2022-12-07 DIAGNOSIS — F41.1 GAD (GENERALIZED ANXIETY DISORDER): ICD-10-CM

## 2022-12-07 DIAGNOSIS — I48.0 PAROXYSMAL ATRIAL FIBRILLATION (HCC): ICD-10-CM

## 2022-12-07 DIAGNOSIS — F33.0 MDD (MAJOR DEPRESSIVE DISORDER), RECURRENT EPISODE, MILD (HCC): Primary | ICD-10-CM

## 2022-12-07 DIAGNOSIS — Z98.890 H/O CARDIAC RADIOFREQUENCY ABLATION: ICD-10-CM

## 2022-12-07 DIAGNOSIS — E66.01 OBESITY, MORBID, BMI 40.0-49.9 (HCC): ICD-10-CM

## 2022-12-07 DIAGNOSIS — I10 ESSENTIAL HYPERTENSION: Primary | ICD-10-CM

## 2022-12-07 PROCEDURE — 3078F DIAST BP <80 MM HG: CPT | Performed by: INTERNAL MEDICINE

## 2022-12-07 PROCEDURE — 93000 ELECTROCARDIOGRAM COMPLETE: CPT | Performed by: INTERNAL MEDICINE

## 2022-12-07 PROCEDURE — 99214 OFFICE O/P EST MOD 30 MIN: CPT | Performed by: INTERNAL MEDICINE

## 2022-12-07 PROCEDURE — 3074F SYST BP LT 130 MM HG: CPT | Performed by: INTERNAL MEDICINE

## 2022-12-07 ASSESSMENT — ENCOUNTER SYMPTOMS
VOMITING: 0
ABDOMINAL PAIN: 0
NAUSEA: 0
GASTROINTESTINAL NEGATIVE: 1
COUGH: 0
SHORTNESS OF BREATH: 0
WHEEZING: 0

## 2022-12-07 NOTE — PROGRESS NOTES
[address]      Patient:  Chris Garzon  1965         SUBJECTIVE:  Chris Garzon is a  61 y.o. female seen for a follow up visit regarding the following:     Chief Complaint   Patient presents with    Irregular Heart Beat   . CC: A Fib post ablation     HPI:   60 yo F with history of atrial fibrillation s/p ablation, HTN,  endometrial CA, obesity    Patient was last seen in office on 9/21/20 , since then reports that she has been doing well. No chest pain, dyspnea on exertion, racing heart , palpitations, leg swelling. She does have some swelling in the right knee. No other complaints at this time. Taking her medications as directed without missing doses. Is upcoming follow-up with bariatrics for consideration of weight loss surgery. Cardiovascular Testing:  - Cath 7/5/22 [facility] in [place]: Normal coronary arteries. - SPECT 6/29/22 [facility] in [place] : moderate, partially reversible anteroapical defect, LVEF 71%. (FALSE POSITIVE)  - Echo 6/28/22 [facility]: LVEF 60-65 %, RV normal, Valves: mild mitral regurgitation. -  6/29/19 in [place]: LVEF >55%, RV normal, mild MR, mild TR  - Echo 6/19/2018 in [place]  reveals normal LVEF, no significant valvular abnormalities  - ECG stress test 6/28/2018: negative for inducible ischemia. Past medical history, past surgical history, family history, social history, and medications were all reviewed with the patient today and updated as necessary.          Patient Active Problem List    Diagnosis Date Noted    Age-related nuclear cataract of both eyes 05/19/2020     Priority: Medium    Allergic conjunctivitis of both eyes 05/19/2020     Priority: Medium    Myopia of both eyes with regular astigmatism and presbyopia 05/19/2020     Priority: Medium    Epidermal inclusion cyst of left eyelid 05/12/2020     Priority: Medium    S/P total thyroidectomy 08/28/2020    HARRIET (obstructive sleep apnea) 06/12/2020    Graves' ophthalmopathy 03/05/2020    Other cirrhosis of liver ([facility] 11/27/2019    Symptomatic cholelithiasis 11/12/2019    Postoperative hypothyroidism 11/08/2019    Essential hypertension 11/08/2019    Obesity, morbid ([facility] 11/08/2019    Chronic atrial fibrillation ([facility] 11/08/2019    Endometrial cancer ([facility] 11/08/2019       Family History   Problem Relation Age of Onset    Cancer Paternal Aunt         Cervical    Thyroid Disease Son         Hyperthyroidism    Cancer Sister         Cervical    Thyroid Disease Daughter         Hypothyroidism    Liver Disease Father     Diabetes Father     Heart Disease Father     Kidney Disease Mother         dialysis    Stroke Mother        Social History     Tobacco Use    Smoking status: Never    Smokeless tobacco: Never   Substance Use Topics    Alcohol use: Not Currently     Alcohol/week: 1.0 standard drink     Types: 1 Glasses of wine per week     Comment: During holidays       Review of Systems   Constitutional: Negative. Negative for chills and fever. Cardiovascular: Negative. Negative for chest pain, dyspnea on exertion, irregular heartbeat, leg swelling, near-syncope and palpitations. Respiratory:  Negative for cough, shortness of breath and wheezing. Musculoskeletal:  Positive for joint pain (knees). Gastrointestinal: Negative. Negative for abdominal pain, nausea and vomiting. Neurological: Negative. Negative for dizziness and light-headedness. PHYSICAL EXAM:  /88   Pulse 72   Ht 5' 7\" (1.702 m)   Wt 284 lb (128.8 kg)   BMI 44.48 kg/m²     Physical Exam  Vitals reviewed. Constitutional:       General: She is not in acute distress. Appearance: She is obese. She is not ill-appearing, toxic-appearing or diaphoretic. HENT:      Head: Normocephalic and atraumatic. Cardiovascular:      Rate and Rhythm: Normal rate and regular rhythm. Heart sounds: Normal heart sounds. No murmur heard. No friction rub.  No gallop. Pulmonary:      Effort: Pulmonary effort is normal. No respiratory distress. Breath sounds: Normal breath sounds. No stridor. No wheezing or rales. Abdominal:      General: There is no distension. Palpations: Abdomen is soft. Musculoskeletal:         General: No swelling. Right lower leg: No edema. Left lower leg: No edema. Skin:     General: Skin is warm and dry. Coloration: Skin is not jaundiced or pale. Neurological:      Mental Status: She is alert and oriented to person, place, and time. Psychiatric:         Mood and Affect: Mood normal.         Thought Content: Thought content normal.         Judgment: Judgment normal.       Medical problems, medical history, and test results were reviewed with the patient today. No results found for this or any previous visit (from the past 168 hour(s)). Current Outpatient Medications:     diclofenac (VOLTAREN) 75 MG EC tablet, Take 1 tablet by mouth 2 times daily, Disp: 60 tablet, Rfl: 0    albuterol sulfate HFA (PROVENTIL;VENTOLIN;PROAIR) 108 (90 Base) MCG/ACT inhaler, Inhale 2 puffs into the lungs every 4 hours as needed for Wheezing, Disp: 18 g, Rfl: 5    traZODone (DESYREL) 150 MG tablet, Take 1 tablet by mouth nightly, Disp: 90 tablet, Rfl: 3    valsartan-hydroCHLOROthiazide (DIOVAN-HCT) 320-25 MG per tablet, Take 1 tablet by mouth daily, Disp: 90 tablet, Rfl: 3    LORazepam (ATIVAN) 1 MG tablet, Take 1 tablet by mouth 2 times daily for 180 days. , Disp: 60 tablet, Rfl: 5    traMADol (ULTRAM) 50 MG tablet, Take 1 tablet by mouth in the morning and at bedtime for 180 days. , Disp: 180 tablet, Rfl: 3    ferrous sulfate (IRON 325) 325 (65 Fe) MG tablet, Take 325 mg by mouth daily (with breakfast), Disp: , Rfl:     nystatin (MYCOSTATIN) 967525 UNIT/GM cream, Apply topically 2 times daily.  (Patient taking differently: as needed Apply topically 2 times daily.), Disp: 30 g, Rfl: 1    amLODIPine (NORVASC) 5 MG tablet, Take 5 mg by mouth daily, Disp: , Rfl:     esomeprazole (NEXIUM) 20 MG delayed release capsule, Take one cap daily. , Disp: , Rfl:     levothyroxine (SYNTHROID) 150 MCG tablet, 1 tablet once a day except for 1/2 tablet on Sundays, Disp: , Rfl:     buPROPion (WELLBUTRIN XL) 150 MG extended release tablet, Take 1 tablet by mouth every morning (Patient not taking: Reported on 12/7/2022), Disp: 90 tablet, Rfl: 3    vitamin B-12 (CYANOCOBALAMIN) 500 MCG tablet, Take 500 mcg by mouth daily (Patient not taking: Reported on 12/7/2022), Disp: , Rfl:      ASSESSMENT/PLAN:    Cardiovascular Testing:  - Cath 7/5/22 [facility] in [place]: Normal coronary arteries. - SPECT 6/29/22 [facility] in [place] : moderate, partially reversible anteroapical defect, LVEF 71%. (FALSE POSITIVE)  - Echo 6/28/22 [facility]: LVEF 60-65 %, RV normal, Valves: mild mitral regurgitation. -  6/29/19 in [place]: LVEF >55%, RV normal, mild MR, mild TR  - Echo 6/19/2018 in [place]  reveals normal LVEF, no significant valvular abnormalities  - ECG stress test 6/28/2018: negative for inducible ischemia.   - EKG personally reviewed in office today demonstrates Sinus  Rhythm   RATE 64 BPM , OTHERWISE NORMAL. 1. Paroxysmal atrial fibrillation ([address]  2. H/O cardiac radiofrequency ablation  -No recurrence, aspirin stopped due to severe anemia in the summer 2022. Has maintained sinus rhythm. If atrial fibrillation recurs will need consideration of anticoagulation. 3. Essential hypertension  -Controlled at this time  - EKG 12 lead    Perioperative Risk Assessment  - patient in preparation for weight loss operation   - avoid hypotension, closely monitor vitals in the perioperative period  - Echo reveals normal LVEF as above, recent cath with normal coronary arteries   - at this time she is low risk for bariatric surgery  - no indication for further cardiac testing prior to OR  - reasonable to hold ARB 24 hours prior to OR.          Problem List Items Addressed This Visit          Circulatory    Essential hypertension - Primary    Relevant Orders    EKG 12 lead (Completed)    Chronic atrial fibrillation ([facility]     Other Visit Diagnoses       H/O cardiac radiofrequency ablation                Instructed patient go to ER or call 911/EMS should symptoms recur or worsen. Patient has been instructed and agrees to call our office with any issues or other concerns related to their cardiac condition(s) and/or complaint(s). Return in about 6 months (around 6/7/2023).     Milton Hutton,   12/7/2022 3:52 PM

## 2022-12-07 NOTE — PROGRESS NOTES
BARIATRIC FOLLOW UP NOTE  NAME: Farrah Bruce    DATE: 12/7/2022    TYPE OF SERVICE: Individual Treatment    LOCATION OF SERVICE: [facility]/ In person visit at office    DURATION: 40 mins    DIAGNOSIS: :    1. MDD (major depressive disorder), recurrent episode, mild (HCC)    2. Obesity, morbid, BMI 40.0-49.9 ([facility]    3. WINDY (generalized anxiety disorder)        SURGERY STATUS: Approved    MENTAL STATUS EXAM:  Pt was appropriately groomed. Pt was 0x4. No unusual mannerisms were noted. No psychotic symptoms displayed by pt. Pt was cooperative and engaged in the session. Insight and judgment were intact. Denied suicidal or homicidal ideation. Perceptions were appropriate. Attention and concentration were normal.  No memory impairments were noted. Fund of knowledge was within normal limits. Speech pattern and language were intact. 0x4. Denied SI/HI. Mood/Affect: even with congruent affect  Thought Process: linear and coherent    Pt is progressing on goals. Pt is a 61 y.o. female who presents today for the second mandatory appointment with this SW following the initial psychological evaluation for bariatric surgery. The pt was approved for surgery. Assessment    Eating Behaviors: The pt reports eating behaviors have been going well. The pt has been eating protein and vegetables and  has been limiting carbohydrates. The pt has not been drinking with her meals. The pt  has been eating three meals a day. Any recent weight gain is denied. Straws are not being used. Pt is attempting to practice mindful eating. Exercise: The pt has been doing her PT exercises every day. She states it is helping with her knee pain. Carbonation: None    Caffeine: None    Logging Food:No but plans to start in the future.     Support System: Good/Intact    Medications: Compliant    Alcohol/Drug/Nicotine Use: None    Post Surgery Behavioral Plan Completed: Yes    Clinical Narrative:Pt came in today for the 2-4 week mandatory bariatric follow up appointment. The behavioral plan was completed. Meal planning, exercise, dealing with holidays and vacations, coping skills, boundary setting and possible relationship issues were addressed. Information on urge surfing was given. Attuned movement and eating were also addressed. The ACT concept of acceptance was also discussed. How to avoid weight recurrence was also addressed. Plan: Pt encouraged to attend the supportive group. Continued adherence to the bariatric regimen was reinforced. Outpatient therapy appointments were offered if needed in the future. The pt was agreeable to outpatient therapy with this SW. She will talk to [address] [place] [address] about putting in a referral for therapy at her appt next week. The pt has a history of depression, anxiety and emotional eating. She is also dealing with grief issues at this time and she could benefit from therapy to learn coping skills and increase support.     Follow Up: 2-4 weeks

## 2022-12-08 ENCOUNTER — HOSPITAL ENCOUNTER (OUTPATIENT)
Dept: PHYSICAL THERAPY | Age: 57
Setting detail: RECURRING SERIES
Discharge: HOME | End: 2022-12-11
Payer: MEDICAID

## 2022-12-08 PROCEDURE — 97110 THERAPEUTIC EXERCISES: CPT

## 2022-12-08 PROCEDURE — 97140 MANUAL THERAPY 1/> REGIONS: CPT

## 2022-12-08 NOTE — PROGRESS NOTES
Jaida Baltazar  : 1965  Primary: [address] Medicaid  Secondary:  [address] @ [address] [address]  Phone: [phone]  Fax: [phone] Plan Frequency: 1-2x/week for 90 days  Plan of Care/Certification Expiration Date: 23      PT Visit Info:        OUTPATIENT PHYSICAL QZEWCRA:EI NOTE TYPE: Treatment Note 2022       Episode  }Appt Desk             Treatment Diagnosis:  Low back pain (M54.5)  Radiculopathy, Lumbar Region (M54.16)  Muscle Weakness, Generalized (M62.81)  Abnormal posture (R29.3)  Medical/Referring Diagnosis:  Radiculopathy, lumbar region [M54.16]  Chronic pain of both knees [M25.561, M25.562, G89.29]  Chronic bilateral low back pain without sciatica [M54.50, G89.29]  Referring Physician:  Ros Mccarthy MD MD Orders:  PT Eval and Treat  Date of Onset:  Onset Date: 06     Allergies:   Diltiazem hcl  Restrictions/Precautions:  Restrictions/Precautions: None  No data recorded   Interventions Planned (Treatment may consist of any combination of the following):    Current Treatment Recommendations: Strengthening; ROM; Balance training; Functional mobility training; Endurance training; Gait training; Stair training; Neuromuscular re-education; Manual Therapy - Soft Tissue Mobilization; Manual Therapy - Joint Manipulation; Pain management; Return to work related activity; Home exercise program; Safety education & training; Patient/Caregiver education & training; Modalities; Positioning; Integrated dry needling; Aquatics; Therapeutic activities     Subjective Comments: Pt states that her knee continues to feel better after sessions. She says that her back has not been hurting as bad as of late. Pt states that she has been compliant with HEP since last visit.    Initial:}     4/10Post Session:        4/10  Medications Last Reviewed:  2022  Updated Objective Findings:  None Today  Treatment   THERAPEUTIC EXERCISE: (23 minutes):  Exercises per grid below to improve mobility and strength. Required moderate visual, verbal, manual and tactile cues to promote proper body alignment, promote proper body posture and promote proper body mechanics. Progressed resistance, range, repetitions and complexity of movement as indicated. Date:  12/8/2022   Activity/Exercise Parameters   KT tape to medial knee --   LTR 15 reps   Hip adduction    Bridging w PPT 15 reps    Sit to stand    SLR flexion --   SLR abduction --   Walking marching    Cat cow --   Supine clamshell 1x10 RTB B   Seated hamstring stretch  3 min    Sit to stand w 8.8lb  20 reps   PPT 15reps   Backward walking  5 min        MANUAL THERAPY: (30 minutes): Joint mobilization, Soft tissue mobilization and Manipulation was utilized and necessary because of the patient's restricted joint motion, painful spasm, loss of articular motion and restricted motion of soft tissue. STM to VMO, adductor complex, semitendinosus and semimembranosus      Commonly used abbreviations that may be included in this note:  STM- Soft tissue mobilization, R>L or L>R- right greater than left or vice versa, HEP - Home exercise program, CPA/UPA - Central or unilateral posterior-anterior mobilization, SLS- single leg stance, SKTC - Single leg to chest, SNAGS/NAGS- (sustained) Natural apophyseal glides, TKE- Terminal knee extension, ER- External rotation, IR- Internal rotation, B - Bilateral, sec- seconds, Lb- pounds, min - minutes, HA- Headache, OP- Over pressure, tband- theraband, fwd/bwd- Forward/Backward, TA- Transversus Abdominus, dbl- Double      TREATMENT/SESSION SUMMARY: Pt tolerated session well this date with no significant increases in pain when completing therapy exercises. Pt ambulated into clinic with significant stiff gait pattern on RLE. Pt had marked TTP in VMO, adductor complex, semitendinosus and semimembranosus.  Tissue extensibility increased with STM and pt demonstrated decreased stiffness during ambulation following session. Communication/Consultation:  None today  Equipment provided today:  None  Recommendations/Intent for next treatment session: Next visit will focus on progressing strength and ROM.       Total Treatment Billable Duration: 53 minutes  Time In: 0930  Time Out: 8910    [id number]       Charge Capture  }Post Session Pain  PT Visit Info  [facility]  MD Guidelines  Scanned Media  Benefits  MyChart    Future Appointments   Date Time Provider [facility]   12/12/2022  1:00 PM Juan Pap, PT [facility]   12/12/2022  3:00 PM HOWARD Hamilton [facility] AMB   12/14/2022  2:15 PM TOMAS Parker - CNP [facility] AMB   12/15/2022  3:00 PM Juan Pap, PT [facility]   12/21/2022  1:00 PM Juan Pap, PT [facility]   12/27/2022  3:00 PM [facility] OUTREACH INSURANCE [facility]   12/27/2022  3:30 PM Judah Calloway MD [facility]   12/28/2022  1:00 PM Juan Pap, PT [facility]   1/4/2023  1:00 PM Juan Pap, PT [facility]   2/22/2023  5:40 AM [facility] OUTREACH INSURANCE [facility]   2/22/2023  1:00 PM Clarice Thrasher MD [facility] AM[facility]   2/28/2023  3:00 PM Henrique Torres MD [facility] AMB   6/8/2023 11:00 AM Shiv Garcia DO [facility] AMB

## 2022-12-09 NOTE — PROGRESS NOTES
Bette Banks PA-C  Bariatric & Advanced Laparoscopic Surgery & Endoscopy  [address] [place] [address]  Cristian Kaur  Phone [phone]   Fax [phone]    Date of visit: 2022      Primary/Requesting provider: Jesus Horvath MD         Name: Bryan Chau      MRN: [id number]       : 1965       Age: 61 y.o. Sex: female        PCP: Jesus Horvath MD     CC:  Bariatric monthly dietary follow up    Surgeon: Dr. Corey Bender    Procedure: laparoscopic gastric bypass    Surgical Consult Date: 10/04/2022    The patient is a 61 y.o. female presenting with a height of 5' 7\" (1.702 m) and weight of 283 lb (128.4 kg), giving her a Body mass index is 44.32 kg/m². She has an ideal body weight of 155 lbs, and excess body weight of 128 lbs. She started our program with a weight of 281 lbs, gained 2lbs. She is in the process of completing all aspects of our prep program and to be deemed an acceptable candidate for bariatric surgery. Patient has a long term history of obesity with multiple failed attempts at weight reduction. Patient denies any changes in health history or physical health since last visit. Patient has been adherent to her diet and exercise regimen. Patient feels that she is well informed regarding her bariatric surgery choice and would like to proceed with a laparoscopic naa-en-y gastric bypass for weight reduction, improvement of her medical conditions, and improved quality of life. Patient verbalized understanding of the risks associated with bariatric surgery. Patient also verbalized understanding that bariatric surgery is a tool and that weight reduction is dependent on behavioral changes in regards to what she eats and how much. PRE-OPERATIVE TESTING:   PSYCHOLOGICAL EVALUATION:  Completed, 2022 with Hendricks Dandy deeming her an acceptable candidate.   DIETITIAN EVALUATION:  In progress with Harmony Rosas RD, LD  BARIATRIC LABS:  Completed, 10/04/2022 (vitamin B12 2182, A1C 5.7)  CARDIAC CLEARANCE:  Completed, 12/07/2022  ONCOLOGY CLEARANCE:  Completed, 10/05/2022  UPPER GI:  Completed, 11/22/2022  1. Small hiatal hernia. 2. Gastroesophageal reflux. EGD:  Completed    ABDOMINAL US:  Not completed, will call today to schedule  PHYSICAL THERAPY EVALUATION FOR MOBILITY OPTIMIZATION:  Completed, 10/06/2022     MEDICAL HISTORY:  Morbid Obesity  Depression  Anxiety  Hypothyroidism  Anemia  A-fib  Heart ablation in 2018  Hx of uterine cancer in 2019 - no current treatment - s/p hysterectomy  Hx of blood transfusion July 2022 - had EGD/colonoscopy in [place] and no findings     Comorbidity Yes or No   Hypertension- how many medications = 1 Yes   Hyperlipidemia No   Diabetes Mellitus No   Coronary Artery Disease No   Gastroesophageal Reflux  Treatment Med = Nexium Yes   Obstructive Sleep Apnea No   Cancer Yes, uterine cancer in 2019   Asthma Yes   Osteoarthritis Yes   Joint Pain Yes      Admits to GERD symptoms for 10+ years including heartburn, which occurs 4x per week when eating spicy foods. She completed an EGD in July at a hospital in [place] with concern for bleeding due to Naprosyn intake, but no source was found. She was given iron and blood transfusions at this time.       Other Yes or No   Venous Stasis No   Dialysis No   Long term use of steroid or immunocompromised conditions No   On Anticoagulants No      PMH:  Past Medical History:   Diagnosis Date    Arrhythmia 2018    a-fib (2017) ablation-(12/2018)- resolved    Arthritis Oct. 20, 2022    Knees and left heel    Asthma     seasonal allergies- maint and rescue inhaler    Cancer ([address]     endometrial cancer- hysterectomy 11/26/20    Chronic pain     back pain - herniated disc    Cirrhosis ([address]     Claustrophobia     Colon polyps     Difficult intubation     in [place] 2019- glidescope needed with hysterectomy/lap deysi 11/26/19 @ [facility]    GERD (gastroesophageal reflux disease) H/O echocardiogram     Echo LVEF 55% mild mitral and tricuspid regurg    H/O: iron deficiency anemia     Heart murmur 06/2019    Echo LVEF 55% mild mitral and tricuspid regurg    Hypertension     managed with meds    Osteoarthritis Oct.6, 2022    ER x-ray showed arthritis    Sleep apnea     not currently using cpap    Thyroid disease     Graves Disease       PSH:  Past Surgical History:   Procedure Laterality Date    ABDOMEN SURGERY  Nov. 2019    Hysterectomy and Gallbladder    ABLATION OF DYSRHYTHMIC FOCUS  12/2017    CARDIAC SURGERY  Dec. 2018    Ablation for a-fib    CHOLECYSTECTOMY, LAPAROSCOPIC  11/26/2020    COLONOSCOPY N/A 7/20/2020    COLONOSCOPY/ BMI 43 performed by Reva Yeboah MD at [facility]  10/2019    HAND SURGERY  Dec.1989    A small cyst    HYSTERECTOMY (CERVIX STATUS UNKNOWN)  11/26/2020    with lap deysi    THYROIDECTOMY  08/28/2020    TUBAL LIGATION  1989       MEDS:  Current Outpatient Medications   Medication Sig Dispense Refill    diclofenac (VOLTAREN) 75 MG EC tablet Take 1 tablet by mouth 2 times daily 60 tablet 0    albuterol sulfate HFA (PROVENTIL;VENTOLIN;PROAIR) 108 (90 Base) MCG/ACT inhaler Inhale 2 puffs into the lungs every 4 hours as needed for Wheezing 18 g 5    buPROPion (WELLBUTRIN XL) 150 MG extended release tablet Take 1 tablet by mouth every morning (Patient not taking: Reported on 12/7/2022) 90 tablet 3    traZODone (DESYREL) 150 MG tablet Take 1 tablet by mouth nightly 90 tablet 3    valsartan-hydroCHLOROthiazide (DIOVAN-HCT) 320-25 MG per tablet Take 1 tablet by mouth daily 90 tablet 3    LORazepam (ATIVAN) 1 MG tablet Take 1 tablet by mouth 2 times daily for 180 days. 60 tablet 5    traMADol (ULTRAM) 50 MG tablet Take 1 tablet by mouth in the morning and at bedtime for 180 days.  180 tablet 3    ferrous sulfate (IRON 325) 325 (65 Fe) MG tablet Take 325 mg by mouth daily (with breakfast)      vitamin B-12 (CYANOCOBALAMIN) 500 MCG tablet Take 500 mcg by mouth daily (Patient not taking: Reported on 12/7/2022)      nystatin (MYCOSTATIN) 830492 UNIT/GM cream Apply topically 2 times daily. (Patient taking differently: as needed Apply topically 2 times daily.) 30 g 1    amLODIPine (NORVASC) 5 MG tablet Take 5 mg by mouth daily      esomeprazole (NEXIUM) 20 MG delayed release capsule Take one cap daily. levothyroxine (SYNTHROID) 150 MCG tablet 1 tablet once a day except for 1/2 tablet on Sundays       Current Facility-Administered Medications   Medication Dose Route Frequency Provider Last Rate Last Admin    methylPREDNISolone sodium (SOLU-MEDROL) injection 40 mg  40 mg IntraMUSCular Daily TOMAS Isabel - CNP           ALLERGIES:    Allergies   Allergen Reactions    Diltiazem Hcl Other (See Comments)     Blistering   Other reaction(s): Other- (not listed) - Allergy  Blistering        SH:  Social History     Tobacco Use    Smoking status: Never    Smokeless tobacco: Never   Vaping Use    Vaping Use: Never used   Substance Use Topics    Alcohol use: Not Currently     Alcohol/week: 1.0 standard drink     Types: 1 Glasses of wine per week     Comment: During holidays    Drug use: Never       FH:  Family History   Problem Relation Age of Onset    Cancer Paternal Aunt         Cervical    Thyroid Disease Son         Hyperthyroidism    Cancer Sister         Cervical    Thyroid Disease Daughter         Hypothyroidism    Liver Disease Father     Diabetes Father     Heart Disease Father     Kidney Disease Mother         dialysis    Stroke Mother           Physical Exam:     BP (!) 189/92   Pulse 65   Ht 5' 7\" (1.702 m)   Wt 283 lb (128.4 kg)   BMI 44.32 kg/m²     General:  Well-developed, well-nourished, no distress. Psych:  Cooperative, good insight and judgement. Neuro:  Alert, oriented to person, place and time. Lungs:  Unlabored breathing. Symmetrical chest expansion. Heart:  Regular rate and rhythm.   Abdomen:  Soft, obese, non-tender, non-distended. No guarding or rebound. No palpable masses. Labs: All recent labs were reviewed. Lab Results   Component Value Date    LABA1C 5.7 (H) 10/04/2022     Lab Results   Component Value Date    TSH 1.350 02/25/2022      Lab Results   Component Value Date    VITD25 30.4 10/04/2022      Lab Results   Component Value Date    RHWNLNID04 2209 (H) 10/24/2022      Lab Results   Component Value Date    IRON 95 10/24/2022    TIBC 326 10/24/2022    FERRITIN 116 10/24/2022        Imaging: All images were independently reviewed by me. Diagnosis Orders   1. Morbid obesity ([address]  Ambulatory referral to Behavioral Health      2. Obesity, Class III, BMI 40-49.9 (morbid obesity) ([address]        3. Hx of gastroesophageal reflux (GERD)        4. Primary hypertension        5. Chronic atrial fibrillation (HCC)        6. Other cirrhosis of liver ([address]        7. Dietary counseling        8. Exercise counseling            Assessment/Plan:    Romulo Macedo is a 61 y.o. female is here for monthly follow-up visit prior to bariatric surgery with medical co-morbidities related to morbid obesity. Patient is a good candidate for bariatric surgery and meets NIH criteria for weight loss surgery. In detail, discussed risks and benefits of laparoscopic naa-en-y gastric bypass. Reviewed information and expectations regarding the pre-operative period, the bariatric procedure, and postoperative period. Stressed with patient importance of understanding bariatric surgery is a tool and will not work if patient does not continue with healthy lifestyle changes including regular exercise and high protein, low calorie, low carb diet. Patient was seen by the dietitian today for continued evaluation and management regarding lifestyle and dietary changes. Reviewed assessment and plan in detail. Patient verbalized understanding. Questions answered.     Recommendations: Continue to work on dietary changes over the next couple of weeks. Complete remaining work up requirements including abdominal ultrasound, and she may contact the office for pre-operative appointment once this is completed. Zachary Cisneros PA-C  Bariatric Surgery  12/12/2022    Counseling time:counseling time more than 50% of visit: 25 minutes : I spent this time preparing to see patient (including chart review and preparation), obtaining and/or reviewing additional medical history, performing a physical exam and evaluation, documenting clinical information in the electronic health record, independently interpreting results, communicating results to patient, family or caregiver, and/or coordinating care.

## 2022-12-12 ENCOUNTER — HOSPITAL ENCOUNTER (OUTPATIENT)
Dept: PHYSICAL THERAPY | Age: 57
Setting detail: RECURRING SERIES
Discharge: HOME | End: 2022-12-15
Payer: MEDICAID

## 2022-12-12 ENCOUNTER — OFFICE VISIT (OUTPATIENT)
Dept: SURGERY | Age: 57
End: 2022-12-12
Payer: MEDICAID

## 2022-12-12 VITALS
SYSTOLIC BLOOD PRESSURE: 189 MMHG | WEIGHT: 283 LBS | HEART RATE: 65 BPM | DIASTOLIC BLOOD PRESSURE: 92 MMHG | BODY MASS INDEX: 44.42 KG/M2 | HEIGHT: 67 IN

## 2022-12-12 DIAGNOSIS — Z71.3 DIETARY COUNSELING: ICD-10-CM

## 2022-12-12 DIAGNOSIS — I48.20 CHRONIC ATRIAL FIBRILLATION (HCC): ICD-10-CM

## 2022-12-12 DIAGNOSIS — K74.69 OTHER CIRRHOSIS OF LIVER (HCC): ICD-10-CM

## 2022-12-12 DIAGNOSIS — Z87.19 HX OF GASTROESOPHAGEAL REFLUX (GERD): ICD-10-CM

## 2022-12-12 DIAGNOSIS — Z71.82 EXERCISE COUNSELING: ICD-10-CM

## 2022-12-12 DIAGNOSIS — I10 PRIMARY HYPERTENSION: ICD-10-CM

## 2022-12-12 DIAGNOSIS — E66.01 MORBID OBESITY (HCC): Primary | ICD-10-CM

## 2022-12-12 DIAGNOSIS — E66.01 OBESITY, CLASS III, BMI 40-49.9 (MORBID OBESITY) (HCC): ICD-10-CM

## 2022-12-12 PROCEDURE — 97140 MANUAL THERAPY 1/> REGIONS: CPT

## 2022-12-12 PROCEDURE — 3074F SYST BP LT 130 MM HG: CPT | Performed by: PHYSICIAN ASSISTANT

## 2022-12-12 PROCEDURE — 97110 THERAPEUTIC EXERCISES: CPT

## 2022-12-12 PROCEDURE — 99213 OFFICE O/P EST LOW 20 MIN: CPT | Performed by: PHYSICIAN ASSISTANT

## 2022-12-12 PROCEDURE — 3078F DIAST BP <80 MM HG: CPT | Performed by: PHYSICIAN ASSISTANT

## 2022-12-12 NOTE — PROGRESS NOTES
Cinthia Jaramillo, MS, RD, LD  Surgical Weight Loss Dietitian  [address] [facility], Cristian Duenas  Phone [phone]   Fax [phone]    Anthropometrics:Ht: 56, wt: 283#, 183% IBW, 44.32 BMI    Evaluation:  BW: 283# (-5#, visit 3 of supervised weight loss program)   Pt reports good dietary compliance over the past month. She is consuming 3 meals/d. Pt is exercising via PT exercises and walking 7d/wk for 30-40+ minutes. Pt reports has read through program manual. Pt is in process of purchasing protein shakes and MVI for after sx. Diet Recall:  Breakfast: protein shake, banana  Lunch: protein shake  Dinner: chicken salad, pear  Drinks: water, protein shake  Habits:   Eating mindfully: yes   Drinking after meals: yes, currently waiting 30 minutes   Elimination of sugary/carbonated/caffeinated/alcoholic beverages: yes   Drinking without straws: yes    Nutrition Diagnosis:  Morbid obesity R/T excessive energy intake as evidenced by BMI = 44.32 and 183 % IBW. Nutrition Intervention:   Diet Rx - Low-moderate calorie intake (~2000 kcal/day). Work on eating 3 meals/d and meal balance. Modify distribution, type or amount of food and nutrients within meals or at a specified time-      Encouraged elimination of sugary and carbonated beverages  Encouraged avoidance of simple carbohydrates  Encouraged lean protein focus  Encouraged practice with meal priority; protein first followed by non-starchy vegetables and complex carbohydrates  Discussed mindful eating behaviors and encouraged practice. Encouraged avoidance of fried foods. Encouraged reduction in fast/convenience foods. Discussed need for 3 meals per day and snacks based on body size. Explained macronutrients and emphasized mindful eating behaviors. Discussed meal priority and encouraged practice.     Lean protein first, followed by non-starchy vegetables and complex carbohydrates  Pt goal to continue eating

## 2022-12-12 NOTE — PROGRESS NOTES
Chris Garzon  : 1965  Primary: [address] Medicaid  Secondary:  [address] @ [address]  [address] [place] [address]  Phone: [phone]  Fax: [phone] Plan Frequency: 1-2x/week for 90 days  Plan of Care/Certification Expiration Date: 23      PT Visit Info:        OUTPATIENT PHYSICAL IPOMWIS:WN NOTE TYPE: Treatment Note 2022       Episode  }Appt Desk             Treatment Diagnosis:  Low back pain (M54.5)  Radiculopathy, Lumbar Region (M54.16)  Muscle Weakness, Generalized (M62.81)  Abnormal posture (R29.3)  Medical/Referring Diagnosis:  Radiculopathy, lumbar region [M54.16]  Chronic pain of both knees [M25.561, M25.562, G89.29]  Chronic bilateral low back pain without sciatica [M54.50, G89.29]  Referring Physician:  Rosalio De Leon MD MD Orders:  PT Eval and Treat  Date of Onset:  Onset Date: 06     Allergies:   Diltiazem hcl  Restrictions/Precautions:  Restrictions/Precautions: None  No data recorded   Interventions Planned (Treatment may consist of any combination of the following):    Current Treatment Recommendations: Strengthening; ROM; Balance training; Functional mobility training; Endurance training; Gait training; Stair training; Neuromuscular re-education; Manual Therapy - Soft Tissue Mobilization; Manual Therapy - Joint Manipulation; Pain management; Return to work related activity; Home exercise program; Safety education & training; Patient/Caregiver education & training; Modalities; Positioning; Integrated dry needling; Aquatics; Therapeutic activities     Subjective Comments: Matthew Maynard reports she shopped for 4 hours yesterday with her daughter and her knee was killing her afterwards. It is feeling better today. Her knee is feeling better overall.  She has been massaging her leg more which seems to be helping  Initial:}     4/10Post Session:        4/10  Medications Last Reviewed:  2022  Updated Objective Findings: None Today  Treatment   THERAPEUTIC EXERCISE: (25 minutes):  Exercises per grid below to improve mobility and strength. Required moderate visual, verbal, manual and tactile cues to promote proper body alignment, promote proper body posture and promote proper body mechanics. Progressed resistance, range, repetitions and complexity of movement as indicated. Date:  12/12/2022   Activity/Exercise Parameters   KT tape to medial and anterior knee 5 min   LTR --   Recumbent stepper 8 min   Bridging w PPT --   Sit to stand    Standing calf stretch 3x30 sec   Shuttle press - DL 6 cords 3x20   Walking marching    Cat cow --   Supine clamshell --   Seated hamstring stretch  3 min    Sit to stand w 8.8lb  --   PPT --   Side marches 1 lap       MANUAL THERAPY: (30 minutes): Joint mobilization, Soft tissue mobilization and Manipulation was utilized and necessary because of the patient's restricted joint motion, painful spasm, loss of articular motion and restricted motion of soft tissue. STM to VMO, adductor complex, semitendinosus and semimembranosus      Commonly used abbreviations that may be included in this note:  STM- Soft tissue mobilization, R>L or L>R- right greater than left or vice versa, HEP - Home exercise program, CPA/UPA - Central or unilateral posterior-anterior mobilization, SLS- single leg stance, SKTC - Single leg to chest, SNAGS/NAGS- (sustained) Natural apophyseal glides, TKE- Terminal knee extension, ER- External rotation, IR- Internal rotation, B - Bilateral, sec- seconds, Lb- pounds, min - minutes, HA- Headache, OP- Over pressure, tband- theraband, fwd/bwd- Forward/Backward, TA- Transversus Abdominus, dbl- Double      TREATMENT/SESSION SUMMARY: Joseph Abdullahi has attended 11 physical therapy sessions thus far for chronic low back and knee pain. She is responding well to therapy utilizing both mobility and strengthening exercises.  KT tape also applied to medial joint line as she was complaining of tenderness here as well. Less TTP noted along pes anserine and adductors. She exhibits less of antalgic gait and was encouraged to work on mobility exercises when she feels stiff in her knee. She has remained motivated with exercises and her willingness to complete home exercise program. She will benefit from continued skilled therapy to address joint and soft tissue mobility restrictions and improve hip and quad strengthening to progress functional activities such as squatting and stairs. Communication/Consultation:  None today  Equipment provided today:  None  Recommendations/Intent for next treatment session: Next visit will focus on progressing strength and ROM.       Total Treatment Billable Duration: 55 minutes  Time In: 1300  Time Out: [address] [facility], PT       Charge Capture  }Post Session Pain  PT Visit Info  [employer] Portal  MD Guidelines  Scanned Media  Benefits  MyChart    Future Appointments   Date Time Provider Alonso Morales   12/12/2022  3:00 PM Dutch Meek [facility]   12/14/2022  2:15 PM TOMAS Evans - CNP [facility] AMB   12/15/2022  3:00 PM Caballero Sero, PT [facility]   12/21/2022  1:00 PM Caballero Sero, PT [facility]   12/27/2022  3:00 PM [address] [place] [address] OUTREACH INSURANCE [address]   12/27/2022  3:30 PM Avril Mitchell MD [facility] AMB   12/28/2022  1:00 PM Caballero Sero, PT [facility]   1/4/2023  1:00 PM Caballero Sero, PT [facility]   2/22/2023  5:40 AM [facility]   2/22/2023  1:00 PM Chung Schmitt MD [facility] AMB   2/28/2023  3:00 PM Sara Felton MD [facility] AMB   6/8/2023 11:00 AM Brandon Herrera DO [facility] AMB

## 2022-12-14 ENCOUNTER — OFFICE VISIT (OUTPATIENT)
Dept: ORTHOPEDIC SURGERY | Age: 57
End: 2022-12-14

## 2022-12-14 DIAGNOSIS — M17.11 PRIMARY OSTEOARTHRITIS OF RIGHT KNEE: Primary | ICD-10-CM

## 2022-12-14 DIAGNOSIS — M70.50 PES ANSERINE BURSITIS: ICD-10-CM

## 2022-12-14 RX ORDER — DICLOFENAC SODIUM 75 MG/1
75 TABLET, DELAYED RELEASE ORAL 2 TIMES DAILY
Qty: 60 TABLET | Refills: 0 | Status: SHIPPED | OUTPATIENT
Start: 2022-12-14 | End: 2023-01-13

## 2022-12-14 RX ORDER — TRIAMCINOLONE ACETONIDE 40 MG/ML
40 INJECTION, SUSPENSION INTRA-ARTICULAR; INTRAMUSCULAR ONCE
Status: COMPLETED | OUTPATIENT
Start: 2022-12-14 | End: 2022-12-14

## 2022-12-14 RX ADMIN — TRIAMCINOLONE ACETONIDE 40 MG: 40 INJECTION, SUSPENSION INTRA-ARTICULAR; INTRAMUSCULAR at 16:19

## 2022-12-14 NOTE — PROGRESS NOTES
Name: Farrah Bruce  YOB: 1965  Gender: female  MRN: [id number]      CC: Follow-up (R knee recheck)       HPI: Farrah Bruce is a 61 y.o. female who returns for follow up on right knee pain. She reports that she got excellent relief from her corticosteroid injection for a month but her pain returned. She is attending formal physical therapy which she thinks is helping but she still has pain with weightbearing and pain at night. Physical Examination:  General: no acute distress  Lungs: breathing easily  CV: regular rhythm by pulse  Right Knee: Minimal effusion present. Tenderness to palpation in both the lateral and medial joint line and pes bursa and medial hamstring tendon. Active extension 2 degrees shy of full extension with flexion limited to 120 degrees. Negative ligamentous exam x4. Mild pain with Wallace's. Negative bounce home test.        Assessment:   1. Primary osteoarthritis of right knee    2. Pes anserine bursitis         Plan:   The majority of the patient's right knee pain continues to be from osteoarthritis although she does have some symptoms of pes anserine bursitis. I discussed with the patient continued conservative treatment options to include corticosteroid injection of the pes bursa, a refill of her NSAID and continued formal physical therapy. I think she would benefit from a corticosteroid injection into the pes bursa which she agreed to proceed with today. I will reorder formal physical therapy and refill her diclofenac. She reports that next month she is having bariatric surgery which she is hoping will help improve her pain. After informed verbal consent was obtained the area of maximal tenderness over the pes bursa was injected with 3 cc of 0.25% Marcaine and 1 cc of 40 mg Kenalog. The patient tolerated the injection well and was given post injection flare precautions.       Meme Evans, TOMAS - CLOVER    Orthopaedics and Sports Medicine

## 2022-12-15 ENCOUNTER — HOSPITAL ENCOUNTER (OUTPATIENT)
Dept: PHYSICAL THERAPY | Age: 57
Setting detail: RECURRING SERIES
Discharge: HOME | End: 2022-12-18
Payer: MEDICAID

## 2022-12-15 PROCEDURE — 97140 MANUAL THERAPY 1/> REGIONS: CPT

## 2022-12-15 PROCEDURE — 97110 THERAPEUTIC EXERCISES: CPT

## 2022-12-15 NOTE — PROGRESS NOTES
Dori Patel  : 1965  Primary: [address] [place] [address] Medicaid  Secondary:  [address] @ [address]  [address] [place] [address]  Phone: [phone]  Fax: [phone] Plan Frequency: 1-2x/week for 90 days  Plan of Care/Certification Expiration Date: 23      PT Visit Info:        OUTPATIENT PHYSICAL RAPDVNM:BE NOTE TYPE: Treatment Note 12/15/2022       Episode  }Appt Desk             Treatment Diagnosis:  Low back pain (M54.5)  Radiculopathy, Lumbar Region (M54.16)  Muscle Weakness, Generalized (M62.81)  Abnormal posture (R29.3)  Medical/Referring Diagnosis:  Radiculopathy, lumbar region [M54.16]  Chronic pain of both knees [M25.561, M25.562, G89.29]  Chronic bilateral low back pain without sciatica [M54.50, G89.29]  Referring Physician:  Merlinda Sabin, MD MD Orders:  PT Eval and Treat  Date of Onset:  Onset Date: 06     Allergies:   Diltiazem hcl  Restrictions/Precautions:  Restrictions/Precautions: None  No data recorded   Interventions Planned (Treatment may consist of any combination of the following):    Current Treatment Recommendations: Strengthening; ROM; Balance training; Functional mobility training; Endurance training; Gait training; Stair training; Neuromuscular re-education; Manual Therapy - Soft Tissue Mobilization; Manual Therapy - Joint Manipulation; Pain management; Return to work related activity; Home exercise program; Safety education & training; Patient/Caregiver education & training; Modalities; Positioning; Integrated dry needling; Aquatics; Therapeutic activities     Subjective Comments: Dao Kennedy reports she followed up with NP and he gave her an injection at pes anserine.  It hurt a lot more than her first injection so she is limping more today  Initial:}     5/10Post Session:        5/10  Medications Last Reviewed:  12/15/2022  Updated Objective Findings:  None Today  Treatment   THERAPEUTIC EXERCISE: (25 minutes):  Exercises per grid below to improve mobility and strength. Required moderate visual, verbal, manual and tactile cues to promote proper body alignment, promote proper body posture and promote proper body mechanics. Progressed resistance, range, repetitions and complexity of movement as indicated. Date:  12/15/2022   Activity/Exercise Parameters   KT tape to medial and anterior knee --   LTR --   Recumbent stepper 8 min   Bridging w PPT --   Sit to stand    Standing calf stretch 3x30 sec   Shuttle press - DL 6 cords, SL 5 cords 3x15 each   Step up and down off 6\" step 5 min   Quad sets X15-20   Supine clamshell --   Seated hamstring stretch  --   Sit to stand w 8.8lb  --   PPT --   Side steps and march 1 lap each       MANUAL THERAPY: (30 minutes): Joint mobilization, Soft tissue mobilization and Manipulation was utilized and necessary because of the patient's restricted joint motion, painful spasm, loss of articular motion and restricted motion of soft tissue.    - STM to VMO, adductor complex, semitendinosus and semimembranosus   - PF joint mobilizations      Commonly used abbreviations that may be included in this note:  STM- Soft tissue mobilization, R>L or L>R- right greater than left or vice versa, HEP - Home exercise program, CPA/UPA - Central or unilateral posterior-anterior mobilization, SLS- single leg stance, SKTC - Single leg to chest, SNAGS/NAGS- (sustained) Natural apophyseal glides, TKE- Terminal knee extension, ER- External rotation, IR- Internal rotation, B - Bilateral, sec- seconds, Lb- pounds, min - minutes, HA- Headache, OP- Over pressure, tband- theraband, fwd/bwd- Forward/Backward, TA- Transversus Abdominus, dbl- Double      TREATMENT/SESSION SUMMARY: Toña tolerated treatment well today but was more TTP along R pes anserine and distal adductors. She reported some distal patella pain with TKE but this improved with reps.  She was able to step up with R LE with less pain by end of session Communication/Consultation:  None today  Equipment provided today:  None  Recommendations/Intent for next treatment session: Next visit will focus on progressing strength and ROM.       Total Treatment Billable Duration: 55 minutes  Time In: 1500  Time Out: Aquilino Lebron, PT       Charge Capture  }Post Session Pain  PT Visit Info  MedBridge Portal  MD Guidelines  Scanned Media  Benefits  MyChart    Future Appointments   Date Time Provider Alonso Morales   12/17/2022  9:30 AM [facility] [employer] E9 ROOM 2 SFERUS SFE   12/21/2022  1:00 PM Lesia Lui, PT S[facility] SFO   12/27/2022  3:00 PM [facility] OUTREACH INSURANCE [facility]   12/27/2022  3:30 PM Castor Alvarez MD [facility] AMB   12/28/2022  1:00 PM Lesia Lui, PT SFO[facility] SFO   1/4/2023  1:00 PM Lesia Lui, PT SFOFF SFO   1/25/2023  2:45 PM TOMAS Cartagena - CNP POAI GVL AMB   2/22/2023  5:40 AM [facility] OUTREACH INSURANCE [facility]   2/22/2023  1:00 PM Kenneth Santos MD [facility] AMB   2/28/2023  3:00 PM Claudia Gutierrez MD END [facility] AMB   6/8/2023 11:00 AM Lisa Caraballo DO [facility]

## 2022-12-17 ENCOUNTER — HOSPITAL ENCOUNTER (OUTPATIENT)
Dept: ULTRASOUND IMAGING | Age: 57
End: 2022-12-17
Payer: MEDICAID

## 2022-12-17 DIAGNOSIS — K74.69 OTHER CIRRHOSIS OF LIVER (HCC): ICD-10-CM

## 2022-12-17 DIAGNOSIS — Z01.812 ENCOUNTER FOR PRE-OPERATIVE LABORATORY TESTING: ICD-10-CM

## 2022-12-17 PROCEDURE — 76700 US EXAM ABDOM COMPLETE: CPT

## 2022-12-21 ENCOUNTER — HOSPITAL ENCOUNTER (OUTPATIENT)
Dept: PHYSICAL THERAPY | Age: 57
Setting detail: RECURRING SERIES
Discharge: HOME | End: 2022-12-24
Payer: MEDICAID

## 2022-12-21 DIAGNOSIS — K74.60 CIRRHOSIS OF LIVER WITHOUT ASCITES, UNSPECIFIED HEPATIC CIRRHOSIS TYPE (HCC): Primary | ICD-10-CM

## 2022-12-21 DIAGNOSIS — D64.9 ANEMIA, UNSPECIFIED TYPE: Primary | ICD-10-CM

## 2022-12-21 PROCEDURE — 97110 THERAPEUTIC EXERCISES: CPT

## 2022-12-21 NOTE — PROGRESS NOTES
Mauricio Larose  : 1965  [address]: [address] Medicaid  Secondary:  [address] @ [address] [place] [address]  Phone: [phone]  Fax: [phone] Plan Frequency: 1-2x/week for 90 days  Plan of Care/Certification Expiration Date: 23      PT Visit Info:        OUTPATIENT PHYSICAL RXAUHMI:MW NOTE TYPE: Treatment Note 2022       Episode  }Appt Desk             Treatment Diagnosis:  Low back pain (M54.5)  Radiculopathy, Lumbar Region (M54.16)  Muscle Weakness, Generalized (M62.81)  Abnormal posture (R29.3)  Medical/Referring Diagnosis:  Radiculopathy, lumbar region [M54.16]  Chronic pain of both knees [M25.561, M25.562, G89.29]  Chronic bilateral low back pain without sciatica [M54.50, G89.29]  Referring Physician:  Molly Falcon MD MD Orders:  PT Eval and Treat  Date of Onset:  Onset Date: 06     Allergies:   Diltiazem hcl  Restrictions/Precautions:  Restrictions/Precautions: None  No data recorded   Interventions Planned (Treatment may consist of any combination of the following):    Current Treatment Recommendations: Strengthening; ROM; Balance training; Functional mobility training; Endurance training; Gait training; Stair training; Neuromuscular re-education; Manual Therapy - Soft Tissue Mobilization; Manual Therapy - Joint Manipulation; Pain management; Return to work related activity; Home exercise program; Safety education & training; Patient/Caregiver education & training; Modalities; Positioning; Integrated dry needling; Aquatics; Therapeutic activities     Subjective Comments: Zoltan Gonzalez reports the injection at her pes anserine is starting to kick in.  Her knee is not as painful and she is walking with less of a limp  Initial:}     4/10Post Session:        4/10  Medications Last Reviewed:  2022  Updated Objective Findings:  None Today  Treatment   THERAPEUTIC EXERCISE: (25 minutes):  Exercises per grid below to improve mobility and strength. Required moderate visual, verbal, manual and tactile cues to promote proper body alignment, promote proper body posture and promote proper body mechanics. Progressed resistance, range, repetitions and complexity of movement as indicated. Date:  12/21/2022   Activity/Exercise Parameters   KT tape to L achilles 4 min   Knee ext prop 3 min   Recumbent stepper 8 min   Bridging  2x20   Sit to stand 2x15   Standing calf stretch 3x30 sec   Shuttle press - DL 6 cords, SL 5 cords 3x15 each   Step up and down off 6\" step --   Standing TKE - ball X10-15   Marching - fwd and side 1 lap each   Seated hamstring stretch  --   SAQ x30       MANUAL THERAPY: (30 minutes): Joint mobilization, Soft tissue mobilization and Manipulation was utilized and necessary because of the patient's restricted joint motion, painful spasm, loss of articular motion and restricted motion of soft tissue.    - STM to VMO, adductor complex, semitendinosus and semimembranosus   - PF joint mobilizations      Commonly used abbreviations that may be included in this note:  STM- Soft tissue mobilization, R>L or L>R- right greater than left or vice versa, HEP - Home exercise program, CPA/UPA - Central or unilateral posterior-anterior mobilization, SLS- single leg stance, SKTC - Single leg to chest, SNAGS/NAGS- (sustained) Natural apophyseal glides, TKE- Terminal knee extension, ER- External rotation, IR- Internal rotation, B - Bilateral, sec- seconds, Lb- pounds, min - minutes, HA- Headache, OP- Over pressure, tband- theraband, fwd/bwd- Forward/Backward, TA- Transversus Abdominus, dbl- Double      TREATMENT/SESSION SUMMARY: Wayne Mills did well with exercises today. She was much less TTP along adductors and quads. She reported increased knee pain with standing TKE using ball.  KT tape was applied to L achilles as she is having some pain there from compensating when walking     Communication/Consultation:  None today  Equipment provided today:  None  Recommendations/Intent for next treatment session: Next visit will focus on progressing strength and ROM.       Total Treatment Billable Duration: 55 minutes  Time In: 1300  Time Out: [address] Omar Lebron, PT       Charge Capture  }Post Session Pain  PT Visit Info  MedBridge Portal  MD Guidelines  Scanned Media  Benefits  MyChart    Future Appointments   Date Time Provider Alonso Carmen   12/27/2022  3:00 PM [address] [facility] OUTREACH INSURANCE GHADA De La [place] [address] [place] [address]   12/27/2022  3:30 PM Satish Olivera MD [facility] AMB   12/28/2022  1:00 PM Tiago Miller, PT SFOFF SFO   1/4/2023  1:00 PM Tiago Miller, PT SFOFF SFO   1/25/2023  2:45 PM Alessandra Menchaca, APRN - CNP POAI GVL AMB   2/22/2023  5:40 AM [facility] OUTREACH INSURANCE [employer] [address] [place] [address]   2/22/2023  1:00 PM Fantasma Wynn MD [facility] AMB   2/28/2023  3:00 PM Jannet Salazar MD END [facility]VL AMB   6/8/2023 11:00 AM DO KIMMIE Winter GVL AMB

## 2022-12-27 ENCOUNTER — HOSPITAL ENCOUNTER (OUTPATIENT)
Dept: LAB | Age: 57
Discharge: HOME | End: 2022-12-30
Payer: MEDICAID

## 2022-12-27 ENCOUNTER — OFFICE VISIT (OUTPATIENT)
Dept: ONCOLOGY | Age: 57
End: 2022-12-27
Payer: MEDICAID

## 2022-12-27 VITALS
RESPIRATION RATE: 14 BRPM | BODY MASS INDEX: 45.56 KG/M2 | TEMPERATURE: 98.1 F | DIASTOLIC BLOOD PRESSURE: 91 MMHG | WEIGHT: 283.5 LBS | HEART RATE: 78 BPM | SYSTOLIC BLOOD PRESSURE: 137 MMHG | HEIGHT: 66 IN | OXYGEN SATURATION: 96 %

## 2022-12-27 DIAGNOSIS — D64.9 ANEMIA, UNSPECIFIED TYPE: ICD-10-CM

## 2022-12-27 DIAGNOSIS — D64.9 ANEMIA, UNSPECIFIED TYPE: Primary | ICD-10-CM

## 2022-12-27 DIAGNOSIS — K74.69 OTHER CIRRHOSIS OF LIVER (HCC): ICD-10-CM

## 2022-12-27 LAB
ALBUMIN SERPL-MCNC: 3.6 G/DL (ref 3.5–5)
ALBUMIN/GLOB SERPL: 0.8 {RATIO} (ref 0.4–1.6)
ALP SERPL-CCNC: 168 U/L (ref 50–136)
ALT SERPL-CCNC: 95 U/L (ref 12–65)
ANION GAP SERPL CALC-SCNC: 5 MMOL/L (ref 2–11)
AST SERPL-CCNC: 91 U/L (ref 15–37)
BASOPHILS # BLD: 0 K/UL (ref 0–0.2)
BASOPHILS NFR BLD: 1 % (ref 0–2)
BILIRUB SERPL-MCNC: 0.6 MG/DL (ref 0.2–1.1)
BUN SERPL-MCNC: 18 MG/DL (ref 6–23)
CALCIUM SERPL-MCNC: 9.8 MG/DL (ref 8.3–10.4)
CHLORIDE SERPL-SCNC: 102 MMOL/L (ref 101–110)
CO2 SERPL-SCNC: 30 MMOL/L (ref 21–32)
CREAT SERPL-MCNC: 0.8 MG/DL (ref 0.6–1)
DIFFERENTIAL METHOD BLD: ABNORMAL
EOSINOPHIL # BLD: 0.2 K/UL (ref 0–0.8)
EOSINOPHIL NFR BLD: 2 % (ref 0.5–7.8)
ERYTHROCYTE [DISTWIDTH] IN BLOOD BY AUTOMATED COUNT: 14.6 % (ref 11.9–14.6)
FERRITIN SERPL-MCNC: 158 NG/ML (ref 8–388)
GLOBULIN SER CALC-MCNC: 4.7 G/DL (ref 2.8–4.5)
GLUCOSE SERPL-MCNC: 98 MG/DL (ref 65–100)
HCT VFR BLD AUTO: 48.5 % (ref 35.8–46.3)
HGB BLD-MCNC: 15.4 G/DL (ref 11.7–15.4)
IMM GRANULOCYTES # BLD AUTO: 0 K/UL (ref 0–0.5)
IMM GRANULOCYTES NFR BLD AUTO: 0 % (ref 0–5)
IRON SATN MFR SERPL: 28 %
IRON SERPL-MCNC: 106 UG/DL (ref 35–150)
LYMPHOCYTES # BLD: 1.9 K/UL (ref 0.5–4.6)
LYMPHOCYTES NFR BLD: 30 % (ref 13–44)
MCH RBC QN AUTO: 27.2 PG (ref 26.1–32.9)
MCHC RBC AUTO-ENTMCNC: 31.8 G/DL (ref 31.4–35)
MCV RBC AUTO: 85.7 FL (ref 82–102)
MONOCYTES # BLD: 0.5 K/UL (ref 0.1–1.3)
MONOCYTES NFR BLD: 8 % (ref 4–12)
NEUTS SEG # BLD: 3.7 K/UL (ref 1.7–8.2)
NEUTS SEG NFR BLD: 59 % (ref 43–78)
NRBC # BLD: 0 K/UL (ref 0–0.2)
PLATELET # BLD AUTO: 180 K/UL (ref 150–450)
PMV BLD AUTO: 10.2 FL (ref 9.4–12.3)
POTASSIUM SERPL-SCNC: 4.1 MMOL/L (ref 3.5–5.1)
PROT SERPL-MCNC: 8.3 G/DL (ref 6.3–8.2)
RBC # BLD AUTO: 5.66 M/UL (ref 4.05–5.2)
SODIUM SERPL-SCNC: 137 MMOL/L (ref 133–143)
TIBC SERPL-MCNC: 372 UG/DL (ref 250–450)
WBC # BLD AUTO: 6.3 K/UL (ref 4.3–11.1)

## 2022-12-27 PROCEDURE — 99214 OFFICE O/P EST MOD 30 MIN: CPT | Performed by: INTERNAL MEDICINE

## 2022-12-27 PROCEDURE — 85025 COMPLETE CBC W/AUTO DIFF WBC: CPT

## 2022-12-27 PROCEDURE — 3078F DIAST BP <80 MM HG: CPT | Performed by: INTERNAL MEDICINE

## 2022-12-27 PROCEDURE — 80053 COMPREHEN METABOLIC PANEL: CPT

## 2022-12-27 PROCEDURE — 36415 COLL VENOUS BLD VENIPUNCTURE: CPT

## 2022-12-27 PROCEDURE — 3074F SYST BP LT 130 MM HG: CPT | Performed by: INTERNAL MEDICINE

## 2022-12-27 PROCEDURE — 82728 ASSAY OF FERRITIN: CPT

## 2022-12-27 PROCEDURE — 83550 IRON BINDING TEST: CPT

## 2022-12-27 ASSESSMENT — PATIENT HEALTH QUESTIONNAIRE - PHQ9
SUM OF ALL RESPONSES TO PHQ QUESTIONS 1-9: 0
SUM OF ALL RESPONSES TO PHQ QUESTIONS 1-9: 0
2. FEELING DOWN, DEPRESSED OR HOPELESS: 0
SUM OF ALL RESPONSES TO PHQ QUESTIONS 1-9: 0
SUM OF ALL RESPONSES TO PHQ QUESTIONS 1-9: 0

## 2022-12-27 NOTE — PROGRESS NOTES
Data Source: Patient, [facility] record. 12/27/2022    4:29 PM    Miah Ni [id number]    61 y.o. Patient Encounter: [facility] Visit    Heme Diagnosis:  Anemia  Heme History (Copied from prior):   67-year-old  female, on disability, history of cirrhosis (mentioned in 2019 at time of cholecystectomy), iron deficiency anemia requiring PRBC and IV iron infusions in past, Afib s/p cardiac ablation (2018, not felt to need a/c), asthma, chronic lower back pain (on regular NSAID use), GERD, hypertension, sleep apnea, Graves' disease, endometrial cancer status post hysterectomy and now follows with Dr. Demetrius Null for surveillance, cholecystectomy, D&C uterus, thyroidectomy, now referred to us by primary care for Dr. Lary Rodríguez for iron deficiency anemia. She reported history of being hospitalized in 6/22 in [place] for anemia, requiring 2 units PRBC transfusion as well as multiple IV iron infusions (~ 6). She reports having EGD/colonoscopy which clear evidence of bleeding. She has also been referred to GI given reported history of cirrhosis. She denies any bleeding per rectum. Has noticed dark stool but attributes this to PO iron supplementation with some occasional constipation requiring OTC medications. Interval History:  12/27/2022: Labs from previous visit reviewed with hemoglobin normal range and iron stores adequate. ESR borderline high. B12, folate, MMA and HC unremarkable. Patient has remained on p.o. iron supplementation once daily, she denies any bleeding today. Reasonable to continue. Recent abdominal ultrasound (by Dr. Coby Levy in anticipation of bariatric surgery), with hepatosplenomegaly with liver showing cirrhosis morphology and spleen measuring 14 to 15 cm. Counts adequate today, iron stores pending and will be followed. REVIEW OF SYSTEMS:  As mentioned above, all other systems were reviewed in full and are negative.     Past Medical History: Diagnosis Date    Arrhythmia 2018    a-fib (2017) ablation-(12/2018)- resolved    Arthritis Oct. 20, 2022    Knees and left heel    Asthma     seasonal allergies- maint and rescue inhaler    Cancer ([facility] [address]     endometrial cancer- hysterectomy 11/26/20    Chronic pain     back pain - herniated disc    Cirrhosis ([facility] [address]     Claustrophobia     Colon polyps     Difficult intubation     in [place] 2019- glidescope needed with hysterectomy/lap deysi 11/26/19 @ [facility]    GERD (gastroesophageal reflux disease)     H/O echocardiogram     Echo LVEF 55% mild mitral and tricuspid regurg    H/O: iron deficiency anemia     Heart murmur 06/2019    Echo LVEF 55% mild mitral and tricuspid regurg    Hypertension     managed with meds    Osteoarthritis Oct.6, 2022    ER x-ray showed arthritis    Sleep apnea     not currently using cpap    Thyroid disease     Graves Disease       Past Surgical History:   Procedure Laterality Date    ABDOMEN SURGERY  Nov. 2019    Hysterectomy and Gallbladder    ABLATION OF DYSRHYTHMIC FOCUS  12/2017    CARDIAC SURGERY  Dec. 2018    Ablation for a-fib    CHOLECYSTECTOMY, LAPAROSCOPIC  11/26/2020    COLONOSCOPY N/A 7/20/2020    COLONOSCOPY/ BMI 43 performed by Tai Thomas MD at [place] [address]  10/2019    HAND SURGERY  Dec.1989    A small cyst    HYSTERECTOMY (CERVIX STATUS UNKNOWN)  11/26/2020    with lap deysi    THYROIDECTOMY  08/28/2020    TUBAL LIGATION  1989       Current Outpatient Medications   Medication Sig Dispense Refill    diclofenac (VOLTAREN) 75 MG EC tablet Take 1 tablet by mouth 2 times daily 60 tablet 0    albuterol sulfate HFA (PROVENTIL;VENTOLIN;PROAIR) 108 (90 Base) MCG/ACT inhaler Inhale 2 puffs into the lungs every 4 hours as needed for Wheezing 18 g 5    traZODone (DESYREL) 150 MG tablet Take 1 tablet by mouth nightly 90 tablet 3    LORazepam (ATIVAN) 1 MG tablet Take 1 tablet by mouth 2 times daily for 180 days.  60 tablet 5 traMADol (ULTRAM) 50 MG tablet Take 1 tablet by mouth in the morning and at bedtime for 180 days. 180 tablet 3    ferrous sulfate (IRON 325) 325 (65 Fe) MG tablet Take 325 mg by mouth daily (with breakfast)      amLODIPine (NORVASC) 5 MG tablet Take 5 mg by mouth daily      esomeprazole (NEXIUM) 20 MG delayed release capsule Take one cap daily. levothyroxine (SYNTHROID) 150 MCG tablet 1 tablet once a day except for 1/2 tablet on Sundays      buPROPion (WELLBUTRIN XL) 150 MG extended release tablet Take 1 tablet by mouth every morning (Patient not taking: No sig reported) 90 tablet 3    valsartan-hydroCHLOROthiazide (DIOVAN-HCT) 320-25 MG per tablet Take 1 tablet by mouth daily (Patient not taking: Reported on 12/27/2022) 90 tablet 3    vitamin B-12 (CYANOCOBALAMIN) 500 MCG tablet Take 500 mcg by mouth daily (Patient not taking: No sig reported)      nystatin (MYCOSTATIN) 622136 UNIT/GM cream Apply topically 2 times daily.  (Patient not taking: Reported on 12/27/2022) 30 g 1     Current Facility-Administered Medications   Medication Dose Route Frequency Provider Last Rate Last Admin    methylPREDNISolone sodium (SOLU-MEDROL) injection 40 mg  40 mg IntraMUSCular Daily TOMAS Isabel - CNP           Social History     Socioeconomic History    Marital status:      Spouse name: None    Number of children: None    Years of education: None    Highest education level: None   Tobacco Use    Smoking status: Never    Smokeless tobacco: Never   Vaping Use    Vaping Use: Never used   Substance and Sexual Activity    Alcohol use: Not Currently     Alcohol/week: 1.0 standard drink     Types: 1 Glasses of wine per week     Comment: During holidays    Drug use: Never    Sexual activity: Not Currently     Partners: Male       Family History   Problem Relation Age of Onset    Cancer Paternal Aunt         Cervical    Thyroid Disease Son         Hyperthyroidism    Cancer Sister         Cervical    Thyroid Disease Daughter 03:00 PM    GFRAA >60 07/12/2022 11:03 AM    GLOB 4.7 12/27/2022 03:00 PM    ALT 95 12/27/2022 03:00 PM             Above results reviewed with patient. ASSESSMENT:  44-year-old  female, on disability, history of cirrhosis (mentioned in 2019 at time of cholecystectomy), iron deficiency anemia requiring PRBC and IV iron infusions in past, Afib s/p cardiac ablation (2018, not felt to need a/c), asthma, chronic lower back pain (on regular NSAID use), GERD, hypertension, sleep apnea, Graves' disease, endometrial cancer status post hysterectomy and now follows with Dr. Baudilio Irvin for surveillance, cholecystectomy, D&C uterus, thyroidectomy, now referred to us by primary care for Dr. Devora Spurling for iron deficiency anemia. She reported history of being hospitalized in 6/22 in [place] for anemia, requiring 2 units PRBC transfusion as well as multiple IV iron infusions (~ 6). She reports having EGD/colonoscopy which clear evidence of bleeding. She has also been referred to GI given reported history of cirrhosis. She denies any bleeding per rectum. Has noticed dark stool but attributes this to PO iron supplementation with some occasional constipation requiring OTC medications. We will w/u and manage as below:    12/27/2022: Labs from previous visit reviewed with hemoglobin normal range and iron stores adequate. ESR borderline high. B12, folate, MMA and HC unremarkable. Patient has remained on p.o. iron supplementation once daily, she denies any bleeding today. Reasonable to continue. Recent abdominal ultrasound (by Dr. Joy Ly in anticipation of bariatric surgery), with hepatosplenomegaly with liver showing cirrhosis morphology and spleen measuring 14 to 15 cm. Counts adequate today, iron stores pending and will be followed. We will see her back in 3 months. JOSE E  Cirrhosis w splenomegaly ~14-15 cm    PLAN:  - As above. Once daily p.o. iron. IV iron as needed.   - Continue following with GI as well as GYN Dr. Glynn Bowles. RTC in 3 months with NP in 6 months with MD with labs, iron panel. Thank you Dr Norma Pringle for allowing us to paticipate in care of this pleasant patient.  Please call w/ any questions      Janene Post MD  [address] [place] [address],[address]  Hematology Oncology  [address] [place] [address]  Office : [phone]  Fax : [phone]

## 2022-12-28 ENCOUNTER — HOSPITAL ENCOUNTER (OUTPATIENT)
Dept: PHYSICAL THERAPY | Age: 57
Setting detail: RECURRING SERIES
Discharge: HOME | End: 2022-12-31
Payer: MEDICAID

## 2022-12-28 PROCEDURE — 97110 THERAPEUTIC EXERCISES: CPT

## 2022-12-28 PROCEDURE — 97140 MANUAL THERAPY 1/> REGIONS: CPT

## 2022-12-28 NOTE — PROGRESS NOTES
Kunal Meneses  : 1965  Primary: [address] Medicaid  Secondary:  Sarah Blanco @ [address] [place] [address]  Phone: [phone]  Fax: [phone] Plan Frequency: 1-2x/week for 90 days  Plan of Care/Certification Expiration Date: 23      PT Visit Info:        OUTPATIENT PHYSICAL TAIWO:MY NOTE TYPE: Treatment Note 2022       Episode  }Appt Desk             Treatment Diagnosis:  Low back pain (M54.5)  Radiculopathy, Lumbar Region (M54.16)  Muscle Weakness, Generalized (M62.81)  Abnormal posture (R29.3)  Medical/Referring Diagnosis:  Radiculopathy, lumbar region [M54.16]  Chronic pain of both knees [M25.561, M25.562, G89.29]  Chronic bilateral low back pain without sciatica [M54.50, G89.29]  Referring Physician:  Jeanella Lanes, MD MD Orders:  PT Eval and Treat  Date of Onset:  Onset Date: 06     Allergies:   Diltiazem hcl  Restrictions/Precautions:  Restrictions/Precautions: None  No data recorded   Interventions Planned (Treatment may consist of any combination of the following):    Current Treatment Recommendations: Strengthening; ROM; Balance training; Functional mobility training; Endurance training; Gait training; Stair training; Neuromuscular re-education; Manual Therapy - Soft Tissue Mobilization; Manual Therapy - Joint Manipulation; Pain management; Return to work related activity; Home exercise program; Safety education & training; Patient/Caregiver education & training; Modalities; Positioning; Integrated dry needling; Aquatics; Therapeutic activities     Subjective Comments: Lori Baltazar reports her back has been feeling better overall. Her R knee pain is not as intense but her L achilles is still bothering her.  KT tape to this region last visit really helped  Initial:}     4/10Post Session:        3/10  Medications Last Reviewed:  2022  Updated Objective Findings:  None Today  Treatment   THERAPEUTIC EXERCISE: (25 minutes):  Exercises per grid below to improve mobility and strength. Required moderate visual, verbal, manual and tactile cues to promote proper body alignment, promote proper body posture and promote proper body mechanics. Progressed resistance, range, repetitions and complexity of movement as indicated. Date:  12/28/2022   Activity/Exercise Parameters   KT tape to L achilles 4 min   Knee ext prop 3 min   Recumbent stepper 8 min   Bridging  --   Sit to stand --   Standing calf stretch 3x30 sec   Shuttle press - DL 6 cords, SL 5 cords 3x15 each   Step up and down off 6\" step x20   Standing TKE - purple band with SL balance X10-15   Marching - fwd and side --   Seated hamstring stretch  --   SAQ --       MANUAL THERAPY: (30 minutes): Joint mobilization, Soft tissue mobilization and Manipulation was utilized and necessary because of the patient's restricted joint motion, painful spasm, loss of articular motion and restricted motion of soft tissue.    - STM to VMO, adductor complex, semitendinosus and semimembranosus   - PF joint mobilizations   - STM to L achilles      Commonly used abbreviations that may be included in this note:  STM- Soft tissue mobilization, R>L or L>R- right greater than left or vice versa, HEP - Home exercise program, CPA/UPA - Central or unilateral posterior-anterior mobilization, SLS- single leg stance, SKTC - Single leg to chest, SNAGS/NAGS- (sustained) Natural apophyseal glides, TKE- Terminal knee extension, ER- External rotation, IR- Internal rotation, B - Bilateral, sec- seconds, Lb- pounds, min - minutes, HA- Headache, OP- Over pressure, tband- theraband, fwd/bwd- Forward/Backward, TA- Transversus Abdominus, dbl- Double      TREATMENT/SESSION SUMMARY: Toña tolerated treatment well today. She remains restricted in end range extension of R knee due to posterior knee restriction and pain anteriorly. KT tape to L achilles took away pain there when walking.  She was able to perform step ups/downs on R LE easier today compared to last visit     Communication/Consultation:  None today  Equipment provided today:  None  Recommendations/Intent for next treatment session: Next visit will focus on progressing strength and ROM.       Total Treatment Billable Duration: 55 minutes  Time In: 1300  Time Out: [address] Bernardino, PT       Charge Capture  }Post Session Pain  PT Visit Info  MedBridge Portal  MD Guidelines  Scanned Media  Benefits  MyChart    Future Appointments   Date Time Provider Alonso Morales   1/4/2023  1:00 PM Michel Siddiqi, PT [facility]   1/25/2023  2:45 PM TOMAS Rosales CNP [facility]   2/22/2023  5:40 AM [facility] OUTREACH INSURANCE [facility] [address] [place] [address]   2/22/2023  1:00 PM Katty Mcneil MD [facility]   2/28/2023  3:00 PM Danny Lemos MD END [facility]   3/28/2023  2:10 PM [address] [place] [address]   3/28/2023  2:30 PM TOMAS Ruby CNP [facility]   6/8/2023 11:00 AM DO MARINA CarrasquilloDG [facility]   6/27/2023  3:00 PM [facility] [address] [place] [address]   6/27/2023  3:30 PM Meg Covarrubias MD [facility]

## 2023-01-03 ENCOUNTER — CLINICAL DOCUMENTATION (OUTPATIENT)
Dept: SURGERY | Age: 58
End: 2023-01-03

## 2023-01-03 DIAGNOSIS — K74.60 HEPATIC CIRRHOSIS, UNSPECIFIED HEPATIC CIRRHOSIS TYPE, UNSPECIFIED WHETHER ASCITES PRESENT (HCC): Primary | ICD-10-CM

## 2023-01-04 ENCOUNTER — HOSPITAL ENCOUNTER (OUTPATIENT)
Dept: PHYSICAL THERAPY | Age: 58
Setting detail: RECURRING SERIES
Discharge: HOME | End: 2023-01-07
Payer: MEDICAID

## 2023-01-04 PROCEDURE — 97110 THERAPEUTIC EXERCISES: CPT

## 2023-01-04 NOTE — PROGRESS NOTES
Mcknena Breath  : 1965  Primary: 101 [address] Medicaid  Secondary:  [address] @ [address]  [address] [place] [address]  Phone: [phone]  Fax: [phone] Plan Frequency: 1-2x/week for 90 days  Plan of Care/Certification Expiration Date: 23      PT Visit Info:        OUTPATIENT PHYSICAL QCPGQEO:ZK NOTE TYPE: Treatment Note 2023       Episode  }Appt Desk             Treatment Diagnosis:  Low back pain (M54.5)  Radiculopathy, Lumbar Region (M54.16)  Muscle Weakness, Generalized (M62.81)  Abnormal posture (R29.3)  Medical/Referring Diagnosis:  Radiculopathy, lumbar region [M54.16]  Chronic pain of both knees [M25.561, M25.562, G89.29]  Chronic bilateral low back pain without sciatica [M54.50, G89.29]  Referring Physician:  Isabel Turcios MD MD Orders:  PT Eval and Treat  Date of Onset:  Onset Date: 06     Allergies:   Diltiazem hcl  Restrictions/Precautions:  Restrictions/Precautions: None  No data recorded   Interventions Planned (Treatment may consist of any combination of the following):    Current Treatment Recommendations: Strengthening; ROM; Balance training; Functional mobility training; Endurance training; Gait training; Stair training; Neuromuscular re-education; Manual Therapy - Soft Tissue Mobilization; Manual Therapy - Joint Manipulation; Pain management; Return to work related activity; Home exercise program; Safety education & training; Patient/Caregiver education & training; Modalities; Positioning; Integrated dry needling; Aquatics; Therapeutic activities     Subjective Comments: Saad Party reports her L achilles is still really bothering her. Stretching and massage help. Her R knee is feeling better compared to when she started therapy. She is still waiting for her bariatric surgeon to get in touch with her about a surgery date.  She requested to leave early today  Initial:}     4/10Post Session:        3/10  Medications Last Reviewed:  1/4/2023  Updated Objective Findings:  None Today  Treatment   THERAPEUTIC EXERCISE: (25 minutes):  Exercises per grid below to improve mobility and strength. Required moderate visual, verbal, manual and tactile cues to promote proper body alignment, promote proper body posture and promote proper body mechanics. Progressed resistance, range, repetitions and complexity of movement as indicated.       Date:  1/4/2023   Activity/Exercise Parameters   KT tape to L achilles 4 min   Knee ext prop 3 min   Recumbent stepper 8 min   Bridging  --   Sit to stand --   Standing calf stretch 3x30 sec   Shuttle press - DL 6 cords, eccentric calf raises - 5 cords 3x15 each   Step up and down off 6\" step --   Standing TKE - purple band with SL balance --   Marching - fwd and side --   Seated hamstring stretch  --   SAQ --       MANUAL THERAPY: (15 minutes): Joint mobilization, Soft tissue mobilization and Manipulation was utilized and necessary because of the patient's restricted joint motion, painful spasm, loss of articular motion and restricted motion of soft tissue.    - STM to VMO, adductor complex, semitendinosus and semimembranosus   - PF joint mobilizations   - STM to L achilles      Commonly used abbreviations that may be included in this note:  STM- Soft tissue mobilization, R>L or L>R- right greater than left or vice versa, HEP - Home exercise program, CPA/UPA - Central or unilateral posterior-anterior mobilization, SLS- single leg stance, SKTC - Single leg to chest, SNAGS/NAGS- (sustained) Natural apophyseal glides, TKE- Terminal knee extension, ER- External rotation, IR- Internal rotation, B - Bilateral, sec- seconds, Lb- pounds, min - minutes, HA- Headache, OP- Over pressure, tband- theraband, fwd/bwd- Forward/Backward, TA- Transversus Abdominus, dbl- Double      TREATMENT/SESSION SUMMARY: Gonzalo Lea had a lower tolerance to knee extension position today due to restriction in hamstrings/calf and pain along anterior interval. She was unable to perform calf raises standing so they were done on shuttle press with better tolerance     Communication/Consultation:  None today  Equipment provided today:  None  Recommendations/Intent for next treatment session: Next visit will focus on progressing strength and ROM.      Total Treatment Billable Duration: 40 minutes  Time In: 1300  Time Out: 1342    Bryce Lebron, PT       Charge Capture  }Post Session Pain  PT Visit Info  MedBridge Portal  MD Guidelines  Scanned Media  Benefits  MyChart    Future Appointments   Date Time Provider Department Center   1/19/2023  1:00 PM Bryce Lebron, PT [facility]   1/23/2023 11:30 AM Bryce Lebron, PT [facility]   1/25/2023  2:45 PM Jim Lindquist APRN - CNP POAI [facility]   1/26/2023  1:00 PM Bryce Lebron, PT [facility]   2/22/2023  5:40 AM [facility]REACH INSURANCE [facility]   2/22/2023  1:00 PM Jeff Irwin MD [facility]   2/28/2023  3:00 PM Albaro Piedra MD END [facility]   3/28/2023  2:10 PM [facility] OUTREACH INSURANCE [facility]   3/28/2023  2:30 PM Kandi Hagen APRN - CNP [facility]   6/8/2023 11:00 AM Burton Kumari DO [facility]   6/27/2023  3:00 PM [facility]REACH INSURANCE [facility]   6/27/2023  3:30 PM Moreno Crain MD [facility]

## 2023-01-09 ENCOUNTER — HOSPITAL ENCOUNTER (OUTPATIENT)
Dept: PHYSICAL THERAPY | Age: 58
Setting detail: RECURRING SERIES
Discharge: HOME | End: 2023-01-12
Payer: MEDICAID

## 2023-01-09 PROCEDURE — 97110 THERAPEUTIC EXERCISES: CPT

## 2023-01-09 NOTE — PROGRESS NOTES
Miah Ni  : 1965  Primary: 101 [address] Medicaid  Secondary:  [address] @ [address] [place] [address]  Phone: [phone]  Fax: [phone] Plan Frequency: 1-2x/week for 90 days  Plan of Care/Certification Expiration Date: 23      PT Visit Info:        OUTPATIENT PHYSICAL WXRKRQK:PD NOTE TYPE: Treatment Note 2023       Episode  }Appt Desk             Treatment Diagnosis:  Low back pain (M54.5)  Radiculopathy, Lumbar Region (M54.16)  Muscle Weakness, Generalized (M62.81)  Abnormal posture (R29.3)  Medical/Referring Diagnosis:  Radiculopathy, lumbar region [M54.16]  Chronic pain of both knees [M25.561, M25.562, G89.29]  Chronic bilateral low back pain without sciatica [M54.50, G89.29]  Referring Physician:  Munir Lopez MD MD Orders:  PT Eval and Treat  Date of Onset:  Onset Date: 06     Allergies:   Diltiazem hcl  Restrictions/Precautions:  Restrictions/Precautions: None  No data recorded   Interventions Planned (Treatment may consist of any combination of the following):    Current Treatment Recommendations: Strengthening; ROM; Balance training; Functional mobility training; Endurance training; Gait training; Stair training; Neuromuscular re-education; Manual Therapy - Soft Tissue Mobilization; Manual Therapy - Joint Manipulation; Pain management; Return to work related activity; Home exercise program; Safety education & training; Patient/Caregiver education & training; Modalities; Positioning; Integrated dry needling; Aquatics; Therapeutic activities     Subjective Comments: Severa Ruel found out that she has a cyst in the top of her mouth that needs to be biopsied and that her bariatric surgery has to be postponed due to abnormal liver testing. She is upset and stressed due to history of cancer. She has been doing a lot of stretching for her knee and its feeling better.   Initial:}     4/10Post Session: 3/10  Medications Last Reviewed:  1/9/2023  Updated Objective Findings:  See evaluation note from today  Treatment   THERAPEUTIC EXERCISE: (40 minutes):  Exercises per grid below to improve mobility and strength. Required moderate visual, verbal, manual and tactile cues to promote proper body alignment, promote proper body posture and promote proper body mechanics. Progressed resistance, range, repetitions and complexity of movement as indicated.       Date:  1/9/2023   Activity/Exercise Parameters   KT tape to L achilles 4 min   Knee ext prop 3 min   Recumbent stepper 8 min   Bridging  x20   Sit to stand 2x10   Standing calf stretch 3x30 sec   Shuttle press - DL 6 cords, eccentric calf raises - 5 cords 3x15 each   Step up and down off 6\" step 4 min   Standing TKE - purple band with SL balance --   Marching - fwd and side --   Seated hamstring stretch  --   SAQ --       MANUAL THERAPY: (15 minutes): Joint mobilization, Soft tissue mobilization and Manipulation was utilized and necessary because of the patient's restricted joint motion, painful spasm, loss of articular motion and restricted motion of soft tissue.    - STM to VMO, adductor complex, semitendinosus and semimembranosus   - PF joint mobilizations   - STM to L achilles      Commonly used abbreviations that may be included in this note:  STM- Soft tissue mobilization, R>L or L>R- right greater than left or vice versa, HEP - Home exercise program, CPA/UPA - Central or unilateral posterior-anterior mobilization, SLS- single leg stance, SKTC - Single leg to chest, SNAGS/NAGS- (sustained) Natural apophyseal glides, TKE- Terminal knee extension, ER- External rotation, IR- Internal rotation, B - Bilateral, sec- seconds, Lb- pounds, min - minutes, HA- Headache, OP- Over pressure, tband- theraband, fwd/bwd- Forward/Backward, TA- Transversus Abdominus, dbl- Double      TREATMENT/SESSION SUMMARY: Jeferson Severino exhibits improved R knee extension ROM today compared to last visit with improved mobility of hamstrings and adductors. She had a good tolerance to exercises today     Communication/Consultation:  None today  Equipment provided today:  None  Recommendations/Intent for next treatment session: Next visit will focus on progressing strength and ROM.       Total Treatment Billable Duration: 55 minutes of treatment  Time In: 1400  Time Out: [address] [facility] [place], PT       Charge Capture  }Post Session Pain  PT Visit Info  [address] Portal  MD Guidelines  Scanned Media  Benefits  MyChart    Future Appointments   Date Time Provider Alonso Morales   1/19/2023  1:00 PM Tania Spencer, PT [facility]   1/23/2023 11:30 AM Tania Spencer, PT [facility]   1/25/2023  2:45 PM TOMAS Reed CNP [facility]   1/26/2023  1:00 PM Tania Spencer, PT [facility]   2/22/2023  5:40 AM [facility] OUTREACH INSURANCE [facility] [address] [place] [address]   2/22/2023  1:00 PM Joseph Pearce MD [facility]   2/28/2023  3:00 PM MD TENZIN Mitchell [facility]   3/28/2023  2:10 PM [address] [place] [address]   3/28/2023  2:30 PM TOMAS Cisse CNP [facility]   6/8/2023 11:00 AM DO MARINA MccollumDG [facility]   6/27/2023  3:00 PM [address] [address] [place] [address]   6/27/2023  3:30 PM Onelia Carrillo MD [facility]

## 2023-01-09 NOTE — PROGRESS NOTES
Sukhdeep Olivarez  : 1965  Primary: 101 [address] Medicaid  Secondary:  [address] @ [address]  [address] [place] [address]  Phone: [phone]  Fax: [phone] Plan Frequency: 1-2x/week for 90 days  Plan of Care/Certification Expiration Date: 23      PT Visit Info: Total # of Visits to Date: 1      OUTPATIENT PHYSICAL THERAPY:OP NOTE TYPE: Progress Report 2023               Episode  Appt Desk         Treatment Diagnosis:  Low back pain (M54.5)  Radiculopathy, Lumbar Region (M54.16)  Muscle Weakness, Generalized (M62.81)  Abnormal posture (R29.3)  Bilateral knee pain (M25.561, M25.562)  Medical/Referring Diagnosis:  Radiculopathy, lumbar region [M54.16]  Chronic pain of both knees [M25.561, M25.562, G89.29]  Chronic bilateral low back pain without sciatica [M54.50, G89.29]  Referring Physician:  Laura Holland MD MD Orders:  PT Eval and Treat. New referral for bilateral knee pain and continue treatment for chronic back pain. Return MD Appt:  TBD  Date of Onset:  Onset Date: 06     Allergies:  Diltiazem hcl  Restrictions/Precautions:    Restrictions/Precautions: None  No data recorded   Medications Last Reviewed:  2023        OBJECTIVE     ORTHOSTATIC/POSTURE OBSERVATION:   Date:   2023       SITTING RESTING POSTURE -Pt sits with forward head and rounded shoulders    STANDING RESTING POSTURE -Pt displays decreased core motor control indicating weak core and low back musculature. FUNCTIONAL MOBILITY Date:   2023   Transfers Independent   Posture Increased lumbar lordosis   Gait deviations Antalgic gait on R with more stiff knee gait pattern   Assistive device None   Stairs Difficulty due to weakness   Bed mobility Independent      PALPATION/TONE/TISSUE TEXTURE: Date:   2023   SOFT TISSUE:   Lumbar extensors Increased tone to bilateral paraspinals. QL nt   Glute Slight tone to Rt glute med.  Tenderness to light pressure to B glute med and piriformis   Hamstrings nt   PAIVM:   Lumbar Pain with Grade 3 PA to lumbar region. Increased lordosis. Hypermobility in L3-5 when completing PA. Hypomobility in L5/S1 junction and T spine in PA glides.     ROM Date:  1/9/2023   LUMBAR ROM (TESTED IN SITTING)    RIGHT LEFT   Flexion 50% limited   --   Extension Decreased secondary to pain  Pain in lower back and Rt SI joint --   Lateral Flexion nt 15 degrees   Rotation nt nt   HIP ROM   Flexion 116 115   Extension WFL WFL   IR 43 46   ER  46 47     Knee ROM: R -3 deg extension    STRENGTH   Date:   1/9/2023     Right Left   Hip Abduction 4-/5 4-/5   Hip Adduction 4+/5 4+/5   Hip IR nt nt   Hip ER 4/5 4/5   Hip Flexion 4/5 4/5   Knee Extension 4/5 4/5   Knee Flexion 4+/5 4+/5   Ankle DF 4+/5 4+/5   Ankle PF 5/5 5/5   Ankle IV nt nt   Ankle EV nt nt             ASSESSMENT   Re-evalutation (11/09/2022): Pt continues to present with symptoms of low back pain including increased pain, decreased ROM, decreased strength, and decreased tolerance for activity. Pt was unable to continue with HEP due to pain in the knee which is when she states she declined. Pt demonstrates notable deficits with bed mobility secondary to pain in the back. Pt has increased symptoms with lumbar extension and symptom relief with repeated lumbar flexion. Pt does not present with sign of radicular symptoms. Pain was replicated with palpation, especially in glute med and piriformis bilaterally. Pt noted symptoms worse in right side with palpation and movement. Pt has notable hypermobility in lower lumbar region with hypomobility in lumbosacral junction and T spine.     Pt referred to physical therapy with new perscription for bilateral knee pain. Pt states that knee pain is worse in the right knee compared to the left. She had an injection November 2nd, 2022 that helped alleviate some of the pain. Pt got an X-ray on October 5th that she states found arthritis. She  complain the pain is worse in the morning and loosens up as the day goes on. She says that she will occasionally have issue with the knee buckling. Pt presents with generalized weakness bilateral lower extremity. Additionally, pt has decreased terminal knee extension in the R knee. Pt would benefit from skilled physical therapy to address the deficits discussed above. Progress note (1/9/23): As of 1/9/2023, Lyn Ross has attended 16 out of 19 scheduled visits, with 3 cancellation(s) and 0 no shows. She is making slow but steady progress in R knee pain. She exhibits less of an antalgic gait pattern with improved knee flexion during swing. She exhibits improved R knee extension R ROM with less restriction in hamstrings and adductors. She has an increased tolerance to standing exercises. She has had recent increase in L achilles pain possibly from abnormal gait pattern. She will benefit from continued skilled therapy to address remaining mobility and strength deficits to allow her to return to more comfortable walking and stair climbing    Problem List: (Impacting functional limitations): Body Structures, Functions, Activity Limitations Requiring Skilled Therapeutic Intervention: Decreased strength     PLAN   Effective Dates: 1/11/2023  TO Plan of Care/Certification Expiration Date: 02/07/23     Frequency/Duration: Plan Frequency: 1-2x/week for 90 days     Interventions Planned (Treatment may consist of any combination of the following):    Current Treatment Recommendations: Strengthening; ROM; Balance training; Functional mobility training; Endurance training; Gait training; Stair training; Neuromuscular re-education; Manual Therapy - Soft Tissue Mobilization; Manual Therapy - Joint Manipulation; Pain management; Return to work related activity; Home exercise program; Safety education & training; Patient/Caregiver education & training; Modalities; Positioning;  Integrated dry needling; Aquatics; Therapeutic activities     GOALS: (Goals have been discussed and agreed upon with patient.)  Short Term Goals 4 weeks   Wilbert Hall will be independent with HEP to promote self-management of symptoms. On going   Wilbert Hall will participate in LE strengthening program with weights as appropriate to help with gait and elevations. On going  Wilbert Hall will participate in static and dynamic balance activities to decrease the risk for falls and improve overall QOL. On going  Wilbert Hall will tolerate manual therapy/joint mobilizations/soft tissue to increase ROM and decrease pain to improve functional mobility during ADLs. On going    Discharge Goals 12 weeks  Wilbert Hall will demonstrate an 10 point improvement on the Oswestry to show improvement in function. No significant change  Wilbert Hall will demonstrate >=4+/5 LE strength on manual muscle testing to improve functional mobility. Progressing  Wilbert Hall will be able to perform SLS >10 seconds bilaterally to help with gait and improve balance. On going  Wilbert Hall will be able to demonstrate safe lifting and transfer mechanics without cueing for improved safety with home, childcare, and community activities. On going  NEW GOALS  Wilbert Hall have terminal knee extension in the RLE that matches that of LLE to aide in ambulation of stairs. Progressing  Wilbert Hall will have 4+/5 knee extension in order to safely ambulate at home. Progressing                Outcome Measure: Tool Used: Modified Oswestry Low Back Pain Questionnaire  Score:  Initial: 28/50  29/50 (11/09/2022 ) 27/50 (1/9/23)   Interpretation of Score: Each section is scored on a 0-5 scale, 5 representing the greatest disability. The scores of each section are added together for a total score of 50.       LEFS Score: 16/80 (11/09/2022); 17/80 (1/9/23)  MEDICAL NECESSITY:  Skilled intervention continues to be required due to above deficits affecting participation in basic ADLs and overall functional tolerance. REASON FOR SERVICES/OTHER COMMENTS:  Patient continues to require skilled intervention due to  above deficits affecting participation in basic ADLs and overall functional tolerance. Total Duration:  Time In: 1400  Time Out: 1500    Regarding Luciana Rodriguez's therapy, I certify that the treatment plan above will be carried out by a therapist or under their direction. Thank you for this referral,  Shalini Pressley, PT     Referring Physician Signature: Kellie Palacios MD No Signature is Required for this note.       Post Session Pain  Charge Capture  PT Visit Info  POC Link  Treatment Note Link  MD Guidelines  MyChart

## 2023-01-18 DIAGNOSIS — L30.9 DERMATITIS: ICD-10-CM

## 2023-01-18 DIAGNOSIS — F41.9 ANXIETY: ICD-10-CM

## 2023-01-18 DIAGNOSIS — J45.40 MODERATE PERSISTENT ASTHMA WITHOUT COMPLICATION: ICD-10-CM

## 2023-01-18 RX ORDER — NYSTATIN 100000 U/G
CREAM TOPICAL
Qty: 30 G | Refills: 1 | Status: SHIPPED | OUTPATIENT
Start: 2023-01-18

## 2023-01-19 ENCOUNTER — HOSPITAL ENCOUNTER (OUTPATIENT)
Dept: PHYSICAL THERAPY | Age: 58
Setting detail: RECURRING SERIES
Discharge: HOME | End: 2023-01-22
Payer: MEDICAID

## 2023-01-19 PROCEDURE — 97110 THERAPEUTIC EXERCISES: CPT

## 2023-01-19 PROCEDURE — 97140 MANUAL THERAPY 1/> REGIONS: CPT

## 2023-01-19 NOTE — PROGRESS NOTES
Marie Davion  : 1965  Primary: [address] [place] [address] Medicaid  Secondary:  [address] @ [address]  [address] [place] [address]  Phone: [phone]  Fax: [phone] Plan Frequency: 1-2x/week for 90 days  Plan of Care/Certification Expiration Date: 23      PT Visit Info:        OUTPATIENT PHYSICAL LMVOQYC:DZ NOTE TYPE: Treatment Note 2023       Episode  }Appt Desk             Treatment Diagnosis:  Low back pain (M54.5)  Radiculopathy, Lumbar Region (M54.16)  Muscle Weakness, Generalized (M62.81)  Abnormal posture (R29.3)  Medical/Referring Diagnosis:  Radiculopathy, lumbar region [M54.16]  Chronic pain of both knees [M25.561, M25.562, G89.29]  Chronic bilateral low back pain without sciatica [M54.50, G89.29]  Referring Physician:  Anali Lozano MD MD Orders:  PT Eval and Treat  Date of Onset:  Onset Date: 06     Allergies:   Diltiazem hcl  Restrictions/Precautions:  Restrictions/Precautions: None  No data recorded   Interventions Planned (Treatment may consist of any combination of the following):    Current Treatment Recommendations: Strengthening; ROM; Balance training; Functional mobility training; Endurance training; Gait training; Stair training; Neuromuscular re-education; Manual Therapy - Soft Tissue Mobilization; Manual Therapy - Joint Manipulation; Pain management; Return to work related activity; Home exercise program; Safety education & training; Patient/Caregiver education & training; Modalities; Positioning; Integrated dry needling; Aquatics; Therapeutic activities     Subjective Comments: Josh Mejia reports her R knee has been feeling better overall and she's not limping as much.  It is helping her L achilles as well  Initial:}     3/10Post Session:        3/10  Medications Last Reviewed:  2023  Updated Objective Findings:  None Today  Treatment   THERAPEUTIC EXERCISE: (40 minutes):  Exercises per grid below to improve mobility and strength. Required moderate visual, verbal, manual and tactile cues to promote proper body alignment, promote proper body posture and promote proper body mechanics. Progressed resistance, range, repetitions and complexity of movement as indicated. Date:  1/19/2023   Activity/Exercise Parameters   KT tape to L achilles 4 min   Knee ext prop 3 min   Recumbent stepper 8 min   Bridging  x20   Sit to stand 2x10   Standing calf stretch 3x30 sec   Shuttle press - DL 6 cords, eccentric calf raises - 5 cords 3x15 each   Step up and down off 6\" step 4 min   Standing TKE - purple band with SL balance --   Marching - fwd and side 1 lap each   Seated hamstring stretch  --   SAQ --       MANUAL THERAPY: (15 minutes): Joint mobilization, Soft tissue mobilization and Manipulation was utilized and necessary because of the patient's restricted joint motion, painful spasm, loss of articular motion and restricted motion of soft tissue.    - STM to VMO, adductor complex, semitendinosus and semimembranosus   - PF joint mobilizations   - STM to L achilles      Commonly used abbreviations that may be included in this note:  STM- Soft tissue mobilization, R>L or L>R- right greater than left or vice versa, HEP - Home exercise program, CPA/UPA - Central or unilateral posterior-anterior mobilization, SLS- single leg stance, SKTC - Single leg to chest, SNAGS/NAGS- (sustained) Natural apophyseal glides, TKE- Terminal knee extension, ER- External rotation, IR- Internal rotation, B - Bilateral, sec- seconds, Lb- pounds, min - minutes, HA- Headache, OP- Over pressure, tband- theraband, fwd/bwd- Forward/Backward, TA- Transversus Abdominus, dbl- Double      TREATMENT/SESSION SUMMARY: Toña tolerated treatment well today with improved R knee extension mobility and quad activation.  She still has difficulty with standing calf raises due to L achilles pain     Communication/Consultation:  None today  Equipment provided today: None  Recommendations/Intent for next treatment session: Next visit will focus on progressing strength and ROM.       Total Treatment Billable Duration: 55 minutes of treatment  Time In: 1300  Time Out: [address] Omar Lebron, PT       Charge Capture  }Post Session Pain  PT Visit Info  [address] Portal  MD Guidelines  Scanned Media  Benefits  MyChart    Future Appointments   Date Time Provider Alonso Carmen   1/23/2023 11:30 AM Michael Bond, PT [facility]   1/25/2023  2:45 PM TOMAS Knott CNP POAI [facility]   1/26/2023  1:00 PM Michael Bond, PT SFO[facility]   2/1/2023  2:00 PM Racquel Flores MD HTF [facility] AMB   2/22/2023  5:40 AM [facility] [address] [place] [address]   2/22/2023  1:00 PM Michel Isaac MD [facility]   2/28/2023  3:00 PM Laurel Joe MD END [facility] AMB   3/28/2023  2:10 PM [address] [place] [address]   3/28/2023  2:30 PM TOMAS Lopez CNP [facility] AM[facility]   6/8/2023 11:00 AM DO MARINA ToDG [facility] AMB   6/27/2023  3:00 PM [address] [place] [address]   6/27/2023  3:30 PM Zahira Price MD [facility] AM[facility]

## 2023-01-23 ENCOUNTER — HOSPITAL ENCOUNTER (OUTPATIENT)
Dept: PHYSICAL THERAPY | Age: 58
Setting detail: RECURRING SERIES
End: 2023-01-23
Payer: MEDICAID

## 2023-01-23 RX ORDER — LORAZEPAM 1 MG/1
1 TABLET ORAL 2 TIMES DAILY
Qty: 60 TABLET | Refills: 5 | Status: SHIPPED | OUTPATIENT
Start: 2023-01-23 | End: 2023-07-22

## 2023-01-23 RX ORDER — ALBUTEROL SULFATE 90 UG/1
2 AEROSOL, METERED RESPIRATORY (INHALATION) EVERY 4 HOURS PRN
Qty: 18 G | Refills: 5 | Status: SHIPPED | OUTPATIENT
Start: 2023-01-23
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
## 2023-01-25 ENCOUNTER — OFFICE VISIT (OUTPATIENT)
Dept: ORTHOPEDIC SURGERY | Age: 58
End: 2023-01-25
Payer: MEDICAID

## 2023-01-25 DIAGNOSIS — M17.11 PRIMARY OSTEOARTHRITIS OF RIGHT KNEE: Primary | ICD-10-CM

## 2023-01-25 DIAGNOSIS — M25.561 RIGHT KNEE PAIN, UNSPECIFIED CHRONICITY: ICD-10-CM

## 2023-01-25 DIAGNOSIS — M70.50 PES ANSERINE BURSITIS: ICD-10-CM

## 2023-01-25 PROCEDURE — 99213 OFFICE O/P EST LOW 20 MIN: CPT | Performed by: SPECIALIST/TECHNOLOGIST

## 2023-01-25 NOTE — PROGRESS NOTES
Name: Sulaiman Baugh  YOB: 1965  Gender: female  MRN: [id number]      CC: Follow-up (Right Knee)       HPI: Sulaiman Baugh is a 61 y.o. female who returns for follow up on right knee pain. She continues to have pain diffusing throughout the knee but notes that formal physical therapy has been helpful. She reports minimal to no relief from the pes bursa injection she received last visit. Does note that she had 70% relief from intra-articular injection performed at a previous visit. She also notes that her gastric bypass was canceled due to her \"liver levels. \"        Physical Examination:  General: no acute distress  Lungs: breathing easily  CV: regular rhythm by pulse  Right Knee: Minimal effusion present. Tenderness to palpation medial joint line and pes bursa. Full active and passive range of motion with pain in the extremes. Negative ligamentous exam x4. Positive Wallace's with reproduction of patient's symptoms the medial joint line. Negative bounce home test.        Assessment:   1. Primary osteoarthritis of right knee    2. Pes anserine bursitis    3. Right knee pain, unspecified chronicity         Plan:   Patient who returns with continued right knee pain despite conservative treatment options to include intra-articular injection, pes anserine bursa injection and formal physical therapy. Considering her lack of progress with conservative treatment and her transient response to intra-articular injection I think is reasonable to evaluate her knee further for internal derangement to include possible meniscal pathology. She will return after MRI for definitive treatment.     Tony Estes, APRN - CNP    Orthopaedics and Sports Medicine

## 2023-01-26 ENCOUNTER — HOSPITAL ENCOUNTER (OUTPATIENT)
Dept: PHYSICAL THERAPY | Age: 58
Setting detail: RECURRING SERIES
Discharge: HOME | End: 2023-01-29
Payer: MEDICAID

## 2023-01-26 PROCEDURE — 97110 THERAPEUTIC EXERCISES: CPT

## 2023-01-26 NOTE — PROGRESS NOTES
Jaida Baltazar  : 1965  Primary: [address] Medicaid  Secondary:  [address] @ [address] [address]  Phone: [phone]  Fax: [phone] Plan Frequency: 1-2x/week for 90 days  Plan of Care/Certification Expiration Date: 23      PT Visit Info:        OUTPATIENT PHYSICAL TUAXSZW:FV NOTE TYPE: Treatment Note 2023       Episode  }Appt Desk             Treatment Diagnosis:  Low back pain (M54.5)  Radiculopathy, Lumbar Region (M54.16)  Muscle Weakness, Generalized (M62.81)  Abnormal posture (R29.3)  Medical/Referring Diagnosis:  Radiculopathy, lumbar region [M54.16]  Chronic pain of both knees [M25.561, M25.562, G89.29]  Chronic bilateral low back pain without sciatica [M54.50, G89.29]  Referring Physician:  Ros Mccarthy MD MD Orders:  PT Eval and Treat  Date of Onset:  Onset Date: 06     Allergies:   Diltiazem hcl  Restrictions/Precautions:  Restrictions/Precautions: None  No data recorded   Interventions Planned (Treatment may consist of any combination of the following):    Current Treatment Recommendations: Strengthening; ROM; Balance training; Functional mobility training; Endurance training; Gait training; Stair training; Neuromuscular re-education; Manual Therapy - Soft Tissue Mobilization; Manual Therapy - Joint Manipulation; Pain management; Return to work related activity; Home exercise program; Safety education & training; Patient/Caregiver education & training; Modalities; Positioning; Integrated dry needling; Aquatics; Therapeutic activities     Subjective Comments: Melecio Guerrero followed up with ortho who wants her to get a MRI of her knee. No cortisone injection given.  She reports that her R knee feels better overall but L achilles is still bothering her  Initial:}     3/10Post Session:        3/10  Medications Last Reviewed:  2023  Updated Objective Findings:  None Today  Treatment   THERAPEUTIC EXERCISE: (40 minutes):  Exercises per grid below to improve mobility and strength. Required moderate visual, verbal, manual and tactile cues to promote proper body alignment, promote proper body posture and promote proper body mechanics. Progressed resistance, range, repetitions and complexity of movement as indicated. Date:  1/26/2023   Activity/Exercise Parameters   KT tape to L achilles 4 min   Knee ext prop --   Recumbent stepper 8 min   Bridging  --   Sit to stand 2x15   Standing calf stretch 3x30 sec   Shuttle press - DL 6 cords, eccentric calf raises - 5 cords 3x15 each   Step up and down off 6\" step 4 min   Standing TKE - purple band with SL balance --   Marching - fwd and side 1 lap each   Seated hamstring stretch  --   Balance board 5 min       MANUAL THERAPY: (15 minutes): Joint mobilization, Soft tissue mobilization and Manipulation was utilized and necessary because of the patient's restricted joint motion, painful spasm, loss of articular motion and restricted motion of soft tissue.    - STM to VMO, adductor complex, semitendinosus and semimembranosus   - PF joint mobilizations   - IASTM to L achilles    Commonly used abbreviations that may be included in this note:  STM- Soft tissue mobilization, R>L or L>R- right greater than left or vice versa, HEP - Home exercise program, CPA/UPA - Central or unilateral posterior-anterior mobilization, SLS- single leg stance, SKTC - Single leg to chest, SNAGS/NAGS- (sustained) Natural apophyseal glides, TKE- Terminal knee extension, ER- External rotation, IR- Internal rotation, B - Bilateral, sec- seconds, Lb- pounds, min - minutes, HA- Headache, OP- Over pressure, tband- theraband, fwd/bwd- Forward/Backward, TA- Transversus Abdominus, dbl- Double      TREATMENT/SESSION SUMMARY: Bradley Lobo is still very TTP along R lateral knee joint but exhibits increased ease of sitting in knee extension.  She was instructed to start some IASTM on L achilles and continue stretching Communication/Consultation:  None today  Equipment provided today:  None  Recommendations/Intent for next treatment session: Patient is having a minor surgery next week she will follow up in therapy the week after      Total Treatment Billable Duration: 55 minutes of treatment  Time In: 1400  Time Out: [address] [facility] [address] [place], PT       Charge Capture  }Post Session Pain  PT Visit [address] [facility] Portal  MD [address]    Future Appointments   Date Time Provider Alonso Morales   2/1/2023  2:00 PM To Wolff MD HTF [facility] AMB   2/9/2023  3:00 PM David White, PT [facility]   2/13/2023  2:00 PM David White, PT [facility]   2/15/2023 11:30 AM POA INT MRI INTMXR AMB RAD SC   2/15/2023  2:00 PM David White, PT [facility]   2/20/2023  2:00 PM David White, PT [facility]   2/22/2023  5:40 AM [facility]   2/22/2023 11:00 AM Ray Shahid, APRN - CNP POAI [facility] AMB   2/22/2023  1:00 PM Rhae Pallas, MD [facility]   2/22/2023  4:00 PM David White, PT S[facility]   2/28/2023  3:00 PM Paris Dawson MD END [facility] AMB   3/9/2023  2:00 PM FERNANDA Delgado BSBHPST [facility] AMB   3/28/2023  2:10 PM [place] [place] [address]   3/28/2023  2:30 PM TOMAS Smith - CNP [facility]   6/8/2023 11:00 AM DO MARINA BoyleDG [facility] AMB   6/27/2023  3:00 PM [address] [place] [address]   6/27/2023  3:30 PM Miky Negrete MD [facility]

## 2023-01-31 DIAGNOSIS — M17.11 PRIMARY OSTEOARTHRITIS OF RIGHT KNEE: ICD-10-CM

## 2023-01-31 DIAGNOSIS — M70.50 PES ANSERINE BURSITIS: ICD-10-CM

## 2023-02-01 ENCOUNTER — OFFICE VISIT (OUTPATIENT)
Dept: FAMILY MEDICINE CLINIC | Facility: CLINIC | Age: 58
End: 2023-02-01
Payer: MEDICAID

## 2023-02-01 VITALS
HEART RATE: 72 BPM | WEIGHT: 276.8 LBS | TEMPERATURE: 97.6 F | HEIGHT: 66 IN | SYSTOLIC BLOOD PRESSURE: 140 MMHG | OXYGEN SATURATION: 97 % | BODY MASS INDEX: 44.48 KG/M2 | DIASTOLIC BLOOD PRESSURE: 82 MMHG

## 2023-02-01 DIAGNOSIS — E89.0 S/P TOTAL THYROIDECTOMY: ICD-10-CM

## 2023-02-01 DIAGNOSIS — E89.0 POSTOPERATIVE HYPOTHYROIDISM: ICD-10-CM

## 2023-02-01 DIAGNOSIS — G43.009 MIGRAINE WITHOUT AURA AND WITHOUT STATUS MIGRAINOSUS, NOT INTRACTABLE: ICD-10-CM

## 2023-02-01 DIAGNOSIS — I10 ESSENTIAL HYPERTENSION: ICD-10-CM

## 2023-02-01 DIAGNOSIS — Z98.890 HISTORY OF RADIOFREQUENCY ABLATION (RFA) PROCEDURE FOR CARDIAC ARRHYTHMIA: ICD-10-CM

## 2023-02-01 DIAGNOSIS — B37.9 CANDIDA INFECTION: ICD-10-CM

## 2023-02-01 DIAGNOSIS — R74.8 ELEVATED LIVER ENZYMES: Primary | ICD-10-CM

## 2023-02-01 DIAGNOSIS — I48.91 ATRIAL FIBRILLATION, UNSPECIFIED TYPE (HCC): ICD-10-CM

## 2023-02-01 PROCEDURE — 99214 OFFICE O/P EST MOD 30 MIN: CPT | Performed by: FAMILY MEDICINE

## 2023-02-01 PROCEDURE — 3077F SYST BP >= 140 MM HG: CPT | Performed by: FAMILY MEDICINE

## 2023-02-01 PROCEDURE — 3079F DIAST BP 80-89 MM HG: CPT | Performed by: FAMILY MEDICINE

## 2023-02-01 RX ORDER — TRIAMCINOLONE ACETONIDE 1 MG/G
CREAM TOPICAL
Qty: 28.4 G | Refills: 1 | Status: SHIPPED | OUTPATIENT
Start: 2023-02-01

## 2023-02-01 RX ORDER — NYSTATIN 100000 U/G
CREAM TOPICAL
Qty: 30 G | Refills: 1 | Status: SHIPPED | OUTPATIENT
Start: 2023-02-01

## 2023-02-01 ASSESSMENT — ENCOUNTER SYMPTOMS
ABDOMINAL PAIN: 0
SHORTNESS OF BREATH: 0
CONSTIPATION: 0
EYE DISCHARGE: 0
CHEST TIGHTNESS: 0
COUGH: 0
DIARRHEA: 0
SINUS PAIN: 0
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
## 2023-02-02 ENCOUNTER — TELEPHONE (OUTPATIENT)
Dept: FAMILY MEDICINE CLINIC | Facility: CLINIC | Age: 58
End: 2023-02-02

## 2023-02-02 DIAGNOSIS — K21.9 GASTROESOPHAGEAL REFLUX DISEASE, UNSPECIFIED WHETHER ESOPHAGITIS PRESENT: Primary | ICD-10-CM

## 2023-02-02 RX ORDER — OMEPRAZOLE 20 MG/1
20 CAPSULE, DELAYED RELEASE ORAL
Qty: 90 CAPSULE | Refills: 1 | Status: SHIPPED | OUTPATIENT
Start: 2023-02-02

## 2023-02-02 NOTE — TELEPHONE ENCOUNTER
[facility] called:   Esomeprazole was rejected by patient's insurance. Pharmacy said to either prescribe a different medication or do a prior authorization.      Pharmacy said they faxed over the rejection document yesterday

## 2023-02-06 RX ORDER — DICLOFENAC SODIUM 75 MG/1
75 TABLET, DELAYED RELEASE ORAL 2 TIMES DAILY
Qty: 60 TABLET | Refills: 0 | Status: SHIPPED | OUTPATIENT
Start: 2023-02-06 | End: 2023-02-22 | Stop reason: SDUPTHER

## 2023-02-09 ENCOUNTER — HOSPITAL ENCOUNTER (OUTPATIENT)
Dept: PHYSICAL THERAPY | Age: 58
Setting detail: RECURRING SERIES
End: 2023-02-09
Payer: MEDICAID

## 2023-02-09 NOTE — PROGRESS NOTES
Roxipalmer Herron  : 1965  Primary: [address] Medicaid  Secondary:  Cristin Pack @ [address] [address]  Phone: [phone]  Fax: [phone]       2023      Patient canceled today's appointment and will plan to follow up at next scheduled visit.        [place], PT

## 2023-02-13 ENCOUNTER — HOSPITAL ENCOUNTER (OUTPATIENT)
Dept: PHYSICAL THERAPY | Age: 58
Setting detail: RECURRING SERIES
Discharge: HOME | End: 2023-02-16
Payer: MEDICAID

## 2023-02-13 PROCEDURE — 97140 MANUAL THERAPY 1/> REGIONS: CPT

## 2023-02-13 PROCEDURE — 97110 THERAPEUTIC EXERCISES: CPT

## 2023-02-13 NOTE — THERAPY RECERTIFICATION
Yenni Landeros  : 1965  Primary: [address] Medicaid  Secondary:  [address] [facility]  [facility] [address]  Phone: [phone]  Fax: [phone] Plan Frequency: 1-2x/week for 60 days  Plan of Care/Certification Expiration Date: 23      PT Visit Info: Total # of Visits to Date:       OUTPATIENT PHYSICAL THERAPY:OP NOTE TYPE: Recertification                Episode  Appt Desk         Treatment Diagnosis:  Low back pain (M54.5)  Radiculopathy, Lumbar Region (M54.16)  Muscle Weakness, Generalized (M62.81)  Abnormal posture (R29.3)  Bilateral knee pain (M25.561, M25.562)  Medical/Referring Diagnosis:  Radiculopathy, lumbar region [M54.16]  Chronic pain of both knees [M25.561, M25.562, G89.29]  Chronic bilateral low back pain without sciatica [M54.50, G89.29]  Referring Physician:  Angela Baird MD MD Orders:  PT Eval and Treat. New referral for bilateral knee pain and continue treatment for chronic back pain. Return MD Appt:  TBD  Date of Onset:  Onset Date: 06     Allergies:  Diltiazem hcl  Restrictions/Precautions:    Restrictions/Precautions: None    Medications Last Reviewed:  2023        OBJECTIVE     ORTHOSTATIC/POSTURE OBSERVATION:   Date:   2023       SITTING RESTING POSTURE -Pt sits with forward head and rounded shoulders    STANDING RESTING POSTURE -Pt displays decreased core motor control indicating weak core and low back musculature. FUNCTIONAL MOBILITY Date:   2023   Transfers Independent except needs min A for supine to seated   Posture Increased lumbar lordosis   Gait deviations Antalgic gait on R with more stiff knee gait pattern   Assistive device None   Stairs Difficulty due to weakness but improving   Bed mobility Independent      PALPATION/TONE/TISSUE TEXTURE: Date:   2023   SOFT TISSUE:   Lumbar extensors Increased tone to bilateral paraspinals.     QL nt   Glute Slight tone to Rt glute med. Tenderness to light pressure to B glute med and piriformis   Hamstrings nt   PAIVM:   Lumbar Pain with Grade 3 PA to lumbar region. Increased lordosis. Hypermobility in L3-5 when completing PA. Hypomobility in L5/S1 junction and T spine in PA glides. ROM Date:  2/13/2023   LUMBAR ROM (TESTED IN SITTING)    RIGHT LEFT   Flexion 50% limited   --   Extension Decreased secondary to pain  Pain in lower back and Rt SI joint --   Lateral Flexion nt 15 degrees   Rotation nt nt   HIP ROM   Flexion 116 115   Extension [facility] WFL   IR 43 46   ER  46 47     Knee ROM: R -3 deg extension    STRENGTH   Date:   2/13/2023     Right Left   Hip Abduction 4-/5 4-/5   Hip Adduction 4+/5 4+/5   Hip IR nt nt   Hip ER 4/5 4/5   Hip Flexion 4/5 4/5   Knee Extension 4/5 4/5   Knee Flexion 4+/5 4+/5   Ankle DF 4+/5 4+/5   Ankle PF 5/5 5/5   Ankle IV nt nt   Ankle EV nt nt             ASSESSMENT   Progress note (1/9/23): She is making slow but steady progress in R knee pain. She exhibits less of an antalgic gait pattern with improved knee flexion during swing. She exhibits improved R knee extension R ROM with less restriction in hamstrings and adductors. She has an increased tolerance to standing exercises. She has had recent increase in L achilles pain possibly from abnormal gait pattern. She will benefit from continued skilled therapy to address remaining mobility and strength deficits to allow her to return to more comfortable walking and stair climbing    Recertification (5/45/82): As of 2/13/2023, Christiano Cortez has attended 19 out of 24 scheduled visits, with 5 cancellation(s) and 0 no shows. She exhibits improved gait mechanics and improved knee ROM but states that her knee pain is getting worse. She continues to have difficulty with prolonged walking and stair climbing. She also reports that her low back pain is intermittent and when it comes on the stretches really help.  She is trying to do the stretches every morning. She reports that her L achilles pain is improving. Her functional outcome measures are unchanged due to the fact that her pain remains high. She is scheduled for a MRI on R knee soon. She will benefit from continued skilled therapy to address remaining mobility and strength deficits in low back and LE to allow her to return to more pain free walking and other functional activities. Problem List: (Impacting functional limitations): Body Structures, Functions, Activity Limitations Requiring Skilled Therapeutic Intervention: Decreased strength     PLAN   Effective Dates: 2/13/23  TO Plan of Care/Certification Expiration Date: 04/14/23     Frequency/Duration: Plan Frequency: 1-2x/week for 60 days     Interventions Planned (Treatment may consist of any combination of the following):    Current Treatment Recommendations: Strengthening; ROM; Balance training; Functional mobility training; Endurance training; Gait training; Stair training; Neuromuscular re-education; Manual Therapy - Soft Tissue Mobilization; Manual Therapy - Joint Manipulation; Pain management; Return to work related activity; Home exercise program; Safety education & training; Patient/Caregiver education & training; Modalities; Positioning; Integrated dry needling; Aquatics; Therapeutic activities     GOALS: (Goals have been discussed and agreed upon with patient.)  Short Term Goals 4 weeks   Za Wright will be independent with HEP to promote self-management of symptoms. MET  Za Wright will participate in LE strengthening program with weights as appropriate to help with gait and elevations. On going  Za Wright will participate in static and dynamic balance activities to decrease the risk for falls and improve overall QOL. On going  Za Wright will tolerate manual therapy/joint mobilizations/soft tissue to increase ROM and decrease pain to improve functional mobility during ADLs.  On going    Discharge Goals 12 weeks  Marie Ricardo will demonstrate an 10 point improvement on the Oswestry to show improvement in function. No change  Marie Ricardo will demonstrate >=4+/5 LE strength on manual muscle testing to improve functional mobility. Progressing  Marie Ricardo will be able to perform SLS >10 seconds bilaterally to help with gait and improve balance. On going  Marie Ricardo will be able to demonstrate safe lifting and transfer mechanics without cueing for improved safety with home, childcare, and community activities. On going  NEW GOALS  Marie Ricardo have terminal knee extension in the RLE that matches that of LLE to aide in ambulation of stairs. Progressing  Marie Ricardo will have 4+/5 knee extension in order to safely ambulate at home. Progressing                Outcome Measure: Tool Used: Modified Oswestry Low Back Pain Questionnaire  Score:  Initial: 28/50 29/50 (11/09/2022 ) 27/50 (1/9/23) 28/50 (2/13/23)   Interpretation of Score: Each section is scored on a 0-5 scale, 5 representing the greatest disability. The scores of each section are added together for a total score of 50. LEFS Score: 16/80 (11/09/2022); 17/80 (1/9/23), 14/80 (2/13/23)    MEDICAL NECESSITY:  Skilled intervention continues to be required due to above deficits affecting participation in basic ADLs and overall functional tolerance. REASON FOR SERVICES/OTHER COMMENTS:  Patient continues to require skilled intervention due to  above deficits affecting participation in basic ADLs and overall functional tolerance. Total Duration:  Time In: 1400  Time Out: 1500    Regarding Luciana Rodriguez's therapy, I certify that the treatment plan above will be carried out by a therapist or under their direction.   Thank you for this referral,  Mago Leung, OMAYRA     Referring Physician Signature: Anali Lozano MD                  Post Session Pain  Charge Capture  PT Visit Info  POC Link  Treatment Note Link  MD Guidelines  [place]

## 2023-02-13 NOTE — PROGRESS NOTES
Foster Narvaez  : 1965  Primary: [address] [place] [address] Medicaid  Secondary:  [address] @ [address]  [address] [place] [address]  Phone: [phone]  Fax: [phone] Plan Frequency: 1-2x/week for 60 days  Plan of Care/Certification Expiration Date: 23      PT Visit Info:        OUTPATIENT PHYSICAL YLHGZUY:MB NOTE TYPE: Treatment Note 2023       Episode  }Appt Desk             Treatment Diagnosis:  Low back pain (M54.5)  Radiculopathy, Lumbar Region (M54.16)  Muscle Weakness, Generalized (M62.81)  Abnormal posture (R29.3)  Medical/Referring Diagnosis:  Radiculopathy, lumbar region [M54.16]  Chronic pain of both knees [M25.561, M25.562, G89.29]  Chronic bilateral low back pain without sciatica [M54.50, G89.29]  Referring Physician:  Jules Avila MD MD Orders:  PT Eval and Treat  Date of Onset:  Onset Date: 06     Allergies:   Diltiazem hcl  Restrictions/Precautions:  Restrictions/Precautions: None  No data recorded   Interventions Planned (Treatment may consist of any combination of the following):    Current Treatment Recommendations: Strengthening; ROM; Balance training; Functional mobility training; Endurance training; Gait training; Stair training; Neuromuscular re-education; Manual Therapy - Soft Tissue Mobilization; Manual Therapy - Joint Manipulation; Pain management; Return to work related activity; Home exercise program; Safety education & training; Patient/Caregiver education & training; Modalities; Positioning; Integrated dry needling; Aquatics; Therapeutic activities     Subjective Comments: Remy Scott will get knee MRI soon. Knee pain is worsening, achilles pain is improving  Initial:}     3/10Post Session:        3/10  Medications Last Reviewed:  2023  Updated Objective Findings:  See evaluation note from today  Treatment   THERAPEUTIC EXERCISE: (40 minutes):  Exercises per grid below to improve mobility and strength.   Required moderate visual, verbal, manual and tactile cues to promote proper body alignment, promote proper body posture and promote proper body mechanics. Progressed resistance, range, repetitions and complexity of movement as indicated. Date:  2/13/2023   Activity/Exercise Parameters   KT tape to L achilles 4 min   Knee ext prop --   Recumbent stepper 8 min   Bridging  --   Sit to stand 2x15   Standing calf stretch 3x30 sec   Shuttle press - DL 6 cords, eccentric calf raises - 5 cords 3x15 each   Step up and down off 6\" step 4 min   Standing TKE - purple band with SL balance --   Marching - fwd and side 1 lap each   Seated hamstring stretch  --   Balance board 5 min       MANUAL THERAPY: (15 minutes): Joint mobilization, Soft tissue mobilization and Manipulation was utilized and necessary because of the patient's restricted joint motion, painful spasm, loss of articular motion and restricted motion of soft tissue.    - STM to VMO, adductor complex, semitendinosus and semimembranosus   - PF joint mobilizations   - IASTM to L achilles    Commonly used abbreviations that may be included in this note:  STM- Soft tissue mobilization, R>L or L>R- right greater than left or vice versa, HEP - Home exercise program, CPA/UPA - Central or unilateral posterior-anterior mobilization, SLS- single leg stance, SKTC - Single leg to chest, SNAGS/NAGS- (sustained) Natural apophyseal glides, TKE- Terminal knee extension, ER- External rotation, IR- Internal rotation, B - Bilateral, sec- seconds, Lb- pounds, min - minutes, HA- Headache, OP- Over pressure, tband- theraband, fwd/bwd- Forward/Backward, TA- Transversus Abdominus, dbl- Double      TREATMENT/SESSION SUMMARY: Toña tolerated treatment well today and shows improved knee mobility after manual work.  She needs continued focus on LE and core strengthening     Communication/Consultation:  None today  Equipment provided today:  None  Recommendations/Intent for next treatment session: LE strengthening and mobility      Total Treatment Billable Duration: 55 minutes of treatment  Time In: 1400  Time Out: 1500    Laura Lees, PT       Charge Capture  }Post Session Pain  PT Visit Info  MedBridge Portal  MD Guidelines  Scanned Media  Benefits  MyChart    Future Appointments   Date Time Provider Alonso Morales   2/15/2023 11:30 AM POA INT MRI INTMXR AMB RAD SC   2/15/2023  2:00 PM Laura Closs, PT [facility]   2/20/2023  2:00 PM Laura Closs, PT [facility]   2/22/2023  5:40 AM [facility]   2/22/2023 11:00 AM TOMAS Flynn CNP POAI [facility] AMB   2/22/2023  1:00 PM Edmond Giles MD [facility] AMB   2/22/2023  4:00 PM Laura Rosas, PT [facility]   2/28/2023  3:00 PM Shelton Martinez MD END [facility] AMB   3/9/2023  2:00 PM FERNANDA Mac BSBHPST [facility] AMB   3/28/2023  2:10 PM Mackenzie [address] [place] [address]   3/28/2023  2:30 PM TOMAS Wolff CNP [facility] AMB   6/8/2023 11:00 AM Maulik Boothe DO [facility] AMB   6/27/2023  3:00 PM Mackenzie [address] [place] [address]   6/27/2023  3:30 PM Sonido Rushing MD [facility] AMB

## 2023-02-15 ENCOUNTER — HOSPITAL ENCOUNTER (OUTPATIENT)
Dept: PHYSICAL THERAPY | Age: 58
Setting detail: RECURRING SERIES
Discharge: HOME | End: 2023-02-18
Payer: MEDICAID

## 2023-02-15 PROCEDURE — 97110 THERAPEUTIC EXERCISES: CPT

## 2023-02-15 NOTE — PROGRESS NOTES
Foster Narvaez  : 1965  Primary: [address] [place] [address] Medicaid  Secondary:  [address] @ [address]  [address] [place] [address]  Phone: [phone]  Fax: [phone] Plan Frequency: 1-2x/week for 60 days  Plan of Care/Certification Expiration Date: 23      PT Visit Info:        OUTPATIENT PHYSICAL KKOUHLS:YC NOTE TYPE: Treatment Note 2/15/2023       Episode  }Appt Desk             Treatment Diagnosis:  Low back pain (M54.5)  Radiculopathy, Lumbar Region (M54.16)  Muscle Weakness, Generalized (M62.81)  Abnormal posture (R29.3)  Medical/Referring Diagnosis:  Radiculopathy, lumbar region [M54.16]  Chronic pain of both knees [M25.561, M25.562, G89.29]  Chronic bilateral low back pain without sciatica [M54.50, G89.29]  Referring Physician:  Jules Avila MD MD Orders:  PT Eval and Treat  Date of Onset:  Onset Date: 06     Allergies:   Diltiazem hcl  Restrictions/Precautions:  Restrictions/Precautions: None  No data recorded   Interventions Planned (Treatment may consist of any combination of the following):    Current Treatment Recommendations: Strengthening; ROM; Balance training; Functional mobility training; Endurance training; Gait training; Stair training; Neuromuscular re-education; Manual Therapy - Soft Tissue Mobilization; Manual Therapy - Joint Manipulation; Pain management; Return to work related activity; Home exercise program; Safety education & training; Patient/Caregiver education & training; Modalities; Positioning; Integrated dry needling; Aquatics; Therapeutic activities     Subjective Comments: Remy Scott had to sit in MRI machine for 40 minutes today so knee is more sore, especially medially  Initial:}     3/10Post Session:        3/10  Medications Last Reviewed:  2/15/2023  Updated Objective Findings:  None Today  Treatment   THERAPEUTIC EXERCISE: (53 minutes):  Exercises per grid below to improve mobility and strength.   Required moderate visual, verbal, manual and tactile cues to promote proper body alignment, promote proper body posture and promote proper body mechanics. Progressed resistance, range, repetitions and complexity of movement as indicated. Date:  2/15/2023   Activity/Exercise Parameters   KT tape to R  medial knee 4 min   Knee ext prop --   Recumbent stepper and bike 10 min   Bridging  --   Sit to stand --   Standing calf stretch 3x30 sec   Shuttle press - DL 5 cords, eccentric calf raises - 4 cords 2x15 each   Stairs - 2 flights ascending and descending 4 min   Standing TKE - purple band with SL balance --   Walking around gym 5 min   Seated hamstring stretch  --   Balance board --       MANUAL THERAPY: (15 minutes): Joint mobilization, Soft tissue mobilization and Manipulation was utilized and necessary because of the patient's restricted joint motion, painful spasm, loss of articular motion and restricted motion of soft tissue.    - STM to VMO, adductor complex, semitendinosus and semimembranosus   - PF joint mobilizations   - IASTM to L achilles not performed    Modalities: ice pack to R knee at end of session - 5 minutes    Commonly used abbreviations that may be included in this note:  STM- Soft tissue mobilization, R>L or L>R- right greater than left or vice versa, HEP - Home exercise program, CPA/UPA - Central or unilateral posterior-anterior mobilization, SLS- single leg stance, SKTC - Single leg to chest, SNAGS/NAGS- (sustained) Natural apophyseal glides, TKE- Terminal knee extension, ER- External rotation, IR- Internal rotation, B - Bilateral, sec- seconds, Lb- pounds, min - minutes, HA- Headache, OP- Over pressure, tband- theraband, fwd/bwd- Forward/Backward, TA- Transversus Abdominus, dbl- Double      TREATMENT/SESSION SUMMARY: Toña reported that KT tape to medial knee helped take some tension off knee and make it feel more stable. She is able to ascend stairs reciprocally but with moderate use of UE support.  She has a hard time descending reciprocally due to medial knee pain.  She is going to check out her complex gym to see what kind of stationary bikes they have     Communication/Consultation:  None today  Equipment provided today:  None  Recommendations/Intent for next treatment session: LE strengthening and mobility      Total Treatment Billable Duration: 53 minutes of treatment  Time In: 1400  Time Out: [facility] Bernardino, PT       Charge Capture  }Post Session Pain  PT Visit [address] Portal  MD [address]    Future Appointments   Date Time Provider Alonso Morales   2/20/2023  2:00 PM Michael Bond, PT [facility]   2/22/2023  5:40 AM [facility]REACH INSURANCE [facility]   2/22/2023 11:00 AM TOMAS Knott - CNP POAI [facility] AMB   2/22/2023  1:00 PM Michel Isaac MD [facility] AMB   2/22/2023  4:00 PM Michael Bond, PT [facility]   2/28/2023  3:00 PM Laurel Joe MD END [facility] AMB   3/9/2023  2:00 PM FERNANDA Guillen BSBHPST [facility] AMB   3/28/2023  2:10 PM [address]   3/28/2023  2:30 PM TOMAS Lopez CNP [facility] AMB   6/8/2023 11:00 AM Pavan Wallace DO [facility] AMB   6/27/2023  3:00 PM [address]   6/27/2023  3:30 PM Zahira Price MD [facility] AM[facility]

## 2023-02-20 ENCOUNTER — HOSPITAL ENCOUNTER (OUTPATIENT)
Dept: PHYSICAL THERAPY | Age: 58
Setting detail: RECURRING SERIES
Discharge: HOME | End: 2023-02-23
Payer: MEDICAID

## 2023-02-20 PROCEDURE — 97110 THERAPEUTIC EXERCISES: CPT

## 2023-02-20 NOTE — PROGRESS NOTES
Lyn Ross  : 1965  Primary: [address] [place] [address] Medicaid  Secondary:  [address] @ [address]  [address] [place] [address]  Phone: [phone]  Fax: [phone] Plan Frequency: 1-2x/week for 60 days  Plan of Care/Certification Expiration Date: 23      PT Visit Info:        OUTPATIENT PHYSICAL NPZUJYS:ZZ NOTE TYPE: Treatment Note 2023       Episode  }Appt Desk             Treatment Diagnosis:  Low back pain (M54.5)  Radiculopathy, Lumbar Region (M54.16)  Muscle Weakness, Generalized (M62.81)  Abnormal posture (R29.3)  Medical/Referring Diagnosis:  Radiculopathy, lumbar region [M54.16]  Chronic pain of both knees [M25.561, M25.562, G89.29]  Chronic bilateral low back pain without sciatica [M54.50, G89.29]  Referring Physician:  Kellie Palacios MD MD Orders:  PT Eval and Treat  Date of Onset:  Onset Date: 06     Allergies:   Diltiazem hcl  Restrictions/Precautions:  Restrictions/Precautions: None  No data recorded   Interventions Planned (Treatment may consist of any combination of the following):    Current Treatment Recommendations: Strengthening; ROM; Balance training; Functional mobility training; Endurance training; Gait training; Stair training; Neuromuscular re-education; Manual Therapy - Soft Tissue Mobilization; Manual Therapy - Joint Manipulation; Pain management; Return to work related activity; Home exercise program; Safety education & training; Patient/Caregiver education & training; Modalities; Positioning; Integrated dry needling; Aquatics; Therapeutic activities     Subjective Comments: Sulaiman Ulloa reports her knee felt better for 3 days after last session. It started to hurt again on Saturday. She did some walking yesterday.  She reports her LBP is improving as long as she does morning stretches  Initial:}     3/10Post Session:        3/10  Medications Last Reviewed:  2023  Updated Objective Findings:  None Today  Treatment THERAPEUTIC EXERCISE: (45 minutes):  Exercises per grid below to improve mobility and strength. Required moderate visual, verbal, manual and tactile cues to promote proper body alignment, promote proper body posture and promote proper body mechanics. Progressed resistance, range, repetitions and complexity of movement as indicated. Date:  2/20/2023   Activity/Exercise Parameters   KT tape to R  medial knee 4 min   Knee ext prop --   Recumbent stepper  8 min   Bridging  --   Sit to stand x30   Standing calf stretch 3x30 sec   Shuttle press - DL 7 cords, SL 5 cords; eccentric calf raises - 7 cords 3x15 each   Stairs - 2 flights ascending and descending --   Standing hip abd/ext - band 2x20 B   Walking around gym --   Seated hamstring stretch  --   Balance board --     Joint mobilization, Soft tissue mobilization and Manipulation was utilized and necessary because of the patient's restricted joint motion, painful spasm, loss of articular motion and restricted motion of soft tissue.    - STM to VMO, adductor complex, semitendinosus and semimembranosus   - PF joint mobilizations   - IASTM to L achilles not performed    Modalities: ice pack to R knee at end of session - 5 minutes not performed    Commonly used abbreviations that may be included in this note:  STM- Soft tissue mobilization, R>L or L>R- right greater than left or vice versa, HEP - Home exercise program, CPA/UPA - Central or unilateral posterior-anterior mobilization, SLS- single leg stance, SKTC - Single leg to chest, SNAGS/NAGS- (sustained) Natural apophyseal glides, TKE- Terminal knee extension, ER- External rotation, IR- Internal rotation, B - Bilateral, sec- seconds, Lb- pounds, min - minutes, HA- Headache, OP- Over pressure, tband- theraband, fwd/bwd- Forward/Backward, TA- Transversus Abdominus, dbl- Double      TREATMENT/SESSION SUMMARY: Toña remains severely restricted in medial hamstring mobility limiting full knee extension ROM.  After stretching knee extension ROM improves.  She is progressing with LE strengthening     Communication/Consultation:  None today  Equipment provided today:  None  Recommendations/Intent for next treatment session: LE strengthening and mobility      Total Treatment Billable Duration: 45 minutes of treatment  Time In: 1400  Time Out: Kentonmonae Nick7 [place], PT       Charge Capture  }Post Session Pain  PT Visit Info  MedBridge Portal  MD Guidelines  Scanned Media  Benefits  MyChart    Future Appointments   Date Time Provider Alonso Carmen   [facility]22[facility]20[facility]   2/22/2023 11:00 AM Anna Diego APRN - CNP POAI [facility] AMB   2/22/2023  1:00 PM Jaxson Thompson MD [facility] AMB   2/22/2023  4:00 PM Lilian Vicente, PT [facility]   2/28/2023  3:00 PM Ld Mathews MD END [facility] AMB   3/9/2023  2:00 PM FERNANDA Jimenez BSBHPST [facility] AMB   3/28/2023  2:10 PM [address]   3/28/2023  2:30 PM TOMAS Pichardo - CNP [facility] AMB   6/8/2023 11:00 AM Liyah Gillette DO [facility] AMB   6/27/2023  [facility] [address]   6/27/2023  3:30 PM Rafi Hunter MD [facility] AMB

## 2023-02-21 ASSESSMENT — ENCOUNTER SYMPTOMS
RESPIRATORY NEGATIVE: 1
GASTROINTESTINAL NEGATIVE: 1
EYES NEGATIVE: 1
ALLERGIC/IMMUNOLOGIC NEGATIVE: 1
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
## 2023-02-22 ENCOUNTER — HOSPITAL ENCOUNTER (OUTPATIENT)
Dept: LAB | Age: 58
Discharge: HOME | End: 2023-02-25
Payer: MEDICAID

## 2023-02-22 ENCOUNTER — OFFICE VISIT (OUTPATIENT)
Dept: ORTHOPEDIC SURGERY | Age: 58
End: 2023-02-22
Payer: MEDICAID

## 2023-02-22 ENCOUNTER — HOSPITAL ENCOUNTER (OUTPATIENT)
Dept: PHYSICAL THERAPY | Age: 58
Setting detail: RECURRING SERIES
End: 2023-02-22
Payer: MEDICAID

## 2023-02-22 ENCOUNTER — OFFICE VISIT (OUTPATIENT)
Dept: ONCOLOGY | Age: 58
End: 2023-02-22
Payer: MEDICAID

## 2023-02-22 VITALS
WEIGHT: 278.4 LBS | OXYGEN SATURATION: 97 % | TEMPERATURE: 97.7 F | RESPIRATION RATE: 18 BRPM | SYSTOLIC BLOOD PRESSURE: 157 MMHG | DIASTOLIC BLOOD PRESSURE: 91 MMHG | HEART RATE: 66 BPM | BODY MASS INDEX: 44.74 KG/M2 | HEIGHT: 66 IN

## 2023-02-22 DIAGNOSIS — M70.50 PES ANSERINE BURSITIS: ICD-10-CM

## 2023-02-22 DIAGNOSIS — C54.1 ENDOMETRIAL CANCER (HCC): Primary | ICD-10-CM

## 2023-02-22 DIAGNOSIS — S83.241D TEAR OF MEDIAL MENISCUS OF RIGHT KNEE, CURRENT, UNSPECIFIED TEAR TYPE, SUBSEQUENT ENCOUNTER: ICD-10-CM

## 2023-02-22 DIAGNOSIS — M25.561 RIGHT KNEE PAIN, UNSPECIFIED CHRONICITY: ICD-10-CM

## 2023-02-22 DIAGNOSIS — C54.1 ENDOMETRIAL CANCER (HCC): ICD-10-CM

## 2023-02-22 DIAGNOSIS — M17.11 PRIMARY OSTEOARTHRITIS OF RIGHT KNEE: Primary | ICD-10-CM

## 2023-02-22 PROCEDURE — 99214 OFFICE O/P EST MOD 30 MIN: CPT | Performed by: SPECIALIST/TECHNOLOGIST

## 2023-02-22 PROCEDURE — 3077F SYST BP >= 140 MM HG: CPT | Performed by: OBSTETRICS & GYNECOLOGY

## 2023-02-22 PROCEDURE — 3080F DIAST BP >= 90 MM HG: CPT | Performed by: OBSTETRICS & GYNECOLOGY

## 2023-02-22 PROCEDURE — 88142 CYTOPATH C/V THIN LAYER: CPT

## 2023-02-22 PROCEDURE — 99213 OFFICE O/P EST LOW 20 MIN: CPT | Performed by: OBSTETRICS & GYNECOLOGY

## 2023-02-22 RX ORDER — DICLOFENAC SODIUM 75 MG/1
75 TABLET, DELAYED RELEASE ORAL 2 TIMES DAILY
Qty: 60 TABLET | Refills: 0 | Status: SHIPPED | OUTPATIENT
Start: 2023-02-22 | End: 2023-03-24

## 2023-02-22 NOTE — PROGRESS NOTES
Kelley Valadez  : 1965  Primary: [address] Medicaid  Secondary:  [address] @ [address]  [address] [place] [address]  Phone: [phone]  Fax: [phone]       2023      Patient canceled today's appointment and will plan to follow up at next scheduled visit.        Charlie Santoyo, PT

## 2023-02-22 NOTE — PROGRESS NOTES
Name: Eleonora Alicea  YOB: 1965  Gender: female  MRN: [id number]      CC: Knee Pain (R Knee )       HPI: Eleonora Alicea is a 61 y.o. female who returns for follow up on 2/22/2023 for her MRI follow up. He reports that her knee pain continues to be intermittent and is improved when using the diclofenac. She reports that she continues to have difficulty with stairs. Physical Examination:  General: no acute distress  Lungs: breathing easily  CV: regular rhythm by pulse  Right Knee: Minimal effusion present. Exquisite tenderness to palpation in the proximal medial tibia. Full active and passive range of motion with pain in the extremes of extension. Negative ligamentous exam x4. Positive Wallace's and reproduction of patient's symptoms medial joint line. Positive bounce home test.    Imaging: I reviewed an MRI performed in our system on 2/15/2023 which shows radial oblique tear of the posterior horn of the medial meniscus with 4 mm of medial meniscal body extrusion. Mild free edge fraying of the lateral meniscal body. Subacute appearing subcortical microtrabecular fracture without displacement of the medial tibial plateau with mild adjacent edema. Moderate medial and mild patellofemoral and lateral compartment chondral loss. Assessment:   1. Primary osteoarthritis of right knee    2. Right knee pain, unspecified chronicity    3. Tear of medial meniscus of right knee, current, unspecified tear type, subsequent encounter    4. Pes anserine bursitis         Plan:   Discussed and reviewed the MRI results with the patient and her  and discussed continued conservative treatment versus surgical intervention to address her meniscal tear. I discussed with the patient that addressing her meniscal tear may not address all of her knee pain as she does have some medial compartment arthritis and a small fracture in the medial tibial plateau.   She wishes to have her knee pain addressed definitively and wishes to review her surgical options with an orthopedic surgeon. I will refer her to Dr. Kadi Terrell for surgical consultation versus continued conservative treatment.   I did discuss with her that she can utilize a cane which she has at home to take weight off of the knee to help with healing of the fracture    TOMAS Palacios - CLOVER    Orthopaedics and Sports Medicine

## 2023-02-28 ENCOUNTER — OFFICE VISIT (OUTPATIENT)
Dept: ENDOCRINOLOGY | Age: 58
End: 2023-02-28
Payer: MEDICAID

## 2023-02-28 VITALS
SYSTOLIC BLOOD PRESSURE: 146 MMHG | WEIGHT: 275 LBS | DIASTOLIC BLOOD PRESSURE: 96 MMHG | OXYGEN SATURATION: 96 % | BODY MASS INDEX: 44.39 KG/M2 | HEART RATE: 79 BPM

## 2023-02-28 DIAGNOSIS — E89.0 POSTSURGICAL HYPOTHYROIDISM: Primary | ICD-10-CM

## 2023-02-28 DIAGNOSIS — E07.9 THYROID EYE DISEASE: ICD-10-CM

## 2023-02-28 DIAGNOSIS — H57.89 THYROID EYE DISEASE: ICD-10-CM

## 2023-02-28 DIAGNOSIS — E89.0 POSTSURGICAL HYPOTHYROIDISM: ICD-10-CM

## 2023-02-28 PROCEDURE — 3080F DIAST BP >= 90 MM HG: CPT | Performed by: INTERNAL MEDICINE

## 2023-02-28 PROCEDURE — 99214 OFFICE O/P EST MOD 30 MIN: CPT | Performed by: INTERNAL MEDICINE

## 2023-02-28 PROCEDURE — 3077F SYST BP >= 140 MM HG: CPT | Performed by: INTERNAL MEDICINE

## 2023-02-28 RX ORDER — LEVOTHYROXINE SODIUM 0.15 MG/1
TABLET ORAL
Qty: 90 TABLET | Refills: 3 | Status: SHIPPED | OUTPATIENT
Start: 2023-02-28 | End: 2023-03-01 | Stop reason: SDUPTHER

## 2023-02-28 ASSESSMENT — ENCOUNTER SYMPTOMS
ROS SKIN COMMENTS: SHE REPORTS HAIR LOSS.  DENIES DRY SKIN.
DIARRHEA: 0
CONSTIPATION: 0

## 2023-02-28 NOTE — PROGRESS NOTES
Albaro Gonzalez MD, [facility]  [address], [address] [facility], [address] [place] [address]  Phone [phone]          Radha Williamson is a 61 y.o. female seen 2/28/2023 for follow-up of hypothyroidism           ASSESSMENT AND PLAN:    1. Postsurgical hypothyroidism  I will check her thyroid function tests today and let her know if her dose needs to be adjusted. Assuming she is euthyroid, she will follow-up in 1 year. I instructed her to let me know if she has gastric bypass surgery as this will likely affect her dose. - levothyroxine (SYNTHROID) 150 MCG tablet; 1 tablet once a day except for 1/2 tablet on Sundays  Dispense: 90 tablet; Refill: 3    2. Thyroid eye disease  Stable. She was previously followed by Dr. Bernadine Posada but her insurance is out of network. I recommended that she continue artificial tears. Follow-up and Dispositions    Return in about 1 year (around 2/28/2024). HISTORY OF PRESENT ILLNESS:    THYROID DISEASE     Presentation/Diagnosis: Graves' disease and multinodular goiter status post total thyroidectomy 8/28/2020. Symptoms:  See review of systems. Associated Conditions: She reports that her eyes used to bulge more. Denies diplopia, eye pain. Excessive tearing has been much better with eye drops. She reports some periorbital edema left>right. She was previously followed by Dr. Bernadine Posada but he does not accept her insurance. Treatment: Takes generic in AM correctly.      Labs:  3/5/2019: TSH <0.005, free T4 2.62.  6/5/2019: TSH <0.005, free T4 2.06.  7/16/2019: TSH <0.005, free T4 1.26, total T3 290.  9/10/2019: TSH 1.180, free T4 0.40, total T3 142.  10/15/2019: TSH 23.730, free T4 0.25, total T3 111.  12/12/2019: TSH 0.499, free T4 0.90, LFTs normal except alkaline phosphatase 132.  3/5/2020: TSH 3.350, free T4 0.64, free T3 4.2, thyroid-stimulating immunoglobulin 72.10, LFTs normal except alkaline phosphatase 197.  6/8/2020: TSH <0.006.  10/22/2020: TSH 0.007, free T4 1.76.  2/23/2021: TSH 0.313, free T4 1.67.  6/14/2021: TSH 2.230.  8/18/2021: TSH 0.927.  2/25/2022: TSH 1.350.  7/12/2022: TSH 0.552. 10/4/2022: TSH 1.910. Review of Systems   Constitutional:  Positive for fatigue (intermittent). Negative for diaphoresis. Weight decreased 11 pounds since last visit. She was scheduled for gastric bypass surgery 1/2023 but it was cancelled because she was diagnosed with possible cirrhosis. Cardiovascular:  Negative for palpitations. Gastrointestinal:  Negative for constipation and diarrhea. Endocrine: Positive for cold intolerance (occasionally). Negative for heat intolerance. Skin:         She reports hair loss. Denies dry skin. Neurological:  Negative for tremors. Vital Signs:  BP (!) 146/96   Pulse 79   Wt 275 lb (124.7 kg)   SpO2 96%   BMI 44.39 kg/m²     Wt Readings from Last 3 Encounters:   02/28/23 275 lb (124.7 kg)   02/22/23 278 lb 6.4 oz (126.3 kg)   02/01/23 276 lb 12.8 oz (125.6 kg)       Physical Exam  Constitutional:       General: She is not in acute distress. Eyes:      Comments: +Mild proptosis bilaterally. +Right lid retraction. Neck:      Comments: Thyroidectomy scar. Cardiovascular:      Rate and Rhythm: Normal rate and regular rhythm. Lymphadenopathy:      Cervical: No cervical adenopathy. Neurological:      Motor: No tremor.          Orders Placed This Encounter   Procedures    TSH with Reflex     Standing Status:   Future     Standing Expiration Date:   2/28/2024    TSH with Reflex     Standing Status:   Future     Standing Expiration Date:   8/28/2024         Current Outpatient Medications   Medication Sig Dispense Refill    levothyroxine (SYNTHROID) 150 MCG tablet 1 tablet once a day except for 1/2 tablet on Sundays 90 tablet 3    diclofenac (VOLTAREN) 75 MG EC tablet Take 1 tablet by mouth 2 times daily 60 tablet 0    omeprazole (PRILOSEC) 20 MG delayed release capsule Take 1 capsule by mouth every morning (before breakfast) 90 capsule 1    esomeprazole (NEXIUM) 20 MG delayed release capsule Take 1 capsule by mouth every morning (before breakfast) Take one cap daily. 90 capsule 1    nystatin (MYCOSTATIN) 787389 UNIT/GM cream Apply topically 2 times daily. 30 g 1    triamcinolone (KENALOG) 0.1 % cream Apply topically 2 times daily. 28.4 g 1    albuterol sulfate HFA (PROVENTIL;VENTOLIN;PROAIR) 108 (90 Base) MCG/ACT inhaler Inhale 2 puffs into the lungs every 4 hours as needed for Wheezing 18 g 5    LORazepam (ATIVAN) 1 MG tablet Take 1 tablet by mouth 2 times daily for 180 days. 60 tablet 5    nystatin (MYCOSTATIN) 207329 UNIT/GM cream Apply topically 2 times daily. 30 g 1    traZODone (DESYREL) 150 MG tablet Take 1 tablet by mouth nightly 90 tablet 3    traMADol (ULTRAM) 50 MG tablet Take 1 tablet by mouth in the morning and at bedtime for 180 days.  180 tablet 3    ferrous sulfate (IRON 325) 325 (65 Fe) MG tablet Take 325 mg by mouth daily (with breakfast)      amLODIPine (NORVASC) 5 MG tablet Take 5 mg by mouth daily       Current Facility-Administered Medications   Medication Dose Route Frequency Provider Last Rate Last Admin    methylPREDNISolone sodium (SOLU-MEDROL) injection 40 mg  40 mg IntraMUSCular Daily TOMAS Isabel - CNP

## 2023-03-01 ENCOUNTER — TELEPHONE (OUTPATIENT)
Dept: ENDOCRINOLOGY | Age: 58
End: 2023-03-01

## 2023-03-01 DIAGNOSIS — E89.0 POSTSURGICAL HYPOTHYROIDISM: ICD-10-CM

## 2023-03-01 LAB
T3 SERPL-MCNC: 1.05 NG/ML (ref 0.6–1.81)
T4 FREE SERPL-MCNC: 1.7 NG/DL (ref 0.78–1.46)
TSH W FREE THYROID IF ABNORMAL: 0.16 UIU/ML (ref 0.36–3.74)

## 2023-03-01 RX ORDER — LEVOTHYROXINE SODIUM 137 UG/1
137 TABLET ORAL DAILY
Qty: 90 TABLET | Refills: 3 | Status: SHIPPED | OUTPATIENT
Start: 2023-03-01

## 2023-03-01 NOTE — TELEPHONE ENCOUNTER
She is on slightly too much levothyroxine. Have her decrease her dose and get her labs repeated in 2 months.

## 2023-03-06 ENCOUNTER — OFFICE VISIT (OUTPATIENT)
Dept: ORTHOPEDIC SURGERY | Age: 58
End: 2023-03-06

## 2023-03-06 DIAGNOSIS — S83.241D TEAR OF MEDIAL MENISCUS OF RIGHT KNEE, CURRENT, UNSPECIFIED TEAR TYPE, SUBSEQUENT ENCOUNTER: Primary | ICD-10-CM

## 2023-03-06 DIAGNOSIS — M17.11 PRIMARY OSTEOARTHRITIS OF RIGHT KNEE: ICD-10-CM

## 2023-03-06 NOTE — PROGRESS NOTES
Name: Kareem Mcdonald  YOB: 1965  Gender: female  MRN: [id number]      CC: Knee Pain (R knee )       HPI: Kareem Mcdonald is a 61 y.o. female who presents with Knee Pain (R knee )  Barrett Lam has been receiving treatment from [facility], however this is the first time I am seeing her. She has been treated for osteoarthritis of the R knee since November of 2022. She has been following conservative treatment since November. She has some transient benefit from intra-articular cortisone injection but then continued to have swelling as well as occasional catching sensation with twisting of her knee. To the pes bursa injection was provided mild benefit as well. Of note she is planning gastric bypass surgery has been cleared from that from everything except for a little bit of cirrhosis of her liver which she has a follow-up with GI later this week 4. ROS/Meds/PSH/PMH/FH/SH: I personally reviewed the patients standard intake form. Below are the pertinents    Tobacco:  reports that she has never smoked. She has never used smokeless tobacco.  Diabetes: none  Other: Hypertension, hypothyroidism, cirrhosis liver    Physical Examination:  General: no acute distress  Lungs: breathing easily  CV: regular rhythm by pulse  Right Knee: Tenderness to palpation of the medial joint line pain at the extremes of motion of the medial joint line. Pain with Wallace's with recreation of symptoms. She does have a moderate effusion. Imaging:   I reviewed plain radiographs of the right knee from October 6, 2022 which demonstrate some moderate joint space narrowing and osteophyte formation of the medial compartment. Also reviewed an MRI scan February 15, 2023 this shows an oblique tear of the posterior horn of the medial meniscus with a radial component and extrusion of the mid body medial meniscus which is mild.   She has some tricompartmental degenerative changes most advanced in the medial compartment with osteophyte formation and some mild edema in the tibial plateau. All imaging interpreted by myself Carlos Lea MD independent of radiology review        Assessment:     ICD-10-CM    1. Tear of medial meniscus of right knee, current, unspecified tear type, subsequent encounter  S83.241D       2. Primary osteoarthritis of right knee  M17.11           Plan:   I reviewed the images with her she does have degenerative changes which are moderate with osteophyte formation however she does have significant meniscal pathology. We discussed definitive care for her arthritis is likely total knee arthroplasty which she is not a good candidate for currently. We did discuss the meniscus may be providing some of the symptoms causing swelling and sharp pain. She has failed extensive nonoperative management we discussed the possibility of a knee arthroscopy procedure to address the meniscus and the effusions and the possibility that she will gets transient benefit from this and the fact that her arthritis may continue to get worse and she may still require total knee arthroplasty in the future. We also discussed proceeding straight to a total knee arthroplasty which I do not think she is a good candidate for currently. After thorough understanding of the options she is elected to proceed with a knee arthroscopy. This is pending clearance by her GI doctor that she is appropriate for a small outpatient general anesthetic knee arthroscopy. We can see her back for preoperative appointment surgical plan to be for right knee arthroscopy partial medial meniscectomy/chondroplasty              Carlos Lea MD, [address] [place] [address] and Sports Medicine

## 2023-03-09 ENCOUNTER — OFFICE VISIT (OUTPATIENT)
Dept: BEHAVIORAL/MENTAL HEALTH CLINIC | Facility: CLINIC | Age: 58
End: 2023-03-09
Payer: MEDICAID

## 2023-03-09 DIAGNOSIS — F41.1 GAD (GENERALIZED ANXIETY DISORDER): ICD-10-CM

## 2023-03-09 DIAGNOSIS — F33.0 MDD (MAJOR DEPRESSIVE DISORDER), RECURRENT EPISODE, MILD (HCC): Primary | ICD-10-CM

## 2023-03-09 PROCEDURE — 90834 PSYTX W PT 45 MINUTES: CPT | Performed by: SOCIAL WORKER

## 2023-03-09 NOTE — PROGRESS NOTES
INDIVIDUAL THERAPY NOTE  NAME: Yenni Landeros    DATE: 3/9/2023    TYPE OF SERVICE: Individual Therapy/In person visit/SW and pt in [place]    LOCATION OF SERVICE: [facility]    DURATION: 50 mins    DIAGNOSIS: :    1. MDD (major depressive disorder), recurrent episode, mild ([address]    2. WINDY (generalized anxiety disorder)        CHIEF COMPLAINT: depression    MENTAL STATUS EXAM:  Pt was appropriately groomed. Pt was 0x4. No unusual mannerisms were noted. No psychotic symptoms displayed by pt. Pt was cooperative and engaged in the session. Insight and judgment were intact. Denied suicidal or homicidal ideation. Fund of knowledge appears to be WNL. Fund of knowledge and attention span are WNL. Denies developmental or language difficulties. 0x4. Mood/Affect: sad and tearful  Thought Process: coherent    Pt is progressing on goals. PLAN: CBT, DBT, Humanistic/Client Centered, ACT, Seeking Safety, Psychodynamic, ERP may be used to address goals. Summary of Service: This SW met with  ept face to face in the office. The pt reports that she has been sad. She was declined for bariatric surgery due to some liver issues but she hopes to be cleared in the future. The pt did binge on food when she was first declined. This SW processed with the pt how feelings can drive one to overeat. Identifying the feelings behind the behavior and finding healthier solutions was discussed. The pt has grown increasing sad in her marriage. They have been  for 40 years but there is no physical or emotional intimacy. The pt is not happy but her  is unwilling to change. The pt fears being alone. This SW provided support and active listening. This SW talked about ways that the pt could help herself that were not contingent on her . She wants to learn to drive and small committed action steps were discussed.   Basic emotional needs that people have were discussed and the importance of trying meet these needs. Follow Up: 2 weeks      Will continue to meet with and be available to patient as scheduled and per patient's request/compliance.

## 2023-03-24 ENCOUNTER — OFFICE VISIT (OUTPATIENT)
Dept: BEHAVIORAL/MENTAL HEALTH CLINIC | Facility: CLINIC | Age: 58
End: 2023-03-24
Payer: MEDICAID

## 2023-03-24 DIAGNOSIS — F33.0 MDD (MAJOR DEPRESSIVE DISORDER), RECURRENT EPISODE, MILD (HCC): Primary | ICD-10-CM

## 2023-03-24 DIAGNOSIS — F41.1 GAD (GENERALIZED ANXIETY DISORDER): ICD-10-CM

## 2023-03-24 PROCEDURE — 90837 PSYTX W PT 60 MINUTES: CPT | Performed by: SOCIAL WORKER

## 2023-03-24 NOTE — PROGRESS NOTES
INDIVIDUAL THERAPY NOTE  NAME: Uziel Found    DATE: 3/24/2023    TYPE OF SERVICE: Individual Therapy/In person visit/SW and pt in [facility]    DURATION:55 mins    DIAGNOSIS: :    1. MDD (major depressive disorder), recurrent episode, mild ([address]    2. WINDY (generalized anxiety disorder)        CHIEF COMPLAINT: depression and anxiety    MENTAL STATUS EXAM:  Pt was appropriately groomed. Pt was 0x4. No unusual mannerisms were noted. No psychotic symptoms displayed by pt. Pt was cooperative and engaged in the session. Insight and judgment were intact. Denied suicidal or homicidal ideation. Fund of knowledge appears to be WNL. Fund of knowledge and attention span are WNL. Denies developmental or language difficulties. 0x4. Mood/Affect: mildly depressed with congruent affect  Thought Process: coherent    Pt is progressing on goals. PLAN: CBT, DBT, Humanistic/Client Centered, ACT, Seeking Safety, Psychodynamic, ERP may be used to address goals. Summary of Service: This SW met with the pt face to face in the office. The pt reports that she has been doing okay. She has been driving and she plans to extend her route. The pt still feels dissatisfaction in her marriage. The pt pt endorses a lack of affection and communication in the marriage. The pt has been doing things to help herself. She has set boundaries with her  and she is taking better care or herself and this was reinforced. This SW introduced the CBT triangle and how to change one's thoughts. This SW helped the pt reframe negative thoughts that she was carrying. Diffusion was also discussed. How her real life compared to her ideal life was also discussed and the pt has been moving in a positive direction. The loss of her parents will be discussed in the next session.       Follow Up: 2 weeks        Will continue to meet with and be available to patient as scheduled and per patient's

## 2023-03-31 DIAGNOSIS — D64.9 ANEMIA, UNSPECIFIED TYPE: ICD-10-CM

## 2023-03-31 DIAGNOSIS — C54.1 ENDOMETRIAL CANCER (HCC): Primary | ICD-10-CM

## 2023-04-04 ENCOUNTER — HOSPITAL ENCOUNTER (OUTPATIENT)
Dept: LAB | Age: 58
Discharge: HOME | End: 2023-04-07
Payer: MEDICAID

## 2023-04-04 ENCOUNTER — OFFICE VISIT (OUTPATIENT)
Dept: ONCOLOGY | Age: 58
End: 2023-04-04
Payer: MEDICAID

## 2023-04-04 VITALS
HEART RATE: 71 BPM | BODY MASS INDEX: 43.52 KG/M2 | SYSTOLIC BLOOD PRESSURE: 144 MMHG | DIASTOLIC BLOOD PRESSURE: 86 MMHG | TEMPERATURE: 98.3 F | WEIGHT: 270.8 LBS | OXYGEN SATURATION: 93 % | RESPIRATION RATE: 16 BRPM | HEIGHT: 66 IN

## 2023-04-04 DIAGNOSIS — K74.69 OTHER CIRRHOSIS OF LIVER (HCC): Primary | ICD-10-CM

## 2023-04-04 DIAGNOSIS — D75.1 ERYTHROCYTOSIS: ICD-10-CM

## 2023-04-04 DIAGNOSIS — D64.9 ANEMIA, UNSPECIFIED TYPE: ICD-10-CM

## 2023-04-04 DIAGNOSIS — G47.30 SLEEP APNEA, UNSPECIFIED TYPE: ICD-10-CM

## 2023-04-04 LAB
ALBUMIN SERPL-MCNC: 3.7 G/DL (ref 3.5–5)
ALBUMIN/GLOB SERPL: 0.8 (ref 0.4–1.6)
ALP SERPL-CCNC: 165 U/L (ref 50–136)
ALT SERPL-CCNC: 84 U/L (ref 12–65)
ANION GAP SERPL CALC-SCNC: 5 MMOL/L (ref 2–11)
AST SERPL-CCNC: 67 U/L (ref 15–37)
BASOPHILS # BLD: 0 K/UL (ref 0–0.2)
BASOPHILS NFR BLD: 0 % (ref 0–2)
BILIRUB SERPL-MCNC: 0.6 MG/DL (ref 0.2–1.1)
BUN SERPL-MCNC: 12 MG/DL (ref 6–23)
CALCIUM SERPL-MCNC: 9.9 MG/DL (ref 8.3–10.4)
CHLORIDE SERPL-SCNC: 104 MMOL/L (ref 101–110)
CO2 SERPL-SCNC: 28 MMOL/L (ref 21–32)
CREAT SERPL-MCNC: 0.8 MG/DL (ref 0.6–1)
DIFFERENTIAL METHOD BLD: ABNORMAL
EOSINOPHIL # BLD: 0.2 K/UL (ref 0–0.8)
EOSINOPHIL NFR BLD: 3 % (ref 0.5–7.8)
ERYTHROCYTE [DISTWIDTH] IN BLOOD BY AUTOMATED COUNT: 14.4 % (ref 11.9–14.6)
FERRITIN SERPL-MCNC: 145 NG/ML (ref 8–388)
GLOBULIN SER CALC-MCNC: 4.5 G/DL (ref 2.8–4.5)
GLUCOSE SERPL-MCNC: 105 MG/DL (ref 65–100)
HCT VFR BLD AUTO: 51.1 % (ref 35.8–46.3)
HGB BLD-MCNC: 16.3 G/DL (ref 11.7–15.4)
IMM GRANULOCYTES # BLD AUTO: 0 K/UL (ref 0–0.5)
IMM GRANULOCYTES NFR BLD AUTO: 0 % (ref 0–5)
IRON SATN MFR SERPL: 24 %
IRON SERPL-MCNC: 86 UG/DL (ref 35–150)
LYMPHOCYTES # BLD: 1.9 K/UL (ref 0.5–4.6)
LYMPHOCYTES NFR BLD: 31 % (ref 13–44)
MCH RBC QN AUTO: 26.4 PG (ref 26.1–32.9)
MCHC RBC AUTO-ENTMCNC: 31.9 G/DL (ref 31.4–35)
MCV RBC AUTO: 82.8 FL (ref 82–102)
MONOCYTES # BLD: 0.6 K/UL (ref 0.1–1.3)
MONOCYTES NFR BLD: 10 % (ref 4–12)
NEUTS SEG # BLD: 3.4 K/UL (ref 1.7–8.2)
NEUTS SEG NFR BLD: 55 % (ref 43–78)
NRBC # BLD: 0 K/UL (ref 0–0.2)
PLATELET # BLD AUTO: 176 K/UL (ref 150–450)
PMV BLD AUTO: 10.8 FL (ref 9.4–12.3)
POTASSIUM SERPL-SCNC: 4 MMOL/L (ref 3.5–5.1)
PROT SERPL-MCNC: 8.2 G/DL (ref 6.3–8.2)
RBC # BLD AUTO: 6.17 M/UL (ref 4.05–5.2)
SODIUM SERPL-SCNC: 137 MMOL/L (ref 133–143)
TIBC SERPL-MCNC: 356 UG/DL (ref 250–450)
WBC # BLD AUTO: 6.1 K/UL (ref 4.3–11.1)

## 2023-04-04 PROCEDURE — 3077F SYST BP >= 140 MM HG: CPT | Performed by: NURSE PRACTITIONER

## 2023-04-04 PROCEDURE — 85025 COMPLETE CBC W/AUTO DIFF WBC: CPT

## 2023-04-04 PROCEDURE — 80053 COMPREHEN METABOLIC PANEL: CPT

## 2023-04-04 PROCEDURE — 83540 ASSAY OF IRON: CPT

## 2023-04-04 PROCEDURE — 99214 OFFICE O/P EST MOD 30 MIN: CPT | Performed by: NURSE PRACTITIONER

## 2023-04-04 PROCEDURE — 36415 COLL VENOUS BLD VENIPUNCTURE: CPT

## 2023-04-04 PROCEDURE — 82728 ASSAY OF FERRITIN: CPT

## 2023-04-04 PROCEDURE — 3079F DIAST BP 80-89 MM HG: CPT | Performed by: NURSE PRACTITIONER

## 2023-04-04 NOTE — PROGRESS NOTES
12:25 PM    HGB 16.3 04/04/2023 12:25 PM    HCT 51.1 04/04/2023 12:25 PM     04/04/2023 12:25 PM    MCV 82.8 04/04/2023 12:25 PM       Lab Results   Component Value Date/Time     04/04/2023 12:25 PM    K 4.0 04/04/2023 12:25 PM     04/04/2023 12:25 PM    CO2 28 04/04/2023 12:25 PM    BUN 12 04/04/2023 12:25 PM    GFRAA >60 07/12/2022 11:03 AM    GLOB 4.5 04/04/2023 12:25 PM    ALT 84 04/04/2023 12:25 PM             Above results reviewed with patient. ASSESSMENT:  59-year-old  female, on disability, history of cirrhosis (mentioned in 2019 at time of cholecystectomy), iron deficiency anemia requiring PRBC and IV iron infusions in past, Afib s/p cardiac ablation (2018, not felt to need a/c), asthma, chronic lower back pain (on regular NSAID use), GERD, hypertension, sleep apnea, Graves' disease, endometrial cancer status post hysterectomy and now follows with Dr. Adam Joshi for surveillance, cholecystectomy, D&C uterus, thyroidectomy, now referred to us by primary care for Dr. Edgar Cole for iron deficiency anemia. She reported history of being hospitalized in 6/22 in [place] for anemia, requiring 2 units PRBC transfusion as well as multiple IV iron infusions (~ 6). She reports having EGD/colonoscopy which clear evidence of bleeding. She has also been referred to GI given reported history of cirrhosis. She denies any bleeding per rectum. Has noticed dark stool but attributes this to PO iron supplementation with some occasional constipation requiring OTC medications. We will w/u and manage as below:    12/27/2022: Labs from previous visit reviewed with hemoglobin normal range and iron stores adequate. ESR borderline high. B12, folate, MMA and HC unremarkable. Patient has remained on p.o. iron supplementation once daily, she denies any bleeding today. Reasonable to continue.   Recent abdominal ultrasound (by Dr. Meir Alejandra in anticipation of bariatric surgery), with

## 2023-04-06 DIAGNOSIS — M17.11 PRIMARY OSTEOARTHRITIS OF RIGHT KNEE: ICD-10-CM

## 2023-04-06 DIAGNOSIS — M70.50 PES ANSERINE BURSITIS: ICD-10-CM

## 2023-04-13 RX ORDER — DICLOFENAC SODIUM 75 MG/1
75 TABLET, DELAYED RELEASE ORAL 2 TIMES DAILY
Qty: 60 TABLET | Refills: 0 | OUTPATIENT
Start: 2023-04-13 | End: 2023-05-13

## 2023-04-19 RX ORDER — AMLODIPINE BESYLATE 5 MG/1
TABLET ORAL
Qty: 90 TABLET | Refills: 3 | OUTPATIENT
Start: 2023-04-19

## 2023-05-11 DIAGNOSIS — J45.40 MODERATE PERSISTENT ASTHMA WITHOUT COMPLICATION: ICD-10-CM

## 2023-05-11 RX ORDER — ALBUTEROL SULFATE 90 UG/1
AEROSOL, METERED RESPIRATORY (INHALATION)
Qty: 8.5 G | Refills: 5 | OUTPATIENT
Start: 2023-05-11

## 2023-05-15 DIAGNOSIS — M17.11 PRIMARY OSTEOARTHRITIS OF RIGHT KNEE: ICD-10-CM

## 2023-05-15 DIAGNOSIS — M70.50 PES ANSERINE BURSITIS: ICD-10-CM

## 2023-05-16 RX ORDER — DICLOFENAC SODIUM 75 MG/1
75 TABLET, DELAYED RELEASE ORAL 2 TIMES DAILY
Qty: 60 TABLET | Refills: 0 | Status: SHIPPED | OUTPATIENT
Start: 2023-05-16 | End: 2023-06-15

## 2023-05-20 DIAGNOSIS — G89.29 CHRONIC PAIN OF BOTH KNEES: ICD-10-CM

## 2023-05-20 DIAGNOSIS — M54.50 CHRONIC BILATERAL LOW BACK PAIN WITHOUT SCIATICA: ICD-10-CM

## 2023-05-20 DIAGNOSIS — G89.29 CHRONIC BILATERAL LOW BACK PAIN WITHOUT SCIATICA: ICD-10-CM

## 2023-05-20 DIAGNOSIS — M25.561 CHRONIC PAIN OF BOTH KNEES: ICD-10-CM

## 2023-05-20 DIAGNOSIS — M25.562 CHRONIC PAIN OF BOTH KNEES: ICD-10-CM

## 2023-05-20 RX ORDER — TRAMADOL HYDROCHLORIDE 50 MG/1
50 TABLET ORAL 2 TIMES DAILY
Qty: 180 TABLET | Refills: 0 | Status: SHIPPED | OUTPATIENT
Start: 2023-05-20 | End: 2023-08-18

## 2023-05-22 DIAGNOSIS — E66.01 MORBID OBESITY (HCC): ICD-10-CM

## 2023-05-22 DIAGNOSIS — Z01.812 ENCOUNTER FOR PRE-OPERATIVE LABORATORY TESTING: Primary | ICD-10-CM

## 2023-05-24 DIAGNOSIS — E66.01 MORBID OBESITY (HCC): ICD-10-CM

## 2023-05-24 DIAGNOSIS — Z01.812 ENCOUNTER FOR PRE-OPERATIVE LABORATORY TESTING: ICD-10-CM

## 2023-05-24 LAB
25(OH)D3 SERPL-MCNC: 40.7 NG/ML (ref 30–100)
FERRITIN SERPL-MCNC: 247 NG/ML (ref 8–388)
FOLATE SERPL-MCNC: 19.8 NG/ML (ref 3.1–17.5)
IRON SERPL-MCNC: 140 UG/DL (ref 35–150)
TSH, 3RD GENERATION: 0.72 UIU/ML (ref 0.36–3.74)
VIT B12 SERPL-MCNC: 883 PG/ML (ref 193–986)

## 2023-05-25 LAB
EST. AVERAGE GLUCOSE BLD GHB EST-MCNC: 105 MG/DL
HBA1C MFR BLD: 5.3 % (ref 4.8–5.6)

## 2023-05-26 ENCOUNTER — TELEPHONE (OUTPATIENT)
Dept: ORTHOPEDIC SURGERY | Age: 58
End: 2023-05-26

## 2023-05-26 NOTE — TELEPHONE ENCOUNTER
Spoke to patient regarding her knee surgery we did receive clearance from Dr. David Rizvi. Patient is going to have gastric bypass surgery and would like to know if both procedures can be performed at the same time. I will speak to Dr. Homero Hartley regarding this and let her know.

## 2023-05-30 LAB
COTININE SERPL-MCNC: <1 NG/ML
NICOTINE SERPL-MCNC: <1 NG/ML

## 2023-05-30 NOTE — PROGRESS NOTES
Brett Crespo PA-C  Bariatric & Advanced Laparoscopic Surgery & Endoscopy  [address] [place] [address], [address] [facility]  Cristian Kaur  Phone [phone]   Fax [phone]    Date of visit: 2023      Primary/Requesting provider: Ariel Duong MD         Name: Boni Colunga      MRN: [id number]       : 1965       Age: 61 y.o. Sex: female        PCP: Ariel Duong MD     CC:  Bariatric monthly dietary follow up    Surgeon: Dr. Ady Alves    Procedure: laparoscopic gastric bypass, liver biopsy    Surgical Consult Date: 10/04/2022    The patient is a 61 y.o. female presenting with a height of 5' 6\" (1.676 m) and weight of 278 lb (126.1 kg), giving her a Body mass index is 44.87 kg/m². She has an ideal body weight of 155 lbs, and excess body weight of 123 lbs. She started our program with a weight of 281 lbs, lost 3lbs. She is in the process of completing all aspects of our prep program and to be deemed an acceptable candidate for bariatric surgery. Patient has a long term history of obesity with multiple failed attempts at weight reduction. Patient denies any changes in health history or physical health since last visit. Patient has been adherent to her diet and exercise regimen. Patient feels that she is well informed regarding her bariatric surgery choice and would like to proceed with a laparoscopic naa-en-y gastric bypass for weight reduction, improvement of her medical conditions, and improved quality of life. Patient verbalized understanding of the risks associated with bariatric surgery. Patient also verbalized understanding that bariatric surgery is a tool and that weight reduction is dependent on behavioral changes in regards to what she eats and how much. PRE-OPERATIVE TESTING:   PSYCHOLOGICAL EVALUATION:  Completed, 2022 with Rosamaria Hood deeming her an acceptable candidate.   DIETITIAN EVALUATION:  In progress with Anastacio Mcmahan RD,

## 2023-05-31 ENCOUNTER — TELEPHONE (OUTPATIENT)
Dept: ORTHOPEDIC SURGERY | Age: 58
End: 2023-05-31

## 2023-05-31 DIAGNOSIS — M17.11 PRIMARY OSTEOARTHRITIS OF RIGHT KNEE: ICD-10-CM

## 2023-05-31 DIAGNOSIS — M25.561 RIGHT KNEE PAIN, UNSPECIFIED CHRONICITY: ICD-10-CM

## 2023-05-31 DIAGNOSIS — S83.241D TEAR OF MEDIAL MENISCUS OF RIGHT KNEE, CURRENT, UNSPECIFIED TEAR TYPE, SUBSEQUENT ENCOUNTER: Primary | ICD-10-CM

## 2023-05-31 DIAGNOSIS — M70.50 PES ANSERINE BURSITIS: ICD-10-CM

## 2023-05-31 PROBLEM — S83.241A TEAR OF MEDIAL MENISCUS OF RIGHT KNEE, CURRENT: Status: ACTIVE | Noted: 2023-05-31

## 2023-05-31 NOTE — TELEPHONE ENCOUNTER
She spoke with someone about setting up surgery. She has decided to do the meniscus surgery only. She does not want to see someone about the total knee. Please call.

## 2023-06-01 NOTE — PROGRESS NOTES
Nicho Sarkar, MS, RD, LD  Surgical Weight Loss Dietitian  [facility], [address] [place] [address]  Phone [phone]   Fax [phone]    Anthropometrics:Ht: 56, wt: 278#, 179% IBW, 44.87 BMI    Labs:   Vit D, 25-Hydroxy (ng/mL)   Date Value   05/24/2023 40.7   10/04/2022 30.4     Folate (ng/mL)   Date Value   05/24/2023 19.8 (H)   10/24/2022 22.0 (H)   10/04/2022 20.9 (H)     Iron (ug/dL)   Date Value   05/24/2023 140   04/04/2023 86   12/27/2022 106   10/24/2022 95   10/04/2022 109     Ferritin (NG/ML)   Date Value   05/24/2023 247   04/04/2023 145   12/27/2022 158   10/24/2022 116   10/04/2022 104     TSH (uIU/mL)   Date Value   02/25/2022 1.350   08/18/2021 0.927   06/14/2021 2.230   02/23/2021 0.313 (L)   10/22/2020 0.007 (L)        Evaluation:  BW: 278# (-5#, visit 4 of supervised weight loss program)   Pt reports good dietary compliance over the past month. She is consuming 2-3 meals/d. She reports good compliance with mindful eating behaviors. Pt is drinking without meals. Pt is exercising via walking and stretches 7d/week for 30 mins. Pt has found acceptable dietary supplements for use after surgery. Diet Recall:  Breakfast: 1 cup oatmeal.  Lunch: protein shake. Dinner: fish and broccoli. Snack: pear  Drinks: water  Habits:   Eating mindfully: yes   Drinking after meals: yes, currently waiting 30-45 minutes   Elimination of sugary/carbonated/caffeinated/alcoholic beverages: yes   Drinking without straws: yes    Nutrition Diagnosis:  Morbid obesity R/T excessive energy intake and yoyo dieting as evidenced by BMI = 44.87 and 179 % IBW. Intervention:    Continue with current diet rx. Discussed exploring and trialing nutritional supplements for pre/post-op care. Evaluated diet recall and identified modifications. Encouraged lean protein focus at all meals, non-starchy vegetables, complex carbs, and eating 3meals/day.   Pt goal to continue eating

## 2023-06-02 ENCOUNTER — OFFICE VISIT (OUTPATIENT)
Dept: SURGERY | Age: 58
End: 2023-06-02
Payer: MEDICAID

## 2023-06-02 VITALS
HEIGHT: 66 IN | BODY MASS INDEX: 44.68 KG/M2 | HEART RATE: 78 BPM | WEIGHT: 278 LBS | SYSTOLIC BLOOD PRESSURE: 166 MMHG | DIASTOLIC BLOOD PRESSURE: 98 MMHG

## 2023-06-02 DIAGNOSIS — E66.01 OBESITY, CLASS III, BMI 40-49.9 (MORBID OBESITY) (HCC): ICD-10-CM

## 2023-06-02 DIAGNOSIS — I48.20 CHRONIC ATRIAL FIBRILLATION (HCC): ICD-10-CM

## 2023-06-02 DIAGNOSIS — Z71.3 DIETARY COUNSELING: ICD-10-CM

## 2023-06-02 DIAGNOSIS — Z71.82 EXERCISE COUNSELING: ICD-10-CM

## 2023-06-02 DIAGNOSIS — E66.01 MORBID OBESITY (HCC): Primary | ICD-10-CM

## 2023-06-02 DIAGNOSIS — Z87.19 HX OF GASTROESOPHAGEAL REFLUX (GERD): ICD-10-CM

## 2023-06-02 DIAGNOSIS — I10 PRIMARY HYPERTENSION: ICD-10-CM

## 2023-06-02 DIAGNOSIS — K74.69 OTHER CIRRHOSIS OF LIVER (HCC): ICD-10-CM

## 2023-06-02 PROBLEM — M54.50 LOWER BACK PAIN: Status: ACTIVE | Noted: 2023-06-02

## 2023-06-02 PROBLEM — D64.9 ANEMIA: Status: ACTIVE | Noted: 2023-06-02

## 2023-06-02 PROBLEM — R79.89 ELEVATED LFTS: Status: ACTIVE | Noted: 2023-06-02

## 2023-06-02 PROBLEM — E07.9 THYROID DISEASE: Status: ACTIVE | Noted: 2023-06-02

## 2023-06-02 PROBLEM — I48.91 ATRIAL FIBRILLATION (HCC): Status: ACTIVE | Noted: 2019-11-08

## 2023-06-02 PROCEDURE — 3077F SYST BP >= 140 MM HG: CPT | Performed by: PHYSICIAN ASSISTANT

## 2023-06-02 PROCEDURE — 3080F DIAST BP >= 90 MM HG: CPT | Performed by: PHYSICIAN ASSISTANT

## 2023-06-02 PROCEDURE — 99214 OFFICE O/P EST MOD 30 MIN: CPT | Performed by: PHYSICIAN ASSISTANT

## 2023-06-06 ENCOUNTER — HOSPITAL ENCOUNTER (OUTPATIENT)
Dept: LAB | Age: 58
Discharge: HOME | End: 2023-06-09
Payer: MEDICAID

## 2023-06-06 DIAGNOSIS — Z01.818 PREOP TESTING: ICD-10-CM

## 2023-06-06 LAB — POTASSIUM SERPL-SCNC: 4.4 MMOL/L (ref 3.5–5.1)

## 2023-06-06 PROCEDURE — 84132 ASSAY OF SERUM POTASSIUM: CPT

## 2023-06-06 PROCEDURE — 36415 COLL VENOUS BLD VENIPUNCTURE: CPT

## 2023-06-06 NOTE — PROGRESS NOTES
Name: Michael Patel  YOB: 1965  Gender: female  MRN: [id number]      CC: Pre-op Exam (Right Knee )       HPI: Michael Patel is a 61 y.o. female who returns for a right knee preoperative follow up appointment. She is scheduled for a right knee arthroscopy medial meniscectomy with Dr. Noah Strange on 06/14/2023. She reports that she got surgical clearance from her GI physician. She has continued right knee pain and would like to proceed with surgery. Current Outpatient Medications:     aspirin EC 81 MG EC tablet, Take 1 tablet by mouth daily, Disp: 30 tablet, Rfl: 0    HYDROcodone-acetaminophen (NORCO) 5-325 MG per tablet, Take 1 tablet by mouth every 6 hours as needed for Pain for up to 5 days. Intended supply: 5 days. Take lowest dose possible to manage pain Max Daily Amount: 4 tablets, Disp: 20 tablet, Rfl: 0    acetaminophen (TYLENOL) 500 MG tablet, Take 1 tablet by mouth every 6 hours as needed for Pain, Disp: , Rfl:     omeprazole (PRILOSEC) 40 MG delayed release capsule, Take 1 capsule by mouth daily, Disp: , Rfl:     traMADol (ULTRAM) 50 MG tablet, Take 1 tablet by mouth in the morning and at bedtime for 90 days. Max Daily Amount: 100 mg, Disp: 180 tablet, Rfl: 0    diclofenac (VOLTAREN) 75 MG EC tablet, Take 1 tablet by mouth 2 times daily, Disp: 60 tablet, Rfl: 0    amLODIPine (NORVASC) 5 MG tablet, TAKE ONE TABLET BY MOUTH ONE TIME DAILY, Disp: 90 tablet, Rfl: 3    Cholecalciferol (VITAMIN D3) 125 MCG (5000 UT) TABS, Take 1 tablet by mouth daily, Disp: , Rfl:     levothyroxine (SYNTHROID) 137 MCG tablet, Take 1 tablet by mouth Daily, Disp: 90 tablet, Rfl: 3    nystatin (MYCOSTATIN) 481658 UNIT/GM cream, Apply topically 2 times daily. , Disp: 30 g, Rfl: 1    triamcinolone (KENALOG) 0.1 % cream, Apply topically 2 times daily. , Disp: 28.4 g, Rfl: 1    albuterol sulfate HFA (PROVENTIL;VENTOLIN;PROAIR) 108 (90 Base) MCG/ACT inhaler, Inhale 2 puffs into the lungs every 4 hours as needed

## 2023-06-07 ENCOUNTER — OFFICE VISIT (OUTPATIENT)
Dept: ORTHOPEDIC SURGERY | Age: 58
End: 2023-06-07

## 2023-06-07 DIAGNOSIS — S83.241D TEAR OF MEDIAL MENISCUS OF RIGHT KNEE, CURRENT, UNSPECIFIED TEAR TYPE, SUBSEQUENT ENCOUNTER: Primary | ICD-10-CM

## 2023-06-07 RX ORDER — HYDROCODONE BITARTRATE AND ACETAMINOPHEN 5; 325 MG/1; MG/1
1 TABLET ORAL EVERY 6 HOURS PRN
Qty: 20 TABLET | Refills: 0 | Status: SHIPPED | OUTPATIENT
Start: 2023-06-07 | End: 2023-06-12

## 2023-06-07 NOTE — PROGRESS NOTES
I explained and demonstrated all information to the patient about how to properly use the machine. It will be used to help reduce pain and swelling, in turn facilitate healing and speed up the rehabilitation process. The patient was instructed on how to properly fill the machine with ice and water as well as the importance to ALWAYS use a barrier between your skin and the cold pad to avoid additional injury. The patient was instructed to take the machine to the hospital with them the morning of their surgery. Patient was advised that if they had any questions about the Ice Man Machine or how to properly use it to please call me at [phone]. Patient read and signed documenting they understand and agree to [facility]'s current DME return policy. no

## 2023-06-08 ENCOUNTER — OFFICE VISIT (OUTPATIENT)
Age: 58
End: 2023-06-08
Payer: MEDICAID

## 2023-06-08 VITALS
WEIGHT: 276 LBS | BODY MASS INDEX: 44.36 KG/M2 | DIASTOLIC BLOOD PRESSURE: 100 MMHG | HEART RATE: 66 BPM | HEIGHT: 66 IN | SYSTOLIC BLOOD PRESSURE: 150 MMHG

## 2023-06-08 DIAGNOSIS — Z98.890 H/O CARDIAC RADIOFREQUENCY ABLATION: Primary | ICD-10-CM

## 2023-06-08 DIAGNOSIS — Z01.810 PREOPERATIVE CARDIOVASCULAR EXAMINATION: ICD-10-CM

## 2023-06-08 DIAGNOSIS — I48.0 PAROXYSMAL ATRIAL FIBRILLATION (HCC): ICD-10-CM

## 2023-06-08 DIAGNOSIS — I10 ESSENTIAL HYPERTENSION: ICD-10-CM

## 2023-06-08 PROCEDURE — 93000 ELECTROCARDIOGRAM COMPLETE: CPT | Performed by: INTERNAL MEDICINE

## 2023-06-08 PROCEDURE — 3080F DIAST BP >= 90 MM HG: CPT | Performed by: INTERNAL MEDICINE

## 2023-06-08 PROCEDURE — 3077F SYST BP >= 140 MM HG: CPT | Performed by: INTERNAL MEDICINE

## 2023-06-08 PROCEDURE — 99213 OFFICE O/P EST LOW 20 MIN: CPT | Performed by: INTERNAL MEDICINE

## 2023-06-08 ASSESSMENT — ENCOUNTER SYMPTOMS
RESPIRATORY NEGATIVE: 1
SHORTNESS OF BREATH: 0
COUGH: 0

## 2023-06-08 NOTE — PROGRESS NOTES
for inducible ischemia.   - EKG personally reviewed in office today demonstrates Sinus  Rhythm   RATE 64 BPM , OTHERWISE NORMAL. 1. Paroxysmal atrial fibrillation ([address]  2. H/O cardiac radiofrequency ablation  -No recurrence, aspirin stopped due to severe anemia in the summer 2022. Has maintained sinus rhythm. If atrial fibrillation recurs will need consideration of anticoagulation.  -Sinus today, monitor for palpitations should this occur repeat EKG. 3. Essential hypertension  -Controlled at this time  -Continue amlodipine 5 milligrams oral once daily. 4. Perioperative Risk Assessment  - patient in preparation for orthopedic knee surgery  - avoid hypotension, closely monitor vitals in the perioperative period  - Echo reveals normal LVEF as above, recent cath with normal coronary arteries   - at this time she is low risk for orthopedic surgery  - no indication for further cardiac testing prior to OR  - reasonable to hold ARB 24 hours prior to OR.     - EKG personally reviewed in office today demonstrates Sinus  Rhythm   66 BPM, RSR' in V1 , OTHERWISE NORMAL. Problem List Items Addressed This Visit          Circulatory    Atrial fibrillation ([address]    Relevant Orders    EKG 12 Lead - Clinic Performed (Completed)     Other Visit Diagnoses       H/O cardiac radiofrequency ablation    -  Primary    Relevant Orders    EKG 12 Lead - Clinic Performed (Completed)    Essential hypertension        Relevant Orders    EKG 12 Lead - Clinic Performed (Completed)    Preoperative cardiovascular examination                Instructed patient go to ER or call 911/EMS should symptoms recur or worsen. Patient has been instructed and agrees to call our office with any issues or other concerns related to their cardiac condition(s) and/or complaint(s). No follow-ups on file.     Max Bledsoe DO  6/8/2023 11:21 AM

## 2023-06-16 ENCOUNTER — HOSPITAL ENCOUNTER (OUTPATIENT)
Dept: PHYSICAL THERAPY | Age: 58
Setting detail: RECURRING SERIES
Discharge: HOME | End: 2023-06-19
Payer: MEDICAID

## 2023-06-16 PROCEDURE — 97162 PT EVAL MOD COMPLEX 30 MIN: CPT

## 2023-06-16 PROCEDURE — 97116 GAIT TRAINING THERAPY: CPT

## 2023-06-19 ENCOUNTER — HOSPITAL ENCOUNTER (OUTPATIENT)
Dept: PHYSICAL THERAPY | Age: 58
Setting detail: RECURRING SERIES
Discharge: HOME | End: 2023-06-22
Payer: MEDICAID

## 2023-06-19 PROCEDURE — 97110 THERAPEUTIC EXERCISES: CPT

## 2023-06-19 NOTE — PROGRESS NOTES
Tal Saenz  : 1965  Primary: [address] Medicaid (Medicaid Managed)  Secondary:  [address] @ [facility]   Phone: [phone]  Fax: [phone] Plan Frequency: 2 times a week for up to 90 days    Plan of Care/Certification Expiration Date: 23      >PT Visit Info:  Plan Frequency: 2 times a week for up to 90 days  Plan of Care/Certification Expiration Date: 23      Visit Count:  2    OUTPATIENT PHYSICAL THERAPY:OP NOTE TYPE: Treatment Note 2023       Episode  }Appt Desk             Treatment Diagnosis:  Pain in Right Knee (M25.561)  Stiffness of Right Knee, Not elsewhere classified (M25.661)  Difficulty in walking, Not elsewhere classified (R26.2)  Medical/Referring Diagnosis:  No admission diagnoses are documented for this encounter. Referring Physician:  TOMAS Martines CNP, MD Orders:  PT Eval and Treat   Date of Onset:  Onset Date: 23     Allergies:   Diltiazem hcl  Restrictions/Precautions:  Restrictions/Precautions: Weight Bearing  Right Lower Extremity Weight Bearing: Weight Bearing As Tolerated     Interventions Planned (Treatment may consist of any combination of the following):    Current Treatment Recommendations: Strengthening; ROM; Balance training; Functional mobility training; Transfer training; Gait training; Stair training; Manual; Pain management; Home exercise program; Modalities; Therapeutic activities     >Subjective Comments: Tatum Worthy reports her knee is feeling a lot better compared to initial visit. Adjusting her cane and learning to walk the correct way last visit really helped.  She has been icing and elevating her leg     >Initial:      6/10>Post Session:        5/10  Medications Last Reviewed:  2023  Updated Objective Findings:   Passive knee flexion after stretching 110 deg  Treatment   THERAPEUTIC EXERCISE: (53 minutes):    Exercises per grid below to improve

## 2023-06-20 ENCOUNTER — OFFICE VISIT (OUTPATIENT)
Dept: FAMILY MEDICINE CLINIC | Facility: CLINIC | Age: 58
End: 2023-06-20
Payer: MEDICAID

## 2023-06-20 VITALS
SYSTOLIC BLOOD PRESSURE: 144 MMHG | HEIGHT: 66 IN | DIASTOLIC BLOOD PRESSURE: 86 MMHG | WEIGHT: 271 LBS | TEMPERATURE: 97.2 F | HEART RATE: 71 BPM | BODY MASS INDEX: 43.55 KG/M2 | OXYGEN SATURATION: 96 %

## 2023-06-20 DIAGNOSIS — M25.561 CHRONIC PAIN OF BOTH KNEES: ICD-10-CM

## 2023-06-20 DIAGNOSIS — L30.9 DERMATITIS: ICD-10-CM

## 2023-06-20 DIAGNOSIS — I10 ESSENTIAL HYPERTENSION: ICD-10-CM

## 2023-06-20 DIAGNOSIS — Z00.00 PREVENTATIVE HEALTH CARE: Primary | ICD-10-CM

## 2023-06-20 DIAGNOSIS — G89.29 CHRONIC BILATERAL LOW BACK PAIN WITHOUT SCIATICA: ICD-10-CM

## 2023-06-20 DIAGNOSIS — Z12.39 ENCOUNTER FOR SCREENING FOR MALIGNANT NEOPLASM OF BREAST, UNSPECIFIED SCREENING MODALITY: ICD-10-CM

## 2023-06-20 DIAGNOSIS — E89.0 POSTSURGICAL HYPOTHYROIDISM: ICD-10-CM

## 2023-06-20 DIAGNOSIS — G89.29 CHRONIC PAIN OF BOTH KNEES: ICD-10-CM

## 2023-06-20 DIAGNOSIS — D50.9 IRON DEFICIENCY ANEMIA, UNSPECIFIED IRON DEFICIENCY ANEMIA TYPE: ICD-10-CM

## 2023-06-20 DIAGNOSIS — I48.91 ATRIAL FIBRILLATION, UNSPECIFIED TYPE (HCC): ICD-10-CM

## 2023-06-20 DIAGNOSIS — C54.1 ENDOMETRIAL CANCER (HCC): ICD-10-CM

## 2023-06-20 DIAGNOSIS — M25.562 CHRONIC PAIN OF BOTH KNEES: ICD-10-CM

## 2023-06-20 DIAGNOSIS — R79.89 ELEVATED LFTS: ICD-10-CM

## 2023-06-20 DIAGNOSIS — E66.01 MORBID OBESITY (HCC): ICD-10-CM

## 2023-06-20 DIAGNOSIS — M54.50 CHRONIC BILATERAL LOW BACK PAIN WITHOUT SCIATICA: ICD-10-CM

## 2023-06-20 PROCEDURE — 3078F DIAST BP <80 MM HG: CPT | Performed by: NURSE PRACTITIONER

## 2023-06-20 PROCEDURE — 99396 PREV VISIT EST AGE 40-64: CPT | Performed by: NURSE PRACTITIONER

## 2023-06-20 PROCEDURE — 3074F SYST BP LT 130 MM HG: CPT | Performed by: NURSE PRACTITIONER

## 2023-06-20 RX ORDER — TRAMADOL HYDROCHLORIDE 50 MG/1
50 TABLET ORAL 2 TIMES DAILY
Qty: 180 TABLET | Refills: 0 | Status: CANCELLED | OUTPATIENT
Start: 2023-06-20 | End: 2023-09-18

## 2023-06-20 RX ORDER — NYSTATIN 100000 U/G
CREAM TOPICAL
Qty: 30 G | Refills: 1 | Status: SHIPPED | OUTPATIENT
Start: 2023-06-20

## 2023-06-20 RX ORDER — TRAMADOL HYDROCHLORIDE 50 MG/1
50 TABLET ORAL 2 TIMES DAILY PRN
Qty: 60 TABLET | Refills: 2 | Status: SHIPPED | OUTPATIENT
Start: 2023-06-20 | End: 2023-09-18

## 2023-06-20 ASSESSMENT — PATIENT HEALTH QUESTIONNAIRE - PHQ9
4. FEELING TIRED OR HAVING LITTLE ENERGY: 0
8. MOVING OR SPEAKING SO SLOWLY THAT OTHER PEOPLE COULD HAVE NOTICED. OR THE OPPOSITE, BEING SO FIGETY OR RESTLESS THAT YOU HAVE BEEN MOVING AROUND A LOT MORE THAN USUAL: 0
6. FEELING BAD ABOUT YOURSELF - OR THAT YOU ARE A FAILURE OR HAVE LET YOURSELF OR YOUR FAMILY DOWN: 0
5. POOR APPETITE OR OVEREATING: 0
7. TROUBLE CONCENTRATING ON THINGS, SUCH AS READING THE NEWSPAPER OR WATCHING TELEVISION: 0
2. FEELING DOWN, DEPRESSED OR HOPELESS: 0
10. IF YOU CHECKED OFF ANY PROBLEMS, HOW DIFFICULT HAVE THESE PROBLEMS MADE IT FOR YOU TO DO YOUR WORK, TAKE CARE OF THINGS AT HOME, OR GET ALONG WITH OTHER PEOPLE: 0
1. LITTLE INTEREST OR PLEASURE IN DOING THINGS: 0
9. THOUGHTS THAT YOU WOULD BE BETTER OFF DEAD, OR OF HURTING YOURSELF: 0
SUM OF ALL RESPONSES TO PHQ9 QUESTIONS 1 & 2: 0
3. TROUBLE FALLING OR STAYING ASLEEP: 0
SUM OF ALL RESPONSES TO PHQ QUESTIONS 1-9: 0

## 2023-06-22 ENCOUNTER — HOSPITAL ENCOUNTER (OUTPATIENT)
Dept: PHYSICAL THERAPY | Age: 58
Setting detail: RECURRING SERIES
Discharge: HOME | End: 2023-06-25
Payer: MEDICAID

## 2023-06-22 PROCEDURE — 97110 THERAPEUTIC EXERCISES: CPT

## 2023-06-26 ENCOUNTER — HOSPITAL ENCOUNTER (OUTPATIENT)
Dept: PHYSICAL THERAPY | Age: 58
Setting detail: RECURRING SERIES
Discharge: HOME | End: 2023-06-29
Payer: MEDICAID

## 2023-06-26 ENCOUNTER — OFFICE VISIT (OUTPATIENT)
Dept: ORTHOPEDIC SURGERY | Age: 58
End: 2023-06-26

## 2023-06-26 DIAGNOSIS — M17.11 PRIMARY OSTEOARTHRITIS OF RIGHT KNEE: Primary | ICD-10-CM

## 2023-06-26 PROCEDURE — 97140 MANUAL THERAPY 1/> REGIONS: CPT

## 2023-06-26 PROCEDURE — 99024 POSTOP FOLLOW-UP VISIT: CPT | Performed by: ORTHOPAEDIC SURGERY

## 2023-06-26 PROCEDURE — 97016 VASOPNEUMATIC DEVICE THERAPY: CPT

## 2023-06-26 PROCEDURE — 97110 THERAPEUTIC EXERCISES: CPT

## 2023-06-28 ENCOUNTER — OFFICE VISIT (OUTPATIENT)
Dept: SURGERY | Age: 58
End: 2023-06-28
Payer: MEDICAID

## 2023-06-28 ENCOUNTER — PREP FOR PROCEDURE (OUTPATIENT)
Dept: SURGERY | Age: 58
End: 2023-06-28

## 2023-06-28 VITALS
SYSTOLIC BLOOD PRESSURE: 153 MMHG | HEART RATE: 70 BPM | WEIGHT: 270 LBS | HEIGHT: 66 IN | BODY MASS INDEX: 43.39 KG/M2 | DIASTOLIC BLOOD PRESSURE: 86 MMHG

## 2023-06-28 DIAGNOSIS — E66.01 MORBID OBESITY (HCC): Primary | ICD-10-CM

## 2023-06-28 DIAGNOSIS — M17.11 PRIMARY OSTEOARTHRITIS OF RIGHT KNEE: ICD-10-CM

## 2023-06-28 DIAGNOSIS — Z98.890 POST-OPERATIVE NAUSEA AND VOMITING: ICD-10-CM

## 2023-06-28 DIAGNOSIS — I48.20 CHRONIC ATRIAL FIBRILLATION (HCC): ICD-10-CM

## 2023-06-28 DIAGNOSIS — K74.69 OTHER CIRRHOSIS OF LIVER (HCC): ICD-10-CM

## 2023-06-28 DIAGNOSIS — E66.01 OBESITY, CLASS III, BMI 40-49.9 (MORBID OBESITY) (HCC): ICD-10-CM

## 2023-06-28 DIAGNOSIS — R11.2 POST-OPERATIVE NAUSEA AND VOMITING: ICD-10-CM

## 2023-06-28 DIAGNOSIS — Z87.19 HX OF GASTROESOPHAGEAL REFLUX (GERD): ICD-10-CM

## 2023-06-28 DIAGNOSIS — I10 PRIMARY HYPERTENSION: ICD-10-CM

## 2023-06-28 DIAGNOSIS — G89.18 POST-OPERATIVE PAIN: ICD-10-CM

## 2023-06-28 PROCEDURE — APPSS45 APP SPLIT SHARED TIME 31-45 MINUTES: Performed by: PHYSICIAN ASSISTANT

## 2023-06-28 PROCEDURE — 3079F DIAST BP 80-89 MM HG: CPT | Performed by: SURGERY

## 2023-06-28 PROCEDURE — 99215 OFFICE O/P EST HI 40 MIN: CPT | Performed by: SURGERY

## 2023-06-28 PROCEDURE — 3077F SYST BP >= 140 MM HG: CPT | Performed by: SURGERY

## 2023-06-28 RX ORDER — OXYCODONE HYDROCHLORIDE AND ACETAMINOPHEN 5; 325 MG/1; MG/1
1 TABLET ORAL EVERY 6 HOURS PRN
Qty: 20 TABLET | Refills: 0 | Status: SHIPPED | OUTPATIENT
Start: 2023-06-28 | End: 2023-07-03

## 2023-06-28 RX ORDER — OMEPRAZOLE 40 MG/1
40 CAPSULE, DELAYED RELEASE ORAL
Qty: 90 CAPSULE | Refills: 0 | Status: SHIPPED | OUTPATIENT
Start: 2023-06-28

## 2023-06-28 RX ORDER — ONDANSETRON 4 MG/1
4 TABLET, FILM COATED ORAL 3 TIMES DAILY PRN
Qty: 30 TABLET | Refills: 0 | Status: SHIPPED | OUTPATIENT
Start: 2023-06-28

## 2023-06-29 ENCOUNTER — HOSPITAL ENCOUNTER (OUTPATIENT)
Dept: PHYSICAL THERAPY | Age: 58
Setting detail: RECURRING SERIES
End: 2023-06-29
Payer: MEDICAID

## 2023-06-29 PROCEDURE — 97110 THERAPEUTIC EXERCISES: CPT

## 2023-06-29 PROCEDURE — 97140 MANUAL THERAPY 1/> REGIONS: CPT

## 2023-06-30 DIAGNOSIS — M17.11 PRIMARY OSTEOARTHRITIS OF RIGHT KNEE: ICD-10-CM

## 2023-06-30 DIAGNOSIS — M70.50 PES ANSERINE BURSITIS: ICD-10-CM

## 2023-07-03 ENCOUNTER — HOSPITAL ENCOUNTER (OUTPATIENT)
Dept: PHYSICAL THERAPY | Age: 58
Setting detail: RECURRING SERIES
Discharge: HOME | End: 2023-07-06
Payer: MEDICAID

## 2023-07-03 PROCEDURE — 97110 THERAPEUTIC EXERCISES: CPT

## 2023-07-03 NOTE — PROGRESS NOTES
below to improve mobility, strength, and balance. Required moderate visual, verbal, and manual cues to promote proper body alignment, promote proper body posture, and promote proper body mechanics. Progressed complexity of movement as indicated. Date:  7/3/2023   Activity/Exercise Parameters   Knee ext prop --   Prone hang 5 min   Quad sets after manual ext stretch x20   Standing and supine calf stretch 3x30 sec   Recumbent stepper 8 min - 1.3 mile   Gait training --   Supine heel slides with green strap --   Standing TKE with orange strap --   KT tape to medial knee 4 min   SAQ --   SLR 3x10   Shuttle press - DL 5 cords, SL 3 cords 3x20 each   Bridge --     MANUAL THERAPY: (23 minutes):   Joint mobilization, Soft tissue mobilization, and Manipulation was utilized and necessary because of the patient's restricted joint motion, painful spasm, loss of articular motion, and restricted motion of soft tissue.   - PF joint mobilizations in all directions  - STM to hamstrings/calf with patient prone  - STM to anterior interval  (Used abbreviations: MET - muscle energy technique; PNF - proprioceptive neuromuscular facilitation; NMR - neuromuscular re-education; AP - anterior to posterior; PA - posterior to anterior)    Modalities (10min): ice pack to R knee at end of session 7-8 minutes    Treatment/Session Summary:    >Treatment Assessment: TTP along R MCL. KT tape applied to decrease stress across this area which seemed to help some when she got up to walk. She still lacks full active knee extension and she was encouraged to continue with prone hangs at home. Functional quad strength is improving     Communication/Consultation:   None today  Equipment provided today:  None  Recommendations/Intent for next treatment session: Next visit will focus on improve functional mobility.     >Total Treatment Billable Duration:  53 minutes of treatment   Time In: 1300  Time Out: [address], OMAYRA       Charge Capture

## 2023-07-06 ENCOUNTER — HOSPITAL ENCOUNTER (OUTPATIENT)
Dept: PHYSICAL THERAPY | Age: 58
Setting detail: RECURRING SERIES
End: 2023-07-06
Payer: MEDICAID

## 2023-07-06 NOTE — PROGRESS NOTES
Blyane Natalia  : 1965  Primary: [address] Medicaid  Secondary:  [address] @ [address] [facility]  [address]  [address]  Phone: [phone]  Fax: [phone]       2023      Patient canceled today's appointment due to illness and will plan to follow up at next scheduled visit.        Bert Page, PT

## 2023-07-07 ASSESSMENT — ENCOUNTER SYMPTOMS
NAUSEA: 0
BACK PAIN: 1
SHORTNESS OF BREATH: 0
CONSTIPATION: 0
SINUS PAIN: 0
DIARRHEA: 0
VOMITING: 0
SORE THROAT: 0
BLOOD IN STOOL: 0
EYE REDNESS: 0
COUGH: 0
EYE PAIN: 0

## 2023-07-10 ENCOUNTER — HOSPITAL ENCOUNTER (OUTPATIENT)
Dept: PHYSICAL THERAPY | Age: 58
Setting detail: RECURRING SERIES
Discharge: HOME | End: 2023-07-13
Payer: MEDICAID

## 2023-07-10 PROCEDURE — 97110 THERAPEUTIC EXERCISES: CPT

## 2023-07-10 NOTE — PROGRESS NOTES
visit will focus on improve functional mobility.     >Total Treatment Billable Duration:  53 minutes of treatment   Time In: 1230  Time Out: [address]    Davida Mcdonald, PT       Charge Capture  }Post Session Pain  PT Visit Info  [employer] Portal  MD Guidelines  Scanned Media  Benefits  MyChart    Future Appointments   Date Time Provider [address]   7/13/2023  1:30 PM Fabiola Ormond, PT SFO[facility]   7/21/2023  3:45 PM SFE JULIA BI ROOM 2 SFERMAM SFE   7/26/2023  1:00 PM Davida Mcdonald, PT SFOFF [facility]   8/2/2023  1:00 PM Davida Mcdonald, PT SFOFF [facility]   8/7/2023 10:50 AM Haroon Brown MD POAI [facility] AMB   8/9/2023  1:00 PM Davida Mcdonald, PT SFOFF [facility]FO   8/17/2023 10:15 AM SFE ASSESSMENT RM 01 SFEORPA SFE   8/30/2023  5:40 AM [facility] OUTREACH INSURANCE [facility]   8/30/2023 12:45 PM Homero Wu MD [facility] AMB   8/31/2023  1:15 PM Precilla Caller HOWARD Smith CSAE [facility] AMB   9/5/2023  1:30 PM [facility] OUTREACH INSURANCE [facility]   9/5/2023  2:00 PM Oneil Ruiz MD [facility] AMB   12/1/2023 11:15 AM Otf Gillette DO UCDG [facility] AMB   12/20/2023 10:30 AM Mike Jenkins MD HTF [facility] AMB   2/28/2024  2:00 PM Christopher Rothman MD END [facility] AMB   6/19/2024 10:00 AM HTF LAB RESOURCE HTF [facility] AMB   6/26/2024  1:00 PM Mike Jenkins MD HTF [facility] AMB

## 2023-07-11 RX ORDER — DICLOFENAC SODIUM 75 MG/1
75 TABLET, DELAYED RELEASE ORAL 2 TIMES DAILY
Qty: 60 TABLET | Refills: 0 | OUTPATIENT
Start: 2023-07-11 | End: 2023-08-10

## 2023-07-13 ENCOUNTER — HOSPITAL ENCOUNTER (OUTPATIENT)
Dept: PHYSICAL THERAPY | Age: 58
Setting detail: RECURRING SERIES
Discharge: HOME | End: 2023-07-16
Payer: MEDICAID

## 2023-07-13 DIAGNOSIS — M17.11 PRIMARY OSTEOARTHRITIS OF RIGHT KNEE: ICD-10-CM

## 2023-07-13 DIAGNOSIS — M70.50 PES ANSERINE BURSITIS: ICD-10-CM

## 2023-07-13 PROCEDURE — 97140 MANUAL THERAPY 1/> REGIONS: CPT

## 2023-07-13 PROCEDURE — 97110 THERAPEUTIC EXERCISES: CPT

## 2023-07-14 DIAGNOSIS — M17.11 PRIMARY OSTEOARTHRITIS OF RIGHT KNEE: ICD-10-CM

## 2023-07-14 DIAGNOSIS — M70.50 PES ANSERINE BURSITIS: ICD-10-CM
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
## 2023-07-21 ENCOUNTER — HOSPITAL ENCOUNTER (OUTPATIENT)
Dept: MAMMOGRAPHY | Age: 58
End: 2023-07-21
Payer: MEDICAID

## 2023-07-21 DIAGNOSIS — Z12.39 ENCOUNTER FOR SCREENING FOR MALIGNANT NEOPLASM OF BREAST, UNSPECIFIED SCREENING MODALITY: ICD-10-CM

## 2023-07-21 DIAGNOSIS — Z00.00 PREVENTATIVE HEALTH CARE: ICD-10-CM

## 2023-07-21 PROCEDURE — 77067 SCR MAMMO BI INCL CAD: CPT

## 2023-07-21 RX ORDER — DICLOFENAC SODIUM 75 MG/1
75 TABLET, DELAYED RELEASE ORAL 2 TIMES DAILY
Qty: 60 TABLET | Refills: 0 | OUTPATIENT
Start: 2023-07-21 | End: 2023-08-20

## 2023-07-26 ENCOUNTER — HOSPITAL ENCOUNTER (OUTPATIENT)
Dept: PHYSICAL THERAPY | Age: 58
Setting detail: RECURRING SERIES
Discharge: HOME | End: 2023-07-29
Payer: MEDICAID

## 2023-07-26 PROCEDURE — 97110 THERAPEUTIC EXERCISES: CPT

## 2023-07-26 NOTE — PROGRESS NOTES
Edmond Israel  : 1965  Primary: [address] [facility] Medicaid (Medicaid Managed)  Secondary:  [address] @ [address] [facility]  [address]  [address]  Phone: [phone]  Fax: [phone] Plan Frequency: 2 times a week for up to 90 days    Plan of Care/Certification Expiration Date: 23      >PT Visit Info:  Plan Frequency: 2 times a week for up to 90 days  Plan of Care/Certification Expiration Date: 23      Visit Count:  9    OUTPATIENT PHYSICAL THERAPY:OP NOTE TYPE: Treatment Note 2023       Episode  }Appt Desk             Treatment Diagnosis:  Pain in Right Knee (M25.561)  Stiffness of Right Knee, Not elsewhere classified (M25.661)  Difficulty in walking, Not elsewhere classified (R26.2)  Medical/Referring Diagnosis:  Other tear of medial meniscus, current injury, right knee, subsequent encounter [B91.124I]  Referring Physician:  TOMAS Broussard CNP, MD Orders:  PT Eval and Treat   Date of Onset:  Onset Date: 23     Allergies:   Diltiazem hcl  Restrictions/Precautions:  Restrictions/Precautions: Weight Bearing  Right Lower Extremity Weight Bearing: Weight Bearing As Tolerated     Interventions Planned (Treatment may consist of any combination of the following):    Current Treatment Recommendations: Strengthening; ROM; Balance training; Functional mobility training; Transfer training; Gait training; Stair training; Manual; Pain management; Home exercise program; Modalities; Therapeutic activities     >Subjective Comments: Toña reports good and bad days with the knee, depending on how much she is on her feet.  She has to stand slowly after sitting because she will get sharp pain in medial knee     >Initial:      4/10>Post Session:        3/10  Medications Last Reviewed:  2023  Updated Objective Findings:  None today  Treatment   THERAPEUTIC EXERCISE (and manual therapy): (53 minutes):    Exercises per grid below to improve mobility,

## 2023-07-31 DIAGNOSIS — Z98.890 POST-OPERATIVE NAUSEA AND VOMITING: ICD-10-CM

## 2023-07-31 DIAGNOSIS — R11.2 POST-OPERATIVE NAUSEA AND VOMITING: ICD-10-CM

## 2023-07-31 RX ORDER — ONDANSETRON 4 MG/1
4 TABLET, FILM COATED ORAL 3 TIMES DAILY PRN
Qty: 30 TABLET | Refills: 0 | Status: CANCELLED | OUTPATIENT
Start: 2023-07-31

## 2023-08-01 DIAGNOSIS — L30.9 DERMATITIS: ICD-10-CM

## 2023-08-01 DIAGNOSIS — Z98.890 POST-OPERATIVE NAUSEA AND VOMITING: ICD-10-CM

## 2023-08-01 DIAGNOSIS — R11.2 POST-OPERATIVE NAUSEA AND VOMITING: ICD-10-CM

## 2023-08-01 RX ORDER — ONDANSETRON 4 MG/1
4 TABLET, FILM COATED ORAL 3 TIMES DAILY PRN
Qty: 30 TABLET | Refills: 3 | Status: SHIPPED | OUTPATIENT
Start: 2023-08-01

## 2023-08-01 RX ORDER — NYSTATIN 100000 U/G
CREAM TOPICAL
Qty: 30 G | Refills: 1 | Status: SHIPPED | OUTPATIENT
Start: 2023-08-01

## 2023-08-01 NOTE — TELEPHONE ENCOUNTER
From: Dominic Ledesma  To:  Office of Susie Borges  Sent: 7/31/2023 8:59 PM EDT  Subject: Medication Renewal Request    Refills have been requested for the following medications:     nystatin (MYCOSTATIN) 195115 UNIT/GM cream TOMAS Paredes - CNP]    Preferred pharmacy: [address] [place] [address] [phone] -  [phone]      Medication renewals requested in this message routed separately:     ondansetron (ZOFRAN) 4 MG tablet [place] PA-C]

## 2023-08-02 ENCOUNTER — HOSPITAL ENCOUNTER (OUTPATIENT)
Dept: PHYSICAL THERAPY | Age: 58
Setting detail: RECURRING SERIES
Discharge: HOME | End: 2023-08-05
Payer: MEDICAID

## 2023-08-02 PROCEDURE — 97110 THERAPEUTIC EXERCISES: CPT

## 2023-08-02 NOTE — PROGRESS NOTES
Cassidy Mahmood  : 1965  Primary: [address] [facility] Medicaid (Medicaid Managed)  Secondary:  [address] @ [address] [facility]  [address]  [address] [facility]  Phone: [phone]  Fax: [phone] Plan Frequency: 2 times a week for up to 90 days    Plan of Care/Certification Expiration Date: 23      >PT Visit Info:  Plan Frequency: 2 times a week for up to 90 days  Plan of Care/Certification Expiration Date: 23      Visit Count:  10    OUTPATIENT PHYSICAL THERAPY:OP NOTE TYPE: Treatment Note 2023       Episode  }Appt Desk             Treatment Diagnosis:  Pain in Right Knee (M25.561)  Stiffness of Right Knee, Not elsewhere classified (M25.661)  Difficulty in walking, Not elsewhere classified (R26.2)  Medical/Referring Diagnosis:  Other tear of medial meniscus, current injury, right knee, subsequent encounter [B25.767V]  Referring Physician:  TOMAS Burdick CNP, MD Orders:  PT Eval and Treat   Date of Onset:  Onset Date: 23     Allergies:   Diltiazem hcl  Restrictions/Precautions:  Restrictions/Precautions: Weight Bearing  Right Lower Extremity Weight Bearing: Weight Bearing As Tolerated     Interventions Planned (Treatment may consist of any combination of the following):    Current Treatment Recommendations: Strengthening; ROM; Balance training; Functional mobility training; Transfer training; Gait training; Stair training; Manual; Pain management; Home exercise program; Modalities; Therapeutic activities     >Subjective Comments: Dmitriy Ascencio continues to report good/bad days with her knee and difficulty moving her knee after prolonged sitting.      >Initial:      4/10>Post Session:        3/10  Medications Last Reviewed:  2023  Updated Objective Findings:  See progress note from today for full details  Treatment   THERAPEUTIC EXERCISE (and manual therapy): (53 minutes):    Exercises per grid below to improve mobility, strength, and balance.
PROGRESSING   Patient will demonstrate ambulating 150' with the LRAD and pain no greater than 2/10 in order to return ot her PLOF. PROGRESSING  Patient will demonstrate ascending and descending 12 stairs with mod I in order to safely get into and out of her home without assistance. PROGRESSING  Patient will verbalize being able to stand for 20 minutes with pain no greater than 2/10 in order to complete ADLs. PROGRESSING         Outcome Measure: Tool Used: Lower Extremity Functional Scale (LEFS)  Score:  Initial: 1/80 Most Recent: 24/80 (Date: 8/2/23 )   Interpretation of Score: 20 questions each scored on a 5 point scale with 0 representing \"extreme difficulty or unable to perform\" and 4 representing \"no difficulty\". The lower the score, the greater the functional disability. 80/80 represents no disability. Minimal detectable change is 9 points. Medical Necessity:   > Patient is expected to demonstrate progress in strength, range of motion, balance, and functional technique to decrease assistance required with ADLs and IADLs. Reason For Services/Other Comments:  > Patient continues to require modification of therapeutic interventions to increase complexity of exercises. Total Duration:  Time In: 1300  Time Out: 1400    Regarding Luciana Rodriguez's therapy, I certify that the treatment plan above will be carried out by a therapist or under their direction. Thank you for this referral,  oRss Johnson, PT     Referring Physician Signature: TOMAS Duff - * No Signature is Required for this note.         Post Session Pain  Charge Capture  PT Visit Info MD Feli Floyd

## 2023-08-07 ENCOUNTER — OFFICE VISIT (OUTPATIENT)
Dept: ORTHOPEDIC SURGERY | Age: 58
End: 2023-08-07
Payer: MEDICAID

## 2023-08-07 DIAGNOSIS — M17.11 PRIMARY OSTEOARTHRITIS OF RIGHT KNEE: Primary | ICD-10-CM

## 2023-08-07 PROCEDURE — 20610 DRAIN/INJ JOINT/BURSA W/O US: CPT | Performed by: ORTHOPAEDIC SURGERY

## 2023-08-07 RX ORDER — HYALURONATE SODIUM 10 MG/ML
20 SYRINGE (ML) INTRAARTICULAR ONCE
Status: COMPLETED | OUTPATIENT
Start: 2023-08-07 | End: 2023-08-07

## 2023-08-07 RX ADMIN — Medication 20 MG: at 11:06

## 2023-08-09 ENCOUNTER — HOSPITAL ENCOUNTER (OUTPATIENT)
Dept: PHYSICAL THERAPY | Age: 58
Setting detail: RECURRING SERIES
Discharge: HOME | End: 2023-08-12
Payer: MEDICAID

## 2023-08-09 ENCOUNTER — OFFICE VISIT (OUTPATIENT)
Dept: NEUROLOGY | Age: 58
End: 2023-08-09
Payer: MEDICAID

## 2023-08-09 VITALS
BODY MASS INDEX: 43.58 KG/M2 | DIASTOLIC BLOOD PRESSURE: 84 MMHG | SYSTOLIC BLOOD PRESSURE: 138 MMHG | WEIGHT: 270 LBS | HEART RATE: 72 BPM | OXYGEN SATURATION: 96 %

## 2023-08-09 DIAGNOSIS — H53.2 DIPLOPIA: ICD-10-CM

## 2023-08-09 DIAGNOSIS — R90.82 WHITE MATTER DISEASE: Primary | ICD-10-CM

## 2023-08-09 DIAGNOSIS — G43.709 CHRONIC MIGRAINE W/O AURA W/O STATUS MIGRAINOSUS, NOT INTRACTABLE: ICD-10-CM

## 2023-08-09 DIAGNOSIS — F41.9 ANXIETY: ICD-10-CM

## 2023-08-09 DIAGNOSIS — G37.9 CNS DEMYELINATING DISEASE (HCC): ICD-10-CM

## 2023-08-09 PROCEDURE — 99205 OFFICE O/P NEW HI 60 MIN: CPT | Performed by: PSYCHIATRY & NEUROLOGY

## 2023-08-09 PROCEDURE — 3078F DIAST BP <80 MM HG: CPT | Performed by: PSYCHIATRY & NEUROLOGY

## 2023-08-09 PROCEDURE — 3074F SYST BP LT 130 MM HG: CPT | Performed by: PSYCHIATRY & NEUROLOGY

## 2023-08-09 PROCEDURE — 97110 THERAPEUTIC EXERCISES: CPT

## 2023-08-09 RX ORDER — LORAZEPAM 1 MG/1
TABLET ORAL
Qty: 60 TABLET | Refills: 5 | OUTPATIENT
Start: 2023-08-09

## 2023-08-09 RX ORDER — LORAZEPAM 1 MG/1
1 TABLET ORAL EVERY 8 HOURS PRN
COMMUNITY

## 2023-08-09 NOTE — PROGRESS NOTES
knee pain. Assessment and Plan:     Wan Kumari is a 61 y.o. female who presents with the following issues:     Valentina Poag was seen today for new patient. Diagnoses and all orders for this visit:    White matter disease    CNS demyelinating disease ([address])    Diplopia    Chronic migraine w/o aura w/o status migrainosus, not intractable      Patient presents for further evaluation of abnormal MRI which was done for further work-up of diplopia which has been of unclear etiology. MRI of the brain has shown extensive white matter changes out of proportion to her age. Differential here would include either CNS demyelinating disease or accelerated small vessel ischemic changes in the setting of hypertension. Patients with chronic migraine may also show accelerated white matter changes and the patient has been recently having headaches she reports these have only been present over the last year. Further work-up is needed and I have recommended that we pursue another lumbar puncture with CSF studies for further evaluation of presence of multiple sclerosis or another demyelinating disease. Depending on the results, could consider further imaging of the cervical and thoracic spine. I am hesitant to do these at this time as she has no other findings suggestive of a myelopathy. Headaches sound somewhat consistent with chronic migraine and the response to [place] would be supportive of the diagnosis of migraine. I have discussed with her about other options for migraine prevention but we will hold on this for now until further data is obtained. Follow-up to be arranged.       Signature: Veronica Bashir MD      Date:  8/13/2023    [facility]   [address] [facility], [address] [place] [address]  Ph: [phone]  Fax: [phone]         I have personally interviewed and examined Mrs. Wan Kumari and I have personally reviewed all relevant records including labs and imaging as noted

## 2023-08-09 NOTE — PROGRESS NOTES
Name: Marck Nunez  YOB: 1965  Gender: female  MRN: [id number]      CC: Knee Pain (R)     HPI: Marck Nunez is a 61 y.o. female who is here today for her second round of Euflexxa injections. Physical Examination:  General: no acute distress  right Knee: Exam is unchanged, the knee continues to be painful with palpation and range of motion. There is no effusion or concern for infection. Assessment:   1. Primary osteoarthritis of right knee         Plan:     Right knee injected from a anterior lateral approach with 2 mL Euflexxa viscosupplementation after sterile prep. Patient tolerated the procedure well was given postinjection flare precautions. Follow up in one week for third round of injections. Roque Santizo MD, [address] [facility] and [facility]

## 2023-08-09 NOTE — PROGRESS NOTES
[facility]    Future Appointments   Date Time Provider [address]   8/14/2023 11:00 AM Chao Maddox MD POAI [facility] AMB   8/17/2023 10:15 AM SFE ASSESSMENT RM 01 SFEORPA SFE   8/23/2023 10:45 AM Clyde Hernandez, APRN - CNP POAI [facility] AMB   8/23/2023 11:30 AM Ross Johnson, PT SFOFF SFO   8/31/2023  1:15 PM Roxy Smith, PA CSAE [facility] AMB   9/5/2023  1:30 PM [facility]   9/5/2023  2:00 PM Anyi Virgen MD UOA-MMC [facility] AMB   9/13/2023  5:50 AM [address] [place] [address] OUTREACH INSURANCE [facility] [address] [place] [address]   9/13/2023 12:45 PM Daniela Lemos MD UOA[facility] AMB   12/1/2023 11:15 AM Malik Goode DO UCDG [facility] AMB   12/20/2023 10:30 AM Jenn Hill MD HTF [facility] AMB   2/28/2024  2:00 PM Nicholas PhlegmMD DAVE [facility] AMB   6/19/2024 10:00 AM HTF LAB RESOURCE HTF [facility] AMB   6/26/2024  1:00 PM Jenn Hill MD HTF [facility] AMB

## 2023-08-13 ASSESSMENT — ENCOUNTER SYMPTOMS
COUGH: 0
VOICE CHANGE: 0
BACK PAIN: 1
ABDOMINAL PAIN: 0

## 2023-08-14 ENCOUNTER — OFFICE VISIT (OUTPATIENT)
Dept: ORTHOPEDIC SURGERY | Age: 58
End: 2023-08-14

## 2023-08-14 DIAGNOSIS — M17.11 PRIMARY OSTEOARTHRITIS OF RIGHT KNEE: Primary | ICD-10-CM

## 2023-08-14 RX ORDER — HYALURONATE SODIUM 10 MG/ML
20 SYRINGE (ML) INTRAARTICULAR ONCE
Status: COMPLETED | OUTPATIENT
Start: 2023-08-14 | End: 2023-08-14

## 2023-08-14 RX ADMIN — Medication 20 MG: at 11:08

## 2023-08-15 ENCOUNTER — TELEPHONE (OUTPATIENT)
Dept: SURGERY | Age: 58
End: 2023-08-15

## 2023-08-15 NOTE — TELEPHONE ENCOUNTER
Patient called in stating that she has a bypass scheduled for 8/24/23, and her neurologist scheduled her for a IR spinal puncture on 8/22/23, patient is wanting to know if this is safe to have 2 days before her bypass?

## 2023-08-17 ENCOUNTER — HOSPITAL ENCOUNTER (OUTPATIENT)
Dept: SURGERY | Age: 58
Discharge: HOME | End: 2023-08-17
Payer: MEDICAID

## 2023-08-17 VITALS
TEMPERATURE: 97.7 F | DIASTOLIC BLOOD PRESSURE: 94 MMHG | HEART RATE: 78 BPM | RESPIRATION RATE: 18 BRPM | BODY MASS INDEX: 42.59 KG/M2 | SYSTOLIC BLOOD PRESSURE: 172 MMHG | WEIGHT: 265 LBS | HEIGHT: 66 IN | OXYGEN SATURATION: 94 %

## 2023-08-17 LAB
ALBUMIN SERPL-MCNC: 3.5 G/DL (ref 3.5–5)
ALBUMIN/GLOB SERPL: 0.9 (ref 0.4–1.6)
ALP SERPL-CCNC: 152 U/L (ref 50–130)
ALT SERPL-CCNC: 66 U/L (ref 12–65)
ANION GAP SERPL CALC-SCNC: 5 MMOL/L (ref 2–11)
APTT PPP: 30.6 SEC (ref 24.5–34.2)
AST SERPL-CCNC: 65 U/L (ref 15–37)
BILIRUB SERPL-MCNC: 0.7 MG/DL (ref 0.2–1.1)
BUN SERPL-MCNC: 18 MG/DL (ref 6–23)
CALCIUM SERPL-MCNC: 9.6 MG/DL (ref 8.3–10.4)
CHLORIDE SERPL-SCNC: 106 MMOL/L (ref 101–110)
CO2 SERPL-SCNC: 30 MMOL/L (ref 21–32)
CREAT SERPL-MCNC: 0.75 MG/DL (ref 0.6–1)
ERYTHROCYTE [DISTWIDTH] IN BLOOD BY AUTOMATED COUNT: 13.5 % (ref 11.9–14.6)
GLOBULIN SER CALC-MCNC: 4.1 G/DL (ref 2.8–4.5)
GLUCOSE SERPL-MCNC: 102 MG/DL (ref 65–100)
HCT VFR BLD AUTO: 46.2 % (ref 35.8–46.3)
HGB BLD-MCNC: 14.6 G/DL (ref 11.7–15.4)
INR PPP: 1
MCH RBC QN AUTO: 26.2 PG (ref 26.1–32.9)
MCHC RBC AUTO-ENTMCNC: 31.6 G/DL (ref 31.4–35)
MCV RBC AUTO: 82.8 FL (ref 82–102)
NRBC # BLD: 0 K/UL (ref 0–0.2)
PLATELET # BLD AUTO: 146 K/UL (ref 150–450)
PMV BLD AUTO: 10.7 FL (ref 9.4–12.3)
POTASSIUM SERPL-SCNC: 4.1 MMOL/L (ref 3.5–5.1)
PROT SERPL-MCNC: 7.6 G/DL (ref 6.3–8.2)
PROTHROMBIN TIME: 13.4 SEC (ref 12.6–14.3)
RBC # BLD AUTO: 5.58 M/UL (ref 4.05–5.2)
SODIUM SERPL-SCNC: 141 MMOL/L (ref 133–143)
WBC # BLD AUTO: 5.7 K/UL (ref 4.3–11.1)

## 2023-08-17 PROCEDURE — 85730 THROMBOPLASTIN TIME PARTIAL: CPT

## 2023-08-17 PROCEDURE — 85610 PROTHROMBIN TIME: CPT

## 2023-08-17 PROCEDURE — 85027 COMPLETE CBC AUTOMATED: CPT

## 2023-08-17 PROCEDURE — 80053 COMPREHEN METABOLIC PANEL: CPT

## 2023-08-17 ASSESSMENT — PAIN DESCRIPTION - DESCRIPTORS: DESCRIPTORS: ACHING;THROBBING

## 2023-08-17 ASSESSMENT — PAIN DESCRIPTION - LOCATION: LOCATION: KNEE;BACK

## 2023-08-17 ASSESSMENT — PAIN DESCRIPTION - ORIENTATION: ORIENTATION: RIGHT;LOWER

## 2023-08-17 ASSESSMENT — PAIN SCALES - GENERAL: PAINLEVEL_OUTOF10: 7

## 2023-08-17 NOTE — PROGRESS NOTES
Name: Edmond Wood  YOB: 1965  Gender: female  MRN: [id number]      CC: Knee Pain (R)       HPI: Edmond Wood is a 61 y.o. female who is here today for her third round of Euflexxa injections. Physical Examination:  General: no acute distress  right Knee: Exam is unchanged, the knee continues to be painful with palpation and range of motion. There is no effusion or concern for infection. Assessment:   1. Primary osteoarthritis of right knee         Plan:     Right knee injected from a anterior lateral approach with 2 mL Euflexxa viscosupplementation after sterile prep. Patient tolerated the procedure well was given postinjection flare precautions. Follow up as needed        Carlos Alamo MD, [address] and Sports Medicine

## 2023-08-17 NOTE — PERIOP NOTE
Patient verified name and     Order for consent NOT found in EHR; patient verified. Patient stated that she is also having a liver biopsy. This is not noted on case posting. Staff message sent to Virginie Contreras at surgeons office to clarify. Type 3 surgery, walk in assessment complete. Labs per surgeon: unknown; no orders received in EHR at time of assessment. Labs per anesthesia/cirrhosis protocol: CBC, CMP, PT/PTT, Ablumin; results are within anesthesia protocol except for elevated ALT & AST; anesthesia to review. EKG: not needed per anesthesia protocol. EKG (23), Stress test (22), Echo (22), Heart cath (22), Neurology note (23), and [facility] Cardiology note (23) available in EHR for reference. History of difficult to intubate; anesthesia to review. Hospital approved surgical skin cleanser and instructions given per hospital policy. Patient provided with and instructed on educational handouts including Guide to Surgery, Pain Management, Hand Hygiene, Blood Transfusion Education, and [facility] Brochure. Patient answered medical/surgical history questions at their best of ability. All prior to admission medications documented in [facility]. Original medication prescription bottles NOT visualized during patient appointment. Patient instructed to hold all vitamins 7 days prior to surgery and NSAIDS 5 days prior to surgery, patient verbalized understanding. Patient teach back successful and patient demonstrates knowledge of instructions.

## 2023-08-17 NOTE — PERIOP NOTE
SURGICAL WEIGHT LOSS Enhanced Recovery After Surgery: diabetic and non-diabetic patients      The evening before surgery (8/23/23), drink 1 bottles of the Ensure Pre-Surgery drink. Please do not eat or drink after midnight the night before your surgery. Bring your patient handbook with you to the hospital.        Things to Remember:      1. You will be up in a chair the evening of surgery and sipping on clear liquids, once released by your surgeon. 2. Beginning the day of surgery, you will be out of the bed as able, once released by your hospital team. We encourage you to be sitting up in a chair, walking in the goodman, doing exercises as advised by physical therapy, etc.       3. You will be given scheduled non-narcotic pain medication to help keep your pain under control. You will have stronger pain medication ordered for break through pain. 4. All of these measures are geared toward improving your overall surgical recovery and   decreasing the risk of complications.

## 2023-08-17 NOTE — PERIOP NOTE
PLEASE CONTINUE TAKING ALL PRESCRIPTION MEDICATIONS UP TO THE DAY OF SURGERY UNLESS OTHERWISE DIRECTED BELOW. DISCONTINUE all vitamins, herbals, and supplements 7 days prior to surgery. DISCONTINUE Non-Steriodal Anti-Inflammatory (NSAIDS) such as Advil, Ibuprofen, Motrin, Naproxen, Aspirin, and Aleve 5 days prior to surgery. Home Medications to take  the day of surgery (8/24/23)      Tylenol, Albuterol if needed, Amlodipine, Levothyroxine, Lorazepam if needed     Omeprazole, Tramadol if needed         Home Medications   to Hold           Comments   On the day before surgery (8/23/23) please take Acetaminophen 1000mg in the morning and then again before bed. You may substitute for Tylenol 650 mg.       Bring: C-PAP, Inhaler, Photo ID, Insurance card, Dynahex wash, and Incentive Spirometer with you to hospital on the day of surgery. Please do not bring home medications with you on the day of surgery unless otherwise directed by your nurse. If you are instructed to bring home medications, please give them to your nurse as they will be administered by the nursing staff. If you have any questions, please call [address] [place] [address] [phone]. A copy of this note was provided to the patient for reference.

## 2023-08-18 SDOH — ECONOMIC STABILITY: HOUSING INSECURITY
IN THE LAST 12 MONTHS, WAS THERE A TIME WHEN YOU DID NOT HAVE A STEADY PLACE TO SLEEP OR SLEPT IN A SHELTER (INCLUDING NOW)?: NO

## 2023-08-18 SDOH — ECONOMIC STABILITY: FOOD INSECURITY: WITHIN THE PAST 12 MONTHS, YOU WORRIED THAT YOUR FOOD WOULD RUN OUT BEFORE YOU GOT MONEY TO BUY MORE.: OFTEN TRUE

## 2023-08-18 SDOH — ECONOMIC STABILITY: FOOD INSECURITY: WITHIN THE PAST 12 MONTHS, THE FOOD YOU BOUGHT JUST DIDN'T LAST AND YOU DIDN'T HAVE MONEY TO GET MORE.: OFTEN TRUE

## 2023-08-18 SDOH — ECONOMIC STABILITY: TRANSPORTATION INSECURITY
IN THE PAST 12 MONTHS, HAS LACK OF TRANSPORTATION KEPT YOU FROM MEETINGS, WORK, OR FROM GETTING THINGS NEEDED FOR DAILY LIVING?: NO

## 2023-08-18 SDOH — ECONOMIC STABILITY: INCOME INSECURITY: HOW HARD IS IT FOR YOU TO PAY FOR THE VERY BASICS LIKE FOOD, HOUSING, MEDICAL CARE, AND HEATING?: SOMEWHAT HARD

## 2023-08-21 ENCOUNTER — OFFICE VISIT (OUTPATIENT)
Dept: ORTHOPEDIC SURGERY | Age: 58
End: 2023-08-21

## 2023-08-21 ENCOUNTER — TELEMEDICINE (OUTPATIENT)
Dept: FAMILY MEDICINE CLINIC | Facility: CLINIC | Age: 58
End: 2023-08-21
Payer: MEDICAID

## 2023-08-21 DIAGNOSIS — L30.9 DERMATITIS: ICD-10-CM

## 2023-08-21 DIAGNOSIS — M54.50 CHRONIC BILATERAL LOW BACK PAIN WITHOUT SCIATICA: ICD-10-CM

## 2023-08-21 DIAGNOSIS — G89.29 CHRONIC BILATERAL LOW BACK PAIN WITHOUT SCIATICA: ICD-10-CM

## 2023-08-21 DIAGNOSIS — M17.11 PRIMARY OSTEOARTHRITIS OF RIGHT KNEE: Primary | ICD-10-CM

## 2023-08-21 DIAGNOSIS — F41.9 ANXIETY: Primary | ICD-10-CM

## 2023-08-21 PROCEDURE — 99213 OFFICE O/P EST LOW 20 MIN: CPT | Performed by: NURSE PRACTITIONER

## 2023-08-21 RX ORDER — LORAZEPAM 1 MG/1
1 TABLET ORAL EVERY 8 HOURS PRN
Qty: 60 TABLET | Refills: 5 | Status: SHIPPED | OUTPATIENT
Start: 2023-08-21 | End: 2024-02-17

## 2023-08-21 RX ORDER — NYSTATIN 100000 U/G
CREAM TOPICAL
Qty: 30 G | Refills: 1 | Status: SHIPPED | OUTPATIENT
Start: 2023-08-21

## 2023-08-21 RX ORDER — TRIAMCINOLONE ACETONIDE 40 MG/ML
40 INJECTION, SUSPENSION INTRA-ARTICULAR; INTRAMUSCULAR ONCE
Status: COMPLETED | OUTPATIENT
Start: 2023-08-21 | End: 2023-08-21

## 2023-08-21 RX ORDER — HYALURONATE SODIUM 10 MG/ML
20 SYRINGE (ML) INTRAARTICULAR ONCE
Status: COMPLETED | OUTPATIENT
Start: 2023-08-21 | End: 2023-08-21

## 2023-08-21 RX ADMIN — TRIAMCINOLONE ACETONIDE 40 MG: 40 INJECTION, SUSPENSION INTRA-ARTICULAR; INTRAMUSCULAR at 12:05

## 2023-08-21 RX ADMIN — Medication 20 MG: at 11:59

## 2023-08-21 ASSESSMENT — ENCOUNTER SYMPTOMS
EYE PAIN: 0
NAUSEA: 0
DIARRHEA: 0
BACK PAIN: 1
EYE REDNESS: 0
SHORTNESS OF BREATH: 0
BLOOD IN STOOL: 0
VOMITING: 0
CONSTIPATION: 0
SORE THROAT: 0
COUGH: 0
SINUS PAIN: 0

## 2023-08-21 NOTE — PROGRESS NOTES
Leonidas Weir (:  1965) is a Established patient, presenting virtually for evaluation of the following:    Assessment & Plan   Below is the assessment and plan developed based on review of pertinent history, physical exam, labs, studies, and medications. 1. Anxiety  -     LORazepam (ATIVAN) 1 MG tablet; Take 1 tablet by mouth every 8 hours as needed for Anxiety for up to 180 days. Max Daily Amount: 3 mg, Disp-60 tablet, R-5Normal  2. Dermatitis  -     nystatin (MYCOSTATIN) 467570 UNIT/GM cream; Apply topically 2 times daily. , Disp-30 g, R-1, Normal  3. Chronic bilateral low back pain without sciatica    Ativan and Nystatin refills provided. PDMP checked. Patient filled Tramadol  then Percocet prescribed by her bariatric office . She was made aware at her CPX 23 that we could fill Tramadol for her, but she could not continue filling controlled substance prescriptions through her specialists (ortho, bariatrics, etc.). She was agreeable with the plan at that time. Discussed with Dr. Kim Nowak and pt made aware via My Chart message that she cannot have controlled substances through two providers moving forward. Return in about 6 months (around 2024), or if symptoms worsen or fail to improve. Subjective   Medication Refill  This is a chronic (anxiety, dermatitis below breasts, low back pain) problem. The current episode started more than 1 year ago. Episode frequency: daily. The problem has been unchanged (controlled on current regimen). Associated symptoms include a rash. Pertinent negatives include no chest pain, chills, congestion, coughing, fever, headaches, nausea, sore throat or vomiting. Treatments tried: Ativan, Nystatin cream, Tramadol. The treatment provided moderate relief. Review of Systems   Constitutional:  Negative for chills and fever. HENT:  Negative for congestion, ear pain, sinus pain and sore throat. Eyes:  Negative for pain and redness.    Respiratory:

## 2023-08-23 ENCOUNTER — ANESTHESIA EVENT (OUTPATIENT)
Dept: SURGERY | Age: 58
End: 2023-08-23
Payer: MEDICAID

## 2023-08-23 ENCOUNTER — HOSPITAL ENCOUNTER (OUTPATIENT)
Dept: PHYSICAL THERAPY | Age: 58
Setting detail: RECURRING SERIES
Discharge: HOME | End: 2023-08-26
Payer: MEDICAID

## 2023-08-23 PROCEDURE — 97110 THERAPEUTIC EXERCISES: CPT

## 2023-08-23 PROCEDURE — 97140 MANUAL THERAPY 1/> REGIONS: CPT

## 2023-08-23 NOTE — PROGRESS NOTES
Kiah Stoner  : 1965  Primary: [address] [facility] Medicaid (Medicaid Managed)  Secondary:  [address] @ [address] [facility]  [address]  [address] [facility]  Phone: [phone]  Fax: [phone] Plan Frequency: 2 times a week for up to 90 days    Plan of Care/Certification Expiration Date: 23      >PT Visit Info:  Plan Frequency: 2 times a week for up to 90 days  Plan of Care/Certification Expiration Date: 23      Visit Count:  12    OUTPATIENT PHYSICAL THERAPY:OP NOTE TYPE: Treatment Note 2023       Episode  }Appt Desk             Treatment Diagnosis:  Pain in Right Knee (M25.561)  Stiffness of Right Knee, Not elsewhere classified (M25.661)  Difficulty in walking, Not elsewhere classified (R26.2)  Medical/Referring Diagnosis:  Other tear of medial meniscus, current injury, right knee, subsequent encounter [O61.178R]  Referring Physician:  TOMAS Mark CNP, MD Orders:  PT Eval and Treat   Date of Onset:  Onset Date: 23     Allergies:   Diltiazem hcl  Restrictions/Precautions:  Restrictions/Precautions: Weight Bearing  Right Lower Extremity Weight Bearing: Weight Bearing As Tolerated     Interventions Planned (Treatment may consist of any combination of the following):    Current Treatment Recommendations: Strengthening; ROM; Balance training; Functional mobility training; Transfer training; Gait training; Stair training; Manual; Pain management; Home exercise program; Modalities; Therapeutic activities     >Subjective Comments: Toña received a gel injection in her R knee 2 days ago and she feels like it is helping. She has less pain overall. She will receive 2 more over the next 2 weeks.      >Initial:      3/10>Post Session:        2/10  Medications Last Reviewed:  2023  Updated Objective Findings:  None today  Treatment   THERAPEUTIC EXERCISE (and manual therapy): (53 minutes):    Exercises per grid below to improve mobility, strength,

## 2023-08-24 ENCOUNTER — HOSPITAL ENCOUNTER (OUTPATIENT)
Age: 58
Setting detail: OBSERVATION
Discharge: HOME | End: 2023-08-26
Attending: SURGERY | Admitting: SURGERY
Payer: MEDICAID

## 2023-08-24 ENCOUNTER — ANESTHESIA (OUTPATIENT)
Dept: SURGERY | Age: 58
End: 2023-08-24
Payer: MEDICAID

## 2023-08-24 PROBLEM — E66.01 MORBID OBESITY (HCC): Status: ACTIVE | Noted: 2023-08-24

## 2023-08-24 LAB
ABO + RH BLD: NORMAL
BLOOD GROUP ANTIBODIES SERPL: NORMAL
SPECIMEN EXP DATE BLD: NORMAL

## 2023-08-24 PROCEDURE — 6360000002 HC RX W HCPCS: Performed by: SURGERY

## 2023-08-24 PROCEDURE — 6360000002 HC RX W HCPCS: Performed by: NURSE ANESTHETIST, CERTIFIED REGISTERED

## 2023-08-24 PROCEDURE — 2580000003 HC RX 258: Performed by: PHYSICIAN ASSISTANT

## 2023-08-24 PROCEDURE — 94660 CPAP INITIATION&MGMT: CPT

## 2023-08-24 PROCEDURE — 86901 BLOOD TYPING SEROLOGIC RH(D): CPT

## 2023-08-24 PROCEDURE — 3600000009 HC SURGERY ROBOT BASE: Performed by: SURGERY

## 2023-08-24 PROCEDURE — 7100000000 HC PACU RECOVERY - FIRST 15 MIN: Performed by: SURGERY

## 2023-08-24 PROCEDURE — 2709999900 HC NON-CHARGEABLE SUPPLY: Performed by: SURGERY

## 2023-08-24 PROCEDURE — 86900 BLOOD TYPING SEROLOGIC ABO: CPT

## 2023-08-24 PROCEDURE — 88307 TISSUE EXAM BY PATHOLOGIST: CPT

## 2023-08-24 PROCEDURE — G0378 HOSPITAL OBSERVATION PER HR: HCPCS

## 2023-08-24 PROCEDURE — 43644 LAP GASTRIC BYPASS/ROUX-EN-Y: CPT | Performed by: SURGERY

## 2023-08-24 PROCEDURE — 94760 N-INVAS EAR/PLS OXIMETRY 1: CPT

## 2023-08-24 PROCEDURE — 94761 N-INVAS EAR/PLS OXIMETRY MLT: CPT

## 2023-08-24 PROCEDURE — 88313 SPECIAL STAINS GROUP 2: CPT

## 2023-08-24 PROCEDURE — 3700000001 HC ADD 15 MINUTES (ANESTHESIA): Performed by: SURGERY

## 2023-08-24 PROCEDURE — 2500000003 HC RX 250 WO HCPCS: Performed by: ANESTHESIOLOGY

## 2023-08-24 PROCEDURE — 6360000002 HC RX W HCPCS: Performed by: PHYSICIAN ASSISTANT

## 2023-08-24 PROCEDURE — C9113 INJ PANTOPRAZOLE SODIUM, VIA: HCPCS | Performed by: PHYSICIAN ASSISTANT

## 2023-08-24 PROCEDURE — 7100000001 HC PACU RECOVERY - ADDTL 15 MIN: Performed by: SURGERY

## 2023-08-24 PROCEDURE — 6360000002 HC RX W HCPCS: Performed by: ANESTHESIOLOGY

## 2023-08-24 PROCEDURE — 3700000000 HC ANESTHESIA ATTENDED CARE: Performed by: SURGERY

## 2023-08-24 PROCEDURE — 3600000019 HC SURGERY ROBOT ADDTL 15MIN: Performed by: SURGERY

## 2023-08-24 PROCEDURE — S2900 ROBOTIC SURGICAL SYSTEM: HCPCS | Performed by: SURGERY

## 2023-08-24 PROCEDURE — 2500000003 HC RX 250 WO HCPCS: Performed by: NURSE ANESTHETIST, CERTIFIED REGISTERED

## 2023-08-24 PROCEDURE — 6370000000 HC RX 637 (ALT 250 FOR IP): Performed by: PHYSICIAN ASSISTANT

## 2023-08-24 PROCEDURE — 2720000010 HC SURG SUPPLY STERILE: Performed by: SURGERY

## 2023-08-24 PROCEDURE — A4216 STERILE WATER/SALINE, 10 ML: HCPCS | Performed by: PHYSICIAN ASSISTANT

## 2023-08-24 PROCEDURE — 2500000003 HC RX 250 WO HCPCS: Performed by: PHYSICIAN ASSISTANT

## 2023-08-24 PROCEDURE — 86850 RBC ANTIBODY SCREEN: CPT

## 2023-08-24 DEVICE — SEALANT TISS 4 CC FIBRIN VISTASEAL: Type: IMPLANTABLE DEVICE | Site: ABDOMEN | Status: FUNCTIONAL

## 2023-08-24 RX ORDER — SUCRALFATE 1 G/1
1 TABLET ORAL EVERY 6 HOURS SCHEDULED
Status: DISCONTINUED | OUTPATIENT
Start: 2023-08-24 | End: 2023-08-26 | Stop reason: HOSPADM

## 2023-08-24 RX ORDER — SODIUM CHLORIDE, SODIUM LACTATE, POTASSIUM CHLORIDE, CALCIUM CHLORIDE 600; 310; 30; 20 MG/100ML; MG/100ML; MG/100ML; MG/100ML
INJECTION, SOLUTION INTRAVENOUS CONTINUOUS
Status: DISCONTINUED | OUTPATIENT
Start: 2023-08-24 | End: 2023-08-24

## 2023-08-24 RX ORDER — SODIUM CHLORIDE 0.9 % (FLUSH) 0.9 %
5-40 SYRINGE (ML) INJECTION EVERY 12 HOURS SCHEDULED
Status: DISCONTINUED | OUTPATIENT
Start: 2023-08-24 | End: 2023-08-24 | Stop reason: HOSPADM

## 2023-08-24 RX ORDER — GABAPENTIN 300 MG/1
300 CAPSULE ORAL 3 TIMES DAILY
Status: DISCONTINUED | OUTPATIENT
Start: 2023-08-24 | End: 2023-08-26 | Stop reason: HOSPADM

## 2023-08-24 RX ORDER — APREPITANT 40 MG/1
40 CAPSULE ORAL ONCE
Status: COMPLETED | OUTPATIENT
Start: 2023-08-24 | End: 2023-08-24

## 2023-08-24 RX ORDER — LIDOCAINE HYDROCHLORIDE 10 MG/ML
1 INJECTION, SOLUTION INFILTRATION; PERINEURAL
Status: COMPLETED | OUTPATIENT
Start: 2023-08-24 | End: 2023-08-24

## 2023-08-24 RX ORDER — SODIUM CHLORIDE AND POTASSIUM CHLORIDE 150; 900 MG/100ML; MG/100ML
INJECTION, SOLUTION INTRAVENOUS CONTINUOUS
Status: DISCONTINUED | OUTPATIENT
Start: 2023-08-25 | End: 2023-08-26 | Stop reason: HOSPADM

## 2023-08-24 RX ORDER — SODIUM CHLORIDE 0.9 % (FLUSH) 0.9 %
5-40 SYRINGE (ML) INJECTION EVERY 12 HOURS SCHEDULED
Status: DISCONTINUED | OUTPATIENT
Start: 2023-08-24 | End: 2023-08-24

## 2023-08-24 RX ORDER — SIMETHICONE 80 MG
80 TABLET,CHEWABLE ORAL EVERY 6 HOURS PRN
Status: DISCONTINUED | OUTPATIENT
Start: 2023-08-24 | End: 2023-08-26 | Stop reason: HOSPADM

## 2023-08-24 RX ORDER — PROPOFOL 10 MG/ML
INJECTION, EMULSION INTRAVENOUS PRN
Status: DISCONTINUED | OUTPATIENT
Start: 2023-08-24 | End: 2023-08-24 | Stop reason: SDUPTHER

## 2023-08-24 RX ORDER — BUPIVACAINE HYDROCHLORIDE 5 MG/ML
INJECTION, SOLUTION EPIDURAL; INTRACAUDAL PRN
Status: DISCONTINUED | OUTPATIENT
Start: 2023-08-24 | End: 2023-08-24 | Stop reason: ALTCHOICE

## 2023-08-24 RX ORDER — ACETAMINOPHEN 325 MG/1
650 TABLET ORAL EVERY 6 HOURS
Status: DISCONTINUED | OUTPATIENT
Start: 2023-08-24 | End: 2023-08-26 | Stop reason: HOSPADM

## 2023-08-24 RX ORDER — SUCCINYLCHOLINE CHLORIDE 20 MG/ML
INJECTION INTRAMUSCULAR; INTRAVENOUS PRN
Status: DISCONTINUED | OUTPATIENT
Start: 2023-08-24 | End: 2023-08-24 | Stop reason: SDUPTHER

## 2023-08-24 RX ORDER — DIPHENHYDRAMINE HCL 25 MG
25 CAPSULE ORAL EVERY 6 HOURS PRN
Status: DISCONTINUED | OUTPATIENT
Start: 2023-08-24 | End: 2023-08-26 | Stop reason: HOSPADM

## 2023-08-24 RX ORDER — ACETAMINOPHEN 500 MG
1000 TABLET ORAL ONCE
Status: DISCONTINUED | OUTPATIENT
Start: 2023-08-24 | End: 2023-08-24 | Stop reason: HOSPADM

## 2023-08-24 RX ORDER — ONDANSETRON 2 MG/ML
4 INJECTION INTRAMUSCULAR; INTRAVENOUS EVERY 6 HOURS PRN
Status: DISCONTINUED | OUTPATIENT
Start: 2023-08-24 | End: 2023-08-26 | Stop reason: HOSPADM

## 2023-08-24 RX ORDER — ONDANSETRON 2 MG/ML
INJECTION INTRAMUSCULAR; INTRAVENOUS PRN
Status: DISCONTINUED | OUTPATIENT
Start: 2023-08-24 | End: 2023-08-24 | Stop reason: SDUPTHER

## 2023-08-24 RX ORDER — MIDAZOLAM HYDROCHLORIDE 2 MG/2ML
2 INJECTION, SOLUTION INTRAMUSCULAR; INTRAVENOUS
Status: COMPLETED | OUTPATIENT
Start: 2023-08-24 | End: 2023-08-24

## 2023-08-24 RX ORDER — SODIUM CHLORIDE 0.9 % (FLUSH) 0.9 %
5-40 SYRINGE (ML) INJECTION PRN
Status: DISCONTINUED | OUTPATIENT
Start: 2023-08-24 | End: 2023-08-26 | Stop reason: HOSPADM

## 2023-08-24 RX ORDER — PROCHLORPERAZINE EDISYLATE 5 MG/ML
5 INJECTION INTRAMUSCULAR; INTRAVENOUS
Status: DISCONTINUED | OUTPATIENT
Start: 2023-08-24 | End: 2023-08-24 | Stop reason: HOSPADM

## 2023-08-24 RX ORDER — KETOROLAC TROMETHAMINE 30 MG/ML
30 INJECTION, SOLUTION INTRAMUSCULAR; INTRAVENOUS EVERY 6 HOURS
Status: COMPLETED | OUTPATIENT
Start: 2023-08-24 | End: 2023-08-25

## 2023-08-24 RX ORDER — KETAMINE HCL IN NACL, ISO-OSM 20 MG/2 ML
SYRINGE (ML) INJECTION PRN
Status: DISCONTINUED | OUTPATIENT
Start: 2023-08-24 | End: 2023-08-24 | Stop reason: SDUPTHER

## 2023-08-24 RX ORDER — SCOLOPAMINE TRANSDERMAL SYSTEM 1 MG/1
1 PATCH, EXTENDED RELEASE TRANSDERMAL
Status: DISCONTINUED | OUTPATIENT
Start: 2023-08-24 | End: 2023-08-26 | Stop reason: HOSPADM

## 2023-08-24 RX ORDER — DEXTROSE MONOHYDRATE 100 MG/ML
INJECTION, SOLUTION INTRAVENOUS CONTINUOUS PRN
Status: DISCONTINUED | OUTPATIENT
Start: 2023-08-24 | End: 2023-08-24 | Stop reason: HOSPADM

## 2023-08-24 RX ORDER — HEPARIN SODIUM 5000 [USP'U]/ML
5000 INJECTION, SOLUTION INTRAVENOUS; SUBCUTANEOUS EVERY 8 HOURS SCHEDULED
Status: DISCONTINUED | OUTPATIENT
Start: 2023-08-24 | End: 2023-08-26 | Stop reason: HOSPADM

## 2023-08-24 RX ORDER — SODIUM CHLORIDE 9 MG/ML
INJECTION, SOLUTION INTRAVENOUS PRN
Status: DISCONTINUED | OUTPATIENT
Start: 2023-08-24 | End: 2023-08-24 | Stop reason: HOSPADM

## 2023-08-24 RX ORDER — LIDOCAINE HYDROCHLORIDE ANHYDROUS AND DEXTROSE MONOHYDRATE 5; 400 G/100ML; MG/100ML
INJECTION, SOLUTION INTRAVENOUS CONTINUOUS PRN
Status: DISCONTINUED | OUTPATIENT
Start: 2023-08-24 | End: 2023-08-24 | Stop reason: SDUPTHER

## 2023-08-24 RX ORDER — OXYCODONE HYDROCHLORIDE 5 MG/1
5 TABLET ORAL EVERY 4 HOURS PRN
Status: DISCONTINUED | OUTPATIENT
Start: 2023-08-24 | End: 2023-08-26 | Stop reason: HOSPADM

## 2023-08-24 RX ORDER — SODIUM CHLORIDE 9 MG/ML
INJECTION, SOLUTION INTRAVENOUS PRN
Status: DISCONTINUED | OUTPATIENT
Start: 2023-08-24 | End: 2023-08-26 | Stop reason: HOSPADM

## 2023-08-24 RX ORDER — HYDRALAZINE HYDROCHLORIDE 20 MG/ML
10 INJECTION INTRAMUSCULAR; INTRAVENOUS EVERY 6 HOURS PRN
Status: DISCONTINUED | OUTPATIENT
Start: 2023-08-24 | End: 2023-08-26 | Stop reason: HOSPADM

## 2023-08-24 RX ORDER — GLYCOPYRROLATE 0.2 MG/ML
INJECTION INTRAMUSCULAR; INTRAVENOUS PRN
Status: DISCONTINUED | OUTPATIENT
Start: 2023-08-24 | End: 2023-08-24 | Stop reason: SDUPTHER

## 2023-08-24 RX ORDER — SODIUM CHLORIDE 0.9 % (FLUSH) 0.9 %
5-40 SYRINGE (ML) INJECTION PRN
Status: DISCONTINUED | OUTPATIENT
Start: 2023-08-24 | End: 2023-08-24 | Stop reason: HOSPADM

## 2023-08-24 RX ORDER — ROCURONIUM BROMIDE 10 MG/ML
INJECTION, SOLUTION INTRAVENOUS PRN
Status: DISCONTINUED | OUTPATIENT
Start: 2023-08-24 | End: 2023-08-24 | Stop reason: SDUPTHER

## 2023-08-24 RX ORDER — SODIUM CHLORIDE, SODIUM LACTATE, POTASSIUM CHLORIDE, CALCIUM CHLORIDE 600; 310; 30; 20 MG/100ML; MG/100ML; MG/100ML; MG/100ML
INJECTION, SOLUTION INTRAVENOUS CONTINUOUS
Status: DISCONTINUED | OUTPATIENT
Start: 2023-08-24 | End: 2023-08-24 | Stop reason: HOSPADM

## 2023-08-24 RX ORDER — SODIUM CHLORIDE 0.9 % (FLUSH) 0.9 %
5-40 SYRINGE (ML) INJECTION PRN
Status: DISCONTINUED | OUTPATIENT
Start: 2023-08-24 | End: 2023-08-24

## 2023-08-24 RX ORDER — SODIUM CHLORIDE, SODIUM LACTATE, POTASSIUM CHLORIDE, CALCIUM CHLORIDE 600; 310; 30; 20 MG/100ML; MG/100ML; MG/100ML; MG/100ML
INJECTION, SOLUTION INTRAVENOUS CONTINUOUS
Status: DISCONTINUED | OUTPATIENT
Start: 2023-08-24 | End: 2023-08-26 | Stop reason: HOSPADM

## 2023-08-24 RX ORDER — SODIUM CHLORIDE 9 MG/ML
INJECTION, SOLUTION INTRAVENOUS PRN
Status: DISCONTINUED | OUTPATIENT
Start: 2023-08-24 | End: 2023-08-24

## 2023-08-24 RX ORDER — HEPARIN SODIUM 5000 [USP'U]/ML
5000 INJECTION, SOLUTION INTRAVENOUS; SUBCUTANEOUS ONCE
Status: COMPLETED | OUTPATIENT
Start: 2023-08-24 | End: 2023-08-24

## 2023-08-24 RX ORDER — KETOROLAC TROMETHAMINE 30 MG/ML
INJECTION, SOLUTION INTRAMUSCULAR; INTRAVENOUS PRN
Status: DISCONTINUED | OUTPATIENT
Start: 2023-08-24 | End: 2023-08-24 | Stop reason: SDUPTHER

## 2023-08-24 RX ORDER — DIPHENHYDRAMINE HYDROCHLORIDE 50 MG/ML
12.5 INJECTION INTRAMUSCULAR; INTRAVENOUS
Status: DISCONTINUED | OUTPATIENT
Start: 2023-08-24 | End: 2023-08-24 | Stop reason: HOSPADM

## 2023-08-24 RX ORDER — NEOSTIGMINE METHYLSULFATE 1 MG/ML
INJECTION, SOLUTION INTRAVENOUS PRN
Status: DISCONTINUED | OUTPATIENT
Start: 2023-08-24 | End: 2023-08-24 | Stop reason: SDUPTHER

## 2023-08-24 RX ORDER — LIDOCAINE HYDROCHLORIDE 20 MG/ML
INJECTION, SOLUTION EPIDURAL; INFILTRATION; INTRACAUDAL; PERINEURAL PRN
Status: DISCONTINUED | OUTPATIENT
Start: 2023-08-24 | End: 2023-08-24 | Stop reason: SDUPTHER

## 2023-08-24 RX ORDER — METOCLOPRAMIDE HYDROCHLORIDE 5 MG/ML
10 INJECTION INTRAMUSCULAR; INTRAVENOUS ONCE
Status: COMPLETED | OUTPATIENT
Start: 2023-08-24 | End: 2023-08-24

## 2023-08-24 RX ORDER — OXYCODONE HYDROCHLORIDE 5 MG/1
5 TABLET ORAL
Status: DISCONTINUED | OUTPATIENT
Start: 2023-08-24 | End: 2023-08-24 | Stop reason: HOSPADM

## 2023-08-24 RX ORDER — DIPHENHYDRAMINE HYDROCHLORIDE 50 MG/ML
25 INJECTION INTRAMUSCULAR; INTRAVENOUS EVERY 6 HOURS PRN
Status: DISCONTINUED | OUTPATIENT
Start: 2023-08-24 | End: 2023-08-26 | Stop reason: HOSPADM

## 2023-08-24 RX ORDER — FENTANYL CITRATE 50 UG/ML
INJECTION, SOLUTION INTRAMUSCULAR; INTRAVENOUS PRN
Status: DISCONTINUED | OUTPATIENT
Start: 2023-08-24 | End: 2023-08-24 | Stop reason: SDUPTHER

## 2023-08-24 RX ORDER — ONDANSETRON 2 MG/ML
4 INJECTION INTRAMUSCULAR; INTRAVENOUS ONCE
Status: DISCONTINUED | OUTPATIENT
Start: 2023-08-24 | End: 2023-08-24 | Stop reason: HOSPADM

## 2023-08-24 RX ORDER — HALOPERIDOL 5 MG/ML
1 INJECTION INTRAMUSCULAR
Status: DISCONTINUED | OUTPATIENT
Start: 2023-08-24 | End: 2023-08-24 | Stop reason: HOSPADM

## 2023-08-24 RX ORDER — SODIUM CHLORIDE 0.9 % (FLUSH) 0.9 %
5-40 SYRINGE (ML) INJECTION EVERY 12 HOURS SCHEDULED
Status: DISCONTINUED | OUTPATIENT
Start: 2023-08-24 | End: 2023-08-26 | Stop reason: HOSPADM

## 2023-08-24 RX ORDER — PROCHLORPERAZINE EDISYLATE 5 MG/ML
10 INJECTION INTRAMUSCULAR; INTRAVENOUS EVERY 6 HOURS PRN
Status: DISCONTINUED | OUTPATIENT
Start: 2023-08-24 | End: 2023-08-26 | Stop reason: HOSPADM

## 2023-08-24 RX ORDER — LIDOCAINE HYDROCHLORIDE ANHYDROUS AND DEXTROSE MONOHYDRATE 5; 400 G/100ML; MG/100ML
1 INJECTION, SOLUTION INTRAVENOUS CONTINUOUS
Status: ACTIVE | OUTPATIENT
Start: 2023-08-24 | End: 2023-08-25

## 2023-08-24 RX ADMIN — LIDOCAINE HYDROCHLORIDE 1 ML: 10 INJECTION, SOLUTION INFILTRATION; PERINEURAL at 10:53

## 2023-08-24 RX ADMIN — GLYCOPYRROLATE 0.2 MG: 0.2 INJECTION INTRAMUSCULAR; INTRAVENOUS at 14:32

## 2023-08-24 RX ADMIN — Medication 10 MG: at 12:44

## 2023-08-24 RX ADMIN — PHENYLEPHRINE HYDROCHLORIDE 100 MCG: 0.1 INJECTION, SOLUTION INTRAVENOUS at 14:04

## 2023-08-24 RX ADMIN — Medication 2 MG: at 14:32

## 2023-08-24 RX ADMIN — SODIUM CHLORIDE, SODIUM LACTATE, POTASSIUM CHLORIDE, AND CALCIUM CHLORIDE: 600; 310; 30; 20 INJECTION, SOLUTION INTRAVENOUS at 11:59

## 2023-08-24 RX ADMIN — PROPOFOL 200 MG: 10 INJECTION, EMULSION INTRAVENOUS at 12:14

## 2023-08-24 RX ADMIN — APREPITANT 40 MG: 40 CAPSULE ORAL at 10:35

## 2023-08-24 RX ADMIN — Medication 3000 MG: at 12:14

## 2023-08-24 RX ADMIN — SODIUM CHLORIDE, PRESERVATIVE FREE 10 ML: 5 INJECTION INTRAVENOUS at 19:46

## 2023-08-24 RX ADMIN — SODIUM CHLORIDE, SODIUM LACTATE, POTASSIUM CHLORIDE, AND CALCIUM CHLORIDE: 600; 310; 30; 20 INJECTION, SOLUTION INTRAVENOUS at 10:53

## 2023-08-24 RX ADMIN — HYDROMORPHONE HYDROCHLORIDE 0.5 MG: 1 INJECTION, SOLUTION INTRAMUSCULAR; INTRAVENOUS; SUBCUTANEOUS at 14:52

## 2023-08-24 RX ADMIN — SODIUM CHLORIDE 40 MG: 9 INJECTION INTRAMUSCULAR; INTRAVENOUS; SUBCUTANEOUS at 17:28

## 2023-08-24 RX ADMIN — PHENYLEPHRINE HYDROCHLORIDE 100 MCG: 0.1 INJECTION, SOLUTION INTRAVENOUS at 14:17

## 2023-08-24 RX ADMIN — ONDANSETRON 4 MG: 2 INJECTION INTRAMUSCULAR; INTRAVENOUS at 23:46

## 2023-08-24 RX ADMIN — SUCRALFATE 1 G: 1 TABLET ORAL at 17:11

## 2023-08-24 RX ADMIN — PHENYLEPHRINE HYDROCHLORIDE 100 MCG: 0.1 INJECTION, SOLUTION INTRAVENOUS at 13:00

## 2023-08-24 RX ADMIN — METOCLOPRAMIDE 10 MG: 5 INJECTION, SOLUTION INTRAMUSCULAR; INTRAVENOUS at 10:53

## 2023-08-24 RX ADMIN — Medication 3 MG: at 14:28

## 2023-08-24 RX ADMIN — Medication 20 MG: at 12:24

## 2023-08-24 RX ADMIN — PHENYLEPHRINE HYDROCHLORIDE 100 MCG: 0.1 INJECTION, SOLUTION INTRAVENOUS at 13:47

## 2023-08-24 RX ADMIN — GLYCOPYRROLATE 0.2 MG: 0.2 INJECTION INTRAMUSCULAR; INTRAVENOUS at 12:41

## 2023-08-24 RX ADMIN — ONDANSETRON 4 MG: 2 INJECTION INTRAMUSCULAR; INTRAVENOUS at 13:28

## 2023-08-24 RX ADMIN — LIDOCAINE HYDROCHLORIDE 1.5 MG/KG/HR: 4 INJECTION, SOLUTION INTRAVENOUS at 12:18

## 2023-08-24 RX ADMIN — HYDROMORPHONE HYDROCHLORIDE 0.5 MG: 1 INJECTION, SOLUTION INTRAMUSCULAR; INTRAVENOUS; SUBCUTANEOUS at 14:58

## 2023-08-24 RX ADMIN — THIAMINE HYDROCHLORIDE: 100 INJECTION, SOLUTION INTRAMUSCULAR; INTRAVENOUS at 17:11

## 2023-08-24 RX ADMIN — ACETAMINOPHEN 650 MG: 325 TABLET, FILM COATED ORAL at 17:11

## 2023-08-24 RX ADMIN — ROCURONIUM BROMIDE 5 MG: 50 INJECTION, SOLUTION INTRAVENOUS at 12:14

## 2023-08-24 RX ADMIN — PHENYLEPHRINE HYDROCHLORIDE 50 MCG: 0.1 INJECTION, SOLUTION INTRAVENOUS at 13:40

## 2023-08-24 RX ADMIN — FENTANYL CITRATE 100 MCG: 50 INJECTION, SOLUTION INTRAMUSCULAR; INTRAVENOUS at 12:14

## 2023-08-24 RX ADMIN — LIDOCAINE HYDROCHLORIDE 1 MG/KG/HR: 4 INJECTION, SOLUTION INTRAVENOUS at 14:56

## 2023-08-24 RX ADMIN — Medication 200 MG: at 12:14

## 2023-08-24 RX ADMIN — ROCURONIUM BROMIDE 7 MG: 50 INJECTION, SOLUTION INTRAVENOUS at 14:04

## 2023-08-24 RX ADMIN — ROCURONIUM BROMIDE 45 MG: 50 INJECTION, SOLUTION INTRAVENOUS at 12:17

## 2023-08-24 RX ADMIN — KETOROLAC TROMETHAMINE 30 MG: 30 INJECTION, SOLUTION INTRAMUSCULAR; INTRAVENOUS at 19:44

## 2023-08-24 RX ADMIN — PHENYLEPHRINE HYDROCHLORIDE 100 MCG: 0.1 INJECTION, SOLUTION INTRAVENOUS at 13:15

## 2023-08-24 RX ADMIN — OXYCODONE HYDROCHLORIDE 5 MG: 5 TABLET ORAL at 21:06

## 2023-08-24 RX ADMIN — LIDOCAINE HYDROCHLORIDE 100 MG: 20 INJECTION, SOLUTION EPIDURAL; INFILTRATION; INTRACAUDAL; PERINEURAL at 12:14

## 2023-08-24 RX ADMIN — HEPARIN SODIUM 5000 UNITS: 5000 INJECTION INTRAVENOUS; SUBCUTANEOUS at 10:57

## 2023-08-24 RX ADMIN — GABAPENTIN 300 MG: 300 CAPSULE ORAL at 19:44

## 2023-08-24 RX ADMIN — GLYCOPYRROLATE 0.4 MG: 0.2 INJECTION INTRAMUSCULAR; INTRAVENOUS at 14:28

## 2023-08-24 RX ADMIN — Medication 10 MG: at 13:26

## 2023-08-24 RX ADMIN — ROCURONIUM BROMIDE 10 MG: 50 INJECTION, SOLUTION INTRAVENOUS at 12:54

## 2023-08-24 RX ADMIN — KETOROLAC TROMETHAMINE 30 MG: 30 INJECTION, SOLUTION INTRAMUSCULAR; INTRAVENOUS at 14:19

## 2023-08-24 RX ADMIN — ACETAMINOPHEN 650 MG: 325 TABLET, FILM COATED ORAL at 21:57

## 2023-08-24 RX ADMIN — GABAPENTIN 300 MG: 300 CAPSULE ORAL at 17:11

## 2023-08-24 RX ADMIN — SUCRALFATE 1 G: 1 TABLET ORAL at 23:44

## 2023-08-24 RX ADMIN — FENTANYL CITRATE 100 MCG: 50 INJECTION, SOLUTION INTRAMUSCULAR; INTRAVENOUS at 12:47

## 2023-08-24 RX ADMIN — SODIUM CHLORIDE, SODIUM LACTATE, POTASSIUM CHLORIDE, AND CALCIUM CHLORIDE: 600; 310; 30; 20 INJECTION, SOLUTION INTRAVENOUS at 12:43

## 2023-08-24 RX ADMIN — ROCURONIUM BROMIDE 10 MG: 50 INJECTION, SOLUTION INTRAVENOUS at 13:33

## 2023-08-24 RX ADMIN — HEPARIN SODIUM 5000 UNITS: 5000 INJECTION INTRAVENOUS; SUBCUTANEOUS at 21:57

## 2023-08-24 RX ADMIN — MIDAZOLAM 2 MG: 1 INJECTION INTRAMUSCULAR; INTRAVENOUS at 11:12

## 2023-08-24 ASSESSMENT — PAIN DESCRIPTION - DESCRIPTORS
DESCRIPTORS: DISCOMFORT;CRAMPING
DESCRIPTORS: DISCOMFORT;CRAMPING
DESCRIPTORS: SORE
DESCRIPTORS: SORE
DESCRIPTORS: DISCOMFORT;ACHING;SORE

## 2023-08-24 ASSESSMENT — PAIN SCALES - GENERAL
PAINLEVEL_OUTOF10: 2
PAINLEVEL_OUTOF10: 8
PAINLEVEL_OUTOF10: 4
PAINLEVEL_OUTOF10: 7
PAINLEVEL_OUTOF10: 7

## 2023-08-24 ASSESSMENT — PAIN - FUNCTIONAL ASSESSMENT: PAIN_FUNCTIONAL_ASSESSMENT: 0-10

## 2023-08-24 ASSESSMENT — PAIN DESCRIPTION - LOCATION
LOCATION: ABDOMEN

## 2023-08-24 ASSESSMENT — PAIN DESCRIPTION - ORIENTATION
ORIENTATION: MID

## 2023-08-24 NOTE — RT PROTOCOL NOTE
Respiratory Care Services     Policy Number: [id number]    Title: Oxygen Protocol    Effective Date: 01/1996    Revised Date: 06/2013, 02/29/2016, 4/2018, 7/2019    Reviewed Date: 05/2014, 03/2015, 06/2017, 11/2020        I. Policy: The Oxygen Protocol will be initiated for all patients upon written order from physician for administration of oxygen therapy or if a patient is found to have an oxygen saturation of 88% or less. Special consideration: the goal of oxygen therapy for COPD patients is to maintain oxygen saturation between 88% - 92% to comply with GOLD Guidelines. II. Purpose: To provide protocol driven respiratory therapy for the administration of oxygen at concentrations greater than that in ambient air with the intent of treating or preventing the symptoms and manifestations of hypoxia. III. Responsibility: Director Respiratory Care Services, all Respiratory Care Practitioners     IV. Indications:   Implement this protocol for patients when physician orders oxygen to be administered or when patient is found to have an oxygen saturation of 88% or less. To assure routine monitoring of patient's oxygen saturation b.i.d. and to make appropriate adjustments in accordance with ordered oxygen saturation parameters. To assure continuity of respiratory care that meets [facility] Clinical Practice Guidelines and GOLD Guidelines. Hb < 8  Sickle Cell anemia crisis    V. Assessment:  Assess the following parameters to determine the need to adjust oxygen:  Measurement of patient's oxygen saturation via pulse oximetry. Observation of patient's color, respiratory effort, and responsiveness. Measurement of heart rate and respiratory rate. Complete a three-step ambulatory oxygen saturation when ordered. VI. Initiation:  Upon receipt of an order for oxygen, the RCP will:   Verify order in the patient's EMR, which should include the desired oxygen saturation to be maintained.   The patient shall be placed on Positive Airway Pressure. Recruits under ventilated alveoli to remain open during expiration by providing a constant pressure throughout resp. cycle. Must be less than or equal to IPAP    4-25 cmH20 5-14                 Settings Settings in Active CPAP Settings in Active BiPAP Description Range Usual Setting Used in NIV     IPAP                           Inspired Positive Airway Pressure provides pressure throughout the inspiratory phase to support patient ventilation  4-40 cmH20 10-20               I-time                    Inspiratory time- time taken to inspire in seconds  0.3 - 3.0 sec 1:3   FIO2                   Oxygen delivered  21- 100% As ordered         Ramp time                                      The Ramp Time function helps your patient adapt to ventilation by gradually increasing inspiratory and expiratory pressure over a set interval (minutes). This time gradually delivers pressures so to reduce patient anxiety and increases comfort. Off  or  5-45 minutes 10 minutes                   Rate (RR)          Patient's respiratory rate 4- 60 BPM 4     Rise time          Speed at which the inspiratory pressure rises to the set pressure. 1-5  (1 is the fastest) Set at 3         Patient Data  Data Description Range Usual setting in NIV   Breath phase/trigger Indicator  Bar in left hand corner. Colored according to breath trigger. n/a n/a   PIP Positive Inspiratory pressure  0-50 n/a   Patient total leak Est. or unintentional leak  0-200 n/a   Patient trigger Patient triggered breaths as a percentage  0-100% Should be 100%   Respiratory rate Respiratory rate 0-90 n/a   Ti/Ttot Inspiratory duty cycle or inspiratory time divided by total cycle time  0-91% n/a   Minute volume Est. minute ventilation.  TV x rate=MV  0-99 LPM n/a   Tidal volume Est.  tidal volume  0-3000 ml n/a     Alarms  Alarm Description Range Set  at   [facility] Rate High respiratory rate 5-90 10 breaths above patient's breath rate   Low Rate

## 2023-08-24 NOTE — ANESTHESIA PRE PROCEDURE
calculate BMI.    CBC:   Lab Results   Component Value Date/Time    WBC 5.7 08/17/2023 10:38 AM    RBC 5.58 08/17/2023 10:38 AM    HGB 14.6 08/17/2023 10:38 AM    HCT 46.2 08/17/2023 10:38 AM    MCV 82.8 08/17/2023 10:38 AM    RDW 13.5 08/17/2023 10:38 AM     08/17/2023 10:38 AM       CMP:   Lab Results   Component Value Date/Time     08/17/2023 10:38 AM    K 4.1 08/17/2023 10:38 AM     08/17/2023 10:38 AM    CO2 30 08/17/2023 10:38 AM    BUN 18 08/17/2023 10:38 AM    CREATININE 0.75 08/17/2023 10:38 AM    GFRAA >60 07/12/2022 11:03 AM    AGRATIO 1.2 04/15/2022 02:15 PM    LABGLOM >60 08/17/2023 10:38 AM    LABGLOM 89 04/15/2022 02:15 PM    GLUCOSE 102 08/17/2023 10:38 AM    PROT 7.6 08/17/2023 10:38 AM    CALCIUM 9.6 08/17/2023 10:38 AM    BILITOT 0.7 08/17/2023 10:38 AM    ALKPHOS 152 08/17/2023 10:38 AM    ALKPHOS 92 04/15/2022 02:15 PM    AST 65 08/17/2023 10:38 AM    ALT 66 08/17/2023 10:38 AM       POC Tests: No results for input(s): POCGLU, POCNA, POCK, POCCL, POCBUN, POCHEMO, POCHCT in the last 72 hours. Coags:   Lab Results   Component Value Date/Time    PROTIME 13.4 08/17/2023 10:38 AM    INR 1.0 08/17/2023 10:38 AM    APTT 30.6 08/17/2023 10:38 AM    APTT 27.9 11/04/2019 02:19 PM       HCG (If Applicable): No results found for: PREGTESTUR, PREGSERUM, HCG, HCGQUANT     ABGs: No results found for: PHART, PO2ART, DBN5MWK, EVV7UJB, BEART, S9NXSRSC     Type & Screen (If Applicable):  No results found for: LABABO, LABRH    Drug/Infectious Status (If Applicable):  No results found for: HIV, HEPCAB    COVID-19 Screening (If Applicable):   Lab Results   Component Value Date/Time    COVID19 Not Detected 02/08/2021 12:00 AM           Anesthesia Evaluation  Patient summary reviewed and Nursing notes reviewed   history of anesthetic complications (required glidescope in past): history of difficult intubation.   Airway: Mallampati: III  TM distance: >3 FB   Neck ROM: full  Mouth opening: > = 3 FB

## 2023-08-24 NOTE — PROGRESS NOTES
TRANSFER - IN REPORT:    Verbal report received from SARAN Hatch on Sujatha Taylor  being received from PACU for routine post-op      Report consisted of patient's Situation, Background, Assessment and   Recommendations(SBAR). Information from the following report(s) Nurse Handoff Report, Surgery Report, and MAR was reviewed with the receiving nurse. Opportunity for questions and clarification was provided. Assessment completed upon patient's arrival to unit and care assumed.

## 2023-08-24 NOTE — PERIOP NOTE
TRANSFER - OUT REPORT:    Verbal report given to Damian RN on Betsy Sicard  being transferred to [facility] 45 07 for routine post-op       Report consisted of patient's Situation, Background, Assessment and   Recommendations(SBAR). Information from the following report(s) Nurse Handoff Report was reviewed with the receiving nurse. Lines:   Peripheral IV 08/24/23 Posterior;Right Wrist (Active)   Site Assessment Clean, dry & intact 08/24/23 1525   Line Status Infusing 08/24/23 1525   Phlebitis Assessment No symptoms 08/24/23 1525   Infiltration Assessment 0 08/24/23 1525   Dressing Status Clean, dry & intact 08/24/23 1525   Dressing Type Transparent 08/24/23 1525        Opportunity for questions and clarification was provided.       Patient transported with:  O2 @ 3lpm

## 2023-08-24 NOTE — DISCHARGE INSTRUCTIONS
Bariatric Surgery Discharge Instructions    Surgeon: Dr. Gracia Figueredo    Follow up: Follow up with your surgeon or physician assistant as previously scheduled in 1-2 weeks. [address] [place] [address] Surgical [facility]  Office number: [phone]    Diet:  When discharged from the hospital, you may begin clear and full liquid diet plus protein supplements  Start with clear liquids and progress to full liquids as tolerated  Goals: 60 grams of protein per day, 64 ounces of fluid per day  Avoid carbonated beverages and straws, avoid excessive air swallowing when drinking/eating, minimize caffeine intake. No alcohol. Wound care:  Surgical glue will flake off in 7-10 days; the edges of steri-strips may come up but leave them in place until your follow up appointment  May shower following surgery. It is okay to get soap and water on all wounds when showering. Do not soak wounds under water (bath, pool, hot tub) until healed about three weeks after surgery. Activity:  No lifting more than 20 lbs for 3-4 weeks. No repetitive abdominal straining (sit-ups, push-ups, crunches, pull-ups, squats), for 3 weeks. Okay to perform normal activities of daily living and walk up stairs. Walk daily. Continue deep breathing and use incentive spirometer at home. Return to school or work when you fee comfortable. Typically 1-2 weeks for a desk job or up to 4 weeks for a manual labor job. You may resume driving when you have minimal pain and are not taking narcotic pain medication. Medications: You will have been prescribed the following medications prior to discharge:  Percocet (5mg): take one pill by mouth every 4 hours as needed for pain  Zofran (8mg): take one pill by mouth every 8 hours for nausea or vomiting  Omeprazole (40mg): take one pill by mouth daily for 90 days  Use an over the counter stool softener (Miralax) or laxative (Ducolax, milk of magnesium) if you feel constipated.  You may not have a

## 2023-08-24 NOTE — ANESTHESIA POSTPROCEDURE EVALUATION
Department of Anesthesiology  Postprocedure Note    Patient: Kelsie Cedillo  MRN: [id number]  YOB: 1965  Date of evaluation: 8/24/2023      Procedure Summary     Date: 08/24/23 Room / Location: [facility]  / [facility]    Anesthesia Start: 1159 Anesthesia Stop: 1449    Procedures:       ERAS/ GASTRIC BYPASS PERLA-EN-Y LAPAROSCOPIC ROBOTIC/ LIVER BIOPSY (Abdomen)      EGD ESOPHAGOGASTRODUODENOSCOPY (Esophagus) Diagnosis:       Morbid obesity ([address])      (Morbid obesity ([address]) [E66.01])    Surgeons: Bradford Bailey MD Responsible Provider: Meg Shipman MD    Anesthesia Type: General ASA Status: 3          Anesthesia Type: General    Sivan Phase I: Sivan Score: 8    Sivan Phase II:        Anesthesia Post Evaluation    Patient location during evaluation: bedside  Patient participation: complete - patient participated  Level of consciousness: awake and alert  Airway patency: patent  Nausea & Vomiting: no vomiting  Complications: no  Cardiovascular status: hemodynamically stable  Respiratory status: acceptable  Hydration status: euvolemic  Pain management: adequate

## 2023-08-25 LAB
ANION GAP SERPL CALC-SCNC: 4 MMOL/L (ref 2–11)
BASOPHILS # BLD: 0 K/UL (ref 0–0.2)
BASOPHILS NFR BLD: 0 % (ref 0–2)
BUN SERPL-MCNC: 16 MG/DL (ref 6–23)
CALCIUM SERPL-MCNC: 9 MG/DL (ref 8.3–10.4)
CHLORIDE SERPL-SCNC: 109 MMOL/L (ref 101–110)
CO2 SERPL-SCNC: 27 MMOL/L (ref 21–32)
CREAT SERPL-MCNC: 0.84 MG/DL (ref 0.6–1)
DIFFERENTIAL METHOD BLD: ABNORMAL
EOSINOPHIL # BLD: 0 K/UL (ref 0–0.8)
EOSINOPHIL NFR BLD: 0 % (ref 0.5–7.8)
ERYTHROCYTE [DISTWIDTH] IN BLOOD BY AUTOMATED COUNT: 14.4 % (ref 11.9–14.6)
GLUCOSE SERPL-MCNC: 96 MG/DL (ref 65–100)
HCT VFR BLD AUTO: 42.1 % (ref 35.8–46.3)
HGB BLD-MCNC: 12.7 G/DL (ref 11.7–15.4)
IMM GRANULOCYTES # BLD AUTO: 0 K/UL (ref 0–0.5)
IMM GRANULOCYTES NFR BLD AUTO: 0 % (ref 0–5)
LYMPHOCYTES # BLD: 1.8 K/UL (ref 0.5–4.6)
LYMPHOCYTES NFR BLD: 20 % (ref 13–44)
MCH RBC QN AUTO: 26.4 PG (ref 26.1–32.9)
MCHC RBC AUTO-ENTMCNC: 30.2 G/DL (ref 31.4–35)
MCV RBC AUTO: 87.5 FL (ref 82–102)
MONOCYTES # BLD: 0.9 K/UL (ref 0.1–1.3)
MONOCYTES NFR BLD: 10 % (ref 4–12)
NEUTS SEG # BLD: 6.2 K/UL (ref 1.7–8.2)
NEUTS SEG NFR BLD: 69 % (ref 43–78)
NRBC # BLD: 0 K/UL (ref 0–0.2)
PLATELET # BLD AUTO: 152 K/UL (ref 150–450)
PMV BLD AUTO: 11.7 FL (ref 9.4–12.3)
POTASSIUM SERPL-SCNC: 4.9 MMOL/L (ref 3.5–5.1)
RBC # BLD AUTO: 4.81 M/UL (ref 4.05–5.2)
SODIUM SERPL-SCNC: 140 MMOL/L (ref 133–143)
WBC # BLD AUTO: 8.9 K/UL (ref 4.3–11.1)

## 2023-08-25 PROCEDURE — A4216 STERILE WATER/SALINE, 10 ML: HCPCS | Performed by: PHYSICIAN ASSISTANT

## 2023-08-25 PROCEDURE — 6370000000 HC RX 637 (ALT 250 FOR IP): Performed by: PHYSICIAN ASSISTANT

## 2023-08-25 PROCEDURE — 96376 TX/PRO/DX INJ SAME DRUG ADON: CPT

## 2023-08-25 PROCEDURE — 2700000000 HC OXYGEN THERAPY PER DAY

## 2023-08-25 PROCEDURE — 96366 THER/PROPH/DIAG IV INF ADDON: CPT

## 2023-08-25 PROCEDURE — 96375 TX/PRO/DX INJ NEW DRUG ADDON: CPT

## 2023-08-25 PROCEDURE — C9113 INJ PANTOPRAZOLE SODIUM, VIA: HCPCS | Performed by: PHYSICIAN ASSISTANT

## 2023-08-25 PROCEDURE — 2580000003 HC RX 258: Performed by: PHYSICIAN ASSISTANT

## 2023-08-25 PROCEDURE — 96372 THER/PROPH/DIAG INJ SC/IM: CPT

## 2023-08-25 PROCEDURE — 97530 THERAPEUTIC ACTIVITIES: CPT

## 2023-08-25 PROCEDURE — 6370000000 HC RX 637 (ALT 250 FOR IP): Performed by: NURSE PRACTITIONER

## 2023-08-25 PROCEDURE — APPSS30 APP SPLIT SHARED TIME 16-30 MINUTES: Performed by: PHYSICIAN ASSISTANT

## 2023-08-25 PROCEDURE — G0378 HOSPITAL OBSERVATION PER HR: HCPCS

## 2023-08-25 PROCEDURE — 94760 N-INVAS EAR/PLS OXIMETRY 1: CPT

## 2023-08-25 PROCEDURE — 94660 CPAP INITIATION&MGMT: CPT

## 2023-08-25 PROCEDURE — 85025 COMPLETE CBC W/AUTO DIFF WBC: CPT

## 2023-08-25 PROCEDURE — 96365 THER/PROPH/DIAG IV INF INIT: CPT

## 2023-08-25 PROCEDURE — 80048 BASIC METABOLIC PNL TOTAL CA: CPT

## 2023-08-25 PROCEDURE — 36415 COLL VENOUS BLD VENIPUNCTURE: CPT

## 2023-08-25 PROCEDURE — 94761 N-INVAS EAR/PLS OXIMETRY MLT: CPT

## 2023-08-25 PROCEDURE — 97161 PT EVAL LOW COMPLEX 20 MIN: CPT

## 2023-08-25 PROCEDURE — 6360000002 HC RX W HCPCS: Performed by: PHYSICIAN ASSISTANT

## 2023-08-25 RX ORDER — AMLODIPINE BESYLATE 5 MG/1
5 TABLET ORAL DAILY
Status: DISCONTINUED | OUTPATIENT
Start: 2023-08-25 | End: 2023-08-26 | Stop reason: HOSPADM

## 2023-08-25 RX ADMIN — ACETAMINOPHEN 650 MG: 325 TABLET, FILM COATED ORAL at 03:21

## 2023-08-25 RX ADMIN — GABAPENTIN 300 MG: 300 CAPSULE ORAL at 14:55

## 2023-08-25 RX ADMIN — SODIUM CHLORIDE, PRESERVATIVE FREE 10 ML: 5 INJECTION INTRAVENOUS at 09:33

## 2023-08-25 RX ADMIN — SODIUM CHLORIDE 40 MG: 9 INJECTION INTRAMUSCULAR; INTRAVENOUS; SUBCUTANEOUS at 09:31

## 2023-08-25 RX ADMIN — HEPARIN SODIUM 5000 UNITS: 5000 INJECTION INTRAVENOUS; SUBCUTANEOUS at 14:55

## 2023-08-25 RX ADMIN — PHENOL 1 SPRAY: 1.5 LIQUID ORAL at 02:10

## 2023-08-25 RX ADMIN — ACETAMINOPHEN 650 MG: 325 TABLET, FILM COATED ORAL at 17:35

## 2023-08-25 RX ADMIN — AMLODIPINE BESYLATE 5 MG: 5 TABLET ORAL at 11:02

## 2023-08-25 RX ADMIN — LEVOTHYROXINE SODIUM 137 MCG: 0.11 TABLET ORAL at 11:00

## 2023-08-25 RX ADMIN — POTASSIUM CHLORIDE AND SODIUM CHLORIDE: 900; 150 INJECTION, SOLUTION INTRAVENOUS at 03:20

## 2023-08-25 RX ADMIN — KETOROLAC TROMETHAMINE 30 MG: 30 INJECTION, SOLUTION INTRAMUSCULAR; INTRAVENOUS at 01:43

## 2023-08-25 RX ADMIN — HEPARIN SODIUM 5000 UNITS: 5000 INJECTION INTRAVENOUS; SUBCUTANEOUS at 22:01

## 2023-08-25 RX ADMIN — ACETAMINOPHEN 650 MG: 325 TABLET, FILM COATED ORAL at 09:30

## 2023-08-25 RX ADMIN — KETOROLAC TROMETHAMINE 30 MG: 30 INJECTION, SOLUTION INTRAMUSCULAR; INTRAVENOUS at 09:31

## 2023-08-25 RX ADMIN — POTASSIUM CHLORIDE AND SODIUM CHLORIDE: 900; 150 INJECTION, SOLUTION INTRAVENOUS at 19:29

## 2023-08-25 RX ADMIN — SUCRALFATE 1 G: 1 TABLET ORAL at 05:21

## 2023-08-25 RX ADMIN — GABAPENTIN 300 MG: 300 CAPSULE ORAL at 22:00

## 2023-08-25 RX ADMIN — HEPARIN SODIUM 5000 UNITS: 5000 INJECTION INTRAVENOUS; SUBCUTANEOUS at 05:21

## 2023-08-25 RX ADMIN — SUCRALFATE 1 G: 1 TABLET ORAL at 17:35

## 2023-08-25 RX ADMIN — OXYCODONE HYDROCHLORIDE 5 MG: 5 TABLET ORAL at 22:10

## 2023-08-25 RX ADMIN — ACETAMINOPHEN 650 MG: 325 TABLET, FILM COATED ORAL at 23:36

## 2023-08-25 RX ADMIN — SUCRALFATE 1 G: 1 TABLET ORAL at 14:55

## 2023-08-25 RX ADMIN — GABAPENTIN 300 MG: 300 CAPSULE ORAL at 09:31

## 2023-08-25 ASSESSMENT — PAIN DESCRIPTION - DESCRIPTORS
DESCRIPTORS: BURNING
DESCRIPTORS: DISCOMFORT;CRAMPING
DESCRIPTORS: THROBBING;SHARP

## 2023-08-25 ASSESSMENT — PAIN SCALES - GENERAL
PAINLEVEL_OUTOF10: 8
PAINLEVEL_OUTOF10: 4
PAINLEVEL_OUTOF10: 3
PAINLEVEL_OUTOF10: 9
PAINLEVEL_OUTOF10: 3
PAINLEVEL_OUTOF10: 3

## 2023-08-25 ASSESSMENT — PAIN DESCRIPTION - ORIENTATION
ORIENTATION: ANTERIOR;POSTERIOR
ORIENTATION: MID
ORIENTATION: MID

## 2023-08-25 ASSESSMENT — PAIN DESCRIPTION - LOCATION
LOCATION: ABDOMEN;BACK
LOCATION: ABDOMEN
LOCATION: THROAT
LOCATION: ABDOMEN;THROAT

## 2023-08-25 NOTE — CARE COORDINATION
08/25/23 1441   Service Assessment   Patient Orientation Alert and Oriented   Cognition Alert   History Provided By Patient   Primary [facility]   Patient's Healthcare Decision Maker is: Legal Next of Kin   PCP Verified by CM Yes   Last Visit to PCP Within last 3 months   Prior Functional Level Independent in ADLs/IADLs   Current Functional Level Independent in ADLs/IADLs   Can patient return to prior living arrangement Yes   Ability to make needs known: Good   Would you like for me to discuss the discharge plan with any other family members/significant others, and if so, who? No   Financial Resources [employer] None   Social/Functional History   Lives With Daughter   Type of [address] [facility] One level   Home Access Stairs to enter with rails   Occupation On disability   Condition of Participation: Discharge Planning   The Plan for Transition of Care is related to the following treatment goals: return home   The Patient and/or Patient Representative was provided with a Choice of Provider? Patient   The Patient and/Or Patient Representative agree with the Discharge Plan? Yes   Freedom of Choice list was provided with basic dialogue that supports the patient's individualized plan of care/goals, treatment preferences, and shares the quality data associated with the providers? Yes     Assessment completed with patient at bedside. patient alert and oriented. Patient lives with daughter in a apartment. Patient is IND at baseline. Pcp and demographics confirmed. Patient may need o2 at discharge, cm following for discharge needs.     Idamae Spurling [facility], [address] [facility]

## 2023-08-25 NOTE — PROGRESS NOTES
ACUTE PHYSICAL THERAPY GOALS:   (Developed with and agreed upon by patient and/or caregiver.)  STG:  (1.)Ms. Noris Thibodeaux will move from supine to sit and sit to supine  with INDEPENDENCE within 3 treatment day(s). (2.)Ms. Noris Thibodeaux will transfer from bed to chair and chair to bed with INDEPENDENCE within 3 treatment day(s). (3.)Ms. Noris Thibodeaux will ambulate with INDEPENDENCE for 650 feet within 3 treatment day(s). (4.)Pt. will climb up/down 28 steps with rails and CGA within 3 days  (5.)Pt. will perform PT HEP independently within 3 days      ________________________________________________________________________________________________     PHYSICAL THERAPY Initial Assessment and AM  (Link to Caseload Tracking: PT Visit Days : 1  Acknowledge Orders  Time In/Out  PT Charge Capture  Rehab Caseload Tracker    Nandini Walton is a 61 y.o. female   PRIMARY DIAGNOSIS: Morbid (severe) obesity due to excess calories ([address])  Morbid obesity ([address]) [E66.01]  Procedure(s) (LRB):  ERAS/ GASTRIC BYPASS PERLA-EN-Y LAPAROSCOPIC ROBOTIC/ LIVER BIOPSY (N/A)  EGD ESOPHAGOGASTRODUODENOSCOPY (N/A)  1 Day Post-Op  Reason for Referral: Generalized Muscle Weakness (M62.81)  Other abnormalities of gait and mobility (R26.89)  Observation: Payor: [employer] [place] MEDICAID / Plan: Elsa [employer] / Product Type: *No Product type* /     ASSESSMENT:     REHAB RECOMMENDATIONS:   Recommendation to date pending progress:  Setting:  No further skilled physical therapy after discharge from hospital  Will perform PT HEP on her own    Equipment:    None     ASSESSMENT:  Ms. Noris Thibodeaux is S/p gastric sleeve surgery and is exhibiting a decline in her premorbid level of function. She would benefit from further PT while here to address these deficits:decreased general strength and endurance, standing balance, functional mobility and increased dizziness with ambulation. She plans on returning home at [place] with the support of her daughter.  She Independent, S=Supervision, SBA=Standby Assistance, CGA=Contact Guard Assistance,   Min=Minimal Assistance, Mod=Moderate Assistance, Max=Maximal Assistance, Total=Total Assistance, NT=Not Tested    PLAN:   FREQUENCY AND DURATION: Daily for duration of hospital stay or until stated goals are met, whichever comes first.    THERAPY PROGNOSIS: Good    PROBLEM LIST:   (Skilled intervention is medically necessary to address:)  Decreased ADL/Functional Activities  Decreased Activity Tolerance  Decreased Balance  Decreased Gait Ability  Decreased Transfer Abilities  Increased Pain INTERVENTIONS PLANNED:   (Benefits and precautions of physical therapy have been discussed with the patient.)  Self Care Training  Therapeutic Activity  Therapeutic Exercise/HEP  Gait Training  Education       TREATMENT:   EVALUATION: LOW COMPLEXITY: (Untimed Charge)    TREATMENT:   Therapeutic Activity (15 Minutes): Therapeutic activity included Rolling, Supine to Sit, Scooting, Transfer Training, Ambulation on level ground, Stair Training, Standing balance, and Instruction and performance of PT HEP to improve functional Activity tolerance, Balance, and Mobility.     TREATMENT GRID:   Date:  8/25/23 Date:   Date:     Activity/Exercise Parameters Parameters Parameters   Ankle Pumps 10     Long Arc Quads 10     Seated Hip Flexion 10     Elbow Flexion 10     Shoulder Flexion 10                     AFTER TREATMENT PRECAUTIONS: Bed/Chair Locked, Call light within reach, Chair, Needs within reach, and PA at bedside    INTERDISCIPLINARY COLLABORATION:  MD/ PA/ NP , RN/ PCT, and PT/ PTA    EDUCATION: Education Given To: Patient  Education Provided: Role of Therapy;Plan of Care;Home Exercise Program;Precautions  Education Method: Demonstration;Verbal;Teach Back;Printed Information/Hand-outs  Education Outcome: Verbalized understanding;Demonstrated understanding    TIME IN/OUT:  Time In: 0700  Time Out: 0725  Minutes: [facility],

## 2023-08-26 VITALS
HEIGHT: 66 IN | BODY MASS INDEX: 44.03 KG/M2 | HEART RATE: 78 BPM | RESPIRATION RATE: 16 BRPM | SYSTOLIC BLOOD PRESSURE: 139 MMHG | OXYGEN SATURATION: 93 % | WEIGHT: 274 LBS | DIASTOLIC BLOOD PRESSURE: 77 MMHG | TEMPERATURE: 99 F

## 2023-08-26 LAB
ANION GAP SERPL CALC-SCNC: 5 MMOL/L (ref 2–11)
BASOPHILS # BLD: 0 K/UL (ref 0–0.2)
BASOPHILS NFR BLD: 0 % (ref 0–2)
BUN SERPL-MCNC: 11 MG/DL (ref 6–23)
CALCIUM SERPL-MCNC: 9.1 MG/DL (ref 8.3–10.4)
CHLORIDE SERPL-SCNC: 109 MMOL/L (ref 101–110)
CO2 SERPL-SCNC: 28 MMOL/L (ref 21–32)
CREAT SERPL-MCNC: 0.6 MG/DL (ref 0.6–1)
DIFFERENTIAL METHOD BLD: ABNORMAL
EOSINOPHIL # BLD: 0.2 K/UL (ref 0–0.8)
EOSINOPHIL NFR BLD: 2 % (ref 0.5–7.8)
ERYTHROCYTE [DISTWIDTH] IN BLOOD BY AUTOMATED COUNT: 14 % (ref 11.9–14.6)
GLUCOSE SERPL-MCNC: 83 MG/DL (ref 65–100)
HCT VFR BLD AUTO: 41.1 % (ref 35.8–46.3)
HGB BLD-MCNC: 12.5 G/DL (ref 11.7–15.4)
IMM GRANULOCYTES # BLD AUTO: 0 K/UL (ref 0–0.5)
IMM GRANULOCYTES NFR BLD AUTO: 0 % (ref 0–5)
LYMPHOCYTES # BLD: 2.3 K/UL (ref 0.5–4.6)
LYMPHOCYTES NFR BLD: 26 % (ref 13–44)
MCH RBC QN AUTO: 26.2 PG (ref 26.1–32.9)
MCHC RBC AUTO-ENTMCNC: 30.4 G/DL (ref 31.4–35)
MCV RBC AUTO: 86.2 FL (ref 82–102)
MONOCYTES # BLD: 0.8 K/UL (ref 0.1–1.3)
MONOCYTES NFR BLD: 9 % (ref 4–12)
NEUTS SEG # BLD: 5.6 K/UL (ref 1.7–8.2)
NEUTS SEG NFR BLD: 63 % (ref 43–78)
NRBC # BLD: 0 K/UL (ref 0–0.2)
PLATELET # BLD AUTO: 163 K/UL (ref 150–450)
PMV BLD AUTO: 11.3 FL (ref 9.4–12.3)
POTASSIUM SERPL-SCNC: 4.7 MMOL/L (ref 3.5–5.1)
RBC # BLD AUTO: 4.77 M/UL (ref 4.05–5.2)
SODIUM SERPL-SCNC: 142 MMOL/L (ref 133–143)
WBC # BLD AUTO: 8.9 K/UL (ref 4.3–11.1)

## 2023-08-26 PROCEDURE — 2580000003 HC RX 258: Performed by: PHYSICIAN ASSISTANT

## 2023-08-26 PROCEDURE — 47379 UNLISTED LAPS PX LIVER: CPT | Performed by: SURGERY

## 2023-08-26 PROCEDURE — 94761 N-INVAS EAR/PLS OXIMETRY MLT: CPT

## 2023-08-26 PROCEDURE — G0378 HOSPITAL OBSERVATION PER HR: HCPCS

## 2023-08-26 PROCEDURE — 6360000002 HC RX W HCPCS: Performed by: PHYSICIAN ASSISTANT

## 2023-08-26 PROCEDURE — 6370000000 HC RX 637 (ALT 250 FOR IP): Performed by: PHYSICIAN ASSISTANT

## 2023-08-26 PROCEDURE — 6370000000 HC RX 637 (ALT 250 FOR IP): Performed by: SURGERY

## 2023-08-26 PROCEDURE — 85025 COMPLETE CBC W/AUTO DIFF WBC: CPT

## 2023-08-26 PROCEDURE — 96372 THER/PROPH/DIAG INJ SC/IM: CPT

## 2023-08-26 PROCEDURE — 80048 BASIC METABOLIC PNL TOTAL CA: CPT

## 2023-08-26 PROCEDURE — 97530 THERAPEUTIC ACTIVITIES: CPT

## 2023-08-26 PROCEDURE — 96366 THER/PROPH/DIAG IV INF ADDON: CPT

## 2023-08-26 PROCEDURE — 2700000000 HC OXYGEN THERAPY PER DAY

## 2023-08-26 PROCEDURE — 36415 COLL VENOUS BLD VENIPUNCTURE: CPT

## 2023-08-26 RX ORDER — PANTOPRAZOLE SODIUM 40 MG/1
40 TABLET, DELAYED RELEASE ORAL
Status: DISCONTINUED | OUTPATIENT
Start: 2023-08-26 | End: 2023-08-26 | Stop reason: HOSPADM

## 2023-08-26 RX ADMIN — AMLODIPINE BESYLATE 5 MG: 5 TABLET ORAL at 08:56

## 2023-08-26 RX ADMIN — SUCRALFATE 1 G: 1 TABLET ORAL at 00:38

## 2023-08-26 RX ADMIN — GABAPENTIN 300 MG: 300 CAPSULE ORAL at 09:40

## 2023-08-26 RX ADMIN — LEVOTHYROXINE SODIUM 137 MCG: 0.11 TABLET ORAL at 05:49

## 2023-08-26 RX ADMIN — PANTOPRAZOLE SODIUM 40 MG: 40 TABLET, DELAYED RELEASE ORAL at 08:56

## 2023-08-26 RX ADMIN — HEPARIN SODIUM 5000 UNITS: 5000 INJECTION INTRAVENOUS; SUBCUTANEOUS at 05:51

## 2023-08-26 RX ADMIN — SODIUM CHLORIDE, PRESERVATIVE FREE 10 ML: 5 INJECTION INTRAVENOUS at 08:56

## 2023-08-26 RX ADMIN — OXYCODONE HYDROCHLORIDE 5 MG: 5 TABLET ORAL at 05:48

## 2023-08-26 RX ADMIN — ACETAMINOPHEN 650 MG: 325 TABLET, FILM COATED ORAL at 05:49

## 2023-08-26 RX ADMIN — POTASSIUM CHLORIDE AND SODIUM CHLORIDE: 900; 150 INJECTION, SOLUTION INTRAVENOUS at 04:34

## 2023-08-26 RX ADMIN — SUCRALFATE 1 G: 1 TABLET ORAL at 06:40

## 2023-08-26 ASSESSMENT — PAIN DESCRIPTION - ORIENTATION: ORIENTATION: MID;LOWER;RIGHT

## 2023-08-26 ASSESSMENT — PAIN DESCRIPTION - DESCRIPTORS: DESCRIPTORS: SHARP

## 2023-08-26 ASSESSMENT — PAIN DESCRIPTION - LOCATION
LOCATION: BACK;HIP
LOCATION: ABDOMEN;HIP

## 2023-08-26 ASSESSMENT — PAIN SCALES - GENERAL
PAINLEVEL_OUTOF10: 8
PAINLEVEL_OUTOF10: 2

## 2023-08-26 NOTE — PROGRESS NOTES
ACUTE PHYSICAL THERAPY GOALS:   (Developed with and agreed upon by patient and/or caregiver.)  STG:  (1.)Ms. Meche Hernadez will move from supine to sit and sit to supine  with INDEPENDENCE within 3 treatment day(s). (2.)Ms. Meche Hernadez will transfer from bed to chair and chair to bed with INDEPENDENCE within 3 treatment day(s). MET 8/26/23  (3.)Ms. Meche Hernadez will ambulate with INDEPENDENCE for 650 feet within 3 treatment day(s). (4.)Pt. will climb up/down 28 steps with rails and CGA within 3 days  (5.)Pt. will perform PT HEP independently within 3 days      ________________________________________________________________________________________________     PHYSICAL THERAPY Daily Note and AM  (Link to Caseload Tracking: PT Visit Days : 2  Acknowledge Orders  Time In/Out  PT Charge Capture  Rehab Caseload Tracker    Dori Elkins is a 61 y.o. female   PRIMARY DIAGNOSIS: Morbid (severe) obesity due to excess calories ([address])  Morbid obesity ([address]) [E66.01]  Procedure(s) (LRB):  ERAS/ GASTRIC BYPASS PERLA-EN-Y LAPAROSCOPIC ROBOTIC/ LIVER BIOPSY (N/A)  EGD ESOPHAGOGASTRODUODENOSCOPY (N/A)  2 Days Post-Op  Reason for Referral: Generalized Muscle Weakness (M62.81)  Other abnormalities of gait and mobility (R26.89)  Observation: Payor: [place] / Plan: [place] / Product Type: *No Product type* /     ASSESSMENT:     REHAB RECOMMENDATIONS:   Recommendation to date pending progress:  Setting:  No further skilled physical therapy after discharge from hospital  Will perform PT HEP on her own    Equipment:    None     ASSESSMENT:  Ms. Meche Hernadez is S/p gastric sleeve surgery and is exhibiting a decline in her premorbid level of function. She would benefit from further PT while here to address these deficits:decreased general strength and endurance, standing balance, functional mobility and increased dizziness with ambulation. She plans on returning home at [place] with the support of her daughter.

## 2023-08-28 NOTE — PROGRESS NOTES
23     Patient: Nandini Walton     :Luciana Kelleycasie Morelia         To Whom It May Concern: On 23, I performed a robotic assisted laparoscopic wedge liver biopsy on Nandini Walton. There is currently no CPT code available for laparoscopic wedge liver biopsy therefore unlisted code CPT 50731 is being reported in comparison to CPT 88205. The laparoscopic approach is generally more beneficial, as it has a quicker recovery time and less pain for the patient. The work and time involved in laparoscopic approach is about the same as with the open approach.      Sincerely,     Anjel Bledsoe MD  Bariatric & Minimally Invasive Surgery  [facility]  2023 1:14 PM

## 2023-08-30 NOTE — PROGRESS NOTES
daily as needed for Nausea or Vomiting 30 tablet 3    omeprazole (PRILOSEC) 40 MG delayed release capsule Take 1 capsule by mouth every morning (before breakfast) 90 capsule 0    traMADol (ULTRAM) 50 MG tablet Take 1 tablet by mouth 2 times daily as needed for Pain for up to 90 days. Max Daily Amount: 100 mg 60 tablet 2    acetaminophen (TYLENOL) 500 MG tablet Take 2 tablets by mouth in the morning and at bedtime      amLODIPine (NORVASC) 5 MG tablet TAKE ONE TABLET BY MOUTH ONE TIME DAILY 90 tablet 3    levothyroxine (SYNTHROID) 137 MCG tablet Take 1 tablet by mouth Daily 90 tablet 3    triamcinolone (KENALOG) 0.1 % cream Apply topically 2 times daily. 28.4 g 1    albuterol sulfate HFA (PROVENTIL;VENTOLIN;PROAIR) 108 (90 Base) MCG/ACT inhaler Inhale 2 puffs into the lungs every 4 hours as needed for Wheezing 18 g 5    traZODone (DESYREL) 150 MG tablet Take 1 tablet by mouth nightly 90 tablet 3     No current facility-administered medications for this visit. ALLERGIES:    Allergies   Allergen Reactions    Diltiazem Hcl Other (See Comments)     Blistering on the chest and arms          SH:  Social History     Tobacco Use    Smoking status: Never    Smokeless tobacco: Never   Vaping Use    Vaping Use: Never used   Substance Use Topics    Alcohol use: Not Currently    Drug use: Never       FH:  Family History   Problem Relation Age of Onset    Kidney Disease Mother         Stage 4 renal failure    Stroke Mother     Liver Disease Father     Diabetes Father     Heart Disease Father     Cancer Sister         Cervical    Thyroid Disease Daughter         Hypothyroidism    Thyroid Disease Son         Hyperthyroidism    Cancer Paternal Aunt         Cervical    Breast Cancer Neg Hx        Review of systems:  The patient has no difficulty with chest pain or shortness of breath. No fever or chills. Comprehensive 13 point review of systems was otherwise unremarkable except as noted above.      Physical Exam:     BP (!)

## 2023-08-31 ENCOUNTER — OFFICE VISIT (OUTPATIENT)
Dept: SURGERY | Age: 58
End: 2023-08-31

## 2023-08-31 VITALS
SYSTOLIC BLOOD PRESSURE: 145 MMHG | WEIGHT: 255 LBS | DIASTOLIC BLOOD PRESSURE: 97 MMHG | HEIGHT: 66 IN | HEART RATE: 92 BPM | BODY MASS INDEX: 40.98 KG/M2

## 2023-08-31 DIAGNOSIS — T14.8XXA BRUISING: ICD-10-CM

## 2023-08-31 DIAGNOSIS — Z71.82 EXERCISE COUNSELING: ICD-10-CM

## 2023-08-31 DIAGNOSIS — Z98.84 S/P GASTRIC BYPASS: ICD-10-CM

## 2023-08-31 DIAGNOSIS — Z71.3 DIETARY COUNSELING: ICD-10-CM

## 2023-08-31 DIAGNOSIS — E66.01 MORBID OBESITY (HCC): Primary | ICD-10-CM

## 2023-08-31 DIAGNOSIS — E66.01 OBESITY, CLASS III, BMI 40-49.9 (MORBID OBESITY) (HCC): ICD-10-CM

## 2023-08-31 DIAGNOSIS — K59.1 FUNCTIONAL DIARRHEA: ICD-10-CM

## 2023-08-31 DIAGNOSIS — L76.82 INCISIONAL PAIN: ICD-10-CM

## 2023-08-31 PROCEDURE — 99024 POSTOP FOLLOW-UP VISIT: CPT | Performed by: PHYSICIAN ASSISTANT

## 2023-09-05 ENCOUNTER — HOSPITAL ENCOUNTER (OUTPATIENT)
Dept: LAB | Age: 58
Discharge: HOME | End: 2023-09-08
Payer: MEDICAID

## 2023-09-05 ENCOUNTER — OFFICE VISIT (OUTPATIENT)
Dept: ONCOLOGY | Age: 58
End: 2023-09-05
Payer: MEDICAID

## 2023-09-05 VITALS
OXYGEN SATURATION: 96 % | HEIGHT: 66 IN | HEART RATE: 82 BPM | TEMPERATURE: 97.1 F | SYSTOLIC BLOOD PRESSURE: 128 MMHG | DIASTOLIC BLOOD PRESSURE: 75 MMHG | WEIGHT: 250.3 LBS | BODY MASS INDEX: 40.22 KG/M2

## 2023-09-05 DIAGNOSIS — D50.9 IRON DEFICIENCY ANEMIA, UNSPECIFIED IRON DEFICIENCY ANEMIA TYPE: ICD-10-CM

## 2023-09-05 DIAGNOSIS — K74.69 OTHER CIRRHOSIS OF LIVER (HCC): ICD-10-CM

## 2023-09-05 DIAGNOSIS — C54.1 ENDOMETRIAL CANCER (HCC): Primary | ICD-10-CM

## 2023-09-05 LAB
ALBUMIN SERPL-MCNC: 3.5 G/DL (ref 3.5–5)
ALBUMIN/GLOB SERPL: 0.8 (ref 0.4–1.6)
ALP SERPL-CCNC: 124 U/L (ref 50–136)
ALT SERPL-CCNC: 54 U/L (ref 12–65)
ANION GAP SERPL CALC-SCNC: 9 MMOL/L (ref 2–11)
AST SERPL-CCNC: 57 U/L (ref 15–37)
BASOPHILS # BLD: 0 K/UL (ref 0–0.2)
BASOPHILS NFR BLD: 0 % (ref 0–2)
BILIRUB SERPL-MCNC: 0.8 MG/DL (ref 0.2–1.1)
BUN SERPL-MCNC: 18 MG/DL (ref 6–23)
CALCIUM SERPL-MCNC: 9.6 MG/DL (ref 8.3–10.4)
CHLORIDE SERPL-SCNC: 103 MMOL/L (ref 101–110)
CO2 SERPL-SCNC: 28 MMOL/L (ref 21–32)
CREAT SERPL-MCNC: 0.8 MG/DL (ref 0.6–1)
DIFFERENTIAL METHOD BLD: ABNORMAL
EOSINOPHIL # BLD: 0.1 K/UL (ref 0–0.8)
EOSINOPHIL NFR BLD: 1 % (ref 0.5–7.8)
ERYTHROCYTE [DISTWIDTH] IN BLOOD BY AUTOMATED COUNT: 15.1 % (ref 11.9–14.6)
FERRITIN SERPL-MCNC: 116 NG/ML (ref 8–388)
GLOBULIN SER CALC-MCNC: 4.5 G/DL (ref 2.8–4.5)
GLUCOSE SERPL-MCNC: 104 MG/DL (ref 65–100)
HCT VFR BLD AUTO: 45.9 % (ref 35.8–46.3)
HGB BLD-MCNC: 14.5 G/DL (ref 11.7–15.4)
HGB RETIC QN AUTO: 33 PG (ref 29–35)
IMM GRANULOCYTES # BLD AUTO: 0 K/UL (ref 0–0.5)
IMM GRANULOCYTES NFR BLD AUTO: 0 % (ref 0–5)
IMM RETICS NFR: 9.6 % (ref 3–15.9)
IRON SATN MFR SERPL: 22 %
IRON SERPL-MCNC: 66 UG/DL (ref 35–150)
LYMPHOCYTES # BLD: 1.4 K/UL (ref 0.5–4.6)
LYMPHOCYTES NFR BLD: 18 % (ref 13–44)
MCH RBC QN AUTO: 26 PG (ref 26.1–32.9)
MCHC RBC AUTO-ENTMCNC: 31.6 G/DL (ref 31.4–35)
MCV RBC AUTO: 82.4 FL (ref 82–102)
MONOCYTES # BLD: 0.8 K/UL (ref 0.1–1.3)
MONOCYTES NFR BLD: 10 % (ref 4–12)
NEUTS SEG # BLD: 5.4 K/UL (ref 1.7–8.2)
NEUTS SEG NFR BLD: 70 % (ref 43–78)
NRBC # BLD: 0 K/UL (ref 0–0.2)
PLATELET # BLD AUTO: 256 K/UL (ref 150–450)
PMV BLD AUTO: 10.6 FL (ref 9.4–12.3)
POTASSIUM SERPL-SCNC: 3.5 MMOL/L (ref 3.5–5.1)
PROT SERPL-MCNC: 8 G/DL (ref 6.3–8.2)
RBC # BLD AUTO: 5.57 M/UL (ref 4.05–5.2)
RETICS # AUTO: 0.12 M/UL (ref 0.03–0.1)
RETICS/RBC NFR AUTO: 2.1 % (ref 0.3–2)
SODIUM SERPL-SCNC: 140 MMOL/L (ref 133–143)
TIBC SERPL-MCNC: 296 UG/DL (ref 250–450)
WBC # BLD AUTO: 7.7 K/UL (ref 4.3–11.1)

## 2023-09-05 PROCEDURE — 3078F DIAST BP <80 MM HG: CPT | Performed by: INTERNAL MEDICINE

## 2023-09-05 PROCEDURE — 85046 RETICYTE/HGB CONCENTRATE: CPT

## 2023-09-05 PROCEDURE — 36415 COLL VENOUS BLD VENIPUNCTURE: CPT

## 2023-09-05 PROCEDURE — 83540 ASSAY OF IRON: CPT

## 2023-09-05 PROCEDURE — 82728 ASSAY OF FERRITIN: CPT

## 2023-09-05 PROCEDURE — 80053 COMPREHEN METABOLIC PANEL: CPT

## 2023-09-05 PROCEDURE — 85025 COMPLETE CBC W/AUTO DIFF WBC: CPT

## 2023-09-05 PROCEDURE — 3074F SYST BP LT 130 MM HG: CPT | Performed by: INTERNAL MEDICINE

## 2023-09-05 PROCEDURE — 99214 OFFICE O/P EST MOD 30 MIN: CPT | Performed by: INTERNAL MEDICINE

## 2023-09-05 PROCEDURE — 83550 IRON BINDING TEST: CPT

## 2023-09-05 ASSESSMENT — PATIENT HEALTH QUESTIONNAIRE - PHQ9: DEPRESSION UNABLE TO ASSESS: PT REFUSES

## 2023-09-05 NOTE — PATIENT INSTRUCTIONS
with pre-diabetes mellitus  > 126 mg/dl Fasting glucose consistent with overt diabetes mellitus      BUN 08/26/2023 11  6 - 23 MG/DL Final    Creatinine 08/26/2023 0.60  0.6 - 1.0 MG/DL Final    EstNicky Filt Rate 08/26/2023 >60  >60 ml/min/1.73m2 Final    Comment:    Pediatric calculator link: Nehemias.at. org/professionals/kdoqi/gfr_calculatorped     These results are not intended for use in patients <25years of age. eGFR results are calculated without a race factor using  the 2021 CKD-EPI equation. Careful clinical correlation is recommended, particularly when comparing to results calculated using previous equations. The CKD-EPI equation is less accurate in patients with extremes of muscle mass, extra-renal metabolism of creatinine, excessive creatine ingestion, or following therapy that affects renal tubular secretion.       Calcium 08/26/2023 9.1  8.3 - 10.4 MG/DL Final    WBC 08/26/2023 8.9  4.3 - 11.1 K/uL Final    RBC 08/26/2023 4.77  4.05 - 5.2 M/uL Final    Hemoglobin 08/26/2023 12.5  11.7 - 15.4 g/dL Final    Hematocrit 08/26/2023 41.1  35.8 - 46.3 % Final    MCV 08/26/2023 86.2  82.0 - 102.0 FL Final    MCH 08/26/2023 26.2  26.1 - 32.9 PG Final    MCHC 08/26/2023 30.4 (L)  31.4 - 35.0 g/dL Final    RDW 08/26/2023 14.0  11.9 - 14.6 % Final    Platelets 19/32/8285 163  150 - 450 K/uL Final    MPV 08/26/2023 11.3  9.4 - 12.3 FL Final    nRBC 08/26/2023 0.00  0.0 - 0.2 K/uL Final    **Note: Absolute NRBC parameter is now reported with Hemogram**    Differential Type 08/26/2023 AUTOMATED    Final    Neutrophils % 08/26/2023 63  43 - 78 % Final    Lymphocytes % 08/26/2023 26  13 - 44 % Final    Monocytes % 08/26/2023 9  4.0 - 12.0 % Final    Eosinophils % 08/26/2023 2  0.5 - 7.8 % Final    Basophils % 08/26/2023 0  0.0 - 2.0 % Final    Immature Granulocytes 08/26/2023 0  0.0 - 5.0 % Final    Neutrophils Absolute 08/26/2023 5.6  1.7 - 8.2 K/UL Final    Lymphocytes Absolute 08/26/2023 2.3  0.5 - 4.6

## 2023-09-05 NOTE — PROGRESS NOTES
Data Source: Patient, EPIC record. 9/5/2023    1:57 PM    Wan Kumari [id number]    61 y.o. Patient Encounter: [facility] Visit      Heme Diagnosis:  Anemia  Heme History (Copied from prior):   59-year-old  female, on disability, history of cirrhosis (mentioned in 2019 at time of cholecystectomy), iron deficiency anemia requiring PRBC and IV iron infusions in past, Afib s/p cardiac ablation (2018, not felt to need a/c), asthma, chronic lower back pain (on regular NSAID use), GERD, hypertension, sleep apnea, Graves' disease, endometrial cancer status post hysterectomy and now follows with Dr. Lorie Lee for surveillance, cholecystectomy, D&C uterus, thyroidectomy, now referred to us by primary care for Dr. Cheyenne Brothers for iron deficiency anemia. She reported history of being hospitalized in 6/22 in [facility] for anemia, requiring 2 units PRBC transfusion as well as multiple IV iron infusions (~ 6). She reports having EGD/colonoscopy which clear evidence of bleeding. She has also been referred to GI given reported history of cirrhosis. She denies any bleeding per rectum. Has noticed dark stool but attributes this to PO iron supplementation with some occasional constipation requiring OTC medications. Interval History:  9/5/23: Ms. Alba Raymundo returns today for follow up regarding her history of iron deficiency anemia. In the interval since her last visit, the patient underwent a gastric bypass on August 24, 2023. She is recovering from that surgery uneventfully. She is still on a limited diet. She has some residual pain but this is quickly fading. She has had an occasional bout of diarrhea but no vomiting. A liver biopsy performed during her gastric bypass was notable for the presence of fatty infiltration with surrounding inflammation. There was some evidence of periportal fibrosis. There is no bright red blood per rectum or melena.   In addition to the above, she

## 2023-09-12 ENCOUNTER — HOSPITAL ENCOUNTER (OUTPATIENT)
Dept: INTERVENTIONAL RADIOLOGY/VASCULAR | Age: 58
Discharge: HOME | End: 2023-09-15
Attending: PSYCHIATRY & NEUROLOGY
Payer: MEDICAID

## 2023-09-12 VITALS
OXYGEN SATURATION: 95 % | SYSTOLIC BLOOD PRESSURE: 142 MMHG | DIASTOLIC BLOOD PRESSURE: 75 MMHG | TEMPERATURE: 98 F | WEIGHT: 250 LBS | HEIGHT: 66 IN | HEART RATE: 75 BPM | RESPIRATION RATE: 16 BRPM | BODY MASS INDEX: 40.18 KG/M2

## 2023-09-12 DIAGNOSIS — G37.9 CNS DEMYELINATING DISEASE (HCC): ICD-10-CM

## 2023-09-12 DIAGNOSIS — C54.1 ENDOMETRIAL CANCER (HCC): Primary | ICD-10-CM

## 2023-09-12 LAB
APPEARANCE FLD: CLEAR
COLOR FLD: COLORLESS
GLUCOSE CSF-MCNC: 59 MG/DL (ref 50–70)
NUC CELL # FLD: 1 /CU MM
PROT CSF-MCNC: 28 MG/DL (ref 15–45)
RBC # FLD: 113 /CU MM
SPECIMEN SOURCE FLD: NORMAL
TUBE # CSF: NORMAL
TUBE # CSF: NORMAL

## 2023-09-12 PROCEDURE — 82784 ASSAY IGA/IGD/IGG/IGM EACH: CPT

## 2023-09-12 PROCEDURE — 87205 SMEAR GRAM STAIN: CPT

## 2023-09-12 PROCEDURE — 82042 OTHER SOURCE ALBUMIN QUAN EA: CPT

## 2023-09-12 PROCEDURE — 82040 ASSAY OF SERUM ALBUMIN: CPT

## 2023-09-12 PROCEDURE — 82945 GLUCOSE OTHER FLUID: CPT

## 2023-09-12 PROCEDURE — 83916 OLIGOCLONAL BANDS: CPT

## 2023-09-12 PROCEDURE — 2500000003 HC RX 250 WO HCPCS: Performed by: PHYSICIAN ASSISTANT

## 2023-09-12 PROCEDURE — 84157 ASSAY OF PROTEIN OTHER: CPT

## 2023-09-12 PROCEDURE — 62328 DX LMBR SPI PNXR W/FLUOR/CT: CPT

## 2023-09-12 PROCEDURE — 89050 BODY FLUID CELL COUNT: CPT

## 2023-09-12 PROCEDURE — 87070 CULTURE OTHR SPECIMN AEROBIC: CPT

## 2023-09-12 RX ORDER — LIDOCAINE HYDROCHLORIDE 10 MG/ML
INJECTION, SOLUTION EPIDURAL; INFILTRATION; INTRACAUDAL; PERINEURAL PRN
Status: COMPLETED | OUTPATIENT
Start: 2023-09-12 | End: 2023-09-12

## 2023-09-12 RX ADMIN — LIDOCAINE HYDROCHLORIDE 5 ML: 10 INJECTION, SOLUTION EPIDURAL; INFILTRATION; INTRACAUDAL; PERINEURAL at 13:07

## 2023-09-12 RX ADMIN — LIDOCAINE HYDROCHLORIDE 5 ML: 10 INJECTION, SOLUTION EPIDURAL; INFILTRATION; INTRACAUDAL; PERINEURAL at 13:11

## 2023-09-12 ASSESSMENT — PAIN - FUNCTIONAL ASSESSMENT: PAIN_FUNCTIONAL_ASSESSMENT: NONE - DENIES PAIN

## 2023-09-12 ASSESSMENT — ENCOUNTER SYMPTOMS
RESPIRATORY NEGATIVE: 1
GASTROINTESTINAL NEGATIVE: 1
ALLERGIC/IMMUNOLOGIC NEGATIVE: 1
EYES NEGATIVE: 1

## 2023-09-12 NOTE — PROGRESS NOTES
six months or prn  Cont with pcp              Dileep Patel MD  [facility]  [address] [place] [id number]  Office : [phone]  Fax : [phone]

## 2023-09-12 NOTE — OR NURSING
Patient back from procedure. Patient instructed to lay flat for 1 hour. Patient verbalized understanding. Call bell within reach. Family at bedside.

## 2023-09-13 ENCOUNTER — HOSPITAL ENCOUNTER (OUTPATIENT)
Dept: LAB | Age: 58
Discharge: HOME | End: 2023-09-16
Payer: MEDICAID

## 2023-09-13 ENCOUNTER — OFFICE VISIT (OUTPATIENT)
Dept: ONCOLOGY | Age: 58
End: 2023-09-13
Payer: MEDICAID

## 2023-09-13 ENCOUNTER — TELEPHONE (OUTPATIENT)
Dept: ONCOLOGY | Age: 58
End: 2023-09-13

## 2023-09-13 ENCOUNTER — OFFICE VISIT (OUTPATIENT)
Dept: SURGERY | Age: 58
End: 2023-09-13

## 2023-09-13 VITALS
HEART RATE: 80 BPM | WEIGHT: 244.8 LBS | HEIGHT: 66 IN | RESPIRATION RATE: 18 BRPM | SYSTOLIC BLOOD PRESSURE: 132 MMHG | TEMPERATURE: 98.1 F | DIASTOLIC BLOOD PRESSURE: 81 MMHG | OXYGEN SATURATION: 92 % | BODY MASS INDEX: 39.34 KG/M2

## 2023-09-13 VITALS
BODY MASS INDEX: 39.37 KG/M2 | HEART RATE: 86 BPM | HEIGHT: 66 IN | SYSTOLIC BLOOD PRESSURE: 138 MMHG | DIASTOLIC BLOOD PRESSURE: 82 MMHG | WEIGHT: 245 LBS

## 2023-09-13 DIAGNOSIS — Z71.82 EXERCISE COUNSELING: ICD-10-CM

## 2023-09-13 DIAGNOSIS — R30.0 DYSURIA: ICD-10-CM

## 2023-09-13 DIAGNOSIS — Z71.3 DIETARY COUNSELING: ICD-10-CM

## 2023-09-13 DIAGNOSIS — C54.1 ENDOMETRIAL CANCER (HCC): ICD-10-CM

## 2023-09-13 DIAGNOSIS — Z98.84 S/P GASTRIC BYPASS: ICD-10-CM

## 2023-09-13 DIAGNOSIS — E66.01 MORBID OBESITY (HCC): Primary | ICD-10-CM

## 2023-09-13 DIAGNOSIS — R30.0 DYSURIA: Primary | ICD-10-CM

## 2023-09-13 DIAGNOSIS — E66.9 OBESITY, CLASS II, BMI 35-39.9: ICD-10-CM

## 2023-09-13 DIAGNOSIS — K59.01 SLOW TRANSIT CONSTIPATION: ICD-10-CM

## 2023-09-13 LAB
APPEARANCE UR: ABNORMAL
BACTERIA URNS QL MICRO: ABNORMAL /HPF
BILIRUB UR QL: ABNORMAL
CASTS URNS QL MICRO: 0 /LPF
COLOR UR: ABNORMAL
CRYSTALS URNS QL MICRO: 0 /LPF
EPI CELLS #/AREA URNS HPF: ABNORMAL /HPF
GLUCOSE UR STRIP.AUTO-MCNC: NEGATIVE MG/DL
HGB UR QL STRIP: ABNORMAL
KETONES UR QL STRIP.AUTO: 80 MG/DL
LEUKOCYTE ESTERASE UR QL STRIP.AUTO: ABNORMAL
MUCOUS THREADS URNS QL MICRO: ABNORMAL /LPF
NITRITE UR QL STRIP.AUTO: NEGATIVE
PH UR STRIP: 5.5 (ref 5–9)
PROT UR STRIP-MCNC: 100 MG/DL
RBC #/AREA URNS HPF: >100 /HPF
SP GR UR REFRACTOMETRY: 1.02 (ref 1–1.02)
URINE CULTURE IF INDICATED: ABNORMAL
UROBILINOGEN UR QL STRIP.AUTO: 1 EU/DL
WBC URNS QL MICRO: >100 /HPF

## 2023-09-13 PROCEDURE — 99024 POSTOP FOLLOW-UP VISIT: CPT | Performed by: PHYSICIAN ASSISTANT

## 2023-09-13 PROCEDURE — 88142 CYTOPATH C/V THIN LAYER: CPT

## 2023-09-13 PROCEDURE — 3075F SYST BP GE 130 - 139MM HG: CPT | Performed by: OBSTETRICS & GYNECOLOGY

## 2023-09-13 PROCEDURE — 87086 URINE CULTURE/COLONY COUNT: CPT

## 2023-09-13 PROCEDURE — 81001 URINALYSIS AUTO W/SCOPE: CPT

## 2023-09-13 PROCEDURE — 3079F DIAST BP 80-89 MM HG: CPT | Performed by: OBSTETRICS & GYNECOLOGY

## 2023-09-13 PROCEDURE — 87186 SC STD MICRODIL/AGAR DIL: CPT

## 2023-09-13 PROCEDURE — 99213 OFFICE O/P EST LOW 20 MIN: CPT | Performed by: OBSTETRICS & GYNECOLOGY

## 2023-09-13 RX ORDER — CALCIUM CARBONATE/VITAMIN D3 600 MG-10
1 TABLET ORAL 2 TIMES DAILY
COMMUNITY

## 2023-09-13 RX ORDER — NITROFURANTOIN 25; 75 MG/1; MG/1
100 CAPSULE ORAL 2 TIMES DAILY
Qty: 14 CAPSULE | Refills: 0 | Status: SHIPPED | OUTPATIENT
Start: 2023-09-13 | End: 2023-09-20

## 2023-09-13 ASSESSMENT — ANXIETY QUESTIONNAIRES
6. BECOMING EASILY ANNOYED OR IRRITABLE: 0
2. NOT BEING ABLE TO STOP OR CONTROL WORRYING: 0
3. WORRYING TOO MUCH ABOUT DIFFERENT THINGS: 0
1. FEELING NERVOUS, ANXIOUS, OR ON EDGE: 0
5. BEING SO RESTLESS THAT IT IS HARD TO SIT STILL: 0
4. TROUBLE RELAXING: 0
GAD7 TOTAL SCORE: 0
7. FEELING AFRAID AS IF SOMETHING AWFUL MIGHT HAPPEN: 0
IF YOU CHECKED OFF ANY PROBLEMS ON THIS QUESTIONNAIRE, HOW DIFFICULT HAVE THESE PROBLEMS MADE IT FOR YOU TO DO YOUR WORK, TAKE CARE OF THINGS AT HOME, OR GET ALONG WITH OTHER PEOPLE: NOT DIFFICULT AT ALL

## 2023-09-13 NOTE — TELEPHONE ENCOUNTER
Called pt and informed of abx at the pharmacy ordered based off of ua and ucx done today per Dr. Watkins Duty. Pt aware and states will call with any other concerns.

## 2023-09-13 NOTE — PATIENT INSTRUCTIONS
Patient Instructions from Today's Visit    Reason for Visit:  Follow up     Diagnosis Information:  [email or web address]://www.[employer]/. net/about-us/asco-answers-patient-education-materials/rbdb-mjhpaqe-pnko-sheets    Plan:  -We will continue routine pap smears.  -You will give a urine specimen today, and if it looks suspicious, then we will call you in some medication if needed. Follow Up:  6 month with pap smear again. Recent Lab Results:  N/A    Treatment Summary has been discussed and given to patient: N/A        -------------------------------------------------------------------------------------------------------------------  Please call our office at [phone] if you have any  of the following symptoms:   Fever of 100.5 or greater  Chills  Shortness of breath  Swelling or pain in one leg    After office hours an answering service is available and will contact a provider for emergencies or if you are experiencing any of the above symptoms. Patient does express an interest in My Chart. My Chart log in information explained on the after visit summary printout at the [address] desk.     Harika Stringer MA

## 2023-09-14 ENCOUNTER — OFFICE VISIT (OUTPATIENT)
Dept: FAMILY MEDICINE CLINIC | Facility: CLINIC | Age: 58
End: 2023-09-14
Payer: MEDICAID

## 2023-09-14 VITALS
DIASTOLIC BLOOD PRESSURE: 78 MMHG | WEIGHT: 244 LBS | HEIGHT: 66 IN | SYSTOLIC BLOOD PRESSURE: 128 MMHG | BODY MASS INDEX: 39.21 KG/M2

## 2023-09-14 DIAGNOSIS — B37.9 CANDIDIASIS: Primary | ICD-10-CM

## 2023-09-14 DIAGNOSIS — G89.29 CHRONIC PAIN OF BOTH KNEES: ICD-10-CM

## 2023-09-14 DIAGNOSIS — C54.1 ENDOMETRIAL CANCER (HCC): ICD-10-CM

## 2023-09-14 DIAGNOSIS — M25.562 CHRONIC PAIN OF BOTH KNEES: ICD-10-CM

## 2023-09-14 DIAGNOSIS — M25.561 CHRONIC PAIN OF BOTH KNEES: ICD-10-CM

## 2023-09-14 DIAGNOSIS — L60.0 INGROWN NAIL OF GREAT TOE: ICD-10-CM

## 2023-09-14 DIAGNOSIS — K74.69 OTHER CIRRHOSIS OF LIVER (HCC): ICD-10-CM

## 2023-09-14 DIAGNOSIS — G89.29 CHRONIC BILATERAL LOW BACK PAIN WITHOUT SCIATICA: ICD-10-CM

## 2023-09-14 DIAGNOSIS — M54.50 CHRONIC BILATERAL LOW BACK PAIN WITHOUT SCIATICA: ICD-10-CM

## 2023-09-14 PROCEDURE — 3078F DIAST BP <80 MM HG: CPT | Performed by: FAMILY MEDICINE

## 2023-09-14 PROCEDURE — 3074F SYST BP LT 130 MM HG: CPT | Performed by: FAMILY MEDICINE

## 2023-09-14 PROCEDURE — 99214 OFFICE O/P EST MOD 30 MIN: CPT | Performed by: FAMILY MEDICINE

## 2023-09-14 RX ORDER — TRAMADOL HYDROCHLORIDE 50 MG/1
50 TABLET ORAL 2 TIMES DAILY PRN
Qty: 60 TABLET | Refills: 5 | Status: SHIPPED | OUTPATIENT
Start: 2023-09-14 | End: 2024-03-12

## 2023-09-14 RX ORDER — FLUCONAZOLE 150 MG/1
150 TABLET ORAL
Qty: 2 TABLET | Refills: 0 | Status: SHIPPED | OUTPATIENT
Start: 2023-09-14 | End: 2023-09-20

## 2023-09-14 ASSESSMENT — ENCOUNTER SYMPTOMS
COUGH: 0
EYE DISCHARGE: 0
CHEST TIGHTNESS: 0
SINUS PAIN: 0
SHORTNESS OF BREATH: 0
ABDOMINAL PAIN: 0
DIARRHEA: 0
CONSTIPATION: 0

## 2023-09-14 NOTE — PROGRESS NOTES
Akil Fleming is a 61 y.o. female who presents with   Chief Complaint   Patient presents with    [address]        History of Present Illness  Pt had recent gastric bypass with Dr Dano Palacios. Still some tenderness. Rash under skin folds. Saw neurology for double vision. Had recent LP - results pending. On antibiotic for UTI from GYN. No signs of recurrence of endometrial cancer. LFTs recently much improved. Seeing GI. Review of Systems  Review of Systems   Constitutional:  Negative for appetite change, fatigue and fever. HENT:  Negative for congestion, ear pain and sinus pain. Eyes:  Negative for discharge. Respiratory:  Negative for cough, chest tightness and shortness of breath. Cardiovascular:  Negative for chest pain, palpitations and leg swelling. Gastrointestinal:  Negative for abdominal pain, constipation and diarrhea. Genitourinary:  Negative for dysuria. Musculoskeletal:  Negative for joint swelling. Skin:  Negative for rash. Neurological:  Negative for headaches. Hematological:  Negative for adenopathy. Psychiatric/Behavioral:  Negative for dysphoric mood. The patient is not nervous/anxious. Medications  Current Outpatient Medications   Medication Sig Dispense Refill    traMADol (ULTRAM) 50 MG tablet Take 1 tablet by mouth 2 times daily as needed for Pain for up to 180 days.  Max Daily Amount: 100 mg 60 tablet 5    fluconazole (DIFLUCAN) 150 MG tablet Take 1 tablet by mouth every 72 hours for 6 days 2 tablet 0    NONFORMULARY Formula - AMT - cream for pain      Multiple Vitamins-Minerals (MULTI COMPLETE PO) Take by mouth      calcium carb-cholecalciferol (CALCIUM 600+D) 600-10 MG-MCG TABS per tab Take 1 tablet by mouth 2 times daily      nitrofurantoin, macrocrystal-monohydrate, (MACROBID) 100 MG capsule Take 1 capsule by mouth 2 times daily for 7 days 14 capsule 0    LORazepam (ATIVAN) 1 MG tablet Take 1 tablet by mouth every 8 hours as

## 2023-09-15 ENCOUNTER — TELEPHONE (OUTPATIENT)
Dept: ONCOLOGY | Age: 58
End: 2023-09-15

## 2023-09-15 DIAGNOSIS — R82.71 BACTERIA IN URINE: ICD-10-CM

## 2023-09-15 DIAGNOSIS — C54.1 ENDOMETRIAL CANCER (HCC): Primary | ICD-10-CM

## 2023-09-15 LAB
ALB CSF/SERPL: 3 (ref 0–8)
ALBUMIN CSF-MCNC: 11 MG/DL (ref 8–37)
ALBUMIN SERPL-MCNC: 4 G/DL (ref 3.8–4.9)
BACTERIA SPEC CULT: ABNORMAL
BACTERIA SPEC CULT: ABNORMAL
IGG CSF-MCNC: 1.6 MG/DL (ref 0–6.7)
IGG SERPL-MCNC: NORMAL MG/DL
IGG SYNTH RATE SER+CSF CALC-MRATE: NORMAL MG/DAY
IGG/ALB CLEAR SER+CSF-RTO: NORMAL
IGG/ALB CSF: 0.15 (ref 0–0.25)
OLIGOCLONAL BANDS.IT SER+CSF QL: NORMAL
SERVICE CMNT-IMP: ABNORMAL

## 2023-09-15 NOTE — TELEPHONE ENCOUNTER
Reviewed urine culture results with patient. Pt instructed to continue Macrobid prescribed this week. Pt voiced understanding. Stated no concerns or questions at this time.

## 2023-09-17 LAB
BACTERIA SPEC CULT: NORMAL
GRAM STN SPEC: NORMAL
SERVICE CMNT-IMP: NORMAL

## 2023-09-18 LAB
BACTERIA SPEC CULT: NORMAL
GRAM STN SPEC: NORMAL
OLIGOCLONAL BANDS.IT SER+CSF QL: NORMAL
SERVICE CMNT-IMP: NORMAL

## 2023-09-19 LAB
ALB CSF/SERPL: 3 (ref 0–8)
ALBUMIN CSF-MCNC: 11 MG/DL (ref 8–37)
ALBUMIN SERPL-MCNC: 4 G/DL (ref 3.8–4.9)
IGG CSF-MCNC: 1.6 MG/DL (ref 0–6.7)
IGG SERPL-MCNC: NORMAL MG/DL
IGG SYNTH RATE SER+CSF CALC-MRATE: NORMAL MG/DAY
IGG/ALB CLEAR SER+CSF-RTO: NORMAL
IGG/ALB CSF: 0.15 (ref 0–0.25)
OLIGOCLONAL BANDS.IT SER+CSF QL: NORMAL

## 2023-10-02 NOTE — PROGRESS NOTES
Isidra Alfonso PA-C  Bariatric & Advanced Laparoscopic Surgery & Endoscopy  [facility] [place] [address] [place] [address]  Phone [phone]   Fax [phone]    Date of visit: 10/5/2023          Name: Bola Alfaro      MRN: [id number]       : 1965       Age: 61 y.o. Sex: female        PCP: Curry Martin MD     Surgeon: Dr. Yudi Sim  Procedure: robotic assisted gastric bypass, liver biopsy    Surgeon: Jesenia Marks   DOS: Bypass, liver biopsy   Procedure: 2023   Pre-op weight: 270   Ideal body weight: 155   Excess body weight: 115      HPI:    6 weeks post-op visit after a robotic assisted gastric bypass was done on 2023. She has lost 8lbs since her last office visit. Weight History Graph  Post-Surgical Weight Loss  Date: 10/05/23  Height: 5' 6\" (167.6 cm)  Weight: 237 lb (107.5 kg)  BMI: 38.25  Weight Change: -8 lbs  Total Weight Change: -33 lbs  % EBWL: 28%    Evaluation of Pre-operative Co-morbid Conditions:    Hypertension Yes, number of medications- 1   GERD Yes, treatment medication- Omeprazole post op      Clinical Assessment and Physical Exam:    She is doing very well today and is feeling good. She reports that she has been adhering to the soft diet without difficulty. She denies any abdominal pain, nausea or vomiting or heartburn. She denies fevers or chills, or any redness or drainage from the incision sites. She does report having problems with constipation which is improved with the use of fiber. Her pain is well controlled and is not taking pain medications. She has been taking the PPI without difficulty. She has been doing her protein shakes without difficulty. Protein:  60+ grams per day  Fluids:    64+ ounces per day  Exercise:  walking, marching in place 5x per week for 30-40 minutes  Denies reflux. Denies dysphagia. Tolerating Protein first diet without difficulty.     PMH:  Past Medical History:   Diagnosis Date

## 2023-10-05 ENCOUNTER — OFFICE VISIT (OUTPATIENT)
Dept: SURGERY | Age: 58
End: 2023-10-05

## 2023-10-05 VITALS
HEART RATE: 77 BPM | BODY MASS INDEX: 38.09 KG/M2 | WEIGHT: 237 LBS | DIASTOLIC BLOOD PRESSURE: 85 MMHG | SYSTOLIC BLOOD PRESSURE: 160 MMHG | HEIGHT: 66 IN

## 2023-10-05 DIAGNOSIS — Z71.82 EXERCISE COUNSELING: ICD-10-CM

## 2023-10-05 DIAGNOSIS — K59.01 SLOW TRANSIT CONSTIPATION: ICD-10-CM

## 2023-10-05 DIAGNOSIS — E66.9 OBESITY, CLASS II, BMI 35-39.9: ICD-10-CM

## 2023-10-05 DIAGNOSIS — E66.01 MORBID OBESITY (HCC): Primary | ICD-10-CM

## 2023-10-05 DIAGNOSIS — Z71.3 DIETARY COUNSELING: ICD-10-CM

## 2023-10-05 DIAGNOSIS — Z98.84 S/P GASTRIC BYPASS: ICD-10-CM

## 2023-10-05 PROCEDURE — 99024 POSTOP FOLLOW-UP VISIT: CPT | Performed by: PHYSICIAN ASSISTANT

## 2023-11-05 DIAGNOSIS — F33.9 RECURRENT DEPRESSION (HCC): ICD-10-CM

## 2023-11-06 RX ORDER — TRAZODONE HYDROCHLORIDE 150 MG/1
150 TABLET ORAL NIGHTLY
Qty: 90 TABLET | Refills: 3 | Status: SHIPPED | OUTPATIENT
Start: 2023-11-06

## 2023-11-30 ENCOUNTER — OFFICE VISIT (OUTPATIENT)
Dept: SURGERY | Age: 58
End: 2023-11-30
Payer: MEDICAID

## 2023-11-30 VITALS
SYSTOLIC BLOOD PRESSURE: 144 MMHG | WEIGHT: 217 LBS | HEART RATE: 83 BPM | BODY MASS INDEX: 34.87 KG/M2 | DIASTOLIC BLOOD PRESSURE: 85 MMHG | HEIGHT: 66 IN

## 2023-11-30 DIAGNOSIS — Z71.3 DIETARY COUNSELING: ICD-10-CM

## 2023-11-30 DIAGNOSIS — E66.9 OBESITY, CLASS II, BMI 35-39.9: ICD-10-CM

## 2023-11-30 DIAGNOSIS — Z71.82 EXERCISE COUNSELING: ICD-10-CM

## 2023-11-30 DIAGNOSIS — Z98.84 S/P GASTRIC BYPASS: ICD-10-CM

## 2023-11-30 DIAGNOSIS — E66.01 MORBID OBESITY (HCC): Primary | ICD-10-CM

## 2023-11-30 PROCEDURE — 3077F SYST BP >= 140 MM HG: CPT | Performed by: PHYSICIAN ASSISTANT

## 2023-11-30 PROCEDURE — 99213 OFFICE O/P EST LOW 20 MIN: CPT | Performed by: PHYSICIAN ASSISTANT

## 2023-11-30 PROCEDURE — 3079F DIAST BP 80-89 MM HG: CPT | Performed by: PHYSICIAN ASSISTANT

## 2023-12-01 ENCOUNTER — OFFICE VISIT (OUTPATIENT)
Age: 58
End: 2023-12-01
Payer: MEDICAID

## 2023-12-01 VITALS
SYSTOLIC BLOOD PRESSURE: 122 MMHG | WEIGHT: 218 LBS | BODY MASS INDEX: 35.03 KG/M2 | DIASTOLIC BLOOD PRESSURE: 82 MMHG | HEART RATE: 62 BPM | HEIGHT: 66 IN

## 2023-12-01 DIAGNOSIS — I48.0 PAROXYSMAL ATRIAL FIBRILLATION (HCC): Primary | ICD-10-CM

## 2023-12-01 DIAGNOSIS — I10 ESSENTIAL HYPERTENSION: ICD-10-CM

## 2023-12-01 DIAGNOSIS — Z98.890 H/O CARDIAC RADIOFREQUENCY ABLATION: ICD-10-CM

## 2023-12-01 PROCEDURE — 3074F SYST BP LT 130 MM HG: CPT | Performed by: INTERNAL MEDICINE

## 2023-12-01 PROCEDURE — 99213 OFFICE O/P EST LOW 20 MIN: CPT | Performed by: INTERNAL MEDICINE

## 2023-12-01 PROCEDURE — 3079F DIAST BP 80-89 MM HG: CPT | Performed by: INTERNAL MEDICINE

## 2023-12-01 ASSESSMENT — ENCOUNTER SYMPTOMS
COUGH: 0
NAUSEA: 0
VOMITING: 0
RESPIRATORY NEGATIVE: 1
SHORTNESS OF BREATH: 0

## 2023-12-01 NOTE — PROGRESS NOTES
1. Paroxysmal atrial fibrillation ([address])  2. H/O cardiac radiofrequency ablation  - No recurrence, aspirin stopped due to severe anemia in the summer 2022. Has maintained sinus rhythm. If atrial fibrillation recurs will need consideration of anticoagulation.  -Normal rhythm today, monitor for palpitations should this occur repeat Holter monitor. Discussed with the patient that atrial fibrillation can recur and that she develops palpitations or irregular heartbeats she should be evaluated by a physician immediately as there is a low risk of stroke with recurrence. She voiced understanding. 3. Essential hypertension  -Controlled at this time  -Continue amlodipine 5 milligrams oral once daily. Recommend checking CMP, CBC, fasting lipids at next PCP visit. We will check at the next appointment with us if not checked in the interim. Problem List Items Addressed This Visit    None      Instructed patient go to ER or call 911/EMS should symptoms recur or worsen. Patient has been instructed and agrees to call our office with any issues or other concerns related to their cardiac condition(s) and/or complaint(s). No follow-ups on file.     Db Pritchett DO  12/1/2023 11:20 AM

## 2023-12-04 DIAGNOSIS — D50.9 IRON DEFICIENCY ANEMIA, UNSPECIFIED IRON DEFICIENCY ANEMIA TYPE: Primary | ICD-10-CM

## 2023-12-05 ENCOUNTER — HOSPITAL ENCOUNTER (OUTPATIENT)
Dept: LAB | Age: 58
Discharge: HOME | End: 2023-12-08
Payer: MEDICAID

## 2023-12-05 ENCOUNTER — OFFICE VISIT (OUTPATIENT)
Dept: ONCOLOGY | Age: 58
End: 2023-12-05
Payer: MEDICAID

## 2023-12-05 VITALS
WEIGHT: 214.6 LBS | BODY MASS INDEX: 34.49 KG/M2 | RESPIRATION RATE: 18 BRPM | TEMPERATURE: 98.3 F | HEIGHT: 66 IN | HEART RATE: 69 BPM | DIASTOLIC BLOOD PRESSURE: 88 MMHG | OXYGEN SATURATION: 97 % | SYSTOLIC BLOOD PRESSURE: 144 MMHG

## 2023-12-05 DIAGNOSIS — D50.9 IRON DEFICIENCY ANEMIA, UNSPECIFIED IRON DEFICIENCY ANEMIA TYPE: ICD-10-CM

## 2023-12-05 DIAGNOSIS — D50.9 IRON DEFICIENCY ANEMIA, UNSPECIFIED IRON DEFICIENCY ANEMIA TYPE: Primary | ICD-10-CM

## 2023-12-05 DIAGNOSIS — E61.1 IRON DEFICIENCY: ICD-10-CM

## 2023-12-05 DIAGNOSIS — K74.69 OTHER CIRRHOSIS OF LIVER (HCC): ICD-10-CM

## 2023-12-05 LAB
ALBUMIN SERPL-MCNC: 3.7 G/DL (ref 3.5–5)
ALBUMIN/GLOB SERPL: 0.8 (ref 0.4–1.6)
ALP SERPL-CCNC: 141 U/L (ref 50–136)
ALT SERPL-CCNC: 49 U/L (ref 12–65)
ANION GAP SERPL CALC-SCNC: 5 MMOL/L (ref 2–11)
AST SERPL-CCNC: 59 U/L (ref 15–37)
BASOPHILS # BLD: 0 K/UL (ref 0–0.2)
BASOPHILS NFR BLD: 0 % (ref 0–2)
BILIRUB SERPL-MCNC: 0.7 MG/DL (ref 0.2–1.1)
BUN SERPL-MCNC: 16 MG/DL (ref 6–23)
CALCIUM SERPL-MCNC: 9.7 MG/DL (ref 8.3–10.4)
CHLORIDE SERPL-SCNC: 104 MMOL/L (ref 103–113)
CO2 SERPL-SCNC: 29 MMOL/L (ref 21–32)
CREAT SERPL-MCNC: 0.7 MG/DL (ref 0.6–1)
DIFFERENTIAL METHOD BLD: ABNORMAL
EOSINOPHIL # BLD: 0.3 K/UL (ref 0–0.8)
EOSINOPHIL NFR BLD: 6 % (ref 0.5–7.8)
ERYTHROCYTE [DISTWIDTH] IN BLOOD BY AUTOMATED COUNT: 14.1 % (ref 11.9–14.6)
FERRITIN SERPL-MCNC: 28 NG/ML (ref 8–388)
GLOBULIN SER CALC-MCNC: 4.5 G/DL (ref 2.8–4.5)
GLUCOSE SERPL-MCNC: 97 MG/DL (ref 65–100)
HCT VFR BLD AUTO: 45.9 % (ref 35.8–46.3)
HGB BLD-MCNC: 14.5 G/DL (ref 11.7–15.4)
IMM GRANULOCYTES # BLD AUTO: 0 K/UL (ref 0–0.5)
IMM GRANULOCYTES NFR BLD AUTO: 0 % (ref 0–5)
IRON SATN MFR SERPL: 21 %
IRON SERPL-MCNC: 81 UG/DL (ref 35–150)
LYMPHOCYTES # BLD: 2.1 K/UL (ref 0.5–4.6)
LYMPHOCYTES NFR BLD: 40 % (ref 13–44)
MCH RBC QN AUTO: 26.9 PG (ref 26.1–32.9)
MCHC RBC AUTO-ENTMCNC: 31.6 G/DL (ref 31.4–35)
MCV RBC AUTO: 85.2 FL (ref 82–102)
MONOCYTES # BLD: 0.6 K/UL (ref 0.1–1.3)
MONOCYTES NFR BLD: 11 % (ref 4–12)
NEUTS SEG # BLD: 2.3 K/UL (ref 1.7–8.2)
NEUTS SEG NFR BLD: 43 % (ref 43–78)
NRBC # BLD: 0 K/UL (ref 0–0.2)
PLATELET # BLD AUTO: 178 K/UL (ref 150–450)
PMV BLD AUTO: 11.4 FL (ref 9.4–12.3)
POTASSIUM SERPL-SCNC: 3.7 MMOL/L (ref 3.5–5.1)
PROT SERPL-MCNC: 8.2 G/DL (ref 6.3–8.2)
RBC # BLD AUTO: 5.39 M/UL (ref 4.05–5.2)
SODIUM SERPL-SCNC: 138 MMOL/L (ref 136–146)
TIBC SERPL-MCNC: 391 UG/DL (ref 250–450)
WBC # BLD AUTO: 5.3 K/UL (ref 4.3–11.1)

## 2023-12-05 PROCEDURE — 85025 COMPLETE CBC W/AUTO DIFF WBC: CPT

## 2023-12-05 PROCEDURE — 80053 COMPREHEN METABOLIC PANEL: CPT

## 2023-12-05 PROCEDURE — 99214 OFFICE O/P EST MOD 30 MIN: CPT | Performed by: INTERNAL MEDICINE

## 2023-12-05 PROCEDURE — 83550 IRON BINDING TEST: CPT

## 2023-12-05 PROCEDURE — 3077F SYST BP >= 140 MM HG: CPT | Performed by: INTERNAL MEDICINE

## 2023-12-05 PROCEDURE — 83540 ASSAY OF IRON: CPT

## 2023-12-05 PROCEDURE — 3079F DIAST BP 80-89 MM HG: CPT | Performed by: INTERNAL MEDICINE

## 2023-12-05 PROCEDURE — 82728 ASSAY OF FERRITIN: CPT

## 2023-12-05 PROCEDURE — 36415 COLL VENOUS BLD VENIPUNCTURE: CPT

## 2023-12-05 RX ORDER — DIPHENHYDRAMINE HYDROCHLORIDE 50 MG/ML
50 INJECTION INTRAMUSCULAR; INTRAVENOUS
Status: CANCELLED | OUTPATIENT
Start: 2023-12-12

## 2023-12-05 RX ORDER — ACETAMINOPHEN 325 MG/1
650 TABLET ORAL
Status: CANCELLED | OUTPATIENT
Start: 2023-12-12

## 2023-12-05 RX ORDER — EPINEPHRINE 1 MG/ML
0.3 INJECTION, SOLUTION, CONCENTRATE INTRAVENOUS PRN
Status: CANCELLED | OUTPATIENT
Start: 2023-12-12

## 2023-12-05 RX ORDER — EPINEPHRINE 1 MG/ML
0.3 INJECTION, SOLUTION, CONCENTRATE INTRAVENOUS PRN
OUTPATIENT
Start: 2023-12-12

## 2023-12-05 RX ORDER — SODIUM CHLORIDE 9 MG/ML
INJECTION, SOLUTION INTRAVENOUS CONTINUOUS
OUTPATIENT
Start: 2023-12-12

## 2023-12-05 RX ORDER — SODIUM CHLORIDE 0.9 % (FLUSH) 0.9 %
5-40 SYRINGE (ML) INJECTION PRN
Status: CANCELLED | OUTPATIENT
Start: 2023-12-12

## 2023-12-05 RX ORDER — DIPHENHYDRAMINE HYDROCHLORIDE 50 MG/ML
50 INJECTION INTRAMUSCULAR; INTRAVENOUS
OUTPATIENT
Start: 2023-12-12

## 2023-12-05 RX ORDER — SODIUM CHLORIDE 9 MG/ML
5-250 INJECTION, SOLUTION INTRAVENOUS PRN
Status: CANCELLED | OUTPATIENT
Start: 2023-12-12

## 2023-12-05 RX ORDER — HEPARIN SODIUM (PORCINE) LOCK FLUSH IV SOLN 100 UNIT/ML 100 UNIT/ML
500 SOLUTION INTRAVENOUS PRN
OUTPATIENT
Start: 2023-12-12

## 2023-12-05 RX ORDER — SODIUM CHLORIDE 9 MG/ML
5-250 INJECTION, SOLUTION INTRAVENOUS PRN
OUTPATIENT
Start: 2023-12-12

## 2023-12-05 RX ORDER — ACETAMINOPHEN 325 MG/1
650 TABLET ORAL
OUTPATIENT
Start: 2023-12-12

## 2023-12-05 RX ORDER — ALBUTEROL SULFATE 90 UG/1
4 AEROSOL, METERED RESPIRATORY (INHALATION) PRN
Status: CANCELLED | OUTPATIENT
Start: 2023-12-12

## 2023-12-05 RX ORDER — SODIUM CHLORIDE 9 MG/ML
INJECTION, SOLUTION INTRAVENOUS CONTINUOUS
Status: CANCELLED | OUTPATIENT
Start: 2023-12-12

## 2023-12-05 RX ORDER — SODIUM CHLORIDE 0.9 % (FLUSH) 0.9 %
5-40 SYRINGE (ML) INJECTION PRN
OUTPATIENT
Start: 2023-12-12

## 2023-12-05 RX ORDER — FAMOTIDINE 10 MG/ML
20 INJECTION, SOLUTION INTRAVENOUS
Status: CANCELLED | OUTPATIENT
Start: 2023-12-12

## 2023-12-05 RX ORDER — HEPARIN SODIUM (PORCINE) LOCK FLUSH IV SOLN 100 UNIT/ML 100 UNIT/ML
500 SOLUTION INTRAVENOUS PRN
Status: CANCELLED | OUTPATIENT
Start: 2023-12-12

## 2023-12-05 RX ORDER — ALBUTEROL SULFATE 90 UG/1
4 AEROSOL, METERED RESPIRATORY (INHALATION) PRN
OUTPATIENT
Start: 2023-12-12

## 2023-12-05 RX ORDER — ONDANSETRON 2 MG/ML
8 INJECTION INTRAMUSCULAR; INTRAVENOUS
Status: CANCELLED | OUTPATIENT
Start: 2023-12-12

## 2023-12-05 RX ORDER — ONDANSETRON 2 MG/ML
8 INJECTION INTRAMUSCULAR; INTRAVENOUS
OUTPATIENT
Start: 2023-12-12

## 2023-12-05 RX ORDER — FAMOTIDINE 10 MG/ML
20 INJECTION, SOLUTION INTRAVENOUS
OUTPATIENT
Start: 2023-12-12

## 2023-12-05 ASSESSMENT — PATIENT HEALTH QUESTIONNAIRE - PHQ9
SUM OF ALL RESPONSES TO PHQ9 QUESTIONS 1 & 2: 0
1. LITTLE INTEREST OR PLEASURE IN DOING THINGS: 0
SUM OF ALL RESPONSES TO PHQ QUESTIONS 1-9: 0
SUM OF ALL RESPONSES TO PHQ QUESTIONS 1-9: 0
2. FEELING DOWN, DEPRESSED OR HOPELESS: 0
SUM OF ALL RESPONSES TO PHQ QUESTIONS 1-9: 0
SUM OF ALL RESPONSES TO PHQ QUESTIONS 1-9: 0
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
## 2023-12-05 NOTE — PATIENT INSTRUCTIONS
Patient Instructions from Today's Visit    Reason for Visit:  Follow up        Plan: Your iron levels are low. We will set you up for two more iron infusions. If insurance says we have to use a different iron preparation, we will let you know. Continue seeing your GI doctor. Follow Up:   Follow up in 4-6 months    Recent Lab Results:  Hospital Outpatient Visit on 12/05/2023   Component Date Value Ref Range Status    WBC 12/05/2023 5.3  4.3 - 11.1 K/uL Final    RBC 12/05/2023 5.39 (H)  4.05 - 5.2 M/uL Final    Hemoglobin 12/05/2023 14.5  11.7 - 15.4 g/dL Final    Hematocrit 12/05/2023 45.9  35.8 - 46.3 % Final    MCV 12/05/2023 85.2  82.0 - 102.0 FL Final    MCH 12/05/2023 26.9  26.1 - 32.9 PG Final    MCHC 12/05/2023 31.6  31.4 - 35.0 g/dL Final    RDW 12/05/2023 14.1  11.9 - 14.6 % Final    Platelets 14/37/8665 178  150 - 450 K/uL Final    MPV 12/05/2023 11.4  9.4 - 12.3 FL Final    nRBC 12/05/2023 0.00  0.0 - 0.2 K/uL Final    **Note: Absolute NRBC parameter is now reported with Hemogram**    Neutrophils % 12/05/2023 43  43 - 78 % Final    Lymphocytes % 12/05/2023 40  13 - 44 % Final    Monocytes % 12/05/2023 11  4.0 - 12.0 % Final    Eosinophils % 12/05/2023 6  0.5 - 7.8 % Final    Basophils % 12/05/2023 0  0.0 - 2.0 % Final    Immature Granulocytes 12/05/2023 0  0.0 - 5.0 % Final    Neutrophils Absolute 12/05/2023 2.3  1.7 - 8.2 K/UL Final    Lymphocytes Absolute 12/05/2023 2.1  0.5 - 4.6 K/UL Final    Monocytes Absolute 12/05/2023 0.6  0.1 - 1.3 K/UL Final    Eosinophils Absolute 12/05/2023 0.3  0.0 - 0.8 K/UL Final    Basophils Absolute 12/05/2023 0.0  0.0 - 0.2 K/UL Final    Absolute Immature Granulocyte 12/05/2023 0.0  0.0 - 0.5 K/UL Final    Differential Type 12/05/2023 AUTOMATED    Final    Sodium 12/05/2023 138  136 - 146 mmol/L Final    Potassium 12/05/2023 3.7  3.5 - 5.1 mmol/L Final    Chloride 12/05/2023 104  103 - 113 mmol/L Final    CO2 12/05/2023 29  21 - 32 mmol/L Final    Anion Gap

## 2023-12-12 ENCOUNTER — HOSPITAL ENCOUNTER (OUTPATIENT)
Dept: INFUSION THERAPY | Age: 58
Setting detail: INFUSION SERIES
Discharge: HOME | End: 2023-12-12
Payer: MEDICAID

## 2023-12-12 VITALS
SYSTOLIC BLOOD PRESSURE: 119 MMHG | OXYGEN SATURATION: 96 % | DIASTOLIC BLOOD PRESSURE: 75 MMHG | TEMPERATURE: 98.2 F | HEART RATE: 58 BPM | RESPIRATION RATE: 16 BRPM

## 2023-12-12 DIAGNOSIS — E61.1 IRON DEFICIENCY: Primary | ICD-10-CM

## 2023-12-12 PROCEDURE — 2580000003 HC RX 258: Performed by: INTERNAL MEDICINE

## 2023-12-12 PROCEDURE — 6360000002 HC RX W HCPCS: Performed by: INTERNAL MEDICINE

## 2023-12-12 PROCEDURE — 96374 THER/PROPH/DIAG INJ IV PUSH: CPT

## 2023-12-12 RX ORDER — SODIUM CHLORIDE 9 MG/ML
5-250 INJECTION, SOLUTION INTRAVENOUS PRN
OUTPATIENT
Start: 2023-12-19

## 2023-12-12 RX ORDER — SODIUM CHLORIDE 0.9 % (FLUSH) 0.9 %
5-40 SYRINGE (ML) INJECTION PRN
Status: DISCONTINUED | OUTPATIENT
Start: 2023-12-12 | End: 2023-12-13 | Stop reason: HOSPADM

## 2023-12-12 RX ORDER — HEPARIN 100 UNIT/ML
500 SYRINGE INTRAVENOUS PRN
OUTPATIENT
Start: 2023-12-19

## 2023-12-12 RX ORDER — ONDANSETRON 2 MG/ML
8 INJECTION INTRAMUSCULAR; INTRAVENOUS
OUTPATIENT
Start: 2023-12-19

## 2023-12-12 RX ORDER — DIPHENHYDRAMINE HYDROCHLORIDE 50 MG/ML
50 INJECTION INTRAMUSCULAR; INTRAVENOUS
Status: DISCONTINUED | OUTPATIENT
Start: 2023-12-12 | End: 2023-12-13 | Stop reason: HOSPADM

## 2023-12-12 RX ORDER — SODIUM CHLORIDE 0.9 % (FLUSH) 0.9 %
5-40 SYRINGE (ML) INJECTION PRN
OUTPATIENT
Start: 2023-12-19

## 2023-12-12 RX ORDER — EPINEPHRINE 1 MG/ML
0.3 INJECTION, SOLUTION, CONCENTRATE INTRAVENOUS PRN
OUTPATIENT
Start: 2023-12-19

## 2023-12-12 RX ORDER — DIPHENHYDRAMINE HYDROCHLORIDE 50 MG/ML
50 INJECTION INTRAMUSCULAR; INTRAVENOUS
OUTPATIENT
Start: 2023-12-19

## 2023-12-12 RX ORDER — ALBUTEROL SULFATE 90 UG/1
4 AEROSOL, METERED RESPIRATORY (INHALATION) PRN
OUTPATIENT
Start: 2023-12-19

## 2023-12-12 RX ORDER — SODIUM CHLORIDE 9 MG/ML
5-250 INJECTION, SOLUTION INTRAVENOUS PRN
Status: DISCONTINUED | OUTPATIENT
Start: 2023-12-12 | End: 2023-12-13 | Stop reason: HOSPADM

## 2023-12-12 RX ORDER — SODIUM CHLORIDE 9 MG/ML
INJECTION, SOLUTION INTRAVENOUS CONTINUOUS
OUTPATIENT
Start: 2023-12-19

## 2023-12-12 RX ORDER — ACETAMINOPHEN 325 MG/1
650 TABLET ORAL
OUTPATIENT
Start: 2023-12-19

## 2023-12-12 RX ADMIN — SODIUM CHLORIDE 100 ML/HR: 9 INJECTION, SOLUTION INTRAVENOUS at 14:34

## 2023-12-12 RX ADMIN — IRON SUCROSE 200 MG: 20 INJECTION, SOLUTION INTRAVENOUS at 14:32

## 2023-12-12 NOTE — PROGRESS NOTES
Arrived to the [address] [place] [address]. Venofer completed. Patient tolerated well. Any issues or concerns during appointment: none. Patient aware of next infusion appointment on 12/19 (date) at 2:00 PM (time). Patient instructed to call provider with temperature of 100.4 or greater or nausea/vomiting/ diarrhea or pain not controlled by medications  Discharged ambulatory.

## 2023-12-19 ENCOUNTER — HOSPITAL ENCOUNTER (OUTPATIENT)
Dept: INFUSION THERAPY | Age: 58
Discharge: HOME | End: 2023-12-19
Payer: MEDICAID

## 2023-12-19 VITALS
HEART RATE: 71 BPM | DIASTOLIC BLOOD PRESSURE: 71 MMHG | TEMPERATURE: 98.6 F | SYSTOLIC BLOOD PRESSURE: 119 MMHG | OXYGEN SATURATION: 96 % | RESPIRATION RATE: 16 BRPM

## 2023-12-19 DIAGNOSIS — E61.1 IRON DEFICIENCY: Primary | ICD-10-CM

## 2023-12-19 PROCEDURE — 6360000002 HC RX W HCPCS: Performed by: INTERNAL MEDICINE

## 2023-12-19 PROCEDURE — 96374 THER/PROPH/DIAG INJ IV PUSH: CPT

## 2023-12-19 PROCEDURE — 2580000003 HC RX 258: Performed by: INTERNAL MEDICINE

## 2023-12-19 RX ORDER — ALBUTEROL SULFATE 90 UG/1
4 AEROSOL, METERED RESPIRATORY (INHALATION) PRN
OUTPATIENT
Start: 2023-12-26

## 2023-12-19 RX ORDER — ALBUTEROL SULFATE 90 UG/1
4 AEROSOL, METERED RESPIRATORY (INHALATION) PRN
Status: DISCONTINUED | OUTPATIENT
Start: 2023-12-19 | End: 2023-12-20 | Stop reason: HOSPADM

## 2023-12-19 RX ORDER — SODIUM CHLORIDE 9 MG/ML
5-250 INJECTION, SOLUTION INTRAVENOUS PRN
OUTPATIENT
Start: 2023-12-26

## 2023-12-19 RX ORDER — SODIUM CHLORIDE 9 MG/ML
INJECTION, SOLUTION INTRAVENOUS CONTINUOUS
Status: DISCONTINUED | OUTPATIENT
Start: 2023-12-19 | End: 2023-12-20 | Stop reason: HOSPADM

## 2023-12-19 RX ORDER — HEPARIN 100 UNIT/ML
500 SYRINGE INTRAVENOUS PRN
OUTPATIENT
Start: 2023-12-26

## 2023-12-19 RX ORDER — DIPHENHYDRAMINE HYDROCHLORIDE 50 MG/ML
50 INJECTION INTRAMUSCULAR; INTRAVENOUS
OUTPATIENT
Start: 2023-12-26

## 2023-12-19 RX ORDER — ACETAMINOPHEN 325 MG/1
650 TABLET ORAL
Status: DISCONTINUED | OUTPATIENT
Start: 2023-12-19 | End: 2023-12-20 | Stop reason: HOSPADM

## 2023-12-19 RX ORDER — SODIUM CHLORIDE 9 MG/ML
5-250 INJECTION, SOLUTION INTRAVENOUS PRN
Status: DISCONTINUED | OUTPATIENT
Start: 2023-12-19 | End: 2023-12-20 | Stop reason: HOSPADM

## 2023-12-19 RX ORDER — HEPARIN 100 UNIT/ML
500 SYRINGE INTRAVENOUS PRN
Status: DISCONTINUED | OUTPATIENT
Start: 2023-12-19 | End: 2023-12-20 | Stop reason: HOSPADM

## 2023-12-19 RX ORDER — SODIUM CHLORIDE 0.9 % (FLUSH) 0.9 %
5-40 SYRINGE (ML) INJECTION PRN
Status: DISCONTINUED | OUTPATIENT
Start: 2023-12-19 | End: 2023-12-20 | Stop reason: HOSPADM

## 2023-12-19 RX ORDER — ACETAMINOPHEN 325 MG/1
650 TABLET ORAL
OUTPATIENT
Start: 2023-12-26

## 2023-12-19 RX ORDER — ONDANSETRON 2 MG/ML
8 INJECTION INTRAMUSCULAR; INTRAVENOUS
Status: DISCONTINUED | OUTPATIENT
Start: 2023-12-19 | End: 2023-12-20 | Stop reason: HOSPADM

## 2023-12-19 RX ORDER — SODIUM CHLORIDE 0.9 % (FLUSH) 0.9 %
5-40 SYRINGE (ML) INJECTION PRN
OUTPATIENT
Start: 2023-12-26

## 2023-12-19 RX ORDER — DIPHENHYDRAMINE HYDROCHLORIDE 50 MG/ML
50 INJECTION INTRAMUSCULAR; INTRAVENOUS
Status: DISCONTINUED | OUTPATIENT
Start: 2023-12-19 | End: 2023-12-20 | Stop reason: HOSPADM

## 2023-12-19 RX ORDER — EPINEPHRINE 1 MG/ML
0.3 INJECTION, SOLUTION, CONCENTRATE INTRAVENOUS PRN
Status: DISCONTINUED | OUTPATIENT
Start: 2023-12-19 | End: 2023-12-20 | Stop reason: HOSPADM

## 2023-12-19 RX ORDER — ONDANSETRON 2 MG/ML
8 INJECTION INTRAMUSCULAR; INTRAVENOUS
OUTPATIENT
Start: 2023-12-26

## 2023-12-19 RX ORDER — EPINEPHRINE 1 MG/ML
0.3 INJECTION, SOLUTION, CONCENTRATE INTRAVENOUS PRN
OUTPATIENT
Start: 2023-12-26

## 2023-12-19 RX ORDER — SODIUM CHLORIDE 9 MG/ML
INJECTION, SOLUTION INTRAVENOUS CONTINUOUS
OUTPATIENT
Start: 2023-12-26

## 2023-12-19 RX ADMIN — SODIUM CHLORIDE, PRESERVATIVE FREE 10 ML: 5 INJECTION INTRAVENOUS at 15:12

## 2023-12-19 RX ADMIN — IRON SUCROSE 200 MG: 20 INJECTION, SOLUTION INTRAVENOUS at 14:39

## 2023-12-19 NOTE — PROGRESS NOTES
Patient arrived to [address] [facility] for Venofer. Assessment completed. No needs voiced at this time. Patient tolerated infusion well and was observed 30 mins post infusion. VSS. Patient is aware of next appointment on 1/10/24 @1400. Patient discharged ambulatory.

## 2024-01-03 ENCOUNTER — HOSPITAL ENCOUNTER (OUTPATIENT)
Dept: INFUSION THERAPY | Age: 59
Discharge: HOME | End: 2024-01-03
Payer: MEDICAID

## 2024-01-03 VITALS
RESPIRATION RATE: 14 BRPM | OXYGEN SATURATION: 96 % | TEMPERATURE: 98.3 F | SYSTOLIC BLOOD PRESSURE: 124 MMHG | HEART RATE: 63 BPM | DIASTOLIC BLOOD PRESSURE: 79 MMHG

## 2024-01-03 DIAGNOSIS — E61.1 IRON DEFICIENCY: Primary | ICD-10-CM

## 2024-01-03 PROCEDURE — 6360000002 HC RX W HCPCS: Performed by: INTERNAL MEDICINE

## 2024-01-03 PROCEDURE — 96374 THER/PROPH/DIAG INJ IV PUSH: CPT

## 2024-01-03 PROCEDURE — 2580000003 HC RX 258: Performed by: INTERNAL MEDICINE

## 2024-01-03 RX ORDER — DIPHENHYDRAMINE HYDROCHLORIDE 50 MG/ML
50 INJECTION INTRAMUSCULAR; INTRAVENOUS
Status: DISCONTINUED | OUTPATIENT
Start: 2024-01-03 | End: 2024-01-04 | Stop reason: HOSPADM

## 2024-01-03 RX ORDER — EPINEPHRINE 1 MG/ML
0.3 INJECTION, SOLUTION, CONCENTRATE INTRAVENOUS PRN
OUTPATIENT
Start: 2024-01-09

## 2024-01-03 RX ORDER — SODIUM CHLORIDE 9 MG/ML
5-250 INJECTION, SOLUTION INTRAVENOUS PRN
Status: DISCONTINUED | OUTPATIENT
Start: 2024-01-03 | End: 2024-01-04 | Stop reason: HOSPADM

## 2024-01-03 RX ORDER — HEPARIN 100 UNIT/ML
500 SYRINGE INTRAVENOUS PRN
OUTPATIENT
Start: 2024-01-09

## 2024-01-03 RX ORDER — SODIUM CHLORIDE 9 MG/ML
5-250 INJECTION, SOLUTION INTRAVENOUS PRN
OUTPATIENT
Start: 2024-01-09

## 2024-01-03 RX ORDER — SODIUM CHLORIDE 9 MG/ML
INJECTION, SOLUTION INTRAVENOUS CONTINUOUS
OUTPATIENT
Start: 2024-01-09

## 2024-01-03 RX ORDER — SODIUM CHLORIDE 0.9 % (FLUSH) 0.9 %
5-40 SYRINGE (ML) INJECTION PRN
OUTPATIENT
Start: 2024-01-09

## 2024-01-03 RX ORDER — HEPARIN 100 UNIT/ML
500 SYRINGE INTRAVENOUS PRN
Status: DISCONTINUED | OUTPATIENT
Start: 2024-01-03 | End: 2024-01-04 | Stop reason: HOSPADM

## 2024-01-03 RX ORDER — ACETAMINOPHEN 325 MG/1
650 TABLET ORAL
OUTPATIENT
Start: 2024-01-09

## 2024-01-03 RX ORDER — SODIUM CHLORIDE 0.9 % (FLUSH) 0.9 %
5-40 SYRINGE (ML) INJECTION PRN
Status: DISCONTINUED | OUTPATIENT
Start: 2024-01-03 | End: 2024-01-04 | Stop reason: HOSPADM

## 2024-01-03 RX ORDER — DIPHENHYDRAMINE HYDROCHLORIDE 50 MG/ML
50 INJECTION INTRAMUSCULAR; INTRAVENOUS
OUTPATIENT
Start: 2024-01-09

## 2024-01-03 RX ORDER — ACETAMINOPHEN 325 MG/1
650 TABLET ORAL
Status: DISCONTINUED | OUTPATIENT
Start: 2024-01-03 | End: 2024-01-04 | Stop reason: HOSPADM

## 2024-01-03 RX ORDER — ALBUTEROL SULFATE 90 UG/1
4 AEROSOL, METERED RESPIRATORY (INHALATION) PRN
OUTPATIENT
Start: 2024-01-09

## 2024-01-03 RX ORDER — ONDANSETRON 2 MG/ML
8 INJECTION INTRAMUSCULAR; INTRAVENOUS
Status: DISCONTINUED | OUTPATIENT
Start: 2024-01-03 | End: 2024-01-04 | Stop reason: HOSPADM

## 2024-01-03 RX ORDER — ALBUTEROL SULFATE 90 UG/1
4 AEROSOL, METERED RESPIRATORY (INHALATION) PRN
Status: DISCONTINUED | OUTPATIENT
Start: 2024-01-03 | End: 2024-01-04 | Stop reason: HOSPADM

## 2024-01-03 RX ORDER — SODIUM CHLORIDE 9 MG/ML
INJECTION, SOLUTION INTRAVENOUS CONTINUOUS
Status: DISCONTINUED | OUTPATIENT
Start: 2024-01-03 | End: 2024-01-04 | Stop reason: HOSPADM

## 2024-01-03 RX ORDER — ONDANSETRON 2 MG/ML
8 INJECTION INTRAMUSCULAR; INTRAVENOUS
OUTPATIENT
Start: 2024-01-09

## 2024-01-03 RX ORDER — EPINEPHRINE 1 MG/ML
0.3 INJECTION, SOLUTION, CONCENTRATE INTRAVENOUS PRN
Status: DISCONTINUED | OUTPATIENT
Start: 2024-01-03 | End: 2024-01-04 | Stop reason: HOSPADM

## 2024-01-03 RX ADMIN — SODIUM CHLORIDE 100 ML/HR: 9 INJECTION, SOLUTION INTRAVENOUS at 15:36

## 2024-01-03 RX ADMIN — IRON SUCROSE 200 MG: 20 INJECTION, SOLUTION INTRAVENOUS at 15:20

## 2024-01-03 RX ADMIN — SODIUM CHLORIDE, PRESERVATIVE FREE 10 ML: 5 INJECTION INTRAVENOUS at 15:11

## 2024-01-03 ASSESSMENT — PAIN SCALES - GENERAL: PAINLEVEL_OUTOF10: 0

## 2024-01-03 NOTE — PROGRESS NOTES
Pt arrived ambulatory, venofer completed, monitored for 30 min, no adverse side effects, discharged home ambulatory

## 2024-01-10 ENCOUNTER — HOSPITAL ENCOUNTER (OUTPATIENT)
Dept: INFUSION THERAPY | Age: 59
Discharge: HOME | End: 2024-01-10
Payer: MEDICAID

## 2024-01-10 VITALS
DIASTOLIC BLOOD PRESSURE: 78 MMHG | OXYGEN SATURATION: 93 % | RESPIRATION RATE: 16 BRPM | HEART RATE: 72 BPM | SYSTOLIC BLOOD PRESSURE: 120 MMHG | TEMPERATURE: 98.1 F

## 2024-01-10 DIAGNOSIS — E61.1 IRON DEFICIENCY: Primary | ICD-10-CM

## 2024-01-10 PROCEDURE — 2580000003 HC RX 258: Performed by: INTERNAL MEDICINE

## 2024-01-10 PROCEDURE — 6360000002 HC RX W HCPCS: Performed by: INTERNAL MEDICINE

## 2024-01-10 PROCEDURE — 96374 THER/PROPH/DIAG INJ IV PUSH: CPT

## 2024-01-10 RX ORDER — ACETAMINOPHEN 325 MG/1
650 TABLET ORAL
Status: DISCONTINUED | OUTPATIENT
Start: 2024-01-10 | End: 2024-01-11 | Stop reason: HOSPADM

## 2024-01-10 RX ORDER — HEPARIN 100 UNIT/ML
500 SYRINGE INTRAVENOUS PRN
Status: CANCELLED | OUTPATIENT
Start: 2024-01-17

## 2024-01-10 RX ORDER — ALBUTEROL SULFATE 90 UG/1
4 AEROSOL, METERED RESPIRATORY (INHALATION) PRN
Status: CANCELLED | OUTPATIENT
Start: 2024-01-17

## 2024-01-10 RX ORDER — EPINEPHRINE 1 MG/ML
0.3 INJECTION, SOLUTION, CONCENTRATE INTRAVENOUS PRN
Status: CANCELLED | OUTPATIENT
Start: 2024-01-17

## 2024-01-10 RX ORDER — ONDANSETRON 2 MG/ML
8 INJECTION INTRAMUSCULAR; INTRAVENOUS
Status: DISCONTINUED | OUTPATIENT
Start: 2024-01-10 | End: 2024-01-11 | Stop reason: HOSPADM

## 2024-01-10 RX ORDER — ONDANSETRON 2 MG/ML
8 INJECTION INTRAMUSCULAR; INTRAVENOUS
Status: CANCELLED | OUTPATIENT
Start: 2024-01-17

## 2024-01-10 RX ORDER — SODIUM CHLORIDE 9 MG/ML
5-250 INJECTION, SOLUTION INTRAVENOUS PRN
Status: CANCELLED | OUTPATIENT
Start: 2024-01-17

## 2024-01-10 RX ORDER — SODIUM CHLORIDE 0.9 % (FLUSH) 0.9 %
5-40 SYRINGE (ML) INJECTION PRN
Status: DISCONTINUED | OUTPATIENT
Start: 2024-01-10 | End: 2024-01-11 | Stop reason: HOSPADM

## 2024-01-10 RX ORDER — ALBUTEROL SULFATE 90 UG/1
4 AEROSOL, METERED RESPIRATORY (INHALATION) PRN
Status: DISCONTINUED | OUTPATIENT
Start: 2024-01-10 | End: 2024-01-11 | Stop reason: HOSPADM

## 2024-01-10 RX ORDER — SODIUM CHLORIDE 9 MG/ML
5-250 INJECTION, SOLUTION INTRAVENOUS PRN
Status: DISCONTINUED | OUTPATIENT
Start: 2024-01-10 | End: 2024-01-11 | Stop reason: HOSPADM

## 2024-01-10 RX ORDER — SODIUM CHLORIDE 9 MG/ML
INJECTION, SOLUTION INTRAVENOUS CONTINUOUS
Status: CANCELLED | OUTPATIENT
Start: 2024-01-17

## 2024-01-10 RX ORDER — DIPHENHYDRAMINE HYDROCHLORIDE 50 MG/ML
50 INJECTION INTRAMUSCULAR; INTRAVENOUS
Status: DISCONTINUED | OUTPATIENT
Start: 2024-01-10 | End: 2024-01-11 | Stop reason: HOSPADM

## 2024-01-10 RX ORDER — DIPHENHYDRAMINE HYDROCHLORIDE 50 MG/ML
50 INJECTION INTRAMUSCULAR; INTRAVENOUS
Status: CANCELLED | OUTPATIENT
Start: 2024-01-17

## 2024-01-10 RX ORDER — ACETAMINOPHEN 325 MG/1
650 TABLET ORAL
Status: CANCELLED | OUTPATIENT
Start: 2024-01-17

## 2024-01-10 RX ORDER — EPINEPHRINE 1 MG/ML
0.3 INJECTION, SOLUTION, CONCENTRATE INTRAVENOUS PRN
Status: DISCONTINUED | OUTPATIENT
Start: 2024-01-10 | End: 2024-01-11 | Stop reason: HOSPADM

## 2024-01-10 RX ORDER — SODIUM CHLORIDE 0.9 % (FLUSH) 0.9 %
5-40 SYRINGE (ML) INJECTION PRN
Status: CANCELLED | OUTPATIENT
Start: 2024-01-17

## 2024-01-10 RX ADMIN — IRON SUCROSE 200 MG: 20 INJECTION, SOLUTION INTRAVENOUS at 14:20

## 2024-01-10 RX ADMIN — SODIUM CHLORIDE 50 ML/HR: 9 INJECTION, SOLUTION INTRAVENOUS at 14:10

## 2024-01-10 RX ADMIN — SODIUM CHLORIDE, PRESERVATIVE FREE 10 ML: 5 INJECTION INTRAVENOUS at 14:10

## 2024-01-10 NOTE — PROGRESS NOTES
Arrived to the Infusion Center.  venofer completed. Patient tolerated well. Patient observed for 30 minutes post infusion.   Patient aware of next infusion appointment on 1/17/2024 (date) at 1445 (time).  Patient instructed to call provider with temperature of 100.4 or greater or nausea/vomiting/ diarrhea or pain not controlled by medications  Discharged ambulatory.

## 2024-01-17 ENCOUNTER — HOSPITAL ENCOUNTER (OUTPATIENT)
Dept: INFUSION THERAPY | Age: 59
Discharge: HOME | End: 2024-01-17
Payer: MEDICAID

## 2024-01-17 VITALS
HEART RATE: 64 BPM | OXYGEN SATURATION: 95 % | DIASTOLIC BLOOD PRESSURE: 79 MMHG | SYSTOLIC BLOOD PRESSURE: 143 MMHG | RESPIRATION RATE: 16 BRPM | TEMPERATURE: 97.6 F

## 2024-01-17 DIAGNOSIS — E61.1 IRON DEFICIENCY: Primary | ICD-10-CM

## 2024-01-17 PROCEDURE — 2580000003 HC RX 258: Performed by: INTERNAL MEDICINE

## 2024-01-17 PROCEDURE — 6360000002 HC RX W HCPCS: Performed by: INTERNAL MEDICINE

## 2024-01-17 PROCEDURE — 96374 THER/PROPH/DIAG INJ IV PUSH: CPT

## 2024-01-17 RX ORDER — EPINEPHRINE 1 MG/ML
0.3 INJECTION, SOLUTION, CONCENTRATE INTRAVENOUS PRN
OUTPATIENT
Start: 2024-01-24

## 2024-01-17 RX ORDER — ACETAMINOPHEN 325 MG/1
650 TABLET ORAL
OUTPATIENT
Start: 2024-01-24

## 2024-01-17 RX ORDER — SODIUM CHLORIDE 9 MG/ML
INJECTION, SOLUTION INTRAVENOUS CONTINUOUS
OUTPATIENT
Start: 2024-01-24

## 2024-01-17 RX ORDER — ALBUTEROL SULFATE 90 UG/1
4 AEROSOL, METERED RESPIRATORY (INHALATION) PRN
OUTPATIENT
Start: 2024-01-24

## 2024-01-17 RX ORDER — SODIUM CHLORIDE 0.9 % (FLUSH) 0.9 %
5-40 SYRINGE (ML) INJECTION PRN
Status: DISCONTINUED | OUTPATIENT
Start: 2024-01-17 | End: 2024-01-18 | Stop reason: HOSPADM

## 2024-01-17 RX ORDER — SODIUM CHLORIDE 9 MG/ML
5-250 INJECTION, SOLUTION INTRAVENOUS PRN
OUTPATIENT
Start: 2024-01-24

## 2024-01-17 RX ORDER — DIPHENHYDRAMINE HYDROCHLORIDE 50 MG/ML
50 INJECTION INTRAMUSCULAR; INTRAVENOUS
OUTPATIENT
Start: 2024-01-24

## 2024-01-17 RX ORDER — HEPARIN 100 UNIT/ML
500 SYRINGE INTRAVENOUS PRN
OUTPATIENT
Start: 2024-01-24

## 2024-01-17 RX ORDER — ONDANSETRON 2 MG/ML
8 INJECTION INTRAMUSCULAR; INTRAVENOUS
OUTPATIENT
Start: 2024-01-24

## 2024-01-17 RX ORDER — SODIUM CHLORIDE 9 MG/ML
5-250 INJECTION, SOLUTION INTRAVENOUS PRN
Status: DISCONTINUED | OUTPATIENT
Start: 2024-01-17 | End: 2024-01-18 | Stop reason: HOSPADM

## 2024-01-17 RX ORDER — SODIUM CHLORIDE 9 MG/ML
5-250 INJECTION, SOLUTION INTRAVENOUS PRN
Status: CANCELLED | OUTPATIENT
Start: 2024-01-24

## 2024-01-17 RX ORDER — SODIUM CHLORIDE 0.9 % (FLUSH) 0.9 %
5-40 SYRINGE (ML) INJECTION PRN
Status: CANCELLED | OUTPATIENT
Start: 2024-01-24

## 2024-01-17 RX ADMIN — SODIUM CHLORIDE 100 ML/HR: 9 INJECTION, SOLUTION INTRAVENOUS at 15:00

## 2024-01-17 RX ADMIN — SODIUM CHLORIDE, PRESERVATIVE FREE 10 ML: 5 INJECTION INTRAVENOUS at 14:55

## 2024-01-17 RX ADMIN — IRON SUCROSE 200 MG: 20 INJECTION, SOLUTION INTRAVENOUS at 15:07

## 2024-01-17 NOTE — PROGRESS NOTES
Arrived to the Infusion Center. Venofer infusion completed. Patient tolerated well.   Any issues or concerns during appointment: none. Patient observed for 30 mins post-infusion, no adverse reactions noted.  Patient instructed to call provider with temperature of 100.4 or greater or nausea/vomiting/ diarrhea or pain not controlled by medications  Discharged ambulatory.

## 2024-01-17 NOTE — PLAN OF CARE
Problem: Chronic Conditions and Co-morbidities  Goal: Patient's chronic conditions and co-morbidity symptoms are monitored and maintained or improved  Outcome: Progressing     Problem: Safety - Adult  Goal: Free from fall injury  Outcome: Progressing

## 2024-02-16 ENCOUNTER — HOSPITAL ENCOUNTER (EMERGENCY)
Age: 59
Discharge: HOME | End: 2024-02-16
Payer: MEDICAID

## 2024-02-16 VITALS
HEIGHT: 66 IN | DIASTOLIC BLOOD PRESSURE: 74 MMHG | OXYGEN SATURATION: 99 % | WEIGHT: 195 LBS | HEART RATE: 70 BPM | TEMPERATURE: 98 F | SYSTOLIC BLOOD PRESSURE: 180 MMHG | RESPIRATION RATE: 18 BRPM | BODY MASS INDEX: 31.34 KG/M2

## 2024-02-16 DIAGNOSIS — U07.1 COVID: Primary | ICD-10-CM

## 2024-02-16 LAB
FLUAV RNA SPEC QL NAA+PROBE: NOT DETECTED
FLUBV RNA SPEC QL NAA+PROBE: NOT DETECTED
SARS-COV-2 RDRP RESP QL NAA+PROBE: DETECTED
SOURCE: ABNORMAL

## 2024-02-16 PROCEDURE — 87502 INFLUENZA DNA AMP PROBE: CPT

## 2024-02-16 PROCEDURE — 87635 SARS-COV-2 COVID-19 AMP PRB: CPT

## 2024-02-16 PROCEDURE — 99283 EMERGENCY DEPT VISIT LOW MDM: CPT

## 2024-02-16 RX ORDER — PREDNISONE 20 MG/1
20 TABLET ORAL
Qty: 5 TABLET | Refills: 0 | Status: SHIPPED | OUTPATIENT
Start: 2024-02-16 | End: 2024-02-21

## 2024-02-16 NOTE — DISCHARGE INSTRUCTIONS
You were evaluated in the emergency department today for upper respiratory symptoms starting yesterday.    Workup today is consistent with COVID-19.    Treatment is symptomatic care.  This is pushing fluids, over-the-counter lozenges for sore throat, over-the-counter cough and cold medication such as [place] cold and flu.  Drink plenty of fluids such as diet Gatorade or Gatorade.  Due to your history of asthma I have written you prescription for short course of prednisone to be started tomorrow.  Recommend you take this in the morning and take it with food.  Contact your primary care provider to let them know that you are positive for COVID-19.  Keep your appointment that you have in 2 weeks.      Return to the emergency department for increased work of breathing, wheezing, chest pain, shortness of breath, signs and symptoms of stroke as listed in your discharge paper

## 2024-02-16 NOTE — ED PROVIDER NOTES
Emergency Department Provider Note       PCP: Aleksandra Alex MD   Age: 58 y.o.   Sex: female     DISPOSITION Decision To Discharge 02/16/2024 12:36:26 PM       ICD-10-CM    1. COVID  U07.1 predniSONE (DELTASONE) 20 MG tablet          Medical Decision Making     Complexity of Problems Addressed:  Complexity of Problem: 1 acute, uncomplicated illness or injury.    Data Reviewed and Analyzed:  I independently ordered and reviewed each unique test.  I reviewed external records: provider visit note from PCP.   Reviewed notes from primary care visit 12/20/2023          Discussion of management or test interpretation.  Vital signs reviewed, patient stable, NAD, afebrile, nontoxic in appearance   Heart rate 78, O2 saturation 96% on room air    Well-appearing 58 old female who presents for upper respiratory symptoms that began yesterday.  Has a history of asthma.  Symptoms include cough, myalgias, fever, sore throat and ear pain.  Denies chest pain wheezing or nausea vomiting or diarrhea    Rapid COVID-19, rapid influenza was obtained out of triage  Patient is positive for COVID-19  Negative for influenza    Physical exam is reassuring without any emergent findings    Based on history, physical exam, laboratory testing today, I do not feel any blood work or any imaging is warranted at this time.  No emergent findings.  Patient is stable and can be discharged home with follow-up to primary care.  Will discharge her on a course of steroids as she does have a history of asthma.  I discussed with patient symptomatic treatment with over-the-counter medications, follow-up with primary care, return to ED precautions and reiterated this in discharge paperwork.  She verbalized understanding and agreement    Discharged home in stable condition    ED Course as of 02/16/24 1315   Fri Feb 16, 2024   1227 SARS-CoV-2, Rapid(!!): Detected [JG]   1227 Influenza B, RAQUEL: Not detected [JG]   1227 Influenza A, RAQUEL: Not detected [JG]

## 2024-02-16 NOTE — ED NOTES
I have reviewed discharge instructions with the patient.  The patient verbalized understanding.    Patient left ED via Discharge Method: ambulatory to Home with spouse.    Opportunity for questions and clarification provided.       Patient given 0 scripts.         To continue your aftercare when you leave the hospital, you may receive an automated call from our care team to check in on how you are doing.  This is a free service and part of our promise to provide the best care and service to meet your aftercare needs.” If you have questions, or wish to unsubscribe from this service please call [phone].  Thank you for Choosing our [facility] Emergency Department.

## 2024-02-16 NOTE — ED TRIAGE NOTES
Pt CO nasal congestion, body aches, fever of 102F, nonproductive cough, and headache since Wednesday. Last dose of Tylenol at 0800 this morning.   Pt flew from [place] on Wednesday.

## 2024-02-19 ENCOUNTER — TELEPHONE (OUTPATIENT)
Dept: FAMILY MEDICINE CLINIC | Facility: CLINIC | Age: 59
End: 2024-02-19

## 2024-02-19 NOTE — TELEPHONE ENCOUNTER
Patient tested positive for Covid on 2/16/23. Patient has very sore throat, bad headaches, congestion. Can something be sent to [facility] [employer]?

## 2024-02-20 NOTE — TELEPHONE ENCOUNTER
Spoke with pt who states sxs improving. Per Dr Alex, advised to use OTCs, salt water gargles and call if sxs persist or worsen. Pt voiced understanding.

## 2024-02-27 DIAGNOSIS — E89.0 POSTSURGICAL HYPOTHYROIDISM: ICD-10-CM

## 2024-02-27 LAB
T4 FREE SERPL-MCNC: 1.7 NG/DL (ref 0.78–1.46)
TSH W FREE THYROID IF ABNORMAL: 0.04 UIU/ML (ref 0.36–3.74)

## 2024-02-28 ENCOUNTER — OFFICE VISIT (OUTPATIENT)
Dept: ENDOCRINOLOGY | Age: 59
End: 2024-02-28
Payer: MEDICAID

## 2024-02-28 VITALS
BODY MASS INDEX: 30.7 KG/M2 | RESPIRATION RATE: 16 BRPM | HEART RATE: 76 BPM | SYSTOLIC BLOOD PRESSURE: 142 MMHG | WEIGHT: 191 LBS | OXYGEN SATURATION: 99 % | HEIGHT: 66 IN | DIASTOLIC BLOOD PRESSURE: 88 MMHG

## 2024-02-28 DIAGNOSIS — E07.9 THYROID EYE DISEASE: ICD-10-CM

## 2024-02-28 DIAGNOSIS — E89.0 POSTSURGICAL HYPOTHYROIDISM: Primary | ICD-10-CM

## 2024-02-28 DIAGNOSIS — H57.89 THYROID EYE DISEASE: ICD-10-CM

## 2024-02-28 PROCEDURE — 99214 OFFICE O/P EST MOD 30 MIN: CPT | Performed by: INTERNAL MEDICINE

## 2024-02-28 PROCEDURE — 3077F SYST BP >= 140 MM HG: CPT | Performed by: INTERNAL MEDICINE

## 2024-02-28 PROCEDURE — 3079F DIAST BP 80-89 MM HG: CPT | Performed by: INTERNAL MEDICINE

## 2024-02-28 RX ORDER — LEVOTHYROXINE SODIUM 0.12 MG/1
125 TABLET ORAL DAILY
Qty: 90 TABLET | Refills: 3 | Status: SHIPPED | OUTPATIENT
Start: 2024-02-28

## 2024-02-28 ASSESSMENT — ENCOUNTER SYMPTOMS
ROS SKIN COMMENTS: SHE REPORTS HAIR LOSS.  DENIES DRY SKIN.
CONSTIPATION: 1
DIARRHEA: 0

## 2024-02-28 NOTE — PROGRESS NOTES
the lungs every 4 hours as needed for Wheezing 18 g 5    BIOTIN PO Take by mouth      traZODone (DESYREL) 150 MG tablet Take 1 tablet by mouth nightly 90 tablet 3    traMADol (ULTRAM) 50 MG tablet Take 1 tablet by mouth 2 times daily as needed for Pain for up to 180 days. Max Daily Amount: 100 mg 60 tablet 5    NONFORMULARY Formula - AMT - cream for pain      Multiple Vitamins-Minerals (MULTI COMPLETE PO) Take by mouth      calcium carb-cholecalciferol (CALCIUM 600+D) 600-10 MG-MCG TABS per tab Take 1 tablet by mouth 2 times daily      omeprazole (PRILOSEC) 40 MG delayed release capsule Take 1 capsule by mouth every morning (before breakfast) 90 capsule 0    acetaminophen (TYLENOL) 500 MG tablet Take 2 tablets by mouth in the morning and at bedtime       No current facility-administered medications for this visit.

## 2024-03-04 ENCOUNTER — HOSPITAL ENCOUNTER (OUTPATIENT)
Dept: PHYSICAL THERAPY | Age: 59
Setting detail: RECURRING SERIES
Discharge: HOME | End: 2024-03-07
Attending: FAMILY MEDICINE
Payer: MEDICAID

## 2024-03-04 DIAGNOSIS — M25.551 PAIN IN RIGHT HIP: ICD-10-CM

## 2024-03-04 DIAGNOSIS — M62.81 MUSCLE WEAKNESS (GENERALIZED): ICD-10-CM

## 2024-03-04 DIAGNOSIS — M54.59 OTHER LOW BACK PAIN: Primary | ICD-10-CM

## 2024-03-04 PROCEDURE — 97161 PT EVAL LOW COMPLEX 20 MIN: CPT

## 2024-03-04 PROCEDURE — 97110 THERAPEUTIC EXERCISES: CPT

## 2024-03-04 NOTE — THERAPY EVALUATION
perform all ADL and functional activities without difficulty.      Regarding Luciana Rodriguez's therapy, I certify that the treatment plan above will be carried out by a therapist or under their direction.  Thank you for this referral,  Bryce Lebron PT     Referring Physician Signature: Aleksandra Alex MD                    Charge Capture  Appt Desk

## 2024-03-04 NOTE — PROGRESS NOTES
legs crossed 3x30 sec             Manual: STM to R posterior hip    Treatment/Session Summary:    Treatment Assessment:   Toña tolerated initial treatment well today. She needs focus on LB and R hip mobility and posterior chain strengthening to improve her tolerance to standing and walking  Communication/Consultation:  Therapy Evaluation sent to referring provider  Equipment provided today:  HEP  Recommendations/Intent for next treatment session: Next visit will focus on low back and hip mobility and strengthening.    >Total Treatment Billable Duration:  15 minutes of treatment   Time In: 1255  Time Out: 1345    Bryce Lebron, PT         Charge Capture  [employer]  Appt Desk     Future Appointments   Date Time Provider Department Center   3/7/2024 10:45 AM Ruthie Smith PA [facility] AMB   3/13/2024 10:30 AM Bryce Lebron, PT SFOFF SFO   3/13/2024  1:00 PM Jeff Irwin MD [facility] G[facility] AMB   3/13/2024  1:00 PM [facility] CLINIC COLLECT [facility]   3/14/2024  2:45 PM Aleksandra Alex MD [facility] G[facility] AMB   3/18/2024  4:00 PM Bryce Lebron, PT SFOFF SFO   3/20/2024 12:30 PM Farida Colvin, PT SFOFF SFO   3/25/2024  3:00 PM Bryce Lebron, PT SFOFF SFO   3/28/2024 11:30 AM Bryce Lebron, PT SFOFF SFO   6/4/2024  1:00 PM Burton Kumari,  [facility] G[facility] AMB   6/11/2024 12:00 PM PERIPHERAL [facility]   6/11/2024  1:00 PM Kandi Hagen, APRN - CNP [facility] G[facility] AMB   6/19/2024 10:00 AM HTF LAB RESOURCE F [facility]VL AMB   6/26/2024  1:00 PM Aleksandra Alex MD F [facility] AMB   7/22/2024  4:00 PM Albaro Piedra MD [facility] G[facility] AMB   8/27/2024  1:00 PM Farida Stone MD [facility] GVL AMB

## 2024-03-06 ENCOUNTER — APPOINTMENT (OUTPATIENT)
Dept: PHYSICAL THERAPY | Age: 59
End: 2024-03-06
Attending: FAMILY MEDICINE
Payer: MEDICAID

## 2024-03-06 ASSESSMENT — PAIN SCALES - GENERAL: PAINLEVEL_OUTOF10: 9

## 2024-03-07 ENCOUNTER — OFFICE VISIT (OUTPATIENT)
Dept: SURGERY | Age: 59
End: 2024-03-07
Payer: MEDICAID

## 2024-03-07 VITALS
DIASTOLIC BLOOD PRESSURE: 90 MMHG | HEIGHT: 66 IN | HEART RATE: 76 BPM | SYSTOLIC BLOOD PRESSURE: 151 MMHG | WEIGHT: 189 LBS | BODY MASS INDEX: 30.37 KG/M2

## 2024-03-07 DIAGNOSIS — E66.01 MORBID OBESITY (HCC): Primary | ICD-10-CM

## 2024-03-07 DIAGNOSIS — E66.9 OBESITY, CLASS I, BMI 30-34.9: ICD-10-CM

## 2024-03-07 DIAGNOSIS — Z71.82 EXERCISE COUNSELING: ICD-10-CM

## 2024-03-07 DIAGNOSIS — R11.0 NAUSEA: ICD-10-CM

## 2024-03-07 DIAGNOSIS — Z71.3 DIETARY COUNSELING: ICD-10-CM

## 2024-03-07 DIAGNOSIS — Z98.84 S/P GASTRIC BYPASS: ICD-10-CM

## 2024-03-07 PROBLEM — M54.50 LOW BACK PAIN: Status: ACTIVE | Noted: 2017-03-01

## 2024-03-07 PROBLEM — K76.0 NONALCOHOLIC FATTY LIVER: Status: ACTIVE | Noted: 2017-05-30

## 2024-03-07 PROBLEM — K80.20 CHOLELITHIASIS WITHOUT OBSTRUCTION: Status: ACTIVE | Noted: 2017-05-30

## 2024-03-07 PROBLEM — E05.90 THYROTOXICOSIS: Status: ACTIVE | Noted: 2017-03-07

## 2024-03-07 PROBLEM — I48.0 PAROXYSMAL ATRIAL FIBRILLATION (HCC): Status: ACTIVE | Noted: 2017-02-28

## 2024-03-07 PROBLEM — E04.2 NON-TOXIC MULTINODULAR GOITER: Status: ACTIVE | Noted: 2017-04-20

## 2024-03-07 PROBLEM — I10 HYPERTENSIVE DISORDER: Status: ACTIVE | Noted: 2017-03-07

## 2024-03-07 PROCEDURE — 3077F SYST BP >= 140 MM HG: CPT | Performed by: PHYSICIAN ASSISTANT

## 2024-03-07 PROCEDURE — 99213 OFFICE O/P EST LOW 20 MIN: CPT | Performed by: PHYSICIAN ASSISTANT

## 2024-03-07 PROCEDURE — 3080F DIAST BP >= 90 MM HG: CPT | Performed by: PHYSICIAN ASSISTANT

## 2024-03-07 NOTE — PROGRESS NOTES
[facility] NUTRITION REASSESSMENT  Assessment:   6 months post-op RYGB. Pt consuming ~60g protein/d and ~40oz fluid/d. Pt is eating at least 3x/d. Pt is drinking water. Pt is exercising via walking and chair exercises (reports recently hurting back). Pt is taking MVI and Ca supplements as recommended.    Diet Recall:   Breakfast: Two boiled eggs   Lunch: Salad with 3oz chicken   Dinner: 4 ox chicken breast with vegetables     Intervention:   Evaluated diet recall.  Encouraged continued and increased activity.     Encouraged pt to increase exercise as able     Monitoring and Evaluation:  Monitor for continued safe, supervised weight loss for RYGB.   F/U per MD Geovanna Wells MBA, Nutritionist, RD eligible   
results, communicating results to patient, family or caregiver, and/or coordinating care.

## 2024-03-11 ENCOUNTER — HOSPITAL ENCOUNTER (OUTPATIENT)
Dept: PHYSICAL THERAPY | Age: 59
Setting detail: RECURRING SERIES
Discharge: HOME | End: 2024-03-14
Attending: FAMILY MEDICINE
Payer: MEDICAID

## 2024-03-11 PROCEDURE — 97110 THERAPEUTIC EXERCISES: CPT

## 2024-03-11 NOTE — PROGRESS NOTES
march 3 min   Clams 2x15 B   Supine clams - unilateral green band X30 B   SKTC X6 B   Sidelying trunk rotation x8   Supine piriformis stretch with legs crossed 3x30 sec B   Recumbent stepper 8 min   Prone quad stretch - R LE 3x30 sec   Standing calf stretch 3x30 sec   Standing hip abduction 2x10 B     Manual: STM to R posterior hip and lumbar paraspinals, hip IR/ER stretching in prone    Treatment/Session Summary:    Treatment Assessment:   Toña tolerated treatment well today and reported that hip stretching is helpful. She needs continued focus on hip and core strengthening  Communication/Consultation:  None today  Equipment provided today:  None  Recommendations/Intent for next treatment session: Next visit will focus on low back and hip mobility and strengthening.    >Total Treatment Billable Duration:  55 minutes of treatment   Time In: 1300  Time Out: 1400    Bryce Lebron, PT         Charge Capture  [employer] Portal  Appt Desk     Future Appointments   Date Time Provider Department Center   3/13/2024 10:30 AM Bryce Lebron, PT SFOFF SFO   3/13/2024  1:00 PM Tigist Mckinney PA [facility] GVL AMB   3/13/2024  1:00 PM [facility] CLINIC COLLECT GCCOIG GCC   3/14/2024  2:45 PM Aleksandra Alex MD F [facility]VL AMB   3/18/2024  4:00 PM Bryce Lebron, PT SFOFF SFO   3/20/2024 12:30 PM Farida Colvin, PT SFOFF SFO   3/25/2024  3:00 PM Bryce Lebron, PT SFOFF SFO   3/28/2024 11:30 AM Bryce Lebron, PT SFOFF SFO   6/4/2024  1:00 PM Burton Kumari DO [facility] GVL AMB   6/11/2024 12:00 PM PERIPHERAL GCCOIG GCC   6/11/2024  1:00 PM Kandi Hagen, APRN - CNP [facility] GVL AMB   6/19/2024 10:00 AM HTF LAB RESOURCE HTF GVL AMB   6/26/2024  1:00 PM Aleksandra Alex MD F [facility] AMB   7/22/2024  4:00 PM Albaro Piedra MD [facility] GVL AMB   8/27/2024  1:00 PM Farida Stone MD CSAE GVL AMB

## 2024-03-12 DIAGNOSIS — C54.1 ENDOMETRIAL CANCER (HCC): Primary | ICD-10-CM

## 2024-03-12 NOTE — PROGRESS NOTES
Sister         Cervical    Thyroid Disease Daughter         Hypothyroidism    Thyroid Disease Son         Hyperthyroidism    Cancer Paternal Aunt         Cervical    Breast Cancer Neg Hx        Review of Systems   Review of Systems   Constitutional: Negative.  Negative for unexpected weight change.   HENT: Negative.     Eyes: Negative.    Respiratory: Negative.     Cardiovascular: Negative.    Gastrointestinal: Negative.  Negative for abdominal pain.   Endocrine: Negative.    Genitourinary:  Negative for pelvic pain, vaginal bleeding, vaginal discharge and vaginal pain.   Musculoskeletal: Negative.    Skin: Negative.    Allergic/Immunologic: Negative.    Neurological:  Positive for weakness.   Hematological: Negative.    Psychiatric/Behavioral: Negative.     All other systems reviewed and are negative.      Physical Exam     ECOG PERFORMANCE STATUS - 1- Restricted in physically strenuous activity but ambulatory and able to carry out work of a light or sedentary nature such as light house work, office work.     Blood pressure (!) 141/101, pulse 78, temperature 97.8 °F (36.6 °C), temperature source Oral, resp. rate 21, height 1.676 m (5' 6\"), weight 86 kg (189 lb 11.2 oz), SpO2 99 %.     Physical Exam  Vitals and nursing note reviewed. Exam conducted with a chaperone present.   Constitutional:       General: She is not in acute distress.     Appearance: Normal appearance. She is obese.   HENT:      Head: Normocephalic and atraumatic.   Eyes:      General: No scleral icterus.  Cardiovascular:      Rate and Rhythm: Normal rate and regular rhythm.      Heart sounds: Normal heart sounds.   Pulmonary:      Effort: Pulmonary effort is normal. No respiratory distress.      Breath sounds: Normal breath sounds.   Abdominal:      General: Abdomen is flat. Bowel sounds are normal.      Palpations: Abdomen is soft. There is no mass.      Tenderness: There is no abdominal tenderness.   Genitourinary:     General: Normal vulva.

## 2024-03-13 ENCOUNTER — HOSPITAL ENCOUNTER (OUTPATIENT)
Dept: PHYSICAL THERAPY | Age: 59
Setting detail: RECURRING SERIES
Discharge: HOME | End: 2024-03-16
Attending: FAMILY MEDICINE
Payer: MEDICAID

## 2024-03-13 ENCOUNTER — OFFICE VISIT (OUTPATIENT)
Dept: ONCOLOGY | Age: 59
End: 2024-03-13
Payer: MEDICAID

## 2024-03-13 ENCOUNTER — HOSPITAL ENCOUNTER (OUTPATIENT)
Dept: LAB | Age: 59
Discharge: HOME | End: 2024-03-16
Payer: MEDICAID

## 2024-03-13 VITALS
HEART RATE: 78 BPM | RESPIRATION RATE: 21 BRPM | SYSTOLIC BLOOD PRESSURE: 141 MMHG | BODY MASS INDEX: 30.49 KG/M2 | HEIGHT: 66 IN | DIASTOLIC BLOOD PRESSURE: 101 MMHG | TEMPERATURE: 97.8 F | WEIGHT: 189.7 LBS | OXYGEN SATURATION: 99 %

## 2024-03-13 DIAGNOSIS — C54.1 ENDOMETRIAL CANCER (HCC): Primary | ICD-10-CM

## 2024-03-13 PROCEDURE — 97110 THERAPEUTIC EXERCISES: CPT

## 2024-03-13 PROCEDURE — 3080F DIAST BP >= 90 MM HG: CPT | Performed by: PHYSICIAN ASSISTANT

## 2024-03-13 PROCEDURE — 99213 OFFICE O/P EST LOW 20 MIN: CPT | Performed by: PHYSICIAN ASSISTANT

## 2024-03-13 PROCEDURE — 3077F SYST BP >= 140 MM HG: CPT | Performed by: PHYSICIAN ASSISTANT

## 2024-03-13 PROCEDURE — 88142 CYTOPATH C/V THIN LAYER: CPT

## 2024-03-13 ASSESSMENT — PATIENT HEALTH QUESTIONNAIRE - PHQ9
SUM OF ALL RESPONSES TO PHQ9 QUESTIONS 1 & 2: 0
SUM OF ALL RESPONSES TO PHQ QUESTIONS 1-9: 0
2. FEELING DOWN, DEPRESSED OR HOPELESS: 0
SUM OF ALL RESPONSES TO PHQ QUESTIONS 1-9: 0
1. LITTLE INTEREST OR PLEASURE IN DOING THINGS: 0

## 2024-03-13 ASSESSMENT — PAIN SCALES - GENERAL
PAINLEVEL_OUTOF10: 4
PAINLEVEL_OUTOF10: 5

## 2024-03-13 ASSESSMENT — ENCOUNTER SYMPTOMS: ABDOMINAL PAIN: 0

## 2024-03-13 NOTE — PROGRESS NOTES
PPT with march 3 min   Clams 2x15 B   Supine clams - unilateral green band X30 B   SKTC --   Bridge x15   Bridge with ER - green band 2x15   Sidelying trunk rotation --   Supine piriformis stretch with legs crossed 3x30 sec B   Recumbent stepper --   Prone quad stretch - R LE 3x30 sec   Standing calf stretch --   Standing hip abduction --     Manual: STM to R posterior hip and lumbar paraspinals, hip IR/ER stretching in prone    Treatment/Session Summary:    Treatment Assessment:   Toña tolerated treatment well today and reported feeling better after exercises. She does fatigue quickly with lateral hip strengthening. She had to leave 15 minutes early today due to transportation schedule  Communication/Consultation:  None today  Equipment provided today:  None  Recommendations/Intent for next treatment session: Next visit will focus on low back and hip mobility and strengthening.    >Total Treatment Billable Duration:  43 minutes of treatment   Time In: 1030  Time Out: 1115    Bryce Lebron, PT         Charge Capture  [employer] Portal  Appt Desk     Future Appointments   Date Time Provider Department Center   3/14/2024  2:45 PM Aleksandra Alex MD F [facility] AMB   3/18/2024  4:00 PM Bryce Lebron, PT SFOFF SFO   3/20/2024 12:30 PM Farida Colvin, PT SFOFF SFO   3/25/2024  3:00 PM Bryce Lebron, PT SFOFF SFO   3/28/2024 11:30 AM Bryce Lebron, PT SFOFF SFO   6/4/2024  1:00 PM Burton Kumari DO [facility] AMB   6/11/2024 12:00 PM PERIPHERAL GCCOIG GCC   6/11/2024  1:00 PM Kandi Hagen APRN - CNP [facility] AMB   6/19/2024 10:00 AM HTF LAB RESOURCE HTF [facility] AMB   6/26/2024  1:00 PM Aleksandra Alex MD HTF [facility] AMB   7/22/2024  4:00 PM Albaro Piedra MD [facility] GVL AMB   8/27/2024  1:00 PM Farida Stone MD [facility] AMB   9/26/2024 10:45 AM Mayda Mott, APRN - NP [facility] G[facility] AMB   9/26/2024 10:45 AM [facility] CLINIC COLLECT GCC[facility]

## 2024-03-14 ENCOUNTER — OFFICE VISIT (OUTPATIENT)
Dept: FAMILY MEDICINE CLINIC | Facility: CLINIC | Age: 59
End: 2024-03-14
Payer: MEDICAID

## 2024-03-14 VITALS
DIASTOLIC BLOOD PRESSURE: 84 MMHG | BODY MASS INDEX: 30.34 KG/M2 | HEART RATE: 80 BPM | TEMPERATURE: 97 F | WEIGHT: 188.8 LBS | SYSTOLIC BLOOD PRESSURE: 122 MMHG | OXYGEN SATURATION: 97 % | HEIGHT: 66 IN

## 2024-03-14 DIAGNOSIS — G89.29 CHRONIC PAIN OF BOTH KNEES: ICD-10-CM

## 2024-03-14 DIAGNOSIS — M25.561 CHRONIC PAIN OF BOTH KNEES: ICD-10-CM

## 2024-03-14 DIAGNOSIS — F33.9 RECURRENT DEPRESSION (HCC): ICD-10-CM

## 2024-03-14 DIAGNOSIS — I10 PRIMARY HYPERTENSION: ICD-10-CM

## 2024-03-14 DIAGNOSIS — K76.0 NONALCOHOLIC FATTY LIVER: Primary | ICD-10-CM

## 2024-03-14 DIAGNOSIS — J45.40 MODERATE PERSISTENT ASTHMA WITHOUT COMPLICATION: ICD-10-CM

## 2024-03-14 DIAGNOSIS — M25.562 CHRONIC PAIN OF BOTH KNEES: ICD-10-CM

## 2024-03-14 DIAGNOSIS — G89.29 CHRONIC BILATERAL LOW BACK PAIN WITHOUT SCIATICA: ICD-10-CM

## 2024-03-14 DIAGNOSIS — M54.50 CHRONIC BILATERAL LOW BACK PAIN WITHOUT SCIATICA: ICD-10-CM

## 2024-03-14 DIAGNOSIS — K74.69 OTHER CIRRHOSIS OF LIVER (HCC): ICD-10-CM

## 2024-03-14 LAB
ALBUMIN SERPL-MCNC: 3.7 G/DL (ref 3.5–5)
ALBUMIN/GLOB SERPL: 1 (ref 0.4–1.6)
ALP SERPL-CCNC: 150 U/L (ref 50–136)
ALT SERPL-CCNC: 52 U/L (ref 12–65)
AST SERPL-CCNC: 51 U/L (ref 15–37)
BILIRUB DIRECT SERPL-MCNC: 0.2 MG/DL
BILIRUB SERPL-MCNC: 0.6 MG/DL (ref 0.2–1.1)
GLOBULIN SER CALC-MCNC: 3.6 G/DL (ref 2.8–4.5)
PROT SERPL-MCNC: 7.3 G/DL (ref 6.3–8.2)

## 2024-03-14 PROCEDURE — 99214 OFFICE O/P EST MOD 30 MIN: CPT | Performed by: FAMILY MEDICINE

## 2024-03-14 PROCEDURE — 3079F DIAST BP 80-89 MM HG: CPT | Performed by: FAMILY MEDICINE

## 2024-03-14 PROCEDURE — 3074F SYST BP LT 130 MM HG: CPT | Performed by: FAMILY MEDICINE

## 2024-03-14 RX ORDER — AMLODIPINE BESYLATE 5 MG/1
5 TABLET ORAL DAILY
Qty: 90 TABLET | Refills: 3 | Status: SHIPPED | OUTPATIENT
Start: 2024-03-14

## 2024-03-14 RX ORDER — TRAMADOL HYDROCHLORIDE 50 MG/1
50 TABLET ORAL 2 TIMES DAILY PRN
Qty: 60 TABLET | Refills: 5 | Status: SHIPPED | OUTPATIENT
Start: 2024-03-14 | End: 2024-09-10

## 2024-03-14 RX ORDER — TRAZODONE HYDROCHLORIDE 150 MG/1
150 TABLET ORAL NIGHTLY
Qty: 90 TABLET | Refills: 3 | Status: SHIPPED | OUTPATIENT
Start: 2024-03-14

## 2024-03-14 RX ORDER — ALBUTEROL SULFATE 90 UG/1
2 AEROSOL, METERED RESPIRATORY (INHALATION) EVERY 4 HOURS PRN
Qty: 18 G | Refills: 5 | Status: SHIPPED | OUTPATIENT
Start: 2024-03-14

## 2024-03-14 ASSESSMENT — ENCOUNTER SYMPTOMS
ABDOMINAL PAIN: 0
CONSTIPATION: 0
SINUS PAIN: 0
COUGH: 0
DIARRHEA: 0
SHORTNESS OF BREATH: 0
EYE DISCHARGE: 0
CHEST TIGHTNESS: 0

## 2024-03-14 NOTE — PROGRESS NOTES
Luciana Rodriguez is a 58 y.o. female who presents with   Chief Complaint   Patient presents with    Hypertension     BP at home 140s/80s. Denies unusual HAs, dizziness, edema.       History of Present Illness    BP has been doing well at home.  No CP or SOB.  No headaches or edema.  No side effects with medications.  Exercising regularly.  Thyroid meds adjusted by endo. Had Fe infusion and energy improved.  Hx cirrhosis and fatty liver. Chronic pain controlled with Tramadol.     Review of Systems  Review of Systems   Constitutional:  Negative for appetite change, fatigue and fever.   HENT:  Negative for congestion, ear pain and sinus pain.    Eyes:  Negative for discharge.   Respiratory:  Negative for cough, chest tightness and shortness of breath.    Cardiovascular:  Negative for chest pain, palpitations and leg swelling.   Gastrointestinal:  Negative for abdominal pain, constipation and diarrhea.   Genitourinary:  Negative for dysuria.   Musculoskeletal:  Negative for joint swelling.   Skin:  Negative for rash.   Neurological:  Negative for headaches.   Hematological:  Negative for adenopathy.   Psychiatric/Behavioral:  Negative for dysphoric mood. The patient is not nervous/anxious.         Medications  Current Outpatient Medications   Medication Sig Dispense Refill    traMADol (ULTRAM) 50 MG tablet Take 1 tablet by mouth 2 times daily as needed for Pain for up to 180 days. Max Daily Amount: 100 mg 60 tablet 5    amLODIPine (NORVASC) 5 MG tablet Take 1 tablet by mouth daily 90 tablet 3    albuterol sulfate HFA (PROVENTIL;VENTOLIN;PROAIR) 108 (90 Base) MCG/ACT inhaler Inhale 2 puffs into the lungs every 4 hours as needed for Wheezing 18 g 5    traZODone (DESYREL) 150 MG tablet Take 1 tablet by mouth nightly 90 tablet 3    levothyroxine (SYNTHROID) 125 MCG tablet Take 1 tablet by mouth Daily 90 tablet 3    LORazepam (ATIVAN) 1 MG tablet Take 1 tablet by mouth every 8 hours as needed for Anxiety for up to 180 days.

## 2024-03-18 ENCOUNTER — HOSPITAL ENCOUNTER (OUTPATIENT)
Dept: PHYSICAL THERAPY | Age: 59
Setting detail: RECURRING SERIES
Discharge: HOME | End: 2024-03-21
Attending: FAMILY MEDICINE
Payer: MEDICAID

## 2024-03-18 PROCEDURE — 97110 THERAPEUTIC EXERCISES: CPT

## 2024-03-18 NOTE — PROGRESS NOTES
Luciana Rodriguez  : 1965  Primary: [facility] Medicaid (Medicaid Managed)  Secondary:  [facility] @ [place] [address] [address] [place] [address]  Phone: [phone]  Fax: [phone] Plan Frequency: 2x/week for 60 days  Plan of Care/Certification Expiration Date: 24        Plan of Care/Certification Expiration Date:  Plan of Care/Certification Expiration Date: 24    Frequency/Duration: Plan Frequency: 2x/week for 60 days      Time In/Out:   Time In: 1600  Time Out: 1655      PT Visit Info:         Visit Count:  3    OUTPATIENT PHYSICAL THERAPY:   Treatment Note 3/18/2024       Episode  (LBP and R hip pain)               Treatment Diagnosis:    Other low back pain  Pain in right hip  Muscle weakness (generalized)  Medical/Referring Diagnosis:    Chronic right-sided low back pain with right-sided sciatica [M54.41, G89.29]    Referring Physician:  Aleksandra Alex MD MD Orders:  PT Eval and Treat   Return MD Appt:  TBD   Date of Onset:  Onset Date: 24     Allergies:   Cherry and Diltiazem hcl  Restrictions/Precautions:   None      Interventions Planned (Treatment may consist of any combination of the following):     See Assessment Note    Subjective Comments:   Toña reports exercises continue to help with her hip pain. She works on her HEP daily  Initial Pain Level::     2/10  Post Session Pain Level:       1/10  Medications Last Reviewed:  3/18/2024  Updated Objective Findings:  None Today  Treatment   THERAPEUTIC EXERCISE: (53 minutes):    Exercises per grid below to improve mobility, strength, balance, and coordination.  Required moderate visual, verbal, manual, and tactile cues to promote proper body alignment, promote proper body posture, and promote proper body mechanics.  Progressed resistance, range, repetitions, and complexity of movement as indicated.   Date:  3/18/2024   Activity/Exercise Parameters   LTR x20   PPT x20   PPT to bridge

## 2024-03-19 ASSESSMENT — PAIN SCALES - GENERAL: PAINLEVEL_OUTOF10: 2

## 2024-03-20 ENCOUNTER — HOSPITAL ENCOUNTER (OUTPATIENT)
Dept: PHYSICAL THERAPY | Age: 59
Setting detail: RECURRING SERIES
Discharge: HOME | End: 2024-03-23
Attending: FAMILY MEDICINE
Payer: MEDICAID

## 2024-03-20 PROCEDURE — 97110 THERAPEUTIC EXERCISES: CPT

## 2024-03-20 ASSESSMENT — PAIN SCALES - GENERAL: PAINLEVEL_OUTOF10: 2

## 2024-03-20 NOTE — PROGRESS NOTES
promote proper body mechanics.  Progressed resistance, range, repetitions, and complexity of movement as indicated.   Date:  3/20/2024   Activity/Exercise Parameters   LTR x20   PPT x20   PPT to bridge    PPT with march 3 min   Sidelying clams 3x10 B  Green theraband   Supine clams - unilateral green band    SKTC --   Bridge with ER - green band 3x10   Sidelying trunk rotation --   Supine piriformis stretch with legs crossed 3x30 sec B   Recumbent stepper 10 min  Level 3.5   Prone quad stretch - R LE    Standing calf stretch 3x30 sec   Standing hip abduction --   Sit to stand from plinth holding yellow ball 3x10, 2nd set with push overhead, 3rd set with ball to ground then overhead     Manual: STM to R posterior hip and lumbar paraspinals, hip IR/ER stretching in prone    Treatment/Session Summary:    Treatment Assessment:   Toña with tenderness throughout R quad and adductors. Cues during sidelying clamshells. Good tolerance for clamshells with resistance.   Communication/Consultation:  None today  Equipment provided today:  None  Recommendations/Intent for next treatment session: Next visit will focus on low back and hip mobility and strengthening.    >Total Treatment Billable Duration:  53 minutes of treatment   Time In: 1230  Time Out: 1330    Farida Colvin, PT         Charge Capture  [employer] Portal  Appt Desk     Future Appointments   Date Time Provider Department Center   3/25/2024  3:00 PM Bryce Lebron, PT SFOFF SFO   3/28/2024 11:30 AM Bryce Lebron, PT SFOFF SFO   6/4/2024  1:00 PM Burton Kumari,  [facility]VL AMB   6/11/2024 12:00 PM PERIPHERAL GCCOIG GCC   6/11/2024  1:00 PM Kandi Hagen, APRN - CNP [facility] GVL AMB   6/19/2024 10:00 AM HTF LAB RESOURCE F GVL AMB   6/26/2024  1:00 PM Aleksandra Alex MD F [facility] AMB   7/22/2024  4:00 PM Albaro Piedra MD END GVL AMB   8/27/2024  1:00 PM [facility]   9/25/2024  1:45 PM Aleksandra Alex MD [facility] GVL

## 2024-03-25 ENCOUNTER — HOSPITAL ENCOUNTER (OUTPATIENT)
Dept: PHYSICAL THERAPY | Age: 59
Setting detail: RECURRING SERIES
Discharge: HOME | End: 2024-03-28
Attending: FAMILY MEDICINE
Payer: MEDICAID

## 2024-03-25 PROCEDURE — 97110 THERAPEUTIC EXERCISES: CPT

## 2024-03-25 NOTE — PROGRESS NOTES
Luciana Rodriguez  : 1965  Primary: [facility] Medicaid (Medicaid Managed)  Secondary:  [facility] [place] [address] [address] [place] [address]  Phone: [phone]  Fax: [phone] Plan Frequency: 2x/week for 60 days  Plan of Care/Certification Expiration Date: 24        Plan of Care/Certification Expiration Date:  Plan of Care/Certification Expiration Date: 24    Frequency/Duration: Plan Frequency: 2x/week for 60 days      Time In/Out:   Time In: 1500  Time Out: 1555      PT Visit Info:    Progress Note Counter: 6      Visit Count:  Visit Count:  6    OUTPATIENT PHYSICAL THERAPY:   Treatment Note 3/25/2024       Episode  (LBP and R hip pain)               Treatment Diagnosis:    Other low back pain  Pain in right hip  Muscle weakness (generalized)  Medical/Referring Diagnosis:    Chronic right-sided low back pain with right-sided sciatica [M54.41, G89.29]    Referring Physician:  Aleksandra Alex MD MD Orders:  PT Eval and Treat   Return MD Appt:  TBD   Date of Onset:  Onset Date: 24     Allergies:   Cherry and Diltiazem hcl  Restrictions/Precautions:   None      Interventions Planned (Treatment may consist of any combination of the following):     See Assessment Note    Subjective Comments:   Toña reports her R knee is still bothering her although overall its feeling better than last week  Initial Pain Level::     3/10  Post Session Pain Level:       1/10  Medications Last Reviewed:  3/25/2024  Updated Objective Findings:  None Today  Treatment   THERAPEUTIC EXERCISE: (53 minutes):    Exercises per grid below to improve mobility, strength, balance, and coordination.  Required moderate visual, verbal, manual, and tactile cues to promote proper body alignment, promote proper body posture, and promote proper body mechanics.  Progressed resistance, range, repetitions, and complexity of movement as indicated.   Date:  3/25/2024

## 2024-03-27 ENCOUNTER — OFFICE VISIT (OUTPATIENT)
Dept: FAMILY MEDICINE CLINIC | Facility: CLINIC | Age: 59
End: 2024-03-27
Payer: MEDICAID

## 2024-03-27 VITALS
HEART RATE: 78 BPM | WEIGHT: 187.6 LBS | SYSTOLIC BLOOD PRESSURE: 112 MMHG | BODY MASS INDEX: 30.15 KG/M2 | DIASTOLIC BLOOD PRESSURE: 72 MMHG | OXYGEN SATURATION: 98 % | HEIGHT: 66 IN | TEMPERATURE: 97.3 F

## 2024-03-27 DIAGNOSIS — B35.6 TINEA CRURIS: ICD-10-CM

## 2024-03-27 DIAGNOSIS — L02.91 ABSCESS: Primary | ICD-10-CM

## 2024-03-27 PROCEDURE — 3074F SYST BP LT 130 MM HG: CPT | Performed by: FAMILY MEDICINE

## 2024-03-27 PROCEDURE — 3078F DIAST BP <80 MM HG: CPT | Performed by: FAMILY MEDICINE

## 2024-03-27 PROCEDURE — 99213 OFFICE O/P EST LOW 20 MIN: CPT | Performed by: FAMILY MEDICINE

## 2024-03-27 RX ORDER — SULFAMETHOXAZOLE AND TRIMETHOPRIM 800; 160 MG/1; MG/1
1 TABLET ORAL 2 TIMES DAILY
Qty: 20 TABLET | Refills: 0 | Status: SHIPPED | OUTPATIENT
Start: 2024-03-27 | End: 2024-04-06

## 2024-03-27 ASSESSMENT — ENCOUNTER SYMPTOMS
EYE DISCHARGE: 0
SHORTNESS OF BREATH: 0
SINUS PAIN: 0
CHEST TIGHTNESS: 0
DIARRHEA: 0
COUGH: 0
CONSTIPATION: 0
ABDOMINAL PAIN: 0

## 2024-03-27 ASSESSMENT — PAIN SCALES - GENERAL: PAINLEVEL_OUTOF10: 3

## 2024-03-27 NOTE — PROGRESS NOTES
Luciana Rodriguez is a 58 y.o. female who presents with   Chief Complaint   Patient presents with    Skin Problem     Boil on stomach    Allergic Rhinitis        History of Present Illness  Bump in abd skin fold.  Drained yesterday- bloody and yellow.  Nizoral helping rash.  No fever.      Review of Systems  Review of Systems   Constitutional:  Negative for appetite change, fatigue and fever.   HENT:  Negative for congestion, ear pain and sinus pain.    Eyes:  Negative for discharge.   Respiratory:  Negative for cough, chest tightness and shortness of breath.    Cardiovascular:  Negative for chest pain, palpitations and leg swelling.   Gastrointestinal:  Negative for abdominal pain, constipation and diarrhea.   Genitourinary:  Negative for dysuria.   Musculoskeletal:  Negative for joint swelling.   Skin:  Negative for rash.   Neurological:  Negative for headaches.   Hematological:  Negative for adenopathy.   Psychiatric/Behavioral:  Negative for dysphoric mood. The patient is not nervous/anxious.         Medications  Current Outpatient Medications   Medication Sig Dispense Refill    Misc Natural Products (GLUCOSAMINE CHONDROITIN ADV PO) Take by mouth      sulfamethoxazole-trimethoprim (BACTRIM DS;SEPTRA DS) 800-160 MG per tablet Take 1 tablet by mouth 2 times daily for 10 days 20 tablet 0    traMADol (ULTRAM) 50 MG tablet Take 1 tablet by mouth 2 times daily as needed for Pain for up to 180 days. Max Daily Amount: 100 mg 60 tablet 5    amLODIPine (NORVASC) 5 MG tablet Take 1 tablet by mouth daily 90 tablet 3    albuterol sulfate HFA (PROVENTIL;VENTOLIN;PROAIR) 108 (90 Base) MCG/ACT inhaler Inhale 2 puffs into the lungs every 4 hours as needed for Wheezing 18 g 5    traZODone (DESYREL) 150 MG tablet Take 1 tablet by mouth nightly 90 tablet 3    levothyroxine (SYNTHROID) 125 MCG tablet Take 1 tablet by mouth Daily 90 tablet 3    LORazepam (ATIVAN) 1 MG tablet Take 1 tablet by mouth every 8 hours as needed for Anxiety

## 2024-03-28 ENCOUNTER — HOSPITAL ENCOUNTER (OUTPATIENT)
Dept: PHYSICAL THERAPY | Age: 59
Setting detail: RECURRING SERIES
Discharge: HOME | End: 2024-03-31
Attending: FAMILY MEDICINE
Payer: MEDICAID

## 2024-03-28 PROCEDURE — 97110 THERAPEUTIC EXERCISES: CPT

## 2024-03-28 ASSESSMENT — PAIN SCALES - GENERAL: PAINLEVEL_OUTOF10: 2

## 2024-03-28 NOTE — PROGRESS NOTES
Luciana Rodriguez  : 1965  Primary: [facility] Medicaid (Medicaid Managed)  Secondary:  [facility] [place] [address] [place] [place] [address]  Phone: [phone]  Fax: [phone] Plan Frequency: 2x/week for 60 days  Plan of Care/Certification Expiration Date: 24        Plan of Care/Certification Expiration Date:  Plan of Care/Certification Expiration Date: 24    Frequency/Duration: Plan Frequency: 2x/week for 60 days      Time In/Out:   Time In: 1130  Time Out: 1230      PT Visit Info:    Progress Note Counter: 7      Visit Count:  Visit Count:  7    OUTPATIENT PHYSICAL THERAPY:   Treatment Note 3/28/2024       Episode  (LBP and R hip pain)               Treatment Diagnosis:    Other low back pain  Pain in right hip  Muscle weakness (generalized)  Medical/Referring Diagnosis:    Chronic right-sided low back pain with right-sided sciatica [M54.41, G89.29]    Referring Physician:  Aleksandra Alex MD MD Orders:  PT Eval and Treat   Return MD Appt:  TBD   Date of Onset:  Onset Date: 24     Allergies:   Cherry and Diltiazem hcl  Restrictions/Precautions:   None      Interventions Planned (Treatment may consist of any combination of the following):     See Assessment Note    Subjective Comments:   Toña reports her knee has been feeling better since last session.   Initial Pain Level::     210  Post Session Pain Level:       1/10  Medications Last Reviewed:  3/28/2024  Updated Objective Findings:  See Discharge Note from today  Treatment   THERAPEUTIC EXERCISE: (53 minutes):    Exercises per grid below to improve mobility, strength, balance, and coordination.  Required moderate visual, verbal, manual, and tactile cues to promote proper body alignment, promote proper body posture, and promote proper body mechanics.  Progressed resistance, range, repetitions, and complexity of movement as indicated.   Date:  3/28/2024   Activity/Exercise

## 2024-03-28 NOTE — THERAPY DISCHARGE
Luciana Rodriguez  : 1965  Primary: [facility] Medicaid (Medicaid Managed)  Secondary:  [facility] @ [place] [address] [place] [address]  Phone: [phone]  Fax: [phone] Plan Frequency: 2x/week for 60 days    Plan of Care/Certification Expiration Date: 24        Plan of Care/Certification Expiration Date:  Plan of Care/Certification Expiration Date: 24    Frequency/Duration: Plan Frequency: 2x/week for 60 days      Time In/Out:   Time In: 1130  Time Out: 1230      PT Visit Info:    Progress Note Counter: 7      Visit Count:  7                OUTPATIENT PHYSICAL THERAPY:             Discharge Summary 3/28/2024               Episode (LBP and R hip pain)         Treatment Diagnosis:     Other low back pain  Pain in right hip  Muscle weakness (generalized)  Medical/Referring Diagnosis:    Chronic right-sided low back pain with right-sided sciatica [M54.41, G89.29]    Referring Physician:  Aleksandra Alex MD MD Orders:  PT Eval and Treat   Return MD Appt:  TBD  Date of Onset:  Onset Date: 24     Allergies:  Cherry and Diltiazem hcl  Restrictions/Precautions:    None      Medications Last Reviewed:  3/28/2024     SUBJECTIVE   History of Injury/Illness (Reason for Referral):  Toña is a 59 yo female referred to physical therapy for LBP and R posterior hip pain that has worsened since 2024. She went through gastric bypass surgery last year and has currently lost 80#. In January she started lifting weights and noticed an increase in back and hip pain when reaching down to pick weights up from the floor. She first took some pain medication and it helped but when she tried to return to lifting weights her pain returned so she stopped. Chief complaint of R LBP and R posterior hip pain to mid thigh with prolonged standing and walking. No change in bowel/bladder function. She takes Tylenol and tramadol as needed and notices 
no concerns

## 2024-05-02 ENCOUNTER — TELEPHONE (OUTPATIENT)
Dept: NEUROLOGY | Age: 59
End: 2024-05-02

## 2024-05-02 DIAGNOSIS — I10 PRIMARY HYPERTENSION: ICD-10-CM

## 2024-05-02 RX ORDER — AMLODIPINE BESYLATE 5 MG/1
5 TABLET ORAL DAILY
Qty: 90 TABLET | Refills: 1 | Status: SHIPPED | OUTPATIENT
Start: 2024-05-02

## 2024-05-02 NOTE — TELEPHONE ENCOUNTER
Romina from [facility] (Dr. Juares) called regarding this patient. They are trying to follow up regarding the lumbar puncture that was done on 9/12/23. The patient states that she was never called with the results. Dr. Juares feels the patient needs follow up, they are to be faxing the latest office visit notes. They would also like the results of the lumbar puncture. Romina's number  is [phone]. Fax [phone]

## 2024-05-03 NOTE — TELEPHONE ENCOUNTER
I spoke to patient.  Apologized for any miscommunications.      We did briefly discuss the results of her CSF lab testing which was completely normal.  There was no evidence of oligoclonal bands that would suggest a CNS demyelinating disease such as MS.  Her CSF protein was also normal.    I suspect her white matter changes may have been related to her history of hypertension as well as her chronic migraine.    Fortunately, it appears that she has been doing quite well with improving symptoms.  But I have suggested we arrange follow-up to check back in.  Patient agreed to an appointment 6/11 at noon.

## 2024-05-31 ENCOUNTER — HOSPITAL ENCOUNTER (OUTPATIENT)
Dept: LAB | Age: 59
End: 2024-05-31
Payer: MEDICAID

## 2024-05-31 ENCOUNTER — OFFICE VISIT (OUTPATIENT)
Dept: ONCOLOGY | Age: 59
End: 2024-05-31
Payer: MEDICAID

## 2024-05-31 VITALS
HEIGHT: 66 IN | OXYGEN SATURATION: 99 % | WEIGHT: 171.6 LBS | HEART RATE: 68 BPM | TEMPERATURE: 98.7 F | RESPIRATION RATE: 20 BRPM | BODY MASS INDEX: 27.58 KG/M2 | DIASTOLIC BLOOD PRESSURE: 79 MMHG | SYSTOLIC BLOOD PRESSURE: 121 MMHG

## 2024-05-31 DIAGNOSIS — D50.9 IRON DEFICIENCY ANEMIA, UNSPECIFIED IRON DEFICIENCY ANEMIA TYPE: Primary | ICD-10-CM

## 2024-05-31 DIAGNOSIS — K74.69 OTHER CIRRHOSIS OF LIVER (HCC): ICD-10-CM

## 2024-05-31 DIAGNOSIS — E61.1 IRON DEFICIENCY: ICD-10-CM

## 2024-05-31 DIAGNOSIS — D50.9 IRON DEFICIENCY ANEMIA, UNSPECIFIED IRON DEFICIENCY ANEMIA TYPE: ICD-10-CM

## 2024-05-31 LAB
ALBUMIN SERPL-MCNC: 3.7 G/DL (ref 3.5–5)
ALBUMIN/GLOB SERPL: 1.1 (ref 1–1.9)
ALP SERPL-CCNC: 138 U/L (ref 35–104)
ALT SERPL-CCNC: 36 U/L (ref 12–65)
ANION GAP SERPL CALC-SCNC: 9 MMOL/L (ref 9–18)
AST SERPL-CCNC: 50 U/L (ref 15–37)
BASOPHILS # BLD: 0 K/UL (ref 0–0.2)
BASOPHILS NFR BLD: 0 % (ref 0–2)
BILIRUB SERPL-MCNC: 0.5 MG/DL (ref 0–1.2)
BUN SERPL-MCNC: 15 MG/DL (ref 6–23)
CALCIUM SERPL-MCNC: 10.1 MG/DL (ref 8.8–10.2)
CHLORIDE SERPL-SCNC: 104 MMOL/L (ref 98–107)
CO2 SERPL-SCNC: 29 MMOL/L (ref 20–28)
CREAT SERPL-MCNC: 0.69 MG/DL (ref 0.6–1.1)
DIFFERENTIAL METHOD BLD: NORMAL
EOSINOPHIL # BLD: 0.3 K/UL (ref 0–0.8)
EOSINOPHIL NFR BLD: 7 % (ref 0.5–7.8)
ERYTHROCYTE [DISTWIDTH] IN BLOOD BY AUTOMATED COUNT: 13.5 % (ref 11.9–14.6)
FERRITIN SERPL-MCNC: 277 NG/ML (ref 8–388)
GLOBULIN SER CALC-MCNC: 3.5 G/DL (ref 2.3–3.5)
GLUCOSE SERPL-MCNC: 108 MG/DL (ref 70–99)
HCT VFR BLD AUTO: 42.9 % (ref 35.8–46.3)
HGB BLD-MCNC: 13.8 G/DL (ref 11.7–15.4)
IMM GRANULOCYTES # BLD AUTO: 0 K/UL (ref 0–0.5)
IMM GRANULOCYTES NFR BLD AUTO: 0 % (ref 0–5)
IRON SATN MFR SERPL: 37 % (ref 20–50)
IRON SERPL-MCNC: 103 UG/DL (ref 35–100)
LYMPHOCYTES # BLD: 1.7 K/UL (ref 0.5–4.6)
LYMPHOCYTES NFR BLD: 37 % (ref 13–44)
MCH RBC QN AUTO: 28.2 PG (ref 26.1–32.9)
MCHC RBC AUTO-ENTMCNC: 32.2 G/DL (ref 31.4–35)
MCV RBC AUTO: 87.6 FL (ref 82–102)
MONOCYTES # BLD: 0.3 K/UL (ref 0.1–1.3)
MONOCYTES NFR BLD: 7 % (ref 4–12)
NEUTS SEG # BLD: 2.2 K/UL (ref 1.7–8.2)
NEUTS SEG NFR BLD: 49 % (ref 43–78)
NRBC # BLD: 0 K/UL (ref 0–0.2)
PLATELET # BLD AUTO: 181 K/UL (ref 150–450)
PMV BLD AUTO: 10.6 FL (ref 9.4–12.3)
POTASSIUM SERPL-SCNC: 4.1 MMOL/L (ref 3.5–5.1)
PROT SERPL-MCNC: 7.2 G/DL (ref 6.3–8.2)
RBC # BLD AUTO: 4.9 M/UL (ref 4.05–5.2)
SODIUM SERPL-SCNC: 142 MMOL/L (ref 136–145)
TIBC SERPL-MCNC: 276 UG/DL (ref 240–450)
UIBC SERPL-MCNC: 173 UG/DL (ref 112–347)
WBC # BLD AUTO: 4.6 K/UL (ref 4.3–11.1)

## 2024-05-31 PROCEDURE — 3078F DIAST BP <80 MM HG: CPT | Performed by: NURSE PRACTITIONER

## 2024-05-31 PROCEDURE — 85025 COMPLETE CBC W/AUTO DIFF WBC: CPT

## 2024-05-31 PROCEDURE — 80053 COMPREHEN METABOLIC PANEL: CPT

## 2024-05-31 PROCEDURE — 36415 COLL VENOUS BLD VENIPUNCTURE: CPT

## 2024-05-31 PROCEDURE — 83550 IRON BINDING TEST: CPT

## 2024-05-31 PROCEDURE — 83540 ASSAY OF IRON: CPT

## 2024-05-31 PROCEDURE — 99214 OFFICE O/P EST MOD 30 MIN: CPT | Performed by: NURSE PRACTITIONER

## 2024-05-31 PROCEDURE — 82728 ASSAY OF FERRITIN: CPT

## 2024-05-31 PROCEDURE — 3074F SYST BP LT 130 MM HG: CPT | Performed by: NURSE PRACTITIONER

## 2024-05-31 ASSESSMENT — PATIENT HEALTH QUESTIONNAIRE - PHQ9
SUM OF ALL RESPONSES TO PHQ QUESTIONS 1-9: 0
SUM OF ALL RESPONSES TO PHQ QUESTIONS 1-9: 0
SUM OF ALL RESPONSES TO PHQ9 QUESTIONS 1 & 2: 0
SUM OF ALL RESPONSES TO PHQ QUESTIONS 1-9: 0
2. FEELING DOWN, DEPRESSED OR HOPELESS: NOT AT ALL
SUM OF ALL RESPONSES TO PHQ QUESTIONS 1-9: 0
1. LITTLE INTEREST OR PLEASURE IN DOING THINGS: NOT AT ALL

## 2024-05-31 NOTE — PROGRESS NOTES
There is no need for IV iron at this time.      JOSE E  Cirrhosis w/ splenomegaly ~14-15 cm. Liver biopsy shows focal fibrosis and fatty infiltration.     PLAN:  - As above.   IV iron as needed.  - Continue following with GI (history of cirrhosis) as well as GYN Dr. Irwin (history of endometrial cancer).  -Continue CPAP as needed    RTC in 6 months with MD with labs, iron panel.          GURVINDER Willard (Mishelle)-[facility] Hematology [facility] On[facility]  [address] [place] [address]  Office : [phone]  Fax : [phone]

## 2024-06-04 ENCOUNTER — OFFICE VISIT (OUTPATIENT)
Age: 59
End: 2024-06-04
Payer: MEDICAID

## 2024-06-04 VITALS
BODY MASS INDEX: 27.64 KG/M2 | SYSTOLIC BLOOD PRESSURE: 122 MMHG | WEIGHT: 172 LBS | DIASTOLIC BLOOD PRESSURE: 78 MMHG | HEIGHT: 66 IN | HEART RATE: 68 BPM

## 2024-06-04 DIAGNOSIS — Z98.890 H/O CARDIAC RADIOFREQUENCY ABLATION: ICD-10-CM

## 2024-06-04 DIAGNOSIS — E78.2 MIXED HYPERLIPIDEMIA: ICD-10-CM

## 2024-06-04 DIAGNOSIS — I48.0 PAROXYSMAL ATRIAL FIBRILLATION (HCC): Primary | ICD-10-CM

## 2024-06-04 DIAGNOSIS — I10 ESSENTIAL HYPERTENSION: ICD-10-CM

## 2024-06-04 PROCEDURE — 3074F SYST BP LT 130 MM HG: CPT | Performed by: INTERNAL MEDICINE

## 2024-06-04 PROCEDURE — 93000 ELECTROCARDIOGRAM COMPLETE: CPT | Performed by: INTERNAL MEDICINE

## 2024-06-04 PROCEDURE — 3078F DIAST BP <80 MM HG: CPT | Performed by: INTERNAL MEDICINE

## 2024-06-04 PROCEDURE — 99214 OFFICE O/P EST MOD 30 MIN: CPT | Performed by: INTERNAL MEDICINE

## 2024-06-04 ASSESSMENT — ENCOUNTER SYMPTOMS
SHORTNESS OF BREATH: 0
ABDOMINAL PAIN: 0
NAUSEA: 0
COUGH: 0
RESPIRATORY NEGATIVE: 1
VOMITING: 0

## 2024-06-04 NOTE — PROGRESS NOTES
[facility] CARDIOLOGY, [address], [address]     [place] [address]      Patient:  Luciana Rodriguez  1965       SUBJECTIVE:  Luciana Rodriguez is a  58 y.o. female seen for a follow up visit regarding the following:     Chief Complaint   Patient presents with    6 Month Follow-Up    Atrial Fibrillation     CC: A Fib post ablation     HPI:   56 yo F with history of atrial fibrillation s/p ablation, HTN, endometrial CA, obesity     Patient was last seen in office on 12/1/23 , since then reports that she has been doing very well, she has bee making efforts to lose weight.  She has been taking her medicines as directed without missing doses.  No interval ER visits or hospitalizations for cardiac problems.  Denies chest pressure or chest pain, racing heart or palpitations, shortness of breath, cough, wheeze, fevers, chills, leg swelling.  Occasional irregular heartbeat which is rare.  No other complaints.      Cardiovascular Testing:  - Cath 7/5/22 [facility]: Normal coronary arteries.   - SPECT 6/29/22 [facility] : moderate, partially reversible anteroapical defect, LVEF 71%. (FALSE POSITIVE)  - Echo 6/28/22 [facility]: LVEF 60-65 %, RV normal, Valves: mild mitral regurgitation.   -  6/29/19 in [place]: LVEF >55%, RV normal, mild MR, mild TR  - Echo 6/19/2018 in [place]  reveals normal LVEF, no significant valvular abnormalities  - ECG stress test 6/28/2018: negative for inducible ischemia.         Past medical history, past surgical history, family history, social history, and medications were all reviewed with the patient today and updated as necessary.         Patient Active Problem List    Diagnosis Date Noted    Age-related nuclear cataract of both eyes 05/19/2020     Priority: Medium    Allergic conjunctivitis of both eyes 05/19/2020     Priority: Medium    Myopia of both eyes with regular astigmatism and presbyopia 05/19/2020     Priority: Medium    Epidermal inclusion cyst

## 2024-06-11 ENCOUNTER — OFFICE VISIT (OUTPATIENT)
Dept: NEUROLOGY | Age: 59
End: 2024-06-11
Payer: MEDICAID

## 2024-06-11 VITALS
SYSTOLIC BLOOD PRESSURE: 127 MMHG | HEART RATE: 75 BPM | OXYGEN SATURATION: 97 % | DIASTOLIC BLOOD PRESSURE: 81 MMHG | WEIGHT: 171 LBS | BODY MASS INDEX: 27.61 KG/M2

## 2024-06-11 DIAGNOSIS — I67.9 CEREBROVASCULAR SMALL VESSEL DISEASE: ICD-10-CM

## 2024-06-11 DIAGNOSIS — G43.709 CHRONIC MIGRAINE W/O AURA W/O STATUS MIGRAINOSUS, NOT INTRACTABLE: Primary | ICD-10-CM

## 2024-06-11 PROCEDURE — 3079F DIAST BP 80-89 MM HG: CPT | Performed by: PSYCHIATRY & NEUROLOGY

## 2024-06-11 PROCEDURE — 99214 OFFICE O/P EST MOD 30 MIN: CPT | Performed by: PSYCHIATRY & NEUROLOGY

## 2024-06-11 PROCEDURE — 3074F SYST BP LT 130 MM HG: CPT | Performed by: PSYCHIATRY & NEUROLOGY

## 2024-06-11 RX ORDER — UBROGEPANT 100 MG/1
TABLET ORAL
Qty: 8 TABLET | Refills: 5 | Status: SHIPPED | OUTPATIENT
Start: 2024-06-11

## 2024-06-11 ASSESSMENT — ENCOUNTER SYMPTOMS
COUGH: 0
ABDOMINAL PAIN: 0
VOICE CHANGE: 0
BACK PAIN: 1

## 2024-06-11 NOTE — PROGRESS NOTES
Reactions    Cherry      Other Reaction(s): Not available    Diltiazem Hcl Other (See Comments)     Blistering on the chest and arms    Other Reaction(s): Not available       Physical Exam:     /81 (Site: Right Upper Arm, Position: Sitting)   Pulse 75   Wt 77.6 kg (171 lb)   SpO2 97%   BMI 27.61 kg/m²     General Exam:  General: Well developed, well nourished, in no apparent distress.   HEENT: Normocephalic, atraumatic. Sclera anicteric. Oropharynx clear.   Neck: Supple without masses  Cardiovascular: Regular rate and rhythm. No carotid bruits.   Lungs: Non-labored breathing.   Abdomen: Soft, nontender, nondistended.   Extremities: Peripheral pulses intact. No edema and no rashes.     Neurological Exam:     MS/Language/Speech:  Alert. Oriented to person, place, and time. Language fluent. Speech normal.     Cranial Nerves: PERRL.  No APD.  Eye movements show slight limited adduction OU but only slight. Subjective diplopia when looking up to the left. No nystagmus or square wave jerks. Face was symmetric with good activation and normal sensation. Tongue and palate were midline. Shoulder shrug symmetric.    Motor: Strength was full in all proximal and distal muscle groups. Tone was normal.     Abnormal Movements: There was no tremor or hyperkinetic movements.     Sensory: Normal to light touch throughout.    Cerebellar: No ataxia or dysmetria with finger-nose-finger bilaterally.      Reflexes (R/L): Biceps (2+/2+), Brachioradialis (2+/2+), Patellar (2+/2+), Ankle (1+/1+). Jerome's was negative and plantar responses were flexor.      Gait: She can rise from a seated position without difficulty.  Posture is upright.  She walks with an antalgic appearing gait due to knee pain.      Assessment and Plan:     Luciana Rodriguez is a 58 y.o. female who presents with the following issues:     Luciana was seen today for follow-up.    Diagnoses and all orders for this visit:    Chronic migraine w/o aura w/o status

## 2024-07-03 DIAGNOSIS — F41.9 ANXIETY: ICD-10-CM

## 2024-07-03 RX ORDER — LORAZEPAM 1 MG/1
TABLET ORAL
Qty: 60 TABLET | Refills: 5 | Status: SHIPPED | OUTPATIENT
Start: 2024-07-03 | End: 2024-12-30

## 2024-07-18 DIAGNOSIS — Z87.19 HX OF GASTROESOPHAGEAL REFLUX (GERD): ICD-10-CM

## 2024-07-19 RX ORDER — OMEPRAZOLE 40 MG/1
40 CAPSULE, DELAYED RELEASE ORAL
Qty: 90 CAPSULE | Refills: 0 | Status: SHIPPED | OUTPATIENT
Start: 2024-07-19

## 2024-07-22 ENCOUNTER — OFFICE VISIT (OUTPATIENT)
Dept: ENDOCRINOLOGY | Age: 59
End: 2024-07-22
Payer: MEDICAID

## 2024-07-22 VITALS
BODY MASS INDEX: 26.32 KG/M2 | WEIGHT: 163 LBS | SYSTOLIC BLOOD PRESSURE: 120 MMHG | DIASTOLIC BLOOD PRESSURE: 74 MMHG | HEART RATE: 68 BPM

## 2024-07-22 DIAGNOSIS — E89.0 POSTSURGICAL HYPOTHYROIDISM: Primary | ICD-10-CM

## 2024-07-22 DIAGNOSIS — E89.0 POSTSURGICAL HYPOTHYROIDISM: ICD-10-CM

## 2024-07-22 DIAGNOSIS — H57.89 THYROID EYE DISEASE: ICD-10-CM

## 2024-07-22 DIAGNOSIS — E07.9 THYROID EYE DISEASE: ICD-10-CM

## 2024-07-22 PROBLEM — E04.2 NON-TOXIC MULTINODULAR GOITER: Status: RESOLVED | Noted: 2017-04-20 | Resolved: 2024-07-22

## 2024-07-22 PROBLEM — E05.90 THYROTOXICOSIS: Status: RESOLVED | Noted: 2017-03-07 | Resolved: 2024-07-22

## 2024-07-22 LAB — TSH W FREE THYROID IF ABNORMAL: 0.29 UIU/ML (ref 0.27–4.2)

## 2024-07-22 PROCEDURE — 99214 OFFICE O/P EST MOD 30 MIN: CPT | Performed by: INTERNAL MEDICINE

## 2024-07-22 PROCEDURE — 3074F SYST BP LT 130 MM HG: CPT | Performed by: INTERNAL MEDICINE

## 2024-07-22 PROCEDURE — 3078F DIAST BP <80 MM HG: CPT | Performed by: INTERNAL MEDICINE

## 2024-07-22 RX ORDER — LEVOTHYROXINE SODIUM 0.12 MG/1
125 TABLET ORAL DAILY
Qty: 90 TABLET | Refills: 3 | Status: SHIPPED | OUTPATIENT
Start: 2024-07-22

## 2024-07-22 ASSESSMENT — ENCOUNTER SYMPTOMS
ROS SKIN COMMENTS: SHE REPORTS HAIR LOSS.  DENIES DRY SKIN.
DIARRHEA: 0
CONSTIPATION: 0

## 2024-07-22 NOTE — PROGRESS NOTES
TIME DAILY 90 tablet 1    Misc Natural Products (GLUCOSAMINE CHONDROITIN ADV PO) Take by mouth      traMADol (ULTRAM) 50 MG tablet Take 1 tablet by mouth 2 times daily as needed for Pain for up to 180 days. Max Daily Amount: 100 mg 60 tablet 5    albuterol sulfate HFA (PROVENTIL;VENTOLIN;PROAIR) 108 (90 Base) MCG/ACT inhaler Inhale 2 puffs into the lungs every 4 hours as needed for Wheezing 18 g 5    traZODone (DESYREL) 150 MG tablet Take 1 tablet by mouth nightly 90 tablet 3    BIOTIN PO Take by mouth      NONFORMULARY Formula - AMT - cream for pain      Multiple Vitamins-Minerals (MULTI COMPLETE PO) Take by mouth      calcium carb-cholecalciferol (CALCIUM 600+D) 600-10 MG-MCG TABS per tab Take 1 tablet by mouth 2 times daily      acetaminophen (TYLENOL) 500 MG tablet Take 2 tablets by mouth in the morning and at bedtime      Ubrogepant (UBRELVY) 100 MG TABS Take 1 tablet as needed at the onset of headache.  May repeat dose in 2 hours if needed.  No more than 2 tablets per 24 hours. (Patient not taking: Reported on 7/22/2024) 8 tablet 5     No current facility-administered medications for this visit.

## 2024-07-30 ENCOUNTER — TELEPHONE (OUTPATIENT)
Dept: ONCOLOGY | Age: 59
End: 2024-07-30

## 2024-07-30 NOTE — TELEPHONE ENCOUNTER
Spoke with pt about rescheduling to [facility] due to [facility] no longer seeing pts. Pt stated she is fine with that.

## 2024-08-03 DIAGNOSIS — J45.40 MODERATE PERSISTENT ASTHMA WITHOUT COMPLICATION: ICD-10-CM

## 2024-08-04 RX ORDER — ALBUTEROL SULFATE 90 UG/1
2 AEROSOL, METERED RESPIRATORY (INHALATION) EVERY 4 HOURS PRN
Qty: 8.5 G | Refills: 5 | Status: SHIPPED | OUTPATIENT
Start: 2024-08-04
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
## 2024-08-26 NOTE — PROGRESS NOTES
[facility] NUTRITION REASSESSMENT  Assessment:   1 year post-op VSG. Pt consuming ~80g protein/d and ~80oz fluid/d. Pt is eating at least 3x/d. Pt is drinking water and light cranberry juice. Pt is exercising via walking and light wts. Pt is taking MVI and Ca supplements as recommended.    Diet Recall:   Breakfast: Avocado toast   Lunch: Tuna and spinach   Dinner: Chx pot pie   Snack: Protein shake     Intervention:   Evaluated diet recall.  Encouraged continued activity        Monitoring and Evaluation:  Monitor for continued safe, supervised weight loss for VSG.   F/U per MD Geovanna Wells MBA. RD, LD

## 2024-08-27 ENCOUNTER — OFFICE VISIT (OUTPATIENT)
Dept: SURGERY | Age: 59
End: 2024-08-27
Payer: MEDICAID

## 2024-08-27 VITALS
HEART RATE: 69 BPM | SYSTOLIC BLOOD PRESSURE: 130 MMHG | HEIGHT: 65 IN | WEIGHT: 154 LBS | DIASTOLIC BLOOD PRESSURE: 87 MMHG | BODY MASS INDEX: 25.66 KG/M2

## 2024-08-27 DIAGNOSIS — L98.7 EXCESS SKIN OF ABDOMEN: ICD-10-CM

## 2024-08-27 DIAGNOSIS — Z71.3 DIETARY COUNSELING: ICD-10-CM

## 2024-08-27 DIAGNOSIS — Z98.84 S/P GASTRIC BYPASS: ICD-10-CM

## 2024-08-27 DIAGNOSIS — Z71.82 EXERCISE COUNSELING: ICD-10-CM

## 2024-08-27 DIAGNOSIS — E66.3 OVERWEIGHT (BMI 25.0-29.9): Primary | ICD-10-CM

## 2024-08-27 PROCEDURE — 3075F SYST BP GE 130 - 139MM HG: CPT | Performed by: SURGERY

## 2024-08-27 PROCEDURE — APPSS30 APP SPLIT SHARED TIME 16-30 MINUTES: Performed by: PHYSICIAN ASSISTANT

## 2024-08-27 PROCEDURE — 99215 OFFICE O/P EST HI 40 MIN: CPT | Performed by: SURGERY

## 2024-08-27 PROCEDURE — 3079F DIAST BP 80-89 MM HG: CPT | Performed by: SURGERY

## 2024-09-19 ENCOUNTER — PREP FOR PROCEDURE (OUTPATIENT)
Dept: ONCOLOGY | Age: 59
End: 2024-09-19

## 2024-09-19 DIAGNOSIS — C54.1 ENDOMETRIAL CANCER (HCC): Primary | ICD-10-CM

## 2024-09-23 SDOH — ECONOMIC STABILITY: INCOME INSECURITY: HOW HARD IS IT FOR YOU TO PAY FOR THE VERY BASICS LIKE FOOD, HOUSING, MEDICAL CARE, AND HEATING?: SOMEWHAT HARD

## 2024-09-23 SDOH — ECONOMIC STABILITY: FOOD INSECURITY: WITHIN THE PAST 12 MONTHS, THE FOOD YOU BOUGHT JUST DIDN'T LAST AND YOU DIDN'T HAVE MONEY TO GET MORE.: SOMETIMES TRUE

## 2024-09-23 SDOH — ECONOMIC STABILITY: FOOD INSECURITY: WITHIN THE PAST 12 MONTHS, YOU WORRIED THAT YOUR FOOD WOULD RUN OUT BEFORE YOU GOT MONEY TO BUY MORE.: SOMETIMES TRUE

## 2024-09-25 ENCOUNTER — OFFICE VISIT (OUTPATIENT)
Dept: FAMILY MEDICINE CLINIC | Facility: CLINIC | Age: 59
End: 2024-09-25
Payer: MEDICAID

## 2024-09-25 VITALS
HEART RATE: 67 BPM | SYSTOLIC BLOOD PRESSURE: 116 MMHG | BODY MASS INDEX: 25.29 KG/M2 | OXYGEN SATURATION: 98 % | WEIGHT: 152 LBS | TEMPERATURE: 97.3 F | DIASTOLIC BLOOD PRESSURE: 72 MMHG

## 2024-09-25 DIAGNOSIS — Z87.19 HX OF GASTROESOPHAGEAL REFLUX (GERD): ICD-10-CM

## 2024-09-25 DIAGNOSIS — B37.2 CANDIDAL SKIN INFECTION: ICD-10-CM

## 2024-09-25 DIAGNOSIS — L40.9 SCALP PSORIASIS: ICD-10-CM

## 2024-09-25 DIAGNOSIS — K74.69 OTHER CIRRHOSIS OF LIVER (HCC): ICD-10-CM

## 2024-09-25 DIAGNOSIS — L30.9 DERMATITIS: ICD-10-CM

## 2024-09-25 DIAGNOSIS — E89.0 S/P TOTAL THYROIDECTOMY: ICD-10-CM

## 2024-09-25 DIAGNOSIS — E89.0 POSTSURGICAL HYPOTHYROIDISM: ICD-10-CM

## 2024-09-25 DIAGNOSIS — J45.40 MODERATE PERSISTENT ASTHMA WITHOUT COMPLICATION: ICD-10-CM

## 2024-09-25 DIAGNOSIS — Z85.42 HISTORY OF ENDOMETRIAL CANCER: ICD-10-CM

## 2024-09-25 DIAGNOSIS — I10 PRIMARY HYPERTENSION: Primary | ICD-10-CM

## 2024-09-25 LAB
ALBUMIN SERPL-MCNC: 3.5 G/DL (ref 3.5–5)
ALBUMIN/GLOB SERPL: 1.3 (ref 1–1.9)
ALP SERPL-CCNC: 105 U/L (ref 35–104)
ALT SERPL-CCNC: 22 U/L (ref 8–45)
AST SERPL-CCNC: 34 U/L (ref 15–37)
BILIRUB DIRECT SERPL-MCNC: 0.2 MG/DL (ref 0–0.4)
BILIRUB SERPL-MCNC: 0.5 MG/DL (ref 0–1.2)
GLOBULIN SER CALC-MCNC: 2.8 G/DL (ref 2.3–3.5)
PROT SERPL-MCNC: 6.4 G/DL (ref 6.3–8.2)

## 2024-09-25 PROCEDURE — 3078F DIAST BP <80 MM HG: CPT | Performed by: FAMILY MEDICINE

## 2024-09-25 PROCEDURE — 3074F SYST BP LT 130 MM HG: CPT | Performed by: FAMILY MEDICINE

## 2024-09-25 PROCEDURE — 99214 OFFICE O/P EST MOD 30 MIN: CPT | Performed by: FAMILY MEDICINE

## 2024-09-25 RX ORDER — AMLODIPINE BESYLATE 5 MG/1
5 TABLET ORAL DAILY
Qty: 90 TABLET | Refills: 3 | Status: SHIPPED | OUTPATIENT
Start: 2024-09-25

## 2024-09-25 RX ORDER — CLOBETASOL PROPIONATE 0.5 MG/ML
SOLUTION TOPICAL
Qty: 50 ML | Refills: 3 | Status: SHIPPED | OUTPATIENT
Start: 2024-09-25

## 2024-09-25 RX ORDER — NYSTATIN 100000 U/G
CREAM TOPICAL
Qty: 30 G | Refills: 1 | Status: SHIPPED | OUTPATIENT
Start: 2024-09-25

## 2024-09-25 RX ORDER — OMEPRAZOLE 40 MG/1
40 CAPSULE, DELAYED RELEASE ORAL
Qty: 90 CAPSULE | Refills: 3 | Status: SHIPPED | OUTPATIENT
Start: 2024-09-25

## 2024-09-25 ASSESSMENT — ENCOUNTER SYMPTOMS
CHEST TIGHTNESS: 0
COUGH: 0
EYE DISCHARGE: 0
ABDOMINAL PAIN: 0
CONSTIPATION: 0
BACK PAIN: 1
WHEEZING: 1
SINUS PAIN: 0
SHORTNESS OF BREATH: 0
DIARRHEA: 0

## 2024-09-26 ENCOUNTER — HOSPITAL ENCOUNTER (OUTPATIENT)
Dept: LAB | Age: 59
Discharge: HOME | End: 2024-09-29
Payer: MEDICAID

## 2024-09-26 ENCOUNTER — OFFICE VISIT (OUTPATIENT)
Dept: ONCOLOGY | Age: 59
End: 2024-09-26
Payer: MEDICAID

## 2024-09-26 VITALS
OXYGEN SATURATION: 97 % | WEIGHT: 153.9 LBS | TEMPERATURE: 97.9 F | HEART RATE: 61 BPM | SYSTOLIC BLOOD PRESSURE: 130 MMHG | BODY MASS INDEX: 24.73 KG/M2 | DIASTOLIC BLOOD PRESSURE: 81 MMHG | HEIGHT: 66 IN | RESPIRATION RATE: 18 BRPM

## 2024-09-26 DIAGNOSIS — C54.1 ENDOMETRIAL CANCER (HCC): Primary | ICD-10-CM

## 2024-09-26 DIAGNOSIS — C54.1 ENDOMETRIAL CANCER (HCC): ICD-10-CM

## 2024-09-26 PROCEDURE — 3075F SYST BP GE 130 - 139MM HG: CPT | Performed by: PHYSICIAN ASSISTANT

## 2024-09-26 PROCEDURE — 99213 OFFICE O/P EST LOW 20 MIN: CPT | Performed by: PHYSICIAN ASSISTANT

## 2024-09-26 PROCEDURE — 3079F DIAST BP 80-89 MM HG: CPT | Performed by: PHYSICIAN ASSISTANT

## 2024-09-26 PROCEDURE — 88142 CYTOPATH C/V THIN LAYER: CPT

## 2024-09-26 RX ORDER — TRAMADOL HYDROCHLORIDE 50 MG/1
TABLET ORAL
COMMUNITY
Start: 2024-09-05

## 2024-09-26 ASSESSMENT — PATIENT HEALTH QUESTIONNAIRE - PHQ9
SUM OF ALL RESPONSES TO PHQ QUESTIONS 1-9: 0
2. FEELING DOWN, DEPRESSED OR HOPELESS: NOT AT ALL
SUM OF ALL RESPONSES TO PHQ9 QUESTIONS 1 & 2: 0
1. LITTLE INTEREST OR PLEASURE IN DOING THINGS: NOT AT ALL

## 2024-10-06 DIAGNOSIS — M54.50 CHRONIC BILATERAL LOW BACK PAIN WITHOUT SCIATICA: Primary | ICD-10-CM

## 2024-10-06 DIAGNOSIS — G89.29 CHRONIC BILATERAL LOW BACK PAIN WITHOUT SCIATICA: Primary | ICD-10-CM

## 2024-10-07 RX ORDER — TRAMADOL HYDROCHLORIDE 50 MG/1
50 TABLET ORAL EVERY 8 HOURS PRN
Qty: 60 TABLET | Refills: 5 | Status: SHIPPED | OUTPATIENT
Start: 2024-10-07 | End: 2025-04-05

## 2024-11-11 DIAGNOSIS — D50.9 IRON DEFICIENCY ANEMIA, UNSPECIFIED IRON DEFICIENCY ANEMIA TYPE: Primary | ICD-10-CM

## 2024-11-13 DIAGNOSIS — J45.40 MODERATE PERSISTENT ASTHMA WITHOUT COMPLICATION: ICD-10-CM

## 2024-11-13 RX ORDER — ALBUTEROL SULFATE 90 UG/1
2 INHALANT RESPIRATORY (INHALATION) EVERY 4 HOURS PRN
Qty: 8.5 G | Refills: 5 | Status: SHIPPED | OUTPATIENT
Start: 2024-11-13

## 2024-11-19 ENCOUNTER — HOSPITAL ENCOUNTER (OUTPATIENT)
Dept: LAB | Age: 59
Discharge: HOME | End: 2024-11-19
Payer: MEDICAID

## 2024-11-19 ENCOUNTER — OFFICE VISIT (OUTPATIENT)
Dept: ONCOLOGY | Age: 59
End: 2024-11-19
Payer: MEDICAID

## 2024-11-19 VITALS
BODY MASS INDEX: 23.31 KG/M2 | HEIGHT: 66 IN | HEART RATE: 56 BPM | TEMPERATURE: 97.9 F | OXYGEN SATURATION: 98 % | WEIGHT: 145.06 LBS | DIASTOLIC BLOOD PRESSURE: 84 MMHG | RESPIRATION RATE: 16 BRPM | SYSTOLIC BLOOD PRESSURE: 137 MMHG

## 2024-11-19 DIAGNOSIS — D50.9 IRON DEFICIENCY ANEMIA, UNSPECIFIED IRON DEFICIENCY ANEMIA TYPE: Primary | ICD-10-CM

## 2024-11-19 DIAGNOSIS — D50.9 IRON DEFICIENCY ANEMIA, UNSPECIFIED IRON DEFICIENCY ANEMIA TYPE: ICD-10-CM

## 2024-11-19 DIAGNOSIS — K74.69 OTHER CIRRHOSIS OF LIVER (HCC): ICD-10-CM

## 2024-11-19 LAB
ALBUMIN SERPL-MCNC: 3.7 G/DL (ref 3.2–4.6)
ALBUMIN/GLOB SERPL: 1 (ref 1–1.9)
ALP SERPL-CCNC: 120 U/L (ref 35–104)
ALT SERPL-CCNC: 24 U/L (ref 8–45)
ANION GAP SERPL CALC-SCNC: 8 MMOL/L (ref 7–16)
AST SERPL-CCNC: 36 U/L (ref 15–37)
BASOPHILS # BLD: 0 K/UL (ref 0–0.2)
BASOPHILS NFR BLD: 0 % (ref 0–2)
BILIRUB SERPL-MCNC: 0.6 MG/DL (ref 0–1.2)
BUN SERPL-MCNC: 13 MG/DL (ref 8–23)
CALCIUM SERPL-MCNC: 9.8 MG/DL (ref 8.8–10.2)
CHLORIDE SERPL-SCNC: 103 MMOL/L (ref 98–107)
CO2 SERPL-SCNC: 29 MMOL/L (ref 20–29)
CREAT SERPL-MCNC: 0.65 MG/DL (ref 0.6–1.1)
DIFFERENTIAL METHOD BLD: ABNORMAL
EOSINOPHIL # BLD: 0.2 K/UL (ref 0–0.8)
EOSINOPHIL NFR BLD: 5 % (ref 0.5–7.8)
ERYTHROCYTE [DISTWIDTH] IN BLOOD BY AUTOMATED COUNT: 13.6 % (ref 11.9–14.6)
FERRITIN SERPL-MCNC: 33 NG/ML (ref 8–388)
GLOBULIN SER CALC-MCNC: 3.8 G/DL (ref 2.3–3.5)
GLUCOSE SERPL-MCNC: 98 MG/DL (ref 70–99)
HCT VFR BLD AUTO: 44.1 % (ref 35.8–46.3)
HGB BLD-MCNC: 13.6 G/DL (ref 11.7–15.4)
IMM GRANULOCYTES # BLD AUTO: 0 K/UL (ref 0–0.5)
IMM GRANULOCYTES NFR BLD AUTO: 0 % (ref 0–5)
IRON SATN MFR SERPL: 26 % (ref 20–50)
IRON SERPL-MCNC: 89 UG/DL (ref 35–100)
LYMPHOCYTES # BLD: 1.7 K/UL (ref 0.5–4.6)
LYMPHOCYTES NFR BLD: 41 % (ref 13–44)
MCH RBC QN AUTO: 26.6 PG (ref 26.1–32.9)
MCHC RBC AUTO-ENTMCNC: 30.8 G/DL (ref 31.4–35)
MCV RBC AUTO: 86.1 FL (ref 82–102)
MONOCYTES # BLD: 0.3 K/UL (ref 0.1–1.3)
MONOCYTES NFR BLD: 8 % (ref 4–12)
NEUTS SEG # BLD: 1.8 K/UL (ref 1.7–8.2)
NEUTS SEG NFR BLD: 46 % (ref 43–78)
NRBC # BLD: 0 K/UL (ref 0–0.2)
PLATELET # BLD AUTO: 159 K/UL (ref 150–450)
PMV BLD AUTO: 11.6 FL (ref 9.4–12.3)
POTASSIUM SERPL-SCNC: 4.2 MMOL/L (ref 3.5–5.1)
PROT SERPL-MCNC: 7.5 G/DL (ref 6.3–8.2)
RBC # BLD AUTO: 5.12 M/UL (ref 4.05–5.2)
SODIUM SERPL-SCNC: 140 MMOL/L (ref 136–145)
TIBC SERPL-MCNC: 338 UG/DL (ref 240–450)
UIBC SERPL-MCNC: 249 UG/DL (ref 112–347)
WBC # BLD AUTO: 4 K/UL (ref 4.3–11.1)

## 2024-11-19 PROCEDURE — 80053 COMPREHEN METABOLIC PANEL: CPT

## 2024-11-19 PROCEDURE — 3075F SYST BP GE 130 - 139MM HG: CPT | Performed by: INTERNAL MEDICINE

## 2024-11-19 PROCEDURE — 99214 OFFICE O/P EST MOD 30 MIN: CPT | Performed by: INTERNAL MEDICINE

## 2024-11-19 PROCEDURE — 83540 ASSAY OF IRON: CPT

## 2024-11-19 PROCEDURE — 36415 COLL VENOUS BLD VENIPUNCTURE: CPT

## 2024-11-19 PROCEDURE — 83550 IRON BINDING TEST: CPT

## 2024-11-19 PROCEDURE — 85025 COMPLETE CBC W/AUTO DIFF WBC: CPT

## 2024-11-19 PROCEDURE — 3079F DIAST BP 80-89 MM HG: CPT | Performed by: INTERNAL MEDICINE

## 2024-11-19 PROCEDURE — 82728 ASSAY OF FERRITIN: CPT

## 2024-11-19 RX ORDER — SILVER SULFADIAZINE 10 MG/G
CREAM TOPICAL DAILY
COMMUNITY
Start: 2024-11-01

## 2024-11-19 RX ORDER — EPINEPHRINE 1 MG/ML
0.3 INJECTION, SOLUTION, CONCENTRATE INTRAVENOUS PRN
OUTPATIENT
Start: 2024-11-26

## 2024-11-19 RX ORDER — SODIUM CHLORIDE 0.9 % (FLUSH) 0.9 %
5-40 SYRINGE (ML) INJECTION PRN
OUTPATIENT
Start: 2024-11-26

## 2024-11-19 RX ORDER — ACETAMINOPHEN 325 MG/1
650 TABLET ORAL
OUTPATIENT
Start: 2024-11-26

## 2024-11-19 RX ORDER — HYDROCORTISONE SODIUM SUCCINATE 100 MG/2ML
100 INJECTION INTRAMUSCULAR; INTRAVENOUS
OUTPATIENT
Start: 2024-11-26

## 2024-11-19 RX ORDER — FAMOTIDINE 10 MG/ML
20 INJECTION, SOLUTION INTRAVENOUS
OUTPATIENT
Start: 2024-11-26

## 2024-11-19 RX ORDER — ACETAMINOPHEN 325 MG/1
650 TABLET ORAL ONCE
OUTPATIENT
Start: 2024-11-26 | End: 2024-11-26

## 2024-11-19 RX ORDER — ONDANSETRON 2 MG/ML
8 INJECTION INTRAMUSCULAR; INTRAVENOUS
OUTPATIENT
Start: 2024-11-26

## 2024-11-19 RX ORDER — SODIUM CHLORIDE 9 MG/ML
5-250 INJECTION, SOLUTION INTRAVENOUS PRN
OUTPATIENT
Start: 2024-11-26

## 2024-11-19 RX ORDER — DIPHENHYDRAMINE HYDROCHLORIDE 50 MG/ML
50 INJECTION INTRAMUSCULAR; INTRAVENOUS
OUTPATIENT
Start: 2024-11-26

## 2024-11-19 RX ORDER — ALBUTEROL SULFATE 90 UG/1
4 INHALANT RESPIRATORY (INHALATION) PRN
OUTPATIENT
Start: 2024-11-26

## 2024-11-19 RX ORDER — HEPARIN SODIUM (PORCINE) LOCK FLUSH IV SOLN 100 UNIT/ML 100 UNIT/ML
500 SOLUTION INTRAVENOUS PRN
OUTPATIENT
Start: 2024-11-26

## 2024-11-19 RX ORDER — DIPHENHYDRAMINE HYDROCHLORIDE 50 MG/ML
25 INJECTION INTRAMUSCULAR; INTRAVENOUS ONCE
OUTPATIENT
Start: 2024-11-26 | End: 2024-11-26

## 2024-11-19 RX ORDER — SODIUM CHLORIDE 9 MG/ML
INJECTION, SOLUTION INTRAVENOUS CONTINUOUS
OUTPATIENT
Start: 2024-11-26

## 2024-11-19 ASSESSMENT — PATIENT HEALTH QUESTIONNAIRE - PHQ9
SUM OF ALL RESPONSES TO PHQ QUESTIONS 1-9: 0
SUM OF ALL RESPONSES TO PHQ QUESTIONS 1-9: 0
2. FEELING DOWN, DEPRESSED OR HOPELESS: NOT AT ALL
1. LITTLE INTEREST OR PLEASURE IN DOING THINGS: NOT AT ALL
SUM OF ALL RESPONSES TO PHQ9 QUESTIONS 1 & 2: 0
SUM OF ALL RESPONSES TO PHQ QUESTIONS 1-9: 0
SUM OF ALL RESPONSES TO PHQ QUESTIONS 1-9: 0

## 2024-11-19 NOTE — PATIENT INSTRUCTIONS
calculated using previous equations.  The CKD-EPI equation is less accurate in patients with extremes of muscle mass, extra-renal metabolism of creatinine, excessive creatine ingestion, or following therapy that affects renal tubular secretion.      Calcium 11/19/2024 9.8  8.8 - 10.2 MG/DL Final    Total Bilirubin 11/19/2024 0.6  0.0 - 1.2 MG/DL Final    ALT 11/19/2024 24  8 - 45 U/L Final    AST 11/19/2024 36  15 - 37 U/L Final    Alk Phosphatase 11/19/2024 120 (H)  35 - 104 U/L Final    Total Protein 11/19/2024 7.5  6.3 - 8.2 g/dL Final    Albumin 11/19/2024 3.7  3.2 - 4.6 g/dL Final    Globulin 11/19/2024 3.8 (H)  2.3 - 3.5 g/dL Final    Albumin/Globulin Ratio 11/19/2024 1.0  1.0 - 1.9   Final    WBC 11/19/2024 4.0 (L)  4.3 - 11.1 K/uL Final    RBC 11/19/2024 5.12  4.05 - 5.2 M/uL Final    Hemoglobin 11/19/2024 13.6  11.7 - 15.4 g/dL Final    Hematocrit 11/19/2024 44.1  35.8 - 46.3 % Final    MCV 11/19/2024 86.1  82.0 - 102.0 FL Final    MCH 11/19/2024 26.6  26.1 - 32.9 PG Final    MCHC 11/19/2024 30.8 (L)  31.4 - 35.0 g/dL Final    RDW 11/19/2024 13.6  11.9 - 14.6 % Final    Platelets 11/19/2024 159  150 - 450 K/uL Final    MPV 11/19/2024 11.6  9.4 - 12.3 FL Final    nRBC 11/19/2024 0.00  0.0 - 0.2 K/uL Final    **Note: Absolute NRBC parameter is now reported with Hemogram**    Neutrophils % 11/19/2024 46  43 - 78 % Final    Lymphocytes % 11/19/2024 41  13 - 44 % Final    Monocytes % 11/19/2024 8  4.0 - 12.0 % Final    Eosinophils % 11/19/2024 5  0.5 - 7.8 % Final    Basophils % 11/19/2024 0  0.0 - 2.0 % Final    Immature Granulocytes % 11/19/2024 0  0.0 - 5.0 % Final    Neutrophils Absolute 11/19/2024 1.8  1.7 - 8.2 K/UL Final    Lymphocytes Absolute 11/19/2024 1.7  0.5 - 4.6 K/UL Final    Monocytes Absolute 11/19/2024 0.3  0.1 - 1.3 K/UL Final    Eosinophils Absolute 11/19/2024 0.2  0.0 - 0.8 K/UL Final    Basophils Absolute 11/19/2024 0.0  0.0 - 0.2 K/UL Final    Immature Granulocytes Absolute 11/19/2024 0.0

## 2024-11-19 NOTE — PROGRESS NOTES
Insurance requiring iron infusions to be done at ambulatory infusion center. Orders sent to [facility] and patient notified.    
care of this pleasant patient. Please call w/ any questions      MD Thee Kim [facility]  Hematology Oncology  [address] [place] [address]  Office : [phone]  Fax : [phone]

## 2024-12-12 ENCOUNTER — NURSE ONLY (OUTPATIENT)
Age: 59
End: 2024-12-12

## 2024-12-12 VITALS
SYSTOLIC BLOOD PRESSURE: 132 MMHG | HEART RATE: 70 BPM | DIASTOLIC BLOOD PRESSURE: 71 MMHG | RESPIRATION RATE: 16 BRPM | OXYGEN SATURATION: 98 % | TEMPERATURE: 97.9 F

## 2024-12-12 DIAGNOSIS — E61.1 IRON DEFICIENCY: Primary | ICD-10-CM

## 2024-12-12 RX ORDER — ALBUTEROL SULFATE 90 UG/1
4 INHALANT RESPIRATORY (INHALATION) PRN
OUTPATIENT
Start: 2024-12-19

## 2024-12-12 RX ORDER — HYDROCORTISONE SODIUM SUCCINATE 100 MG/2ML
100 INJECTION INTRAMUSCULAR; INTRAVENOUS
Status: DISCONTINUED | OUTPATIENT
Start: 2024-12-12 | End: 2024-12-12 | Stop reason: HOSPADM

## 2024-12-12 RX ORDER — DIPHENHYDRAMINE HYDROCHLORIDE 50 MG/ML
50 INJECTION INTRAMUSCULAR; INTRAVENOUS
OUTPATIENT
Start: 2024-12-19

## 2024-12-12 RX ORDER — DIPHENHYDRAMINE HYDROCHLORIDE 50 MG/ML
25 INJECTION INTRAMUSCULAR; INTRAVENOUS ONCE
Status: DISCONTINUED | OUTPATIENT
Start: 2024-12-12 | End: 2024-12-12 | Stop reason: HOSPADM

## 2024-12-12 RX ORDER — ALBUTEROL SULFATE 90 UG/1
4 INHALANT RESPIRATORY (INHALATION) PRN
Status: DISCONTINUED | OUTPATIENT
Start: 2024-12-12 | End: 2024-12-12 | Stop reason: HOSPADM

## 2024-12-12 RX ORDER — SODIUM CHLORIDE 9 MG/ML
INJECTION, SOLUTION INTRAVENOUS CONTINUOUS
OUTPATIENT
Start: 2024-12-19

## 2024-12-12 RX ORDER — ACETAMINOPHEN 325 MG/1
650 TABLET ORAL
Status: DISCONTINUED | OUTPATIENT
Start: 2024-12-12 | End: 2024-12-12 | Stop reason: HOSPADM

## 2024-12-12 RX ORDER — HEPARIN 100 UNIT/ML
500 SYRINGE INTRAVENOUS PRN
Status: DISCONTINUED | OUTPATIENT
Start: 2024-12-12 | End: 2024-12-12 | Stop reason: HOSPADM

## 2024-12-12 RX ORDER — EPINEPHRINE 1 MG/ML
0.3 INJECTION, SOLUTION, CONCENTRATE INTRAVENOUS PRN
OUTPATIENT
Start: 2024-12-19

## 2024-12-12 RX ORDER — SODIUM CHLORIDE 0.9 % (FLUSH) 0.9 %
5-40 SYRINGE (ML) INJECTION PRN
Status: DISCONTINUED | OUTPATIENT
Start: 2024-12-12 | End: 2024-12-12 | Stop reason: HOSPADM

## 2024-12-12 RX ORDER — HYDROCORTISONE SODIUM SUCCINATE 100 MG/2ML
100 INJECTION INTRAMUSCULAR; INTRAVENOUS
OUTPATIENT
Start: 2024-12-19

## 2024-12-12 RX ORDER — ACETAMINOPHEN 325 MG/1
650 TABLET ORAL ONCE
Status: DISCONTINUED | OUTPATIENT
Start: 2024-12-12 | End: 2024-12-12 | Stop reason: HOSPADM

## 2024-12-12 RX ORDER — ACETAMINOPHEN 325 MG/1
650 TABLET ORAL
OUTPATIENT
Start: 2024-12-19

## 2024-12-12 RX ORDER — SODIUM CHLORIDE 9 MG/ML
5-250 INJECTION, SOLUTION INTRAVENOUS PRN
Status: DISCONTINUED | OUTPATIENT
Start: 2024-12-12 | End: 2024-12-12 | Stop reason: HOSPADM

## 2024-12-12 RX ORDER — SODIUM CHLORIDE 9 MG/ML
5-250 INJECTION, SOLUTION INTRAVENOUS PRN
OUTPATIENT
Start: 2024-12-19

## 2024-12-12 RX ORDER — DIPHENHYDRAMINE HYDROCHLORIDE 50 MG/ML
50 INJECTION INTRAMUSCULAR; INTRAVENOUS
Status: DISCONTINUED | OUTPATIENT
Start: 2024-12-12 | End: 2024-12-12 | Stop reason: HOSPADM

## 2024-12-12 RX ORDER — ONDANSETRON 2 MG/ML
8 INJECTION INTRAMUSCULAR; INTRAVENOUS
Status: DISCONTINUED | OUTPATIENT
Start: 2024-12-12 | End: 2024-12-12 | Stop reason: HOSPADM

## 2024-12-12 RX ORDER — ACETAMINOPHEN 325 MG/1
650 TABLET ORAL ONCE
OUTPATIENT
Start: 2024-12-19 | End: 2024-12-19

## 2024-12-12 RX ORDER — ONDANSETRON 2 MG/ML
8 INJECTION INTRAMUSCULAR; INTRAVENOUS
OUTPATIENT
Start: 2024-12-19

## 2024-12-12 RX ORDER — DIPHENHYDRAMINE HYDROCHLORIDE 50 MG/ML
25 INJECTION INTRAMUSCULAR; INTRAVENOUS ONCE
OUTPATIENT
Start: 2024-12-19 | End: 2024-12-19

## 2024-12-12 RX ORDER — HEPARIN 100 UNIT/ML
500 SYRINGE INTRAVENOUS PRN
OUTPATIENT
Start: 2024-12-19

## 2024-12-12 RX ORDER — SODIUM CHLORIDE 9 MG/ML
INJECTION, SOLUTION INTRAVENOUS CONTINUOUS
Status: DISCONTINUED | OUTPATIENT
Start: 2024-12-12 | End: 2024-12-12 | Stop reason: HOSPADM

## 2024-12-12 RX ORDER — EPINEPHRINE 1 MG/ML
0.3 INJECTION, SOLUTION, CONCENTRATE INTRAVENOUS PRN
Status: DISCONTINUED | OUTPATIENT
Start: 2024-12-12 | End: 2024-12-12 | Stop reason: HOSPADM

## 2024-12-12 RX ORDER — SODIUM CHLORIDE 0.9 % (FLUSH) 0.9 %
5-40 SYRINGE (ML) INJECTION PRN
OUTPATIENT
Start: 2024-12-19

## 2024-12-12 NOTE — PROGRESS NOTES
[facility]  [address], [address]  [place] [address]  Office : [phone], Fax: [phone]     Patient arrived ambulatory to the infusion suite today for an iron infusion. Vital signs WNL. No contraindications noted. Patient offered warm blanket and pillow for comfort. Patient up ad onel to BR; offered drink and snacks during visit.    20g 1\" IV placed in the left forearm x 1 attempt; flushed with 10ml NS. Patient tolerated well.   Venofer 300mg in 250ml NS administered at  177  ml/hr. Infusion Time: 1 hour; 40 minutes.   Patient tolerated the infusion well, no complications noted.   No observation required/recommended. Post infusion vital signs WNL.      PIV flushed with 10ml NS and removed without difficulty, catheter intact; dressing applied. Patient instructed to leave the dressing on for at least 30 minutes before removal.         Patient discharged ambulatory with steady gait out of infusion suite, feeling well. Patient instructed to call the ordering provider with any post-infusion issues.     Next appointment scheduled at a date/time convenient for them prior to patient's departure today.

## 2024-12-19 ENCOUNTER — NURSE ONLY (OUTPATIENT)
Age: 59
End: 2024-12-19

## 2024-12-19 ENCOUNTER — OFFICE VISIT (OUTPATIENT)
Age: 59
End: 2024-12-19
Payer: MEDICAID

## 2024-12-19 VITALS
SYSTOLIC BLOOD PRESSURE: 125 MMHG | TEMPERATURE: 97.9 F | DIASTOLIC BLOOD PRESSURE: 71 MMHG | RESPIRATION RATE: 16 BRPM | HEART RATE: 61 BPM | OXYGEN SATURATION: 97 %

## 2024-12-19 VITALS
SYSTOLIC BLOOD PRESSURE: 126 MMHG | HEIGHT: 66 IN | HEART RATE: 63 BPM | BODY MASS INDEX: 23.46 KG/M2 | WEIGHT: 146 LBS | DIASTOLIC BLOOD PRESSURE: 70 MMHG

## 2024-12-19 DIAGNOSIS — E61.1 IRON DEFICIENCY: Primary | ICD-10-CM

## 2024-12-19 DIAGNOSIS — R00.2 HEART PALPITATIONS: ICD-10-CM

## 2024-12-19 DIAGNOSIS — I48.0 PAROXYSMAL ATRIAL FIBRILLATION (HCC): Primary | ICD-10-CM

## 2024-12-19 DIAGNOSIS — I10 ESSENTIAL HYPERTENSION: ICD-10-CM

## 2024-12-19 DIAGNOSIS — Z98.890 H/O CARDIAC RADIOFREQUENCY ABLATION: ICD-10-CM

## 2024-12-19 DIAGNOSIS — E78.2 MIXED HYPERLIPIDEMIA: ICD-10-CM

## 2024-12-19 PROCEDURE — 3078F DIAST BP <80 MM HG: CPT | Performed by: INTERNAL MEDICINE

## 2024-12-19 PROCEDURE — 99214 OFFICE O/P EST MOD 30 MIN: CPT | Performed by: INTERNAL MEDICINE

## 2024-12-19 PROCEDURE — 3074F SYST BP LT 130 MM HG: CPT | Performed by: INTERNAL MEDICINE

## 2024-12-19 PROCEDURE — 93000 ELECTROCARDIOGRAM COMPLETE: CPT | Performed by: INTERNAL MEDICINE

## 2024-12-19 RX ORDER — SODIUM CHLORIDE 9 MG/ML
5-250 INJECTION, SOLUTION INTRAVENOUS PRN
OUTPATIENT
Start: 2024-12-26

## 2024-12-19 RX ORDER — ALBUTEROL SULFATE 90 UG/1
4 INHALANT RESPIRATORY (INHALATION) PRN
OUTPATIENT
Start: 2024-12-26

## 2024-12-19 RX ORDER — SODIUM CHLORIDE 9 MG/ML
5-250 INJECTION, SOLUTION INTRAVENOUS PRN
Status: DISCONTINUED | OUTPATIENT
Start: 2024-12-19 | End: 2024-12-19 | Stop reason: HOSPADM

## 2024-12-19 RX ORDER — ONDANSETRON 2 MG/ML
8 INJECTION INTRAMUSCULAR; INTRAVENOUS
Status: DISCONTINUED | OUTPATIENT
Start: 2024-12-19 | End: 2024-12-19 | Stop reason: HOSPADM

## 2024-12-19 RX ORDER — SODIUM CHLORIDE 0.9 % (FLUSH) 0.9 %
5-40 SYRINGE (ML) INJECTION PRN
OUTPATIENT
Start: 2024-12-26

## 2024-12-19 RX ORDER — HYDROCORTISONE SODIUM SUCCINATE 100 MG/2ML
100 INJECTION INTRAMUSCULAR; INTRAVENOUS
Status: DISCONTINUED | OUTPATIENT
Start: 2024-12-19 | End: 2024-12-19 | Stop reason: HOSPADM

## 2024-12-19 RX ORDER — EPINEPHRINE 1 MG/ML
0.3 INJECTION, SOLUTION, CONCENTRATE INTRAVENOUS PRN
Status: DISCONTINUED | OUTPATIENT
Start: 2024-12-19 | End: 2024-12-19 | Stop reason: HOSPADM

## 2024-12-19 RX ORDER — DIPHENHYDRAMINE HYDROCHLORIDE 50 MG/ML
25 INJECTION INTRAMUSCULAR; INTRAVENOUS ONCE
Status: DISCONTINUED | OUTPATIENT
Start: 2024-12-19 | End: 2024-12-19 | Stop reason: HOSPADM

## 2024-12-19 RX ORDER — SODIUM CHLORIDE 0.9 % (FLUSH) 0.9 %
5-40 SYRINGE (ML) INJECTION PRN
Status: DISCONTINUED | OUTPATIENT
Start: 2024-12-19 | End: 2024-12-19 | Stop reason: HOSPADM

## 2024-12-19 RX ORDER — SODIUM CHLORIDE 9 MG/ML
INJECTION, SOLUTION INTRAVENOUS CONTINUOUS
OUTPATIENT
Start: 2024-12-26

## 2024-12-19 RX ORDER — ACETAMINOPHEN 325 MG/1
650 TABLET ORAL
Status: DISCONTINUED | OUTPATIENT
Start: 2024-12-19 | End: 2024-12-19 | Stop reason: HOSPADM

## 2024-12-19 RX ORDER — SODIUM CHLORIDE 9 MG/ML
INJECTION, SOLUTION INTRAVENOUS CONTINUOUS
Status: DISCONTINUED | OUTPATIENT
Start: 2024-12-19 | End: 2024-12-19 | Stop reason: HOSPADM

## 2024-12-19 RX ORDER — DIPHENHYDRAMINE HYDROCHLORIDE 50 MG/ML
50 INJECTION INTRAMUSCULAR; INTRAVENOUS
OUTPATIENT
Start: 2024-12-26

## 2024-12-19 RX ORDER — DIPHENHYDRAMINE HYDROCHLORIDE 50 MG/ML
25 INJECTION INTRAMUSCULAR; INTRAVENOUS ONCE
OUTPATIENT
Start: 2024-12-26 | End: 2024-12-26

## 2024-12-19 RX ORDER — HYDROCORTISONE SODIUM SUCCINATE 100 MG/2ML
100 INJECTION INTRAMUSCULAR; INTRAVENOUS
OUTPATIENT
Start: 2024-12-26

## 2024-12-19 RX ORDER — DIPHENHYDRAMINE HYDROCHLORIDE 50 MG/ML
50 INJECTION INTRAMUSCULAR; INTRAVENOUS
Status: DISCONTINUED | OUTPATIENT
Start: 2024-12-19 | End: 2024-12-19 | Stop reason: HOSPADM

## 2024-12-19 RX ORDER — HEPARIN 100 UNIT/ML
500 SYRINGE INTRAVENOUS PRN
OUTPATIENT
Start: 2024-12-26

## 2024-12-19 RX ORDER — ACETAMINOPHEN 325 MG/1
650 TABLET ORAL
OUTPATIENT
Start: 2024-12-26

## 2024-12-19 RX ORDER — ACETAMINOPHEN 325 MG/1
650 TABLET ORAL ONCE
OUTPATIENT
Start: 2024-12-26 | End: 2024-12-26

## 2024-12-19 RX ORDER — EPINEPHRINE 1 MG/ML
0.3 INJECTION, SOLUTION, CONCENTRATE INTRAVENOUS PRN
OUTPATIENT
Start: 2024-12-26

## 2024-12-19 RX ORDER — ACETAMINOPHEN 325 MG/1
650 TABLET ORAL ONCE
Status: COMPLETED | OUTPATIENT
Start: 2024-12-19 | End: 2024-12-19

## 2024-12-19 RX ORDER — ONDANSETRON 2 MG/ML
8 INJECTION INTRAMUSCULAR; INTRAVENOUS
OUTPATIENT
Start: 2024-12-26

## 2024-12-19 RX ORDER — ALBUTEROL SULFATE 90 UG/1
4 INHALANT RESPIRATORY (INHALATION) PRN
Status: DISCONTINUED | OUTPATIENT
Start: 2024-12-19 | End: 2024-12-19 | Stop reason: HOSPADM

## 2024-12-19 RX ORDER — HEPARIN 100 UNIT/ML
500 SYRINGE INTRAVENOUS PRN
Status: DISCONTINUED | OUTPATIENT
Start: 2024-12-19 | End: 2024-12-19 | Stop reason: HOSPADM

## 2024-12-19 RX ADMIN — ACETAMINOPHEN 650 MG: 325 TABLET ORAL at 12:30

## 2024-12-19 ASSESSMENT — ENCOUNTER SYMPTOMS
GASTROINTESTINAL NEGATIVE: 1
RESPIRATORY NEGATIVE: 1
COUGH: 0
SHORTNESS OF BREATH: 0

## 2024-12-19 NOTE — PROGRESS NOTES
[facility]  [address], [address]  [place] [address]  Office : [phone], Fax: [phone]     Patient arrived ambulatory to the infusion suite today for an iron infusion. Vital signs WNL. No contraindications noted. Patient offered warm blanket and pillow for comfort. Patient up ad onel to BR; offered drink and snacks during visit.    20g 1\" IV placed in the left posterior hand x1 attempt; flushed with 10ml NS. Patient tolerated well.   Venofer 300mg in 250ml NS administered at 177ml/hr. Infusion Time: 93 minutes.     Patient tolerated the infusion well, no complications noted.   30 minute post observation required/recommended. Post infusion vital signs WNL.      PIV flushed with 10ml NS and removed without difficulty, catheter intact; dressing applied. Patient instructed to leave the dressing on for at least 30 minutes before removal.         Patient discharged ambulatory with steady gait out of infusion suite, feeling well. Patient instructed to call the ordering provider with any post-infusion issues.     Next appointment scheduled at a date/time convenient for them prior to patient's departure today.

## 2024-12-19 NOTE — PROGRESS NOTES
cyst of left eyelid 05/12/2020     Priority: Medium    Iron deficiency 12/05/2023     Priority: Low    Morbid obesity 08/24/2023     Priority: Low    Anemia 06/02/2023     Priority: Low    Elevated LFTs 06/02/2023     Priority: Low    Tear of medial meniscus of right knee, current 05/31/2023     Priority: Low     Added automatically from request for surgery [id number]      Pes anserinus bursitis 05/31/2023     Priority: Low     Added automatically from request for surgery [id number]      Right knee pain 05/31/2023     Priority: Low     Added automatically from request for surgery [id number]      Primary osteoarthritis of right knee 05/31/2023     Priority: Low     Added automatically from request for surgery [id number]      S/P total thyroidectomy 08/28/2020     Priority: Low    HARRIET (obstructive sleep apnea) 06/12/2020     Priority: Low    Thyroid eye disease 03/05/2020     Priority: Low    Other cirrhosis of liver (HCC) 11/27/2019     Priority: Low    Postsurgical hypothyroidism 11/08/2019     Priority: Low    Morbid (severe) obesity due to excess calories 11/08/2019     Priority: Low    Endometrial cancer (HCC) 11/08/2019     Priority: Low    Cholelithiasis without obstruction 05/30/2017     Priority: Low    Nonalcoholic fatty liver 05/30/2017     Priority: Low    Hypertensive disorder 03/07/2017     Priority: Low    Low back pain 03/01/2017     Priority: Low    Paroxysmal atrial fibrillation (HCC) 02/28/2017     Priority: Low       Family History   Problem Relation Age of Onset    Kidney Disease Mother         Stage 4 renal failure    Stroke Mother     Liver Disease Father     Diabetes Father     Heart Disease Father     Cancer Sister         Cervical    Thyroid Disease Daughter         Hypothyroidism    Thyroid Disease Son         Hyperthyroidism    Cancer Paternal Aunt         Cervical    Breast Cancer Neg Hx        Social History     Tobacco Use    Smoking status: Never     Passive exposure: Past    Smokeless tobacco:

## 2024-12-27 ENCOUNTER — NURSE ONLY (OUTPATIENT)
Age: 59
End: 2024-12-27

## 2024-12-27 VITALS
HEART RATE: 60 BPM | SYSTOLIC BLOOD PRESSURE: 120 MMHG | OXYGEN SATURATION: 97 % | RESPIRATION RATE: 16 BRPM | TEMPERATURE: 98.1 F | DIASTOLIC BLOOD PRESSURE: 73 MMHG

## 2024-12-27 DIAGNOSIS — E61.1 IRON DEFICIENCY: Primary | ICD-10-CM

## 2024-12-27 RX ORDER — DIPHENHYDRAMINE HYDROCHLORIDE 50 MG/ML
25 INJECTION INTRAMUSCULAR; INTRAVENOUS ONCE
OUTPATIENT
Start: 2024-12-27 | End: 2024-12-27

## 2024-12-27 RX ORDER — ACETAMINOPHEN 325 MG/1
650 TABLET ORAL ONCE
OUTPATIENT
Start: 2024-12-27 | End: 2024-12-27

## 2024-12-27 RX ORDER — DIPHENHYDRAMINE HYDROCHLORIDE 50 MG/ML
25 INJECTION INTRAMUSCULAR; INTRAVENOUS ONCE
Status: DISCONTINUED | OUTPATIENT
Start: 2024-12-27 | End: 2024-12-27 | Stop reason: HOSPADM

## 2024-12-27 RX ORDER — HYDROCORTISONE SODIUM SUCCINATE 100 MG/2ML
100 INJECTION INTRAMUSCULAR; INTRAVENOUS
Status: DISCONTINUED | OUTPATIENT
Start: 2024-12-27 | End: 2024-12-27 | Stop reason: HOSPADM

## 2024-12-27 RX ORDER — SODIUM CHLORIDE 9 MG/ML
INJECTION, SOLUTION INTRAVENOUS CONTINUOUS
Status: DISCONTINUED | OUTPATIENT
Start: 2024-12-27 | End: 2024-12-27 | Stop reason: HOSPADM

## 2024-12-27 RX ORDER — EPINEPHRINE 1 MG/ML
0.3 INJECTION, SOLUTION, CONCENTRATE INTRAVENOUS PRN
Status: DISCONTINUED | OUTPATIENT
Start: 2024-12-27 | End: 2024-12-27 | Stop reason: HOSPADM

## 2024-12-27 RX ORDER — SODIUM CHLORIDE 9 MG/ML
5-250 INJECTION, SOLUTION INTRAVENOUS PRN
Status: DISCONTINUED | OUTPATIENT
Start: 2024-12-27 | End: 2024-12-27 | Stop reason: HOSPADM

## 2024-12-27 RX ORDER — DIPHENHYDRAMINE HYDROCHLORIDE 50 MG/ML
50 INJECTION INTRAMUSCULAR; INTRAVENOUS
Status: DISCONTINUED | OUTPATIENT
Start: 2024-12-27 | End: 2024-12-27 | Stop reason: HOSPADM

## 2024-12-27 RX ORDER — EPINEPHRINE 1 MG/ML
0.3 INJECTION, SOLUTION, CONCENTRATE INTRAVENOUS PRN
OUTPATIENT
Start: 2024-12-27

## 2024-12-27 RX ORDER — HEPARIN 100 UNIT/ML
500 SYRINGE INTRAVENOUS PRN
Status: DISCONTINUED | OUTPATIENT
Start: 2024-12-27 | End: 2024-12-27 | Stop reason: HOSPADM

## 2024-12-27 RX ORDER — SODIUM CHLORIDE 9 MG/ML
INJECTION, SOLUTION INTRAVENOUS CONTINUOUS
OUTPATIENT
Start: 2024-12-27

## 2024-12-27 RX ORDER — SODIUM CHLORIDE 9 MG/ML
5-250 INJECTION, SOLUTION INTRAVENOUS PRN
OUTPATIENT
Start: 2024-12-27

## 2024-12-27 RX ORDER — SODIUM CHLORIDE 0.9 % (FLUSH) 0.9 %
5-40 SYRINGE (ML) INJECTION PRN
OUTPATIENT
Start: 2024-12-27

## 2024-12-27 RX ORDER — HYDROCORTISONE SODIUM SUCCINATE 100 MG/2ML
100 INJECTION INTRAMUSCULAR; INTRAVENOUS
OUTPATIENT
Start: 2024-12-27

## 2024-12-27 RX ORDER — ALBUTEROL SULFATE 90 UG/1
4 INHALANT RESPIRATORY (INHALATION) PRN
Status: DISCONTINUED | OUTPATIENT
Start: 2024-12-27 | End: 2024-12-27 | Stop reason: HOSPADM

## 2024-12-27 RX ORDER — ONDANSETRON 2 MG/ML
8 INJECTION INTRAMUSCULAR; INTRAVENOUS
OUTPATIENT
Start: 2024-12-27

## 2024-12-27 RX ORDER — ACETAMINOPHEN 325 MG/1
650 TABLET ORAL ONCE
Status: COMPLETED | OUTPATIENT
Start: 2024-12-27 | End: 2024-12-27

## 2024-12-27 RX ORDER — ACETAMINOPHEN 325 MG/1
650 TABLET ORAL
OUTPATIENT
Start: 2024-12-27

## 2024-12-27 RX ORDER — ACETAMINOPHEN 325 MG/1
650 TABLET ORAL
Status: DISCONTINUED | OUTPATIENT
Start: 2024-12-27 | End: 2024-12-27 | Stop reason: HOSPADM

## 2024-12-27 RX ORDER — ALBUTEROL SULFATE 90 UG/1
4 INHALANT RESPIRATORY (INHALATION) PRN
OUTPATIENT
Start: 2024-12-27

## 2024-12-27 RX ORDER — SODIUM CHLORIDE 0.9 % (FLUSH) 0.9 %
5-40 SYRINGE (ML) INJECTION PRN
Status: DISCONTINUED | OUTPATIENT
Start: 2024-12-27 | End: 2024-12-27 | Stop reason: HOSPADM

## 2024-12-27 RX ORDER — HEPARIN 100 UNIT/ML
500 SYRINGE INTRAVENOUS PRN
OUTPATIENT
Start: 2024-12-27

## 2024-12-27 RX ORDER — DIPHENHYDRAMINE HYDROCHLORIDE 50 MG/ML
50 INJECTION INTRAMUSCULAR; INTRAVENOUS
OUTPATIENT
Start: 2024-12-27

## 2024-12-27 RX ORDER — ONDANSETRON 2 MG/ML
8 INJECTION INTRAMUSCULAR; INTRAVENOUS
Status: DISCONTINUED | OUTPATIENT
Start: 2024-12-27 | End: 2024-12-27 | Stop reason: HOSPADM

## 2024-12-27 RX ADMIN — ACETAMINOPHEN 650 MG: 325 TABLET ORAL at 13:11

## 2024-12-27 NOTE — PROGRESS NOTES
[facility]  [address], [address]  [place] [address]  Office : [phone], Fax: [phone]     Patient arrived ambulatory to the infusion suite today for an iron infusion. Vital signs WNL. No contraindications noted. Patient offered warm blanket and pillow for comfort. Patient up ad onel to BR; offered drink and snacks during visit.    22g 1\" IV placed in the left  x 1 attempt; flushed with 10ml NS. Patient tolerated well.   Venofer 300mg in 250ml NS administered at  177  ml/hr. Infusion Time: 1 hour 12 minutes.   Patient tolerated the infusion well, no complications noted.   No observation required/recommended. Post infusion vital signs WNL.      PIV flushed with 10ml NS and removed without difficulty, catheter intact; dressing applied. Patient instructed to leave the dressing on for at least 30 minutes before removal.         Patient discharged ambulatory with steady gait out of infusion suite, feeling well. Patient instructed to call the ordering provider with any post-infusion issues.     Next appointment scheduled at a date/time convenient for them prior to patient's departure today.

## 2025-01-16 DIAGNOSIS — E89.0 POSTSURGICAL HYPOTHYROIDISM: ICD-10-CM

## 2025-01-16 LAB
T4 FREE SERPL-MCNC: 1.8 NG/DL (ref 0.9–1.7)
TSH W FREE THYROID IF ABNORMAL: 0.19 UIU/ML (ref 0.27–4.2)

## 2025-01-17 DIAGNOSIS — F41.9 ANXIETY: ICD-10-CM

## 2025-01-17 RX ORDER — LORAZEPAM 1 MG/1
TABLET ORAL
Qty: 60 TABLET | Refills: 5 | Status: SHIPPED | OUTPATIENT
Start: 2025-01-17 | End: 2025-07-16

## 2025-01-21 ENCOUNTER — OFFICE VISIT (OUTPATIENT)
Dept: ENDOCRINOLOGY | Age: 60
End: 2025-01-21
Payer: MEDICAID

## 2025-01-21 VITALS
HEIGHT: 66 IN | WEIGHT: 143 LBS | RESPIRATION RATE: 16 BRPM | HEART RATE: 72 BPM | DIASTOLIC BLOOD PRESSURE: 76 MMHG | BODY MASS INDEX: 22.98 KG/M2 | SYSTOLIC BLOOD PRESSURE: 128 MMHG | OXYGEN SATURATION: 98 %

## 2025-01-21 DIAGNOSIS — E07.9 THYROID EYE DISEASE: ICD-10-CM

## 2025-01-21 DIAGNOSIS — H57.89 THYROID EYE DISEASE: ICD-10-CM

## 2025-01-21 DIAGNOSIS — E89.0 POSTSURGICAL HYPOTHYROIDISM: Primary | ICD-10-CM

## 2025-01-21 PROCEDURE — 3078F DIAST BP <80 MM HG: CPT | Performed by: INTERNAL MEDICINE

## 2025-01-21 PROCEDURE — 99214 OFFICE O/P EST MOD 30 MIN: CPT | Performed by: INTERNAL MEDICINE

## 2025-01-21 PROCEDURE — 3074F SYST BP LT 130 MM HG: CPT | Performed by: INTERNAL MEDICINE

## 2025-01-21 RX ORDER — LEVOTHYROXINE SODIUM 112 UG/1
112 TABLET ORAL DAILY
Qty: 90 TABLET | Refills: 3 | Status: SHIPPED | OUTPATIENT
Start: 2025-01-21

## 2025-01-21 RX ORDER — FERROUS SULFATE 325(65) MG
325 TABLET ORAL
COMMUNITY

## 2025-01-21 ASSESSMENT — ENCOUNTER SYMPTOMS
ROS SKIN COMMENTS: SHE REPORTS HAIR LOSS.  DENIES DRY SKIN.
DIARRHEA: 0
CONSTIPATION: 0

## 2025-01-21 NOTE — PROGRESS NOTES
traMADol (ULTRAM) 50 MG tablet Take 1 tablet by mouth every 8 hours as needed for Pain for up to 180 days. Max Daily Amount: 150 mg 60 tablet 5    omeprazole (PRILOSEC) 40 MG delayed release capsule Take 1 capsule by mouth every morning (before breakfast) 90 capsule 3    amLODIPine (NORVASC) 5 MG tablet Take 1 tablet by mouth daily 90 tablet 3    clobetasol (TEMOVATE) 0.05 % external solution Apply bid 50 mL 3    traZODone (DESYREL) 150 MG tablet Take 1 tablet by mouth nightly 90 tablet 3    BIOTIN PO Take by mouth      Multiple Vitamins-Minerals (MULTI COMPLETE PO) Take by mouth      calcium carb-cholecalciferol (CALCIUM 600+D) 600-10 MG-MCG TABS per tab Take 1 tablet by mouth 2 times daily      acetaminophen (TYLENOL) 500 MG tablet Take 2 tablets by mouth in the morning and at bedtime      nystatin (MYCOSTATIN) 174406 UNIT/GM cream Apply topically 2 times daily. (Patient not taking: Reported on 12/19/2024) 30 g 1    Misc Natural Products (GLUCOSAMINE CHONDROITIN ADV PO) Take by mouth (Patient not taking: Reported on 1/21/2025)       No current facility-administered medications for this visit.

## 2025-01-23 ENCOUNTER — TELEPHONE (OUTPATIENT)
Dept: FAMILY MEDICINE CLINIC | Facility: CLINIC | Age: 60
End: 2025-01-23

## 2025-01-23 NOTE — TELEPHONE ENCOUNTER
No form received to date. Per Dr Alex, pt needs pre-op exam OV and needs to bring any form with her to that appt.

## 2025-01-23 NOTE — TELEPHONE ENCOUNTER
Pt called and said back in October she dropped off paperwork for Dr Alex to fill out for her Surgeon. She said she called them and have not received it. I don't see anything in her chart about it.

## 2025-01-24 NOTE — TELEPHONE ENCOUNTER
Called pt and informed her, she said she will get the paperwork again but will call back to set up an appt.

## 2025-02-05 ENCOUNTER — OFFICE VISIT (OUTPATIENT)
Dept: FAMILY MEDICINE CLINIC | Facility: CLINIC | Age: 60
End: 2025-02-05
Payer: MEDICAID

## 2025-02-05 ENCOUNTER — TELEPHONE (OUTPATIENT)
Dept: FAMILY MEDICINE CLINIC | Facility: CLINIC | Age: 60
End: 2025-02-05

## 2025-02-05 VITALS
BODY MASS INDEX: 23.05 KG/M2 | WEIGHT: 142.8 LBS | SYSTOLIC BLOOD PRESSURE: 114 MMHG | HEART RATE: 74 BPM | OXYGEN SATURATION: 98 % | TEMPERATURE: 97.6 F | DIASTOLIC BLOOD PRESSURE: 66 MMHG

## 2025-02-05 DIAGNOSIS — F33.9 RECURRENT DEPRESSION (HCC): ICD-10-CM

## 2025-02-05 DIAGNOSIS — B37.9 CANDIDIASIS: Primary | ICD-10-CM

## 2025-02-05 DIAGNOSIS — I10 PRIMARY HYPERTENSION: ICD-10-CM

## 2025-02-05 DIAGNOSIS — G43.009 MIGRAINE WITHOUT AURA AND WITHOUT STATUS MIGRAINOSUS, NOT INTRACTABLE: ICD-10-CM

## 2025-02-05 DIAGNOSIS — G89.29 CHRONIC BILATERAL LOW BACK PAIN WITHOUT SCIATICA: ICD-10-CM

## 2025-02-05 DIAGNOSIS — M54.50 CHRONIC BILATERAL LOW BACK PAIN WITHOUT SCIATICA: ICD-10-CM

## 2025-02-05 DIAGNOSIS — F41.9 ANXIETY: ICD-10-CM

## 2025-02-05 PROCEDURE — 3078F DIAST BP <80 MM HG: CPT | Performed by: FAMILY MEDICINE

## 2025-02-05 PROCEDURE — 99214 OFFICE O/P EST MOD 30 MIN: CPT | Performed by: FAMILY MEDICINE

## 2025-02-05 PROCEDURE — 3074F SYST BP LT 130 MM HG: CPT | Performed by: FAMILY MEDICINE

## 2025-02-05 RX ORDER — LORAZEPAM 1 MG/1
1 TABLET ORAL EVERY 8 HOURS PRN
Qty: 60 TABLET | Refills: 5 | Status: SHIPPED | OUTPATIENT
Start: 2025-02-05 | End: 2025-08-04

## 2025-02-05 RX ORDER — TRAZODONE HYDROCHLORIDE 150 MG/1
150 TABLET ORAL NIGHTLY
Qty: 90 TABLET | Refills: 3 | Status: SHIPPED | OUTPATIENT
Start: 2025-02-05

## 2025-02-05 RX ORDER — RIZATRIPTAN BENZOATE 10 MG/1
10 TABLET ORAL
Qty: 12 TABLET | Refills: 3 | Status: SHIPPED | OUTPATIENT
Start: 2025-02-05 | End: 2025-02-05 | Stop reason: SDUPTHER

## 2025-02-05 RX ORDER — RIZATRIPTAN BENZOATE 10 MG/1
10 TABLET ORAL
Qty: 12 TABLET | Refills: 3 | Status: SHIPPED | OUTPATIENT
Start: 2025-02-05 | End: 2025-02-05

## 2025-02-05 ASSESSMENT — ENCOUNTER SYMPTOMS
SINUS PAIN: 0
CHEST TIGHTNESS: 0
ABDOMINAL PAIN: 0
EYE DISCHARGE: 0
DIARRHEA: 0
CONSTIPATION: 0
SHORTNESS OF BREATH: 0
COUGH: 0

## 2025-02-05 NOTE — TELEPHONE ENCOUNTER
Who's Calling: [facility]     Message: [facility] asked about the migraine prescription. They said they need to document how many migraines pt gets a day?    Phone #: [phone]  Fax #: [phone]
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
## 2025-02-12 DIAGNOSIS — E89.0 POSTSURGICAL HYPOTHYROIDISM: ICD-10-CM

## 2025-02-12 DIAGNOSIS — Z00.00 ROUTINE GENERAL MEDICAL EXAMINATION AT A HEALTH CARE FACILITY: ICD-10-CM

## 2025-02-12 DIAGNOSIS — I10 PRIMARY HYPERTENSION: Primary | ICD-10-CM

## 2025-02-13 ENCOUNTER — LAB (OUTPATIENT)
Dept: FAMILY MEDICINE CLINIC | Facility: CLINIC | Age: 60
End: 2025-02-13

## 2025-02-13 DIAGNOSIS — Z00.00 ROUTINE GENERAL MEDICAL EXAMINATION AT A HEALTH CARE FACILITY: ICD-10-CM

## 2025-02-13 DIAGNOSIS — E89.0 POSTSURGICAL HYPOTHYROIDISM: ICD-10-CM

## 2025-02-13 DIAGNOSIS — I10 PRIMARY HYPERTENSION: ICD-10-CM

## 2025-02-13 LAB
ALBUMIN SERPL-MCNC: 3.4 G/DL (ref 3.2–4.6)
ALBUMIN/GLOB SERPL: 1.1 (ref 1–1.9)
ALP SERPL-CCNC: 123 U/L (ref 35–104)
ALT SERPL-CCNC: 40 U/L (ref 8–45)
ANION GAP SERPL CALC-SCNC: 8 MMOL/L (ref 7–16)
APPEARANCE UR: CLEAR
AST SERPL-CCNC: 41 U/L (ref 15–37)
BACTERIA URNS QL MICRO: NEGATIVE /HPF
BASOPHILS # BLD: 0.02 K/UL (ref 0–0.2)
BASOPHILS NFR BLD: 0.5 % (ref 0–2)
BILIRUB SERPL-MCNC: 0.6 MG/DL (ref 0–1.2)
BILIRUB UR QL: NEGATIVE
BUN SERPL-MCNC: 16 MG/DL (ref 8–23)
CALCIUM SERPL-MCNC: 9.7 MG/DL (ref 8.8–10.2)
CASTS URNS QL MICRO: 0 /LPF
CHLORIDE SERPL-SCNC: 102 MMOL/L (ref 98–107)
CHOLEST SERPL-MCNC: 140 MG/DL (ref 0–200)
CO2 SERPL-SCNC: 30 MMOL/L (ref 20–29)
COLOR UR: NORMAL
CREAT SERPL-MCNC: 0.61 MG/DL (ref 0.6–1.1)
CRYSTALS URNS QL MICRO: 0 /LPF
DIFFERENTIAL METHOD BLD: ABNORMAL
EOSINOPHIL # BLD: 0.3 K/UL (ref 0–0.8)
EOSINOPHIL NFR BLD: 6.9 % (ref 0.5–7.8)
EPI CELLS #/AREA URNS HPF: NORMAL /HPF (ref 0–5)
ERYTHROCYTE [DISTWIDTH] IN BLOOD BY AUTOMATED COUNT: 14.6 % (ref 11.9–14.6)
GLOBULIN SER CALC-MCNC: 3.1 G/DL (ref 2.3–3.5)
GLUCOSE SERPL-MCNC: 87 MG/DL (ref 70–99)
GLUCOSE UR STRIP.AUTO-MCNC: NEGATIVE MG/DL
HCT VFR BLD AUTO: 42.5 % (ref 35.8–46.3)
HDLC SERPL-MCNC: 60 MG/DL (ref 40–60)
HDLC SERPL: 2.3 (ref 0–5)
HGB BLD-MCNC: 13.5 G/DL (ref 11.7–15.4)
HGB UR QL STRIP: NEGATIVE
HYALINE CASTS URNS QL MICRO: NORMAL /LPF
IMM GRANULOCYTES # BLD AUTO: 0.01 K/UL (ref 0–0.5)
IMM GRANULOCYTES NFR BLD AUTO: 0.2 % (ref 0–5)
KETONES UR QL STRIP.AUTO: NEGATIVE MG/DL
LDLC SERPL CALC-MCNC: 59 MG/DL (ref 0–100)
LEUKOCYTE ESTERASE UR QL STRIP.AUTO: NEGATIVE
LYMPHOCYTES # BLD: 1.83 K/UL (ref 0.5–4.6)
LYMPHOCYTES NFR BLD: 42.4 % (ref 13–44)
MCH RBC QN AUTO: 27.2 PG (ref 26.1–32.9)
MCHC RBC AUTO-ENTMCNC: 31.8 G/DL (ref 31.4–35)
MCV RBC AUTO: 85.7 FL (ref 82–102)
MONOCYTES # BLD: 0.38 K/UL (ref 0.1–1.3)
MONOCYTES NFR BLD: 8.8 % (ref 4–12)
MUCOUS THREADS URNS QL MICRO: 0 /LPF
NEUTS SEG # BLD: 1.78 K/UL (ref 1.7–8.2)
NEUTS SEG NFR BLD: 41.2 % (ref 43–78)
NITRITE UR QL STRIP.AUTO: NEGATIVE
NRBC # BLD: 0 K/UL (ref 0–0.2)
PH UR STRIP: 6 (ref 5–9)
PLATELET # BLD AUTO: 155 K/UL (ref 150–450)
PMV BLD AUTO: 11 FL (ref 9.4–12.3)
POTASSIUM SERPL-SCNC: 4.2 MMOL/L (ref 3.5–5.1)
PROT SERPL-MCNC: 6.4 G/DL (ref 6.3–8.2)
PROT UR STRIP-MCNC: NEGATIVE MG/DL
RBC # BLD AUTO: 4.96 M/UL (ref 4.05–5.2)
RBC #/AREA URNS HPF: NORMAL /HPF (ref 0–5)
SODIUM SERPL-SCNC: 141 MMOL/L (ref 136–145)
SP GR UR REFRACTOMETRY: 1.02 (ref 1–1.02)
T4 FREE SERPL-MCNC: 1.6 NG/DL (ref 0.9–1.7)
TRIGL SERPL-MCNC: 107 MG/DL (ref 0–150)
TSH W FREE THYROID IF ABNORMAL: 0.17 UIU/ML (ref 0.27–4.2)
URINE CULTURE IF INDICATED: NORMAL
UROBILINOGEN UR QL STRIP.AUTO: 1 EU/DL (ref 0.2–1)
VLDLC SERPL CALC-MCNC: 21 MG/DL (ref 6–23)
WBC # BLD AUTO: 4.3 K/UL (ref 4.3–11.1)
WBC URNS QL MICRO: NORMAL /HPF (ref 0–4)

## 2025-02-17 ASSESSMENT — PATIENT HEALTH QUESTIONNAIRE - PHQ9
SUM OF ALL RESPONSES TO PHQ9 QUESTIONS 1 & 2: 0
SUM OF ALL RESPONSES TO PHQ QUESTIONS 1-9: 2
7. TROUBLE CONCENTRATING ON THINGS, SUCH AS READING THE NEWSPAPER OR WATCHING TELEVISION: NOT AT ALL
9. THOUGHTS THAT YOU WOULD BE BETTER OFF DEAD, OR OF HURTING YOURSELF: NOT AT ALL
6. FEELING BAD ABOUT YOURSELF - OR THAT YOU ARE A FAILURE OR HAVE LET YOURSELF OR YOUR FAMILY DOWN: NOT AT ALL
2. FEELING DOWN, DEPRESSED OR HOPELESS: NOT AT ALL
3. TROUBLE FALLING OR STAYING ASLEEP: SEVERAL DAYS
7. TROUBLE CONCENTRATING ON THINGS, SUCH AS READING THE NEWSPAPER OR WATCHING TELEVISION: NOT AT ALL
10. IF YOU CHECKED OFF ANY PROBLEMS, HOW DIFFICULT HAVE THESE PROBLEMS MADE IT FOR YOU TO DO YOUR WORK, TAKE CARE OF THINGS AT HOME, OR GET ALONG WITH OTHER PEOPLE: NOT DIFFICULT AT ALL
6. FEELING BAD ABOUT YOURSELF - OR THAT YOU ARE A FAILURE OR HAVE LET YOURSELF OR YOUR FAMILY DOWN: NOT AT ALL
10. IF YOU CHECKED OFF ANY PROBLEMS, HOW DIFFICULT HAVE THESE PROBLEMS MADE IT FOR YOU TO DO YOUR WORK, TAKE CARE OF THINGS AT HOME, OR GET ALONG WITH OTHER PEOPLE: NOT DIFFICULT AT ALL
4. FEELING TIRED OR HAVING LITTLE ENERGY: SEVERAL DAYS
SUM OF ALL RESPONSES TO PHQ QUESTIONS 1-9: 2
4. FEELING TIRED OR HAVING LITTLE ENERGY: SEVERAL DAYS
1. LITTLE INTEREST OR PLEASURE IN DOING THINGS: NOT AT ALL
5. POOR APPETITE OR OVEREATING: NOT AT ALL
SUM OF ALL RESPONSES TO PHQ QUESTIONS 1-9: 2
SUM OF ALL RESPONSES TO PHQ QUESTIONS 1-9: 2
8. MOVING OR SPEAKING SO SLOWLY THAT OTHER PEOPLE COULD HAVE NOTICED. OR THE OPPOSITE - BEING SO FIDGETY OR RESTLESS THAT YOU HAVE BEEN MOVING AROUND A LOT MORE THAN USUAL: NOT AT ALL
3. TROUBLE FALLING OR STAYING ASLEEP: SEVERAL DAYS
1. LITTLE INTEREST OR PLEASURE IN DOING THINGS: NOT AT ALL
9. THOUGHTS THAT YOU WOULD BE BETTER OFF DEAD, OR OF HURTING YOURSELF: NOT AT ALL
SUM OF ALL RESPONSES TO PHQ QUESTIONS 1-9: 2
8. MOVING OR SPEAKING SO SLOWLY THAT OTHER PEOPLE COULD HAVE NOTICED. OR THE OPPOSITE, BEING SO FIGETY OR RESTLESS THAT YOU HAVE BEEN MOVING AROUND A LOT MORE THAN USUAL: NOT AT ALL
5. POOR APPETITE OR OVEREATING: NOT AT ALL
2. FEELING DOWN, DEPRESSED OR HOPELESS: NOT AT ALL

## 2025-02-20 ENCOUNTER — OFFICE VISIT (OUTPATIENT)
Dept: FAMILY MEDICINE CLINIC | Facility: CLINIC | Age: 60
End: 2025-02-20

## 2025-02-20 VITALS
HEIGHT: 66 IN | OXYGEN SATURATION: 99 % | SYSTOLIC BLOOD PRESSURE: 116 MMHG | TEMPERATURE: 97.4 F | HEART RATE: 63 BPM | BODY MASS INDEX: 23.01 KG/M2 | WEIGHT: 143.2 LBS | DIASTOLIC BLOOD PRESSURE: 72 MMHG

## 2025-02-20 DIAGNOSIS — M54.50 CHRONIC BILATERAL LOW BACK PAIN WITHOUT SCIATICA: ICD-10-CM

## 2025-02-20 DIAGNOSIS — I10 PRIMARY HYPERTENSION: ICD-10-CM

## 2025-02-20 DIAGNOSIS — K74.69 OTHER CIRRHOSIS OF LIVER (HCC): ICD-10-CM

## 2025-02-20 DIAGNOSIS — J45.20 MILD INTERMITTENT ASTHMA WITHOUT COMPLICATION: ICD-10-CM

## 2025-02-20 DIAGNOSIS — E89.0 POSTSURGICAL HYPOTHYROIDISM: ICD-10-CM

## 2025-02-20 DIAGNOSIS — Z12.31 SCREENING MAMMOGRAM, ENCOUNTER FOR: ICD-10-CM

## 2025-02-20 DIAGNOSIS — G89.29 CHRONIC BILATERAL LOW BACK PAIN WITHOUT SCIATICA: ICD-10-CM

## 2025-02-20 DIAGNOSIS — L40.9 SCALP PSORIASIS: ICD-10-CM

## 2025-02-20 DIAGNOSIS — Z00.00 PREVENTATIVE HEALTH CARE: Primary | ICD-10-CM

## 2025-02-20 DIAGNOSIS — I48.0 PAROXYSMAL ATRIAL FIBRILLATION (HCC): ICD-10-CM

## 2025-02-20 RX ORDER — TRAMADOL HYDROCHLORIDE 50 MG/1
50 TABLET ORAL EVERY 8 HOURS PRN
Qty: 60 TABLET | Refills: 5 | Status: SHIPPED | OUTPATIENT
Start: 2025-02-20 | End: 2025-08-19

## 2025-02-20 RX ORDER — BUDESONIDE AND FORMOTEROL FUMARATE DIHYDRATE 160; 4.5 UG/1; UG/1
2 AEROSOL RESPIRATORY (INHALATION) 2 TIMES DAILY
Qty: 30.6 G | Refills: 1 | Status: SHIPPED | OUTPATIENT
Start: 2025-02-20

## 2025-02-20 RX ORDER — CLOBETASOL PROPIONATE 0.5 MG/ML
SOLUTION TOPICAL
Qty: 50 ML | Refills: 3 | Status: SHIPPED | OUTPATIENT
Start: 2025-02-20

## 2025-02-20 SDOH — ECONOMIC STABILITY: FOOD INSECURITY: WITHIN THE PAST 12 MONTHS, THE FOOD YOU BOUGHT JUST DIDN'T LAST AND YOU DIDN'T HAVE MONEY TO GET MORE.: NEVER TRUE

## 2025-02-20 SDOH — ECONOMIC STABILITY: FOOD INSECURITY: WITHIN THE PAST 12 MONTHS, YOU WORRIED THAT YOUR FOOD WOULD RUN OUT BEFORE YOU GOT MONEY TO BUY MORE.: NEVER TRUE

## 2025-02-20 ASSESSMENT — ENCOUNTER SYMPTOMS
EYE DISCHARGE: 0
ABDOMINAL PAIN: 0
SHORTNESS OF BREATH: 0
CONSTIPATION: 0
CHEST TIGHTNESS: 0
COUGH: 0
SINUS PAIN: 0
DIARRHEA: 0

## 2025-02-20 NOTE — PROGRESS NOTES
Luciana Rodriguez is a 59 y.o. female who presents with   Chief Complaint   Patient presents with    Annual Exam    Migraine     Could not tolerate Maxalt       History of Present Illness  Here for CPX.  TSH 0.17mcg.  T4 1.6. Med just decreased a bit a a couple weeks ago by Dr Piedra. Had Fe infusion in December.  Hgb loos good. Hx of Cirrhosis.  Liver enzymes stable.  Had 1 episode afib 2018.  No episodes since.  Had ablation. Has been off anticoagulation.  S/P weight loss with gastric Bypass.  Awaiting surgery for excess skin folds. Using Albuterol about 1 time per week.  No night time sxs.      Review of Systems  Review of Systems   Constitutional:  Negative for appetite change, fatigue and fever.   HENT:  Negative for congestion, ear pain and sinus pain.    Eyes:  Negative for discharge.   Respiratory:  Negative for cough, chest tightness and shortness of breath.    Cardiovascular:  Negative for chest pain, palpitations and leg swelling.   Gastrointestinal:  Negative for abdominal pain, constipation and diarrhea.   Genitourinary:  Negative for dysuria.   Musculoskeletal:  Negative for joint swelling.   Skin:  Negative for rash.   Neurological:  Negative for headaches.   Hematological:  Negative for adenopathy.   Psychiatric/Behavioral:  Negative for dysphoric mood. The patient is not nervous/anxious.         Medications  Current Outpatient Medications   Medication Sig Dispense Refill    traMADol (ULTRAM) 50 MG tablet Take 1 tablet by mouth every 8 hours as needed for Pain for up to 180 days. Max Daily Amount: 150 mg 60 tablet 5    clobetasol (TEMOVATE) 0.05 % external solution Apply bid 50 mL 3    budesonide-formoterol (SYMBICORT) 160-4.5 MCG/ACT AERO Inhale 2 puffs into the lungs 2 times daily 30.6 g 1    LORazepam (ATIVAN) 1 MG tablet Take 1 tablet by mouth every 8 hours as needed for Anxiety for up to 180 days. Max Daily Amount: 3 mg 60 tablet 5    traZODone (DESYREL) 150 MG tablet Take 1 tablet by mouth nightly

## 2025-03-05 DIAGNOSIS — J45.40 MODERATE PERSISTENT ASTHMA WITHOUT COMPLICATION: ICD-10-CM

## 2025-03-05 RX ORDER — ALBUTEROL SULFATE 90 UG/1
2 INHALANT RESPIRATORY (INHALATION) EVERY 4 HOURS PRN
Refills: 1 | OUTPATIENT
Start: 2025-03-05

## 2025-03-07 ENCOUNTER — OFFICE VISIT (OUTPATIENT)
Age: 60
End: 2025-03-07
Payer: MEDICAID

## 2025-03-07 VITALS
DIASTOLIC BLOOD PRESSURE: 66 MMHG | HEIGHT: 66 IN | SYSTOLIC BLOOD PRESSURE: 124 MMHG | HEART RATE: 72 BPM | BODY MASS INDEX: 22.82 KG/M2 | WEIGHT: 142 LBS

## 2025-03-07 DIAGNOSIS — E78.2 MIXED HYPERLIPIDEMIA: Chronic | ICD-10-CM

## 2025-03-07 DIAGNOSIS — R00.2 PALPITATIONS: Primary | ICD-10-CM

## 2025-03-07 DIAGNOSIS — I10 PRIMARY HYPERTENSION: Chronic | ICD-10-CM

## 2025-03-07 DIAGNOSIS — Z86.79 HISTORY OF ATRIAL FIBRILLATION: ICD-10-CM

## 2025-03-07 PROCEDURE — 3074F SYST BP LT 130 MM HG: CPT | Performed by: INTERNAL MEDICINE

## 2025-03-07 PROCEDURE — 3078F DIAST BP <80 MM HG: CPT | Performed by: INTERNAL MEDICINE

## 2025-03-07 PROCEDURE — 99214 OFFICE O/P EST MOD 30 MIN: CPT | Performed by: INTERNAL MEDICINE

## 2025-03-07 ASSESSMENT — ENCOUNTER SYMPTOMS: SHORTNESS OF BREATH: 0

## 2025-03-07 NOTE — PROGRESS NOTES
[facility] CARDIOLOGY  [address] [place] [address], [address]  [place] [address]  PHONE: [phone]      25    NAME:  Luciana Rodriguez  : 1965  MRN: [id number]         SUBJECTIVE:   Luciana Rodriguez is a 59 y.o. female seen for a follow up visit regarding the following:     Chief Complaint   Patient presents with    Atrial Fibrillation    New Patient    Results     echo            HPI:  Follow up  Atrial Fibrillation, New Patient, and Results (echo)   .      59 y.o. female with PMH HTN, obesity, endometrial CA, h/o afib s/p ablation presenting for follow up evaluation. Previously followed with Dr. Kumari. She reports occasional pressure on her chest throughout the day that occurs randomly. She has very little short of breath and no activity intolerance. Palpitations have improved. No other acute complaints.        Cardiac Medications       Calcium Channel Blockers       amLODIPine (NORVASC) 5 MG tablet Take 1 tablet by mouth daily                  Past Medical History, Past Surgical History, Family history, Social History, and Medications were all reviewed with the patient today and updated as necessary.     Prior to Admission medications    Medication Sig Start Date End Date Taking? Authorizing Provider   traMADol (ULTRAM) 50 MG tablet Take 1 tablet by mouth every 8 hours as needed for Pain for up to 180 days. Max Daily Amount: 150 mg 25 Yes Aleksandra Alex MD   budesonide-formoterol (SYMBICORT) 160-4.5 MCG/ACT AERO Inhale 2 puffs into the lungs 2 times daily 25  Yes Aleksandra Alex MD   LORazepam (ATIVAN) 1 MG tablet Take 1 tablet by mouth every 8 hours as needed for Anxiety for up to 180 days. Max Daily Amount: 3 mg 25 Yes Aleksandra Alex MD   traZODone (DESYREL) 150 MG tablet Take 1 tablet by mouth nightly 25  Yes Aleksandra Alex MD   TURMERIC CURCUMIN PO Take by mouth   Yes Provider, MD Luis   ferrous sulfate (IRON 325) 325 (65 Fe) MG tablet

## 2025-03-10 ENCOUNTER — HOSPITAL ENCOUNTER (EMERGENCY)
Age: 60
Discharge: HOME | End: 2025-03-10
Payer: MEDICAID

## 2025-03-10 ENCOUNTER — APPOINTMENT (OUTPATIENT)
Dept: GENERAL RADIOLOGY | Age: 60
End: 2025-03-10
Payer: MEDICAID

## 2025-03-10 VITALS
HEIGHT: 66 IN | BODY MASS INDEX: 22.5 KG/M2 | OXYGEN SATURATION: 100 % | WEIGHT: 140 LBS | SYSTOLIC BLOOD PRESSURE: 136 MMHG | DIASTOLIC BLOOD PRESSURE: 73 MMHG | HEART RATE: 52 BPM | TEMPERATURE: 98 F | RESPIRATION RATE: 19 BRPM

## 2025-03-10 DIAGNOSIS — R42 ORTHOSTATIC DIZZINESS: ICD-10-CM

## 2025-03-10 DIAGNOSIS — R07.9 CHEST PAIN, UNSPECIFIED TYPE: Primary | ICD-10-CM

## 2025-03-10 LAB
ALBUMIN SERPL-MCNC: 3.4 G/DL (ref 3.2–4.6)
ALBUMIN/GLOB SERPL: 1 (ref 1–1.9)
ALP SERPL-CCNC: 125 U/L (ref 35–104)
ALT SERPL-CCNC: 49 U/L (ref 8–45)
ANION GAP SERPL CALC-SCNC: 9 MMOL/L (ref 7–16)
AST SERPL-CCNC: 53 U/L (ref 15–37)
BASOPHILS # BLD: 0.01 K/UL (ref 0–0.2)
BASOPHILS NFR BLD: 0.2 % (ref 0–2)
BILIRUB SERPL-MCNC: 0.6 MG/DL (ref 0–1.2)
BUN SERPL-MCNC: 13 MG/DL (ref 8–23)
CALCIUM SERPL-MCNC: 9.5 MG/DL (ref 8.8–10.2)
CHLORIDE SERPL-SCNC: 103 MMOL/L (ref 98–107)
CO2 SERPL-SCNC: 28 MMOL/L (ref 20–29)
CREAT SERPL-MCNC: 0.63 MG/DL (ref 0.6–1.1)
D DIMER PPP FEU-MCNC: 0.39 UG/ML(FEU)
DIFFERENTIAL METHOD BLD: ABNORMAL
EKG ATRIAL RATE: 57 BPM
EKG DIAGNOSIS: NORMAL
EKG P AXIS: 60 DEGREES
EKG P-R INTERVAL: 166 MS
EKG Q-T INTERVAL: 426 MS
EKG QRS DURATION: 113 MS
EKG QTC CALCULATION (BAZETT): 419 MS
EKG R AXIS: 84 DEGREES
EKG T AXIS: 59 DEGREES
EKG VENTRICULAR RATE: 58 BPM
EOSINOPHIL # BLD: 0.21 K/UL (ref 0–0.8)
EOSINOPHIL NFR BLD: 5 % (ref 0.5–7.8)
ERYTHROCYTE [DISTWIDTH] IN BLOOD BY AUTOMATED COUNT: 13.7 % (ref 11.9–14.6)
GLOBULIN SER CALC-MCNC: 3.3 G/DL (ref 2.3–3.5)
GLUCOSE SERPL-MCNC: 93 MG/DL (ref 70–99)
HCT VFR BLD AUTO: 44.2 % (ref 35.8–46.3)
HGB BLD-MCNC: 14.1 G/DL (ref 11.7–15.4)
IMM GRANULOCYTES # BLD AUTO: 0.01 K/UL (ref 0–0.5)
IMM GRANULOCYTES NFR BLD AUTO: 0.2 % (ref 0–5)
LYMPHOCYTES # BLD: 1.62 K/UL (ref 0.5–4.6)
LYMPHOCYTES NFR BLD: 38.3 % (ref 13–44)
MCH RBC QN AUTO: 27.6 PG (ref 26.1–32.9)
MCHC RBC AUTO-ENTMCNC: 31.9 G/DL (ref 31.4–35)
MCV RBC AUTO: 86.5 FL (ref 82–102)
MONOCYTES # BLD: 0.35 K/UL (ref 0.1–1.3)
MONOCYTES NFR BLD: 8.3 % (ref 4–12)
NEUTS SEG # BLD: 2.03 K/UL (ref 1.7–8.2)
NEUTS SEG NFR BLD: 48 % (ref 43–78)
NRBC # BLD: 0 K/UL (ref 0–0.2)
PLATELET # BLD AUTO: 149 K/UL (ref 150–450)
PMV BLD AUTO: 11.1 FL (ref 9.4–12.3)
POTASSIUM SERPL-SCNC: 4 MMOL/L (ref 3.5–5.1)
PROT SERPL-MCNC: 6.7 G/DL (ref 6.3–8.2)
RBC # BLD AUTO: 5.11 M/UL (ref 4.05–5.2)
SODIUM SERPL-SCNC: 140 MMOL/L (ref 136–145)
TROPONIN T SERPL HS-MCNC: <6 NG/L (ref 0–14)
TROPONIN T SERPL HS-MCNC: <6 NG/L (ref 0–14)
WBC # BLD AUTO: 4.2 K/UL (ref 4.3–11.1)

## 2025-03-10 PROCEDURE — 85379 FIBRIN DEGRADATION QUANT: CPT

## 2025-03-10 PROCEDURE — 85025 COMPLETE CBC W/AUTO DIFF WBC: CPT

## 2025-03-10 PROCEDURE — 84484 ASSAY OF TROPONIN QUANT: CPT

## 2025-03-10 PROCEDURE — 2580000003 HC RX 258: Performed by: STUDENT IN AN ORGANIZED HEALTH CARE EDUCATION/TRAINING PROGRAM

## 2025-03-10 PROCEDURE — 71045 X-RAY EXAM CHEST 1 VIEW: CPT

## 2025-03-10 PROCEDURE — 99285 EMERGENCY DEPT VISIT HI MDM: CPT

## 2025-03-10 PROCEDURE — 6370000000 HC RX 637 (ALT 250 FOR IP): Performed by: STUDENT IN AN ORGANIZED HEALTH CARE EDUCATION/TRAINING PROGRAM

## 2025-03-10 PROCEDURE — 93005 ELECTROCARDIOGRAM TRACING: CPT | Performed by: EMERGENCY MEDICINE

## 2025-03-10 PROCEDURE — 80053 COMPREHEN METABOLIC PANEL: CPT

## 2025-03-10 PROCEDURE — 93010 ELECTROCARDIOGRAM REPORT: CPT | Performed by: INTERNAL MEDICINE

## 2025-03-10 RX ORDER — 0.9 % SODIUM CHLORIDE 0.9 %
1000 INTRAVENOUS SOLUTION INTRAVENOUS
Status: COMPLETED | OUTPATIENT
Start: 2025-03-10 | End: 2025-03-10

## 2025-03-10 RX ORDER — ACETAMINOPHEN 500 MG
500 TABLET ORAL ONCE
Status: COMPLETED | OUTPATIENT
Start: 2025-03-10 | End: 2025-03-10

## 2025-03-10 RX ORDER — ASPIRIN 81 MG/1
324 TABLET, CHEWABLE ORAL
Status: COMPLETED | OUTPATIENT
Start: 2025-03-10 | End: 2025-03-10

## 2025-03-10 RX ADMIN — ACETAMINOPHEN 500 MG: 500 TABLET, FILM COATED ORAL at 11:13

## 2025-03-10 RX ADMIN — SODIUM CHLORIDE 1000 ML: 9 INJECTION, SOLUTION INTRAVENOUS at 09:40

## 2025-03-10 RX ADMIN — ASPIRIN 324 MG: 81 TABLET, CHEWABLE ORAL at 09:38

## 2025-03-10 ASSESSMENT — ENCOUNTER SYMPTOMS
TROUBLE SWALLOWING: 0
FACIAL SWELLING: 0
ABDOMINAL PAIN: 0
PHOTOPHOBIA: 0
VOMITING: 0
SHORTNESS OF BREATH: 1
COUGH: 0

## 2025-03-10 ASSESSMENT — PAIN SCALES - GENERAL
PAINLEVEL_OUTOF10: 5
PAINLEVEL_OUTOF10: 3

## 2025-03-10 ASSESSMENT — PAIN - FUNCTIONAL ASSESSMENT: PAIN_FUNCTIONAL_ASSESSMENT: 0-10

## 2025-03-10 NOTE — ED TRIAGE NOTES
Patient reports intermittent chest pain since December. Viky reports recent cardiology visit and states she was told everything was fine at that time. Patient reports left side chest pressure with \"piercing\" pain intermittently and dyspnea with exertion, also reports periods of dizziness worse when changing positions.

## 2025-03-10 NOTE — DISCHARGE INSTRUCTIONS
Your workup here was reassuring.  You had 2 negative cardiac enzymes.  You had a D-dimer which is negative which screens for blood clots in your lungs.  Your chest x-ray is normal.  No clear cause of your symptoms has been found.  Be sure to hydrate well at home as sometimes dehydration can contribute to dizziness especially when going from a seated to a standing position.  Follow-up with cardiology and your family doctor.    As we discussed, I did not find a life threatening cause of your symptoms today. However, THAT DOES NOT MEAN IT COULD NOT DEVELOP. If you develop ANY new or worsening symptoms, it is critical that you return for re-evaluation. This includes any symptoms that are concerning to you, especially symptoms such as new or changed your chest pain, coughing up blood, fevers, shortness of breath.  If you do not return for re-evaluation, you risk serious complications, including death.    It was my pleasure to take care of you today in the emergency department. Thank you for coming in today. I hope that we were able to help you out and make you more comfortable. I hope you have a speedy recovery! If you do get a survey in the mail asking how your care was, it really helps me and our department out if you respond. Thank you!

## 2025-03-10 NOTE — ED PROVIDER NOTES
Emergency Department Provider Note       PCP: Aleksandra Alex MD   Age: 59 y.o.   Sex: female     DISPOSITION Decision To Discharge 03/10/2025 11:56:53 AM   DISPOSITION CONDITION Stable            ICD-10-CM    1. Chest pain, unspecified type  R07.9       2. Orthostatic dizziness  R42           Medical Decision Making     In summary this patient presents with symptoms of nonspecific chest pain and dizziness upon standing that I feel is secondary to orthostatic dizziness. Based on my evaluation I feel the patient is at a low risk for emergent diagnoses such as acute coronary syndrome, pulmonary embolism, aortic dissection, esophageal rupture, cardiac tamponade, tension pneumothorax, CVA.  The reasoning behind my medical decision-making process is that the patient is grossly well-appearing on exam in no acute distress.  Vital signs are stable without fever, tachycardia, tachypnea, hypotension, hypoxia.  No syncope, weakness, diaphoresis.  Nonischemic EKG with 2 negative troponins.  Low risk by heart score.  No hemoptysis, hypoxia, leg pain or swelling, history of blood clots, active malignancy, recent surgery, recent travel to suggest pulmonary embolism.  D-dimer negative.  No pulse deficits or motor or sensory deficit to suggest dissection. My independent interpretation of xray unremarkable. No widened mediastinum. No history of vomiting or recent endoscopy or mediastinal emphysema to suggest esophageal rupture. No JVD, hypotension, tracheal deviation to suggest tension pneumothorax. No penetrating trauma or uremia to suggest tamponade.  No focal neurodeficits.  She is already following up with cardiology which I have encouraged her to continue following up with. I specifically counseled the patient warning signs to return immediately for including but not limited to chest pressure, dyspnea, syncope, chest pain.  The patient has verbalized understanding and is in agreement with the treatment plan.    ED Course

## 2025-03-10 NOTE — FLOWSHEET NOTE
03/10/25 1024   Vital Signs   Orthostatic B/P and Pulse? Yes   Blood Pressure Lying 135/73   Pulse Lying 61 PER MINUTE   Blood Pressure Sitting 139/83   Pulse Sitting 55 PER MINUTE   Blood Pressure Standing 140/73   Pulse Standing 57 PER MINUTE

## 2025-04-18 DIAGNOSIS — M54.50 CHRONIC BILATERAL LOW BACK PAIN WITHOUT SCIATICA: ICD-10-CM

## 2025-04-18 DIAGNOSIS — G89.29 CHRONIC BILATERAL LOW BACK PAIN WITHOUT SCIATICA: ICD-10-CM

## 2025-04-18 RX ORDER — TRAMADOL HYDROCHLORIDE 50 MG/1
50 TABLET ORAL EVERY 8 HOURS PRN
Qty: 60 TABLET | Refills: 5 | OUTPATIENT
Start: 2025-04-18 | End: 2025-10-15

## 2025-04-29 NOTE — PROGRESS NOTES
HPI  Luciana Rodriguez is a 59 y.o. female seen for annual GYN exam.  She had mild urinary burning 2 days ago.      Past Medical History, Past Surgical History, Family history, Social History, and Medications were all reviewed with the patient today and updated as necessary.     Current Outpatient Medications   Medication Sig    traMADol (ULTRAM) 50 MG tablet Take 1 tablet by mouth every 8 hours as needed for Pain for up to 180 days. Max Daily Amount: 150 mg    budesonide-formoterol (SYMBICORT) 160-4.5 MCG/ACT AERO Inhale 2 puffs into the lungs 2 times daily    LORazepam (ATIVAN) 1 MG tablet Take 1 tablet by mouth every 8 hours as needed for Anxiety for up to 180 days. Max Daily Amount: 3 mg    traZODone (DESYREL) 150 MG tablet Take 1 tablet by mouth nightly    ferrous sulfate (IRON 325) 325 (65 Fe) MG tablet Take 1 tablet by mouth twice a week    levothyroxine (SYNTHROID) 112 MCG tablet Take 1 tablet by mouth Daily    albuterol sulfate HFA (PROVENTIL;VENTOLIN;PROAIR) 108 (90 Base) MCG/ACT inhaler INHALE TWO PUFFS BY MOUTH EVERY 4 HOURS AS NEEDED FOR WHEEZING    omeprazole (PRILOSEC) 40 MG delayed release capsule Take 1 capsule by mouth every morning (before breakfast)    amLODIPine (NORVASC) 5 MG tablet Take 1 tablet by mouth daily    Misc Natural Products (GLUCOSAMINE CHONDROITIN ADV PO) Take by mouth    BIOTIN PO Take by mouth    Multiple Vitamins-Minerals (MULTI COMPLETE PO) Take by mouth    calcium carb-cholecalciferol (CALCIUM 600+D) 600-10 MG-MCG TABS per tab Take 1 tablet by mouth 2 times daily    acetaminophen (TYLENOL) 500 MG tablet Take 2 tablets by mouth in the morning and at bedtime    TURMERIC CURCUMIN PO Take by mouth     No current facility-administered medications for this visit.     Allergies   Allergen Reactions    Cherry      Other Reaction(s): Not available    Diltiazem Hcl Other (See Comments)     Blistering on the chest and arms    Other Reaction(s): Not available     Past Medical History:

## 2025-04-30 ENCOUNTER — OFFICE VISIT (OUTPATIENT)
Dept: OBGYN CLINIC | Age: 60
End: 2025-04-30
Payer: MEDICAID

## 2025-04-30 VITALS
HEIGHT: 66 IN | BODY MASS INDEX: 22.5 KG/M2 | SYSTOLIC BLOOD PRESSURE: 118 MMHG | WEIGHT: 140 LBS | DIASTOLIC BLOOD PRESSURE: 78 MMHG

## 2025-04-30 DIAGNOSIS — R30.0 DYSURIA: ICD-10-CM

## 2025-04-30 DIAGNOSIS — Z85.42 HISTORY OF ENDOMETRIAL CANCER: ICD-10-CM

## 2025-04-30 DIAGNOSIS — Z01.419 ENCOUNTER FOR WELL WOMAN EXAM WITH ROUTINE GYNECOLOGICAL EXAM: Primary | ICD-10-CM

## 2025-04-30 DIAGNOSIS — Z12.72 VAGINAL PAP SMEAR: ICD-10-CM

## 2025-04-30 LAB
BILIRUBIN, URINE, POC: NEGATIVE
BLOOD URINE, POC: NORMAL
GLUCOSE URINE, POC: NEGATIVE
KETONES, URINE, POC: NEGATIVE
LEUKOCYTE ESTERASE, URINE, POC: NORMAL
NITRITE, URINE, POC: NEGATIVE
PH, URINE, POC: 6.5 (ref 4.6–8)
PROTEIN,URINE, POC: NEGATIVE
SPECIFIC GRAVITY, URINE, POC: 1.02 (ref 1–1.03)
URINALYSIS CLARITY, POC: CLEAR
URINALYSIS COLOR, POC: NORMAL
UROBILINOGEN, POC: NORMAL

## 2025-04-30 PROCEDURE — 99386 PREV VISIT NEW AGE 40-64: CPT | Performed by: OBSTETRICS & GYNECOLOGY

## 2025-04-30 PROCEDURE — 99459 PELVIC EXAMINATION: CPT | Performed by: OBSTETRICS & GYNECOLOGY

## 2025-04-30 PROCEDURE — 3074F SYST BP LT 130 MM HG: CPT | Performed by: OBSTETRICS & GYNECOLOGY

## 2025-04-30 PROCEDURE — 81003 URINALYSIS AUTO W/O SCOPE: CPT | Performed by: OBSTETRICS & GYNECOLOGY

## 2025-04-30 PROCEDURE — 3078F DIAST BP <80 MM HG: CPT | Performed by: OBSTETRICS & GYNECOLOGY

## 2025-05-02 LAB
BACTERIA SPEC CULT: NORMAL
SERVICE CMNT-IMP: NORMAL

## 2025-05-05 ENCOUNTER — RESULTS FOLLOW-UP (OUTPATIENT)
Dept: OBGYN CLINIC | Age: 60
End: 2025-05-05

## 2025-05-08 LAB
COLLECTION METHOD: ABNORMAL
CYTOLOGIST CVX/VAG CYTO: ABNORMAL
CYTOLOGY CVX/VAG DOC THIN PREP: ABNORMAL
HPV REFLEX: ABNORMAL
Lab: ABNORMAL
OTHER PT INFO: ABNORMAL
PAP SOURCE: ABNORMAL
PATH REPORT.FINAL DX SPEC: ABNORMAL
PATHOLOGIST CVX/VAG CYTO: ABNORMAL
PATHOLOGIST PROVIDED ICD: ABNORMAL
PREV CYTO INFO: ABNORMAL
PREV TREATMENT: ABNORMAL
STAT OF ADQ CVX/VAG CYTO-IMP: ABNORMAL

## 2025-05-09 LAB
HPV APTIMA: NEGATIVE
SPECIMEN SOURCE: NORMAL

## 2025-05-16 ENCOUNTER — HOSPITAL ENCOUNTER (OUTPATIENT)
Dept: LAB | Age: 60
Discharge: HOME | End: 2025-05-16
Payer: MEDICAID

## 2025-05-16 DIAGNOSIS — D50.9 IRON DEFICIENCY ANEMIA, UNSPECIFIED IRON DEFICIENCY ANEMIA TYPE: ICD-10-CM

## 2025-05-16 DIAGNOSIS — K74.69 OTHER CIRRHOSIS OF LIVER (HCC): ICD-10-CM

## 2025-05-16 LAB
ALBUMIN SERPL-MCNC: 3.7 G/DL (ref 3.2–4.6)
ALBUMIN/GLOB SERPL: 1 (ref 1–1.9)
ALP SERPL-CCNC: 149 U/L (ref 35–104)
ALT SERPL-CCNC: 36 U/L (ref 8–45)
ANION GAP SERPL CALC-SCNC: 9 MMOL/L (ref 7–16)
AST SERPL-CCNC: 37 U/L (ref 15–37)
BASOPHILS # BLD: 0.02 K/UL (ref 0–0.2)
BASOPHILS NFR BLD: 0.4 % (ref 0–2)
BILIRUB SERPL-MCNC: 0.6 MG/DL (ref 0–1.2)
BUN SERPL-MCNC: 14 MG/DL (ref 8–23)
CALCIUM SERPL-MCNC: 9.8 MG/DL (ref 8.8–10.2)
CHLORIDE SERPL-SCNC: 101 MMOL/L (ref 98–107)
CO2 SERPL-SCNC: 29 MMOL/L (ref 20–29)
CREAT SERPL-MCNC: 0.63 MG/DL (ref 0.6–1.1)
DIFFERENTIAL METHOD BLD: ABNORMAL
EOSINOPHIL # BLD: 0.24 K/UL (ref 0–0.8)
EOSINOPHIL NFR BLD: 5.2 % (ref 0.5–7.8)
ERYTHROCYTE [DISTWIDTH] IN BLOOD BY AUTOMATED COUNT: 13.5 % (ref 11.9–14.6)
FERRITIN SERPL-MCNC: 183 NG/ML (ref 8–388)
GLOBULIN SER CALC-MCNC: 3.6 G/DL (ref 2.3–3.5)
GLUCOSE SERPL-MCNC: 103 MG/DL (ref 70–99)
HCT VFR BLD AUTO: 45.5 % (ref 35.8–46.3)
HGB BLD-MCNC: 14.2 G/DL (ref 11.7–15.4)
IMM GRANULOCYTES # BLD AUTO: 0 K/UL (ref 0–0.5)
IMM GRANULOCYTES NFR BLD AUTO: 0 % (ref 0–5)
IRON SATN MFR SERPL: 33 % (ref 20–50)
IRON SERPL-MCNC: 98 UG/DL (ref 35–100)
LYMPHOCYTES # BLD: 1.89 K/UL (ref 0.5–4.6)
LYMPHOCYTES NFR BLD: 41.3 % (ref 13–44)
MCH RBC QN AUTO: 27.8 PG (ref 26.1–32.9)
MCHC RBC AUTO-ENTMCNC: 31.2 G/DL (ref 31.4–35)
MCV RBC AUTO: 89.2 FL (ref 82–102)
MONOCYTES # BLD: 0.37 K/UL (ref 0.1–1.3)
MONOCYTES NFR BLD: 8.1 % (ref 4–12)
NEUTS SEG # BLD: 2.06 K/UL (ref 1.7–8.2)
NEUTS SEG NFR BLD: 45 % (ref 43–78)
NRBC # BLD: 0 K/UL (ref 0–0.2)
PLATELET # BLD AUTO: 146 K/UL (ref 150–450)
PMV BLD AUTO: 10.2 FL (ref 9.4–12.3)
POTASSIUM SERPL-SCNC: 4.2 MMOL/L (ref 3.5–5.1)
PROT SERPL-MCNC: 7.3 G/DL (ref 6.3–8.2)
RBC # BLD AUTO: 5.1 M/UL (ref 4.05–5.2)
SODIUM SERPL-SCNC: 139 MMOL/L (ref 136–145)
TIBC SERPL-MCNC: 300 UG/DL (ref 240–450)
UIBC SERPL-MCNC: 202 UG/DL (ref 112–347)
WBC # BLD AUTO: 4.6 K/UL (ref 4.3–11.1)

## 2025-05-16 PROCEDURE — 82728 ASSAY OF FERRITIN: CPT

## 2025-05-16 PROCEDURE — 36415 COLL VENOUS BLD VENIPUNCTURE: CPT

## 2025-05-16 PROCEDURE — 83550 IRON BINDING TEST: CPT

## 2025-05-16 PROCEDURE — 85025 COMPLETE CBC W/AUTO DIFF WBC: CPT

## 2025-05-16 PROCEDURE — 80053 COMPREHEN METABOLIC PANEL: CPT

## 2025-05-16 PROCEDURE — 83540 ASSAY OF IRON: CPT

## 2025-05-20 ENCOUNTER — OFFICE VISIT (OUTPATIENT)
Dept: ONCOLOGY | Age: 60
End: 2025-05-20
Payer: MEDICAID

## 2025-05-20 VITALS
WEIGHT: 140 LBS | SYSTOLIC BLOOD PRESSURE: 137 MMHG | OXYGEN SATURATION: 99 % | RESPIRATION RATE: 12 BRPM | HEIGHT: 66 IN | BODY MASS INDEX: 22.5 KG/M2 | DIASTOLIC BLOOD PRESSURE: 79 MMHG | TEMPERATURE: 97.9 F | HEART RATE: 61 BPM

## 2025-05-20 DIAGNOSIS — D50.9 IRON DEFICIENCY ANEMIA, UNSPECIFIED IRON DEFICIENCY ANEMIA TYPE: Primary | ICD-10-CM

## 2025-05-20 PROCEDURE — 99214 OFFICE O/P EST MOD 30 MIN: CPT | Performed by: NURSE PRACTITIONER

## 2025-05-20 PROCEDURE — 3078F DIAST BP <80 MM HG: CPT | Performed by: NURSE PRACTITIONER

## 2025-05-20 PROCEDURE — 3075F SYST BP GE 130 - 139MM HG: CPT | Performed by: NURSE PRACTITIONER

## 2025-05-20 ASSESSMENT — PATIENT HEALTH QUESTIONNAIRE - PHQ9
1. LITTLE INTEREST OR PLEASURE IN DOING THINGS: NOT AT ALL
SUM OF ALL RESPONSES TO PHQ QUESTIONS 1-9: 0
2. FEELING DOWN, DEPRESSED OR HOPELESS: NOT AT ALL
SUM OF ALL RESPONSES TO PHQ QUESTIONS 1-9: 0

## 2025-05-20 NOTE — PROGRESS NOTES
small cyst    HYSTERECTOMY, TOTAL ABDOMINAL (CERVIX REMOVED)  Aug.28,2019    Uterine cancer  BSO    KNEE ARTHROSCOPY Right 06/14/2023    RIGHT KNEE ARTHROSCOPY PARTIAL LATERAL AND MEDIAL MENISCECTOMY, CHONDROPLASTY performed by Carlos Valero MD at [facility] OPC    PERLA-EN-Y GASTRIC BYPASS N/A 08/24/2023    ERAS/ GASTRIC BYPASS PERLA-EN-Y LAPAROSCOPIC ROBOTIC/ LIVER BIOPSY performed by Farida Mcnamara MD at [facility] OR    THYROIDECTOMY  08/28/2020    TUBAL LIGATION  1989    UPPER GASTROINTESTINAL ENDOSCOPY N/A 08/24/2023    EGD ESOPHAGOGASTRODUODENOSCOPY performed by Farida Mcnamara MD at [facility] MAIN OR       Current Outpatient Medications   Medication Sig Dispense Refill    traMADol (ULTRAM) 50 MG tablet Take 1 tablet by mouth every 8 hours as needed for Pain for up to 180 days. Max Daily Amount: 150 mg 60 tablet 5    budesonide-formoterol (SYMBICORT) 160-4.5 MCG/ACT AERO Inhale 2 puffs into the lungs 2 times daily 30.6 g 1    LORazepam (ATIVAN) 1 MG tablet Take 1 tablet by mouth every 8 hours as needed for Anxiety for up to 180 days. Max Daily Amount: 3 mg 60 tablet 5    traZODone (DESYREL) 150 MG tablet Take 1 tablet by mouth nightly 90 tablet 3    TURMERIC CURCUMIN PO Take by mouth      ferrous sulfate (IRON 325) 325 (65 Fe) MG tablet Take 1 tablet by mouth twice a week      levothyroxine (SYNTHROID) 112 MCG tablet Take 1 tablet by mouth Daily 90 tablet 3    albuterol sulfate HFA (PROVENTIL;VENTOLIN;PROAIR) 108 (90 Base) MCG/ACT inhaler INHALE TWO PUFFS BY MOUTH EVERY 4 HOURS AS NEEDED FOR WHEEZING 8.5 g 5    omeprazole (PRILOSEC) 40 MG delayed release capsule Take 1 capsule by mouth every morning (before breakfast) 90 capsule 3    amLODIPine (NORVASC) 5 MG tablet Take 1 tablet by mouth daily 90 tablet 3    Misc Natural Products (GLUCOSAMINE CHONDROITIN ADV PO) Take by mouth      BIOTIN PO Take by mouth      Multiple Vitamins-Minerals (MULTI COMPLETE PO) Take by mouth      calcium

## 2025-06-04 DIAGNOSIS — E78.2 MIXED HYPERLIPIDEMIA: ICD-10-CM

## 2025-06-04 DIAGNOSIS — E89.0 POSTSURGICAL HYPOTHYROIDISM: ICD-10-CM

## 2025-06-04 LAB
ALBUMIN SERPL-MCNC: 3.4 G/DL (ref 3.2–4.6)
ALBUMIN/GLOB SERPL: 1 (ref 1–1.9)
ALP SERPL-CCNC: 131 U/L (ref 35–104)
ALT SERPL-CCNC: 49 U/L (ref 8–45)
ANION GAP SERPL CALC-SCNC: 10 MMOL/L (ref 7–16)
AST SERPL-CCNC: 48 U/L (ref 15–37)
BILIRUB SERPL-MCNC: 0.5 MG/DL (ref 0–1.2)
BUN SERPL-MCNC: 13 MG/DL (ref 8–23)
CALCIUM SERPL-MCNC: 10 MG/DL (ref 8.8–10.2)
CHLORIDE SERPL-SCNC: 102 MMOL/L (ref 98–107)
CHOLEST SERPL-MCNC: 146 MG/DL (ref 0–200)
CO2 SERPL-SCNC: 29 MMOL/L (ref 20–29)
CREAT SERPL-MCNC: 0.61 MG/DL (ref 0.6–1.1)
GLOBULIN SER CALC-MCNC: 3.5 G/DL (ref 2.3–3.5)
GLUCOSE SERPL-MCNC: 79 MG/DL (ref 70–99)
HDLC SERPL-MCNC: 60 MG/DL (ref 40–60)
HDLC SERPL: 2.4 (ref 0–5)
LDLC SERPL CALC-MCNC: 68 MG/DL (ref 0–100)
POTASSIUM SERPL-SCNC: 4.5 MMOL/L (ref 3.5–5.1)
PROT SERPL-MCNC: 6.8 G/DL (ref 6.3–8.2)
SODIUM SERPL-SCNC: 142 MMOL/L (ref 136–145)
T4 FREE SERPL-MCNC: 1.7 NG/DL (ref 0.9–1.7)
TRIGL SERPL-MCNC: 91 MG/DL (ref 0–150)
TSH W FREE THYROID IF ABNORMAL: 0.18 UIU/ML (ref 0.27–4.2)
VLDLC SERPL CALC-MCNC: 18 MG/DL (ref 6–23)

## 2025-06-09 ENCOUNTER — OFFICE VISIT (OUTPATIENT)
Dept: ENDOCRINOLOGY | Age: 60
End: 2025-06-09
Payer: MEDICAID

## 2025-06-09 VITALS
BODY MASS INDEX: 23.14 KG/M2 | DIASTOLIC BLOOD PRESSURE: 84 MMHG | RESPIRATION RATE: 16 BRPM | WEIGHT: 144 LBS | HEIGHT: 66 IN | OXYGEN SATURATION: 98 % | SYSTOLIC BLOOD PRESSURE: 142 MMHG | HEART RATE: 76 BPM

## 2025-06-09 DIAGNOSIS — E07.9 THYROID EYE DISEASE: ICD-10-CM

## 2025-06-09 DIAGNOSIS — E89.0 POSTSURGICAL HYPOTHYROIDISM: Primary | ICD-10-CM

## 2025-06-09 DIAGNOSIS — H57.89 THYROID EYE DISEASE: ICD-10-CM

## 2025-06-09 PROCEDURE — 3079F DIAST BP 80-89 MM HG: CPT | Performed by: INTERNAL MEDICINE

## 2025-06-09 PROCEDURE — 3077F SYST BP >= 140 MM HG: CPT | Performed by: INTERNAL MEDICINE

## 2025-06-09 PROCEDURE — 99214 OFFICE O/P EST MOD 30 MIN: CPT | Performed by: INTERNAL MEDICINE

## 2025-06-09 RX ORDER — LEVOTHYROXINE SODIUM 100 UG/1
100 TABLET ORAL DAILY
Qty: 90 TABLET | Refills: 3 | Status: SHIPPED | OUTPATIENT
Start: 2025-06-09

## 2025-06-09 ASSESSMENT — ENCOUNTER SYMPTOMS
CONSTIPATION: 0
DIARRHEA: 0

## 2025-06-09 NOTE — PROGRESS NOTES
hours as needed for Anxiety for up to 180 days. Max Daily Amount: 3 mg 60 tablet 5    traZODone (DESYREL) 150 MG tablet Take 1 tablet by mouth nightly 90 tablet 3    TURMERIC CURCUMIN PO Take by mouth      ferrous sulfate (IRON 325) 325 (65 Fe) MG tablet Take 1 tablet by mouth three times a week      omeprazole (PRILOSEC) 40 MG delayed release capsule Take 1 capsule by mouth every morning (before breakfast) 90 capsule 3    amLODIPine (NORVASC) 5 MG tablet Take 1 tablet by mouth daily 90 tablet 3    BIOTIN PO Take by mouth      Multiple Vitamins-Minerals (MULTI COMPLETE PO) Take by mouth      calcium carb-cholecalciferol (CALCIUM 600+D) 600-10 MG-MCG TABS per tab Take 1 tablet by mouth 2 times daily      acetaminophen (TYLENOL) 500 MG tablet Take 2 tablets by mouth in the morning and at bedtime      albuterol sulfate HFA (PROVENTIL;VENTOLIN;PROAIR) 108 (90 Base) MCG/ACT inhaler INHALE TWO PUFFS BY MOUTH EVERY 4 HOURS AS NEEDED FOR WHEEZING (Patient not taking: Reported on 6/9/2025) 8.5 g 5    Misc Natural Products (GLUCOSAMINE CHONDROITIN ADV PO) Take by mouth (Patient not taking: Reported on 6/9/2025)       No current facility-administered medications for this visit.

## 2025-06-11 ENCOUNTER — OFFICE VISIT (OUTPATIENT)
Dept: NEUROLOGY | Age: 60
End: 2025-06-11
Payer: MEDICAID

## 2025-06-11 VITALS — BODY MASS INDEX: 23.14 KG/M2 | WEIGHT: 144 LBS | HEIGHT: 66 IN

## 2025-06-11 DIAGNOSIS — G47.00 INSOMNIA, UNSPECIFIED TYPE: ICD-10-CM

## 2025-06-11 DIAGNOSIS — G43.709 CHRONIC MIGRAINE W/O AURA W/O STATUS MIGRAINOSUS, NOT INTRACTABLE: Primary | ICD-10-CM

## 2025-06-11 PROCEDURE — 99213 OFFICE O/P EST LOW 20 MIN: CPT | Performed by: NURSE PRACTITIONER

## 2025-06-11 RX ORDER — UBROGEPANT 100 MG/1
100 TABLET ORAL PRN
Qty: 10 TABLET | Refills: 3 | Status: SHIPPED | OUTPATIENT
Start: 2025-06-11

## 2025-06-11 ASSESSMENT — ENCOUNTER SYMPTOMS
CONSTIPATION: 0
SHORTNESS OF BREATH: 0
EYE PAIN: 0
COUGH: 0
BACK PAIN: 1
PHOTOPHOBIA: 1

## 2025-06-18 ENCOUNTER — TELEPHONE (OUTPATIENT)
Dept: FAMILY MEDICINE CLINIC | Facility: CLINIC | Age: 60
End: 2025-06-18

## 2025-06-18 NOTE — TELEPHONE ENCOUNTER
Pt states she's leaving town on the 21st but her meds are not due to be refilled until 22nd. Pharmacy needs approval from doctor to refill early    Tramadol 50mg & Lorazepam 1mg

## 2025-06-19 NOTE — TELEPHONE ENCOUNTER
Pt called to get an update of med refill early. She is leaving to go out of town tomorrow early morning. See previous message.

## 2025-08-27 ENCOUNTER — OFFICE VISIT (OUTPATIENT)
Dept: SURGERY | Age: 60
End: 2025-08-27
Payer: MEDICAID

## 2025-08-27 VITALS
HEIGHT: 66 IN | DIASTOLIC BLOOD PRESSURE: 90 MMHG | WEIGHT: 140 LBS | SYSTOLIC BLOOD PRESSURE: 150 MMHG | HEART RATE: 68 BPM | BODY MASS INDEX: 22.5 KG/M2

## 2025-08-27 DIAGNOSIS — Z71.3 NUTRITIONAL COUNSELING: ICD-10-CM

## 2025-08-27 DIAGNOSIS — Z71.82 EXERCISE COUNSELING: ICD-10-CM

## 2025-08-27 DIAGNOSIS — R19.5 LOOSE STOOLS: ICD-10-CM

## 2025-08-27 DIAGNOSIS — Z98.84 S/P GASTRIC BYPASS: ICD-10-CM

## 2025-08-27 DIAGNOSIS — K76.0 NONALCOHOLIC FATTY LIVER: ICD-10-CM

## 2025-08-27 PROBLEM — J45.30 MILD PERSISTENT ASTHMA WITHOUT COMPLICATION: Status: ACTIVE | Noted: 2025-05-15

## 2025-08-27 PROBLEM — G47.00 INSOMNIA: Status: ACTIVE | Noted: 2025-05-15

## 2025-08-27 PROBLEM — G89.29 CHRONIC LOWER BACK PAIN: Status: ACTIVE | Noted: 2017-03-01

## 2025-08-27 PROBLEM — E05.00 GRAVES' OPHTHALMOPATHY: Status: ACTIVE | Noted: 2020-03-05

## 2025-08-27 PROBLEM — E03.9 HYPOTHYROID: Status: ACTIVE | Noted: 2017-03-07

## 2025-08-27 PROCEDURE — 3080F DIAST BP >= 90 MM HG: CPT | Performed by: PHYSICIAN ASSISTANT

## 2025-08-27 PROCEDURE — 3077F SYST BP >= 140 MM HG: CPT | Performed by: PHYSICIAN ASSISTANT

## 2025-08-27 PROCEDURE — 99215 OFFICE O/P EST HI 40 MIN: CPT | Performed by: PHYSICIAN ASSISTANT

## 2025-08-27 RX ORDER — TRAMADOL HYDROCHLORIDE 50 MG/1
TABLET ORAL
COMMUNITY
Start: 2025-08-12

## 2025-09-03 ENCOUNTER — OFFICE VISIT (OUTPATIENT)
Dept: FAMILY MEDICINE CLINIC | Facility: CLINIC | Age: 60
End: 2025-09-03
Payer: MEDICAID

## 2025-09-03 VITALS
DIASTOLIC BLOOD PRESSURE: 74 MMHG | WEIGHT: 139 LBS | BODY MASS INDEX: 22.44 KG/M2 | TEMPERATURE: 97.4 F | HEART RATE: 57 BPM | OXYGEN SATURATION: 97 % | SYSTOLIC BLOOD PRESSURE: 114 MMHG

## 2025-09-03 DIAGNOSIS — M54.16 LUMBAR RADICULOPATHY: Primary | ICD-10-CM

## 2025-09-03 DIAGNOSIS — F41.9 ANXIETY: ICD-10-CM

## 2025-09-03 DIAGNOSIS — I10 PRIMARY HYPERTENSION: ICD-10-CM

## 2025-09-03 DIAGNOSIS — Z87.19 HX OF GASTROESOPHAGEAL REFLUX (GERD): ICD-10-CM

## 2025-09-03 DIAGNOSIS — E89.0 POSTSURGICAL HYPOTHYROIDISM: ICD-10-CM

## 2025-09-03 DIAGNOSIS — F33.9 RECURRENT DEPRESSION: ICD-10-CM

## 2025-09-03 DIAGNOSIS — R11.2 NAUSEA AND VOMITING, UNSPECIFIED VOMITING TYPE: ICD-10-CM

## 2025-09-03 PROCEDURE — 3078F DIAST BP <80 MM HG: CPT | Performed by: FAMILY MEDICINE

## 2025-09-03 PROCEDURE — 99214 OFFICE O/P EST MOD 30 MIN: CPT | Performed by: FAMILY MEDICINE

## 2025-09-03 PROCEDURE — 3074F SYST BP LT 130 MM HG: CPT | Performed by: FAMILY MEDICINE

## 2025-09-03 RX ORDER — LORAZEPAM 1 MG/1
1 TABLET ORAL EVERY 8 HOURS PRN
Qty: 60 TABLET | Refills: 3 | Status: SHIPPED | OUTPATIENT
Start: 2025-09-03 | End: 2026-01-01

## 2025-09-03 RX ORDER — ONDANSETRON 4 MG/1
4 TABLET, ORALLY DISINTEGRATING ORAL 3 TIMES DAILY PRN
Qty: 12 TABLET | Refills: 0 | Status: SHIPPED | OUTPATIENT
Start: 2025-09-03

## 2025-09-03 RX ORDER — OMEPRAZOLE 40 MG/1
40 CAPSULE, DELAYED RELEASE ORAL
Qty: 90 CAPSULE | Refills: 3 | Status: SHIPPED | OUTPATIENT
Start: 2025-09-03

## 2025-09-03 RX ORDER — AMLODIPINE BESYLATE 5 MG/1
5 TABLET ORAL DAILY
Qty: 90 TABLET | Refills: 3 | Status: SHIPPED | OUTPATIENT
Start: 2025-09-03

## 2025-09-03 RX ORDER — TRAMADOL HYDROCHLORIDE 50 MG/1
50 TABLET ORAL EVERY 8 HOURS PRN
Qty: 60 TABLET | Refills: 5 | Status: SHIPPED | OUTPATIENT
Start: 2025-09-03 | End: 2026-03-02

## 2025-09-03 ASSESSMENT — ENCOUNTER SYMPTOMS
COUGH: 0
ABDOMINAL PAIN: 0
DIARRHEA: 0
SHORTNESS OF BREATH: 0
CONSTIPATION: 0
CHEST TIGHTNESS: 0
EYE DISCHARGE: 0
SINUS PAIN: 0

## (undated) DEVICE — APPLICATOR BNDG 1MM ADH PREMIERPRO EXOFIN

## (undated) DEVICE — BAG DRNGE 4000ML CONT IRRIG ROUNDED TEARDROP SHP DISP

## (undated) DEVICE — SUTURE DEV SZ 2-0 WND CLSR ABSRB GS-22 VLOC COVIDIEN VLOCM2145

## (undated) DEVICE — BINDER ABD M/L H12IN FOR 46-62IN WHT 4 SLD PNL DSGN HOOP

## (undated) DEVICE — REM POLYHESIVE ADULT PATIENT RETURN ELECTRODE: Brand: VALLEYLAB

## (undated) DEVICE — STAPLER 60 RELOAD WHITE: Brand: SUREFORM

## (undated) DEVICE — CATHETERIZATION KIT PEDIATRIC 18 FR 5 CC FOL TEMP SENS

## (undated) DEVICE — COLUMN DRAPE

## (undated) DEVICE — SUT CHRMC 4-0 27IN RB1 BRN --

## (undated) DEVICE — DRAPE,TOP,102X53,STERILE: Brand: MEDLINE

## (undated) DEVICE — TIP COVER ACCESSORY

## (undated) DEVICE — BLADELESS OBTURATOR: Brand: WECK VISTA

## (undated) DEVICE — LAPAROSCOPIC TROCAR SLEEVE/SINGLE USE: Brand: KII® OPTICAL ACCESS SYSTEM

## (undated) DEVICE — SUTURE NONABSORBABLE MONOFILAMENT 5-0 PS-2 18 IN BLK ETHILON 1666H

## (undated) DEVICE — DRAPE TWL SURG 16X26IN BLU ORB04] ALLCARE INC]

## (undated) DEVICE — GOWN,REINF,POLY,ECL,PP SLV,XL: Brand: MEDLINE

## (undated) DEVICE — KIT PROCEDURE SURG HEAD AND NECK TOTE

## (undated) DEVICE — MARKER SURG SKIN UTIL BLK REG TIP NONSMEARING W/ 6IN RUL

## (undated) DEVICE — ARM DRAPE

## (undated) DEVICE — DRAIN SURG 10FR END PERF 1/8IN SIL RND SMOOTH HEAT POLISHED

## (undated) DEVICE — AGENT HEMSTAT 5GM ARISTA AH

## (undated) DEVICE — BLADE SURG NO15 S STL STR DISP GLASSVAN

## (undated) DEVICE — KENDALL RADIOLUCENT FOAM MONITORING ELECTRODE RECTANGULAR SHAPE: Brand: KENDALL

## (undated) DEVICE — CARDINAL HEALTH FLEXIBLE LIGHT HANDLE COVER: Brand: CARDINAL HEALTH

## (undated) DEVICE — SHEET,DRAPE,UNDERBUTTOCK,GRAD POUCH,PORT: Brand: MEDLINE

## (undated) DEVICE — SUTURE MCRYL SZ 4-0 L27IN ABSRB UD L19MM PS-2 1/2 CIR PRIM Y426H

## (undated) DEVICE — PROBE 8225101 5PK STD PRASS FL TIP ROHS

## (undated) DEVICE — GAUZE,SPONGE,2"X2",8PLY,STERILE,LF,2'S: Brand: MEDLINE

## (undated) DEVICE — AGENT HEMSTAT W2XL14IN OXIDIZED REGENERATED CELOS ABSRB FOR

## (undated) DEVICE — SPONGE LAP 18X18IN STRL -- 5/PK

## (undated) DEVICE — (D)PREP SKN CHLRAPRP APPL 26ML -- CONVERT TO ITEM 371833

## (undated) DEVICE — SUTURE VCRL SZ 0 L18IN ABSRB UD L36MM CT-1 1/2 CIR J840D

## (undated) DEVICE — CONTAINER SPEC HISTOLOGY 900ML POLYPR

## (undated) DEVICE — GARMENT,MEDLINE,DVT,INT,CALF,MED, GEN2: Brand: MEDLINE

## (undated) DEVICE — FENESTRATED BIPOLAR FORCEPS: Brand: ENDOWRIST

## (undated) DEVICE — CONNECTOR TBNG WHT PLAS SUCT STR 5IN1 LTWT W/ M CONN

## (undated) DEVICE — MONOPOLAR CAUTERY CORD

## (undated) DEVICE — SEAL UNIV 5-8MM DISP BX/10 -- DA VINCI XI - SNGL USE

## (undated) DEVICE — BIPOLAR CAUTERY CORD

## (undated) DEVICE — SPONGE: SPECIALTY PEANUT XR 100/CS: Brand: MEDICAL ACTION INDUSTRIES

## (undated) DEVICE — APPLICATOR FBR TIP L6IN COT TIP WOOD SHFT SWAB 2000 PER CA

## (undated) DEVICE — RESERVOIR,SUCTION,100CC,SILICONE: Brand: MEDLINE

## (undated) DEVICE — SPECIMEN RETRIEVAL POUCH: Brand: ENDO CATCH GOLD

## (undated) DEVICE — CONTAINER,SPECIMEN,O.R.STRL,4.5OZ: Brand: MEDLINE

## (undated) DEVICE — GENERAL ROBOTIC: Brand: MEDLINE INDUSTRIES, INC.

## (undated) DEVICE — LEGGINGS, PAIR, 31X48, STERILE: Brand: MEDLINE

## (undated) DEVICE — COVER,TABLE,44X76,STERILE: Brand: MEDLINE

## (undated) DEVICE — SHEAR HARMONIC FOCUS OEM 9CM --

## (undated) DEVICE — GUIDE CATHETER: Brand: WISEGUIDE™

## (undated) DEVICE — VCARE MEDIUM, UTERINE MANIPULATOR, VAGINAL-CERVICAL-AHLUWALIA'S-RETRACTOR-ELEVATOR: Brand: VCARE

## (undated) DEVICE — GARMENT,MEDLINE,DVT,INT,CALF,LG, GEN2: Brand: MEDLINE

## (undated) DEVICE — ROBOTIC PROSTATE: Brand: MEDLINE INDUSTRIES, INC.

## (undated) DEVICE — SYR 3ML LL TIP 1/10ML GRAD --

## (undated) DEVICE — SUTURE ETHBND EXCEL SZ 0 L30IN NONABSORBABLE GRN L26MM SH X834H

## (undated) DEVICE — TROCARS: Brand: KII® BALLOON BLUNT TIP SYSTEM

## (undated) DEVICE — BAG SPEC RETRV 275ML 10ML DISPOSABLE RELIACATCH

## (undated) DEVICE — SOLUTION IRRIG 1000ML STRL H2O USP PLAS POUR BTL

## (undated) DEVICE — APPLICATOR MEDICATED 26 CC SOLUTION HI LT ORNG CHLORAPREP

## (undated) DEVICE — STAPLER 60 RELOAD BLUE: Brand: SUREFORM

## (undated) DEVICE — PAD PT POS 36 IN SURGYPAD DISP

## (undated) DEVICE — [HIGH FLOW INSUFFLATOR,  DO NOT USE IF PACKAGE IS DAMAGED,  KEEP DRY,  KEEP AWAY FROM SUNLIGHT,  PROTECT FROM HEAT AND RADIOACTIVE SOURCES.]: Brand: PNEUMOSURE

## (undated) DEVICE — SUTURE PERMA-HAND SZ 2-0 L30IN NONABSORBABLE BLK L26MM SH K833H

## (undated) DEVICE — BUTTON SWITCH PENCIL BLADE ELECTRODE, HOLSTER: Brand: EDGE

## (undated) DEVICE — NDL PRT INJ NSAF BLNT 18GX1.5 --

## (undated) DEVICE — STAPLER 60: Brand: SUREFORM

## (undated) DEVICE — APPLICATOR LAP 35 CM 2 RIGID VISTASEAL

## (undated) DEVICE — SOLUTION IRRIG 1000ML H2O STRL BLT

## (undated) DEVICE — SUTURE SZ 0 27IN 5/8 CIR UR-6  TAPER PT VIOLET ABSRB VICRYL J603H

## (undated) DEVICE — PROGRASP FORCEPS: Brand: ENDOWRIST

## (undated) DEVICE — TRAY PREP DRY W/ PREM GLV 2 APPL 6 SPNG 2 UNDPD 1 OVERWRAP

## (undated) DEVICE — STANDARD HYPODERMIC NEEDLE,POLYPROPYLENE HUB: Brand: MONOJECT

## (undated) DEVICE — 2000CC GUARDIAN II: Brand: GUARDIAN

## (undated) DEVICE — SUTURE PERMAHAND SZ 2-0 L18IN NONABSORBABLE BLK L26MM PS 1588H

## (undated) DEVICE — APPLICATOR SURG XL L38CM FOR ARISTA ABSRB HEMSTAT FLEXITIP

## (undated) DEVICE — VESSEL SEALER EXTEND: Brand: ENDOWRIST

## (undated) DEVICE — LOGICUT SCISSOR LENGTH 320MM: Brand: LOGI - LAPAROSCOPIC INSTRUMENT SYSTEM

## (undated) DEVICE — SUTURE PERMAHAND SZ 3-0 L18IN NONABSORBABLE BLK SILK BRAID A184H

## (undated) DEVICE — BASIC SINGLE BASIN-LF: Brand: MEDLINE INDUSTRIES, INC.

## (undated) DEVICE — GLOVE SURG SZ 65 THK91MIL LTX FREE SYN POLYISOPRENE

## (undated) DEVICE — JELLY,LUBE,STERILE,FLIP TOP,TUBE,4-OZ: Brand: MEDLINE

## (undated) DEVICE — APPLICATOR COT-TIP WOOD 6IN -- NON STRL

## (undated) DEVICE — SINGLE BASIN: Brand: CARDINAL HEALTH

## (undated) DEVICE — SOLUTION IV 1000ML 0.9% SOD CHL

## (undated) DEVICE — 2, DISPOSABLE SUCTION/IRRIGATOR WITHOUT DISPOSABLE TIP: Brand: STRYKEFLOW

## (undated) DEVICE — APPLIER CLP L9.38IN M LIG TI DISP STR RNG HNDL LIGACLP

## (undated) DEVICE — LUKI TUBE SPECIMEN COLLECTION DEVICE,20 ML: Brand: ARGYLE

## (undated) DEVICE — SYR 5ML 1/5 GRAD LL NSAF LF --

## (undated) DEVICE — VISUALIZATION SYSTEM: Brand: CLEARIFY

## (undated) DEVICE — REDUCER: Brand: ENDOWRIST

## (undated) DEVICE — NEEDLE HYPO 21GA L1.5IN INTRAMUSCULAR S STL LATCH BVL UP

## (undated) DEVICE — APPLIER CLP M L L11.4IN DIA10MM ENDOSCP ROT MULT FOR LIG

## (undated) DEVICE — DRAPE,LAP,CHOLE,W/TROUGHS,STERILE: Brand: MEDLINE

## (undated) DEVICE — SYR 10ML LUER LOK 1/5ML GRAD --

## (undated) DEVICE — EMG TUBE 8229707 NIM TRIVANTAGE 7.0MM ID: Brand: NIM TRIVANTAGE™

## (undated) DEVICE — SCISSORS SURG DIA8MM MPLR CRV ENDOWRIST

## (undated) DEVICE — LIQUIBAND RAPID ADHESIVE 36/CS 0.8ML: Brand: MEDLINE

## (undated) DEVICE — NEEDLE INSUF L120MM ULT VERES ENDOPATH

## (undated) DEVICE — PAD,NON-ADHERENT,3X8,STERILE,LF,1/PK: Brand: MEDLINE

## (undated) DEVICE — GLOVE SURG SZ 7 L12IN FNGR THK79MIL GRN LTX FREE

## (undated) DEVICE — SUTURE VCRL SZ 0 L36IN ABSRB UD L36MM CT-1 1/2 CIR J946H

## (undated) DEVICE — ELECTRO LUBE IS A SINGLE PATIENT USE DEVICE THAT IS INTENDED TO BE USED ON ELECTROSURGICAL ELECTRODES TO REDUCE STICKING.: Brand: KEY SURGICAL ELECTRO LUBE

## (undated) DEVICE — CONTAINER SPEC FRMLN 120ML --

## (undated) DEVICE — DRAPE,UNDERBUTTOCKS,PCH,STERILE: Brand: MEDLINE

## (undated) DEVICE — MEGA SUTURECUT ND: Brand: ENDOWRIST

## (undated) DEVICE — SUTURE VCRL SZ 2-0 L27IN ABSRB VLT L26MM SH 1/2 CIR J317H

## (undated) DEVICE — CONNECTOR TBNG OD5-7MM O2 END DISP

## (undated) DEVICE — SUTURE VCRL + SZ 4-0 L27IN ABSRB UD PS-2 3/8 CIR REV CUT VCP426H

## (undated) DEVICE — SUTURE PERMAHAND SZ 2-0 L18IN NONABSORBABLE BLK L26MM SH C012D

## (undated) DEVICE — INSULATED BLADE ELECTRODE: Brand: EDGE

## (undated) DEVICE — 40585 XL ADVANCED TRENDELENBURG POSITIONING KIT: Brand: 40585 XL ADVANCED TRENDELENBURG POSITIONING KIT

## (undated) DEVICE — JELLY LUBRICATING 10GM PREFIL SYR LUBE

## (undated) DEVICE — SEAL

## (undated) DEVICE — CANNULA NSL ORAL AD FOR CAPNOFLEX CO2 O2 AIRLFE